# Patient Record
Sex: MALE | Race: WHITE | NOT HISPANIC OR LATINO | Employment: OTHER | URBAN - METROPOLITAN AREA
[De-identification: names, ages, dates, MRNs, and addresses within clinical notes are randomized per-mention and may not be internally consistent; named-entity substitution may affect disease eponyms.]

---

## 2021-06-16 PROBLEM — A41.9 SEPSIS (H): Status: ACTIVE | Noted: 2018-06-09

## 2021-06-16 PROBLEM — L03.90 CELLULITIS: Status: ACTIVE | Noted: 2018-07-02

## 2021-06-16 PROBLEM — K21.9 GERD (GASTROESOPHAGEAL REFLUX DISEASE): Status: ACTIVE | Noted: 2018-05-16

## 2021-06-25 ENCOUNTER — HOSPITAL ENCOUNTER (EMERGENCY)
Dept: EMERGENCY MEDICINE | Facility: CLINIC | Age: 76
Discharge: HOME | End: 2021-06-25
Attending: EMERGENCY MEDICINE
Payer: MEDICARE

## 2021-06-25 DIAGNOSIS — S39.012A BACK STRAIN, INITIAL ENCOUNTER: ICD-10-CM

## 2021-06-25 DIAGNOSIS — S09.90XA CLOSED HEAD INJURY, INITIAL ENCOUNTER: ICD-10-CM

## 2021-06-25 DIAGNOSIS — T07.XXXA ABRASION, MULTIPLE SITES: ICD-10-CM

## 2021-06-25 RX ORDER — OXYCODONE HYDROCHLORIDE 5 MG/1
5-10 TABLET ORAL EVERY 6 HOURS PRN
Qty: 20 TABLET | Refills: 0 | Status: SHIPPED | OUTPATIENT
Start: 2021-06-25 | End: 2022-08-24

## 2021-06-25 RX ORDER — CYCLOBENZAPRINE HCL 10 MG
10 TABLET ORAL 3 TIMES DAILY PRN
Qty: 15 TABLET | Refills: 0 | Status: SHIPPED | OUTPATIENT
Start: 2021-06-25 | End: 2023-04-04

## 2021-06-26 NOTE — ED PROVIDER NOTES
EMERGENCY DEPARTMENT ENCOUNTER      NAME: Nirav Baker  AGE: 76 y.o. male  YOB: 1945  MRN: [id number]  EVALUATION DATE & TIME: 6/25/2021 11:06 AM    PCP: Mae Carranza PA-C    ED PROVIDER: Genoveva Eric M.D.      Chief Complaint   Patient presents with     Fall     Back Pain         FINAL IMPRESSION:  1. Back strain, initial encounter    2. Closed head injury, initial encounter    3. Abrasion, multiple sites          ED COURSE & MEDICAL DECISION MAKING:    Pertinent Labs & Imaging studies reviewed. (See chart for details)  11:15 AM I saw the patient wearing an eye shield, surgical mask, N95, and gloves.    2:52 PM I checked in on patient who is feeling improved but continues to endorse pain with movement. Discussed work up results and plan for discharge with outpatient follow up. Patient agreeable with plan.     76 y.o. male presents to the Emergency Department for evaluation of fall and back pain.    ED Course as of Jun 25 1457   Fri Jun 25, 2021   1204 Given how the patient fell on outstretched hands and landing on his knees just nicking his forehead, low risk for significant head injury and he is not on any blood thinners except aspirin.  He also has no LOC with this and no neurologic symptoms so at this point no further imaging is needed we will just have him monitor for symptoms at home and return here if they occur.  He is comfortable with that.  He does not even have a headache but notes he does get frequent migraines.  He is more so having diffuse muscle spasms of his back.  This seems more likely to be muscular but he is obese and so it certainly is possible that even with how he fell if he bent significantly he could have some compression fracture or wedge fracture so we will do imaging of his back specifically in the areas where he is having discomfort.  He has no radicular symptoms and no neck pain so no imaging there as needed.  We discussed pain control and we will start with  morphine and then Percocet.  As far as the abrasion laceration over his right eyebrow he is comfortable just using gauze to treat that as its not gaping and should heal fine.  His tetanus is up-to-date as of earlier this year.  His other scrapes are not associated with any bony pain or joint swelling so no further imaging there as needed.  He is having some shortness of breath but it is primarily just with taking a deep breath because of the back pain but we will get an EKG on him.    [NB]      ED Course User Index  [NB] Genoveva Eric MD         At the conclusion of the encounter I discussed the results of all of the tests and the disposition. The questions were answered. The patient or family acknowledged understanding and was agreeable with the care plan.       0 minutes of critical care time not including time for procedures which is documented separately under procedures heading.    MEDICATIONS GIVEN IN THE EMERGENCY:  Medications   bacitracin ointment packet 1 packet (has no administration in time range)   sodium chloride flush 10 mL (NS) (has no administration in time range)   cyclobenzaprine tablet 10 mg (FLEXERIL) (has no administration in time range)   morphine injection 6 mg (6 mg Intravenous Given 6/25/21 1150)   oxyCODONE-acetaminophen 5-325 mg 2 tablet (PERCOCET/ENDOCET) (2 tablets Oral Given 6/25/21 1142)       NEW PRESCRIPTIONS STARTED AT TODAY'S ER VISIT  Current Discharge Medication List      START taking these medications    Details   cyclobenzaprine (FLEXERIL) 10 MG tablet Take 1 tablet (10 mg total) by mouth 3 (three) times a day as needed for muscle spasms.  Qty: 15 tablet, Refills: 0    Associated Diagnoses: Back strain, initial encounter      oxyCODONE (ROXICODONE) 5 MG immediate release tablet Take 1-2 tablets (5-10 mg total) by mouth every 6 (six) hours as needed for pain.  Qty: 20 tablet, Refills: 0    Associated Diagnoses: Back strain, initial encounter         CONTINUE these  medications which have NOT CHANGED    Details   acetaminophen (TYLENOL) 325 MG tablet Take 2 tablets by mouth every 4 (four) hours as needed.      aspirin 325 MG EC tablet Take 1 tablet by mouth at bedtime.       aspirin-acetaminophen-caffeine (EXCEDRIN MIGRAINE) 250-250-65 mg per tablet Take 1 tablet by mouth every 6 (six) hours as needed for pain.      calcium carbonate (TUMS ULTRA) 400 mg calcium (1,000 mg) Chew Chew 2 tablets at bedtime.       chlorthalidone (HYGROTEN) 25 MG tablet Take 12.5 mg by mouth at bedtime.       cholecalciferol, vitamin D3, 5,000 unit capsule Take 5,000 Units by mouth at bedtime.       diphenhydrAMINE (BENADRYL) 25 mg tablet Take 25 mg by mouth at bedtime as needed for itching.      ezetimibe (ZETIA) 10 mg tablet Take 1 tablet by mouth at bedtime.       glipiZIDE (GLUCOTROL XL) 10 MG 24 hr tablet Take 1 tablet (10 mg total) by mouth daily.  Qty: 30 tablet, Refills: 0    Associated Diagnoses: Diabetes mellitus without complication (H)      indomethacin (INDOCIN) 50 MG capsule Take 1 capsule by mouth 3 (three) times a day as needed.       irbesartan (AVAPRO) 300 MG tablet Take 300 mg by mouth at bedtime.      Lactobacillus rhamnosus GG (CULTURELLE) 10-15 Billion cell capsule Take 2 capsules by mouth daily.      LANTUS U-100 INSULIN 100 unit/mL injection Inject 60 Units under the skin 2 (two) times a day.  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       metFORMIN (GLUCOPHAGE) 1000 MG tablet Take 1 tablet by mouth at bedtime.       multivitamin (ONE A DAY) per tablet Take 1 tablet by mouth at bedtime.       omega-3/dha/epa/fish oil (FISH OIL-OMEGA-3 FATTY ACIDS) 300-1,000 mg capsule Take 6 g by mouth at bedtime.      omeprazole (PRILOSEC) 20 MG capsule Take 1 capsule by mouth at bedtime.       polyethylene glycol (MIRALAX) 17 gram packet Take 17 g by mouth at bedtime.      "  pseudoephedrine (SUDAFED) 30 MG tablet Take 1 tablet by mouth every 12 (twelve) hours as needed.      riboflavin, vitamin B2, 100 mg Tab Take 1 tablet by mouth at bedtime.       TRADJENTA 5 mg Tab Take 1 tablet by mouth at bedtime.                 =================================================================    HPI    Patient information was obtained from: Patient     Use of : N/A       Nirav Baker is a 76 y.o. male with a pertinent history of metabolic disease, morbid obesity, adenocarcinoma of prostate, diabetes mellitus, mixed hyperlipidemia, migraines, hypertension, acute non-ST segment elevation, celiac disease, GERD and obstructive sleep apnea who presents to this ED via walk-in for evaluation of back pain secondary to a fall.     Patient reports at 09:15 this morning while walking alongside a road under construction he misstepped, lost his balance, and fell forward into the middle of the street. Patient did hit his head but denies loss of consciousness. Able to ambulate after fall. Of note endorses \"severe\" back pain with associated muscle spasms throughout the entire back except for the lower region. Associated pain with deep inhalation secondary to back pain. Additionally obtains a laceration above the right eyelid and abrasions to bilateral knees and right elbow.     Not on anticoagulants. Last Tdap was 05/12/2021. History of chronic low back pain. Denies current low back pain. History of C5 and C6 fusion. Complaint with all daily medications including aspirin. History of diabetes and insulin dependent. Sugars this morning were within normal limits. History of migraine headaches. Medicates with tylenol as needed for pain. Fully vaccinated with Pfizer for covid-19. Denies tobacco use. Patient denies headache, fever, chills, cough, chest pain, shortness of breath, nausea, vomiting, and any other complaints at this time.     REVIEW OF SYSTEMS   Review of Systems   Constitutional: " Negative for chills and fever.   HENT: Negative.    Respiratory: Negative for cough and shortness of breath.    Cardiovascular: Negative for chest pain.   Gastrointestinal: Negative for nausea and vomiting.   Musculoskeletal: Positive for back pain.   Skin: Positive for wound (laceration to right eyelid and abrasions to bilateral knees and right elbow).   Neurological: Negative for headaches.   All other systems reviewed and are negative.     Otherwise, outside of that noted in the HPI, all other systems negative.    PAST MEDICAL HISTORY:  Past Medical History:   Diagnosis Date     Acute non-ST segment elevation myocardial infarction (H) 9/11/2012    Overview:  Admitted 9/8/12 at [facility] in the setting of sepsis and acute renal failure following a prostate biopsy on 9/7/12, and found to also have acute MI. Cardiology consultation Dr. Spence from [facility]. Echocardiogram done 9/10/12 shows EF of 45% with mild to moderate inferior wall hypokinesis and mild global LV hypokinesis, mild LVH, mildly dilated right ventricle with mildly reduced RV function and moderately dilated left and right atria. Transferred to [facility] for coronary angiography once his acute renal failure resolved, and angiography did not find any lesion amenable to stenting. His troponin elevation was felt by Cardiology (Dr. Jacobo) to most likely be due to stress of sepsis.     Adenocarcinoma of prostate (H) 12/13/2012    Overview:  Independence score 7/10 confined to the prostate gland, bilateral and involving <5% of the gland, incidentally found when robotic prostatectomy done 12/2012 for PIN and BPH causing urinary retention.     Celiac disease 5/20/2011     Diabetes mellitus without complication (H) 12/3/2002    Overview:  Dx 3/1997.     GERD (gastroesophageal reflux disease) 5/16/2018     Hypertension      Metabolic disease      Migraines 8/14/2011     Mixed hyperlipidemia 12/10/2006     Morbid obesity (H)       "Obstructive sleep apnea syndrome 5/21/2007    Overview:  On CPAP. POLYSOMNOGRAM  PSG type: split night Date of study:12/06/2003; weight: 318# Facility: [facility] Sleep [facility] Baseline data: Total Sleep Time:  195\" Sleep Efficiency: 96.4% AHI: 58.5 RDI: 63.1 Jun O2%: 66 MD interp: ELDON, CPAP 15cm water        PAST SURGICAL HISTORY:  No past surgical history on file.        CURRENT MEDICATIONS:    No current facility-administered medications on file prior to encounter.      Current Outpatient Medications on File Prior to Encounter   Medication Sig     acetaminophen (TYLENOL) 325 MG tablet Take 2 tablets by mouth every 4 (four) hours as needed.     aspirin 325 MG EC tablet Take 1 tablet by mouth at bedtime.      aspirin-acetaminophen-caffeine (EXCEDRIN MIGRAINE) 250-250-65 mg per tablet Take 1 tablet by mouth every 6 (six) hours as needed for pain.     calcium carbonate (TUMS ULTRA) 400 mg calcium (1,000 mg) Chew Chew 2 tablets at bedtime.      chlorthalidone (HYGROTEN) 25 MG tablet Take 12.5 mg by mouth at bedtime.      cholecalciferol, vitamin D3, 5,000 unit capsule Take 5,000 Units by mouth at bedtime.      diphenhydrAMINE (BENADRYL) 25 mg tablet Take 25 mg by mouth at bedtime as needed for itching.     ezetimibe (ZETIA) 10 mg tablet Take 1 tablet by mouth at bedtime.      glipiZIDE (GLUCOTROL XL) 10 MG 24 hr tablet Take 1 tablet (10 mg total) by mouth daily. (Patient taking differently: Take 20 mg by mouth at bedtime. )     indomethacin (INDOCIN) 50 MG capsule Take 1 capsule by mouth 3 (three) times a day as needed.      irbesartan (AVAPRO) 300 MG tablet Take 300 mg by mouth at bedtime.     Lactobacillus rhamnosus GG (CULTURELLE) 10-15 Billion cell capsule Take 2 capsules by mouth daily.     LANTUS U-100 INSULIN 100 unit/mL injection Inject 60 Units under the skin 2 (two) times a day.     magnesium 250 mg Tab Take 500 mg by mouth at bedtime.      metFORMIN (GLUCOPHAGE) 1000 MG tablet Take 1 tablet by mouth at " "bedtime.      multivitamin (ONE A DAY) per tablet Take 1 tablet by mouth at bedtime.      omega-3/dha/epa/fish oil (FISH OIL-OMEGA-3 FATTY ACIDS) 300-1,000 mg capsule Take 6 g by mouth at bedtime.     omeprazole (PRILOSEC) 20 MG capsule Take 1 capsule by mouth at bedtime.      polyethylene glycol (MIRALAX) 17 gram packet Take 17 g by mouth at bedtime.      pseudoephedrine (SUDAFED) 30 MG tablet Take 1 tablet by mouth every 12 (twelve) hours as needed.     riboflavin, vitamin B2, 100 mg Tab Take 1 tablet by mouth at bedtime.      TRADJENTA 5 mg Tab Take 1 tablet by mouth at bedtime.        ALLERGIES:  Allergies   Allergen Reactions     Amoxicillin Hives     Per Dr. Barrientos, patient has tolerated Keflex.     Atorvastatin Diarrhea and Myalgia     Fenofibrate Myalgia     Hydrocodone-Acetaminophen Other (See Comments)     Patient reports \"going white as a sheet\" and cold sweats     Lisinopril Cough     Neosporin ([facility]) [Neomycin-Bacitracnzn-Polymyxnb]      Pravastatin Sodium Myalgia     Trulicity [Dulaglutide]      Gluten Other (See Comments)     Reports feeling worse with gluten; tested below threshold for Celiac but daughter is positive     Insulin Detemir Rash       FAMILY HISTORY:  No family history on file.    SOCIAL HISTORY:   Social History     Socioeconomic History     Marital status:      Spouse name: Not on file     Number of children: Not on file     Years of education: Not on file     Highest education level: Not on file   Occupational History     Not on file   Social Needs     Financial resource strain: Not on file     Food insecurity     Worry: Not on file     Inability: Not on file     Transportation needs     Medical: Not on file     Non-medical: Not on file   Tobacco Use     Smoking status: Never Smoker     Smokeless tobacco: Never Used   Substance and Sexual Activity     Alcohol use: Yes     Alcohol/week: 1.0 standard drinks     Types: 1 Standard drinks or equivalent per week     Drug use: " No     Sexual activity: Not on file   Lifestyle     Physical activity     Days per week: Not on file     Minutes per session: Not on file     Stress: Not on file   Relationships     Social connections     Talks on phone: Not on file     Gets together: Not on file     Attends Holiness service: Not on file     Active member of club or organization: Not on file     Attends meetings of clubs or organizations: Not on file     Relationship status: Not on file     Intimate partner violence     Fear of current or ex partner: Not on file     Emotionally abused: Not on file     Physically abused: Not on file     Forced sexual activity: Not on file   Other Topics Concern     Not on file   Social History Narrative     Not on file       VITALS:  Patient Vitals for the past 24 hrs:   BP Temp Temp src Pulse Resp SpO2 Weight   06/25/21 1357 138/65 -- -- 75 16 97 % --   06/25/21 1102 161/77 98.9  F (37.2  C) Oral 80 18 97 % (!) 339 lb (153.8 kg)       PHYSICAL EXAM    Constitutional:  Patient sitting in bed. Appears comfortable at rest but endorses pain with movement.   Eyes:  PERRLA bilaterally, no hyphema, no diplopia,   HENT:  NCAT, canals clear, no lacerations, no hemotympanum, no epistaxis, no septal hematoma, oropharynx clear, no blood in posterior pharynx, teeth intact, trachea midline   Spine:  No C-Collar,Tenderness over T8 and L1. Muscles are diffusely sore.    Respiratory:  Clear to auscultation, equal breath sounds bilaterally but somewhat distant sounds. no subcutaneous air, atraumatic chest wall, stable chest wall to ap and lateral palpation.   Cardiovascular:   RRR S1 S2, no murmurs or friction rubs, No JVD, pulses are equal and symmetrically in all extremities  Abdomen:  Soft, Non-distended, non-tender, atraumatic,  Pelvis:  Stable, nontender to ap and lateral compression and to rock.    /Rectal:  No signs of trauma, no gross hematuria, no blood at the urethral meatus, no perineal ecchymosis.  Musculoskeletal:   Tenderness over T8 and L1. Muscles are diffusely sore.  Integument:  Superficial laceration 1.5 cm over the right eyebrow. Laceration is starting to scab over. Bleeding controlled. Abrasion to bilateral knees. No overlying contusion. Contact road-rash to the right elbow that is not completely through the skins surface.   Neurologic:  Awake, Alert, and Oriented x3, GCS 15, diffusely normal sensation including perirectally, spontaneously able to move all extremities         LAB:  All pertinent labs reviewed and interpreted.  No results found for this visit on 06/25/21.    RADIOLOGY:  Reviewed all pertinent imaging. Please see official radiology report.  Xr Thoracic Spine 3 Vws    Result Date: 6/25/2021  EXAM: XR THORACIC SPINE 3 VWS LOCATION: [facility] DATE/TIME: 6/25/2021 12:59 PM INDICATION: fall forwards landed on hands/knees and now diffuse back spasm with focal pain T8 area and L1 area. COMPARISON: Chest x-ray 02/20/2018. TECHNIQUE: CR Thoracic Spine.     Vertebral body heights are unremarkable. Alignment is unremarkable in the lateral projection. There is mild broad-based dextroconvex thoracic curvature. There are confluent anterior endplate osteophytes, compatible with diffuse idiopathic skeletal hyperostosis. Intervertebral disc spaces appear preserved. Pedicles appear symmetric. Visualized lung fields are well aerated.      Xr Lumbar Spine 2 Or 3 Vws    Result Date: 6/25/2021  EXAM: XR LUMBAR SPINE 2 OR 3 VWS LOCATION: [facility] DATE/TIME: 6/25/2021 12:54 PM INDICATION: fall forwards landed on hands/knees and now diffuse back spasm with focal pain t8 area and l1 area. COMPARISON: Radiograph 11/16/2020. Abdominal CT 06/19/2018. TECHNIQUE: CR Lumbar Spine.     Nomenclature presumes 5 lumbar type vertebral bodies. Total body heights are unremarkable and unchanged. There is mild L3-L4 and L2-L3 retrolisthesis, similar to prior. Mild L1-L2, L3-L4 and L4-L5  intervertebral disc height loss suggests underlying spondylosis. Pedicles appear symmetric. The imaged portion of the sacrum appears grossly intact. However, it is partially obscured by stool and bowel gas.    Xr Chest 1 View    Result Date: 6/25/2021  EXAM: XR CHEST 1 VIEW LOCATION: [facility] DATE/TIME: 6/25/2021 12:53 PM INDICATION: fall forwards landed on hands/knees and now diffuse back spasm with focal pain t8 area and l1 area.  causing sob COMPARISON: 06/09/2018     No focal consolidation or pleural effusion. Stable mildly enlarged cardiac silhouette. The pulmonary vasculature is normal.        I, Neal Domínguez , am serving as a scribe to document services personally performed by Dr. Jeaneth MD, based on my observation and the provider's statements to me. I, Genoveva Eric MD attest that Neal Domínguez  is acting in a scribe capacity, has observed my performance of the services and has documented them in accordance with my direction.    Genoveva Eric M.D.  Emergency Medicine  [facility] EMERGENCY ROOM  [address] [address]  Dept: [phone]  Loc: [phone]     Genoveva Eric MD  06/25/21 1316

## 2021-07-06 VITALS — WEIGHT: 315 LBS | BODY MASS INDEX: 45.98 KG/M2

## 2021-07-07 NOTE — ED TRIAGE NOTES
Patient tripped on curb outside @0915 this morning.  Patient has lac to R forehead, not on thinners, no LOC, has diffuse back pain.  Perla Nelson RN.......6/25/2021 11:06 AM

## 2022-02-19 ENCOUNTER — HOSPITAL ENCOUNTER (EMERGENCY)
Facility: CLINIC | Age: 77
Discharge: HOME | End: 2022-02-20
Attending: EMERGENCY MEDICINE | Admitting: EMERGENCY MEDICINE
Payer: COMMERCIAL

## 2022-02-19 DIAGNOSIS — R79.89 ELEVATED SERUM CREATININE: ICD-10-CM

## 2022-02-19 DIAGNOSIS — N23 URETERAL COLIC: ICD-10-CM

## 2022-02-19 DIAGNOSIS — N20.1 URETEROLITHIASIS: ICD-10-CM

## 2022-02-19 PROBLEM — M25.551 BILATERAL HIP PAIN: Status: ACTIVE | Noted: 2020-11-17

## 2022-02-19 PROBLEM — R40.0 UNCONTROLLED DAYTIME SOMNOLENCE: Status: ACTIVE | Noted: 2017-12-08

## 2022-02-19 PROBLEM — G89.29 CHRONIC BILATERAL LOW BACK PAIN WITHOUT SCIATICA: Status: ACTIVE | Noted: 2020-11-17

## 2022-02-19 PROBLEM — G89.29 CHRONIC RIGHT-SIDED THORACIC BACK PAIN: Status: ACTIVE | Noted: 2021-10-07

## 2022-02-19 PROBLEM — T46.6X5D ADVERSE EFFECT OF ANTIHYPERLIPIDEMIC AND ANTIARTERIOSCLEROTIC DRUGS, SUBSEQUENT ENCOUNTER: Status: ACTIVE | Noted: 2021-12-02

## 2022-02-19 PROBLEM — M54.6 CHRONIC RIGHT-SIDED THORACIC BACK PAIN: Status: ACTIVE | Noted: 2021-10-07

## 2022-02-19 PROBLEM — M25.552 BILATERAL HIP PAIN: Status: ACTIVE | Noted: 2020-11-17

## 2022-02-19 PROBLEM — M54.50 CHRONIC BILATERAL LOW BACK PAIN WITHOUT SCIATICA: Status: ACTIVE | Noted: 2020-11-17

## 2022-02-19 LAB
ALBUMIN SERPL-MCNC: 3.6 G/DL (ref 3.5–5)
ALP SERPL-CCNC: 93 U/L (ref 45–120)
ALT SERPL W P-5'-P-CCNC: 40 U/L (ref 0–45)
ANION GAP SERPL CALCULATED.3IONS-SCNC: 13 MMOL/L (ref 5–18)
AST SERPL W P-5'-P-CCNC: 37 U/L (ref 0–40)
BASOPHILS # BLD AUTO: 0.1 10E3/UL (ref 0–0.2)
BASOPHILS NFR BLD AUTO: 1 %
BILIRUB SERPL-MCNC: 0.8 MG/DL (ref 0–1)
BUN SERPL-MCNC: 26 MG/DL (ref 8–28)
C REACTIVE PROTEIN LHE: 0.4 MG/DL (ref 0–0.8)
CALCIUM SERPL-MCNC: 9 MG/DL (ref 8.5–10.5)
CHLORIDE BLD-SCNC: 105 MMOL/L (ref 98–107)
CO2 SERPL-SCNC: 20 MMOL/L (ref 22–31)
CREAT SERPL-MCNC: 1.6 MG/DL (ref 0.7–1.3)
EOSINOPHIL # BLD AUTO: 0.1 10E3/UL (ref 0–0.7)
EOSINOPHIL NFR BLD AUTO: 1 %
ERYTHROCYTE [DISTWIDTH] IN BLOOD BY AUTOMATED COUNT: 13.8 % (ref 10–15)
GFR SERPL CREATININE-BSD FRML MDRD: 44 ML/MIN/1.73M2
GLUCOSE BLD-MCNC: 202 MG/DL (ref 70–125)
HCT VFR BLD AUTO: 45.5 % (ref 40–53)
HGB BLD-MCNC: 15.8 G/DL (ref 13.3–17.7)
IMM GRANULOCYTES # BLD: 0 10E3/UL
IMM GRANULOCYTES NFR BLD: 0 %
INR PPP: 1.03 (ref 0.85–1.15)
LIPASE SERPL-CCNC: 19 U/L (ref 0–52)
LYMPHOCYTES # BLD AUTO: 1.2 10E3/UL (ref 0.8–5.3)
LYMPHOCYTES NFR BLD AUTO: 11 %
MCH RBC QN AUTO: 30.1 PG (ref 26.5–33)
MCHC RBC AUTO-ENTMCNC: 34.7 G/DL (ref 31.5–36.5)
MCV RBC AUTO: 87 FL (ref 78–100)
MONOCYTES # BLD AUTO: 0.5 10E3/UL (ref 0–1.3)
MONOCYTES NFR BLD AUTO: 5 %
NEUTROPHILS # BLD AUTO: 8.5 10E3/UL (ref 1.6–8.3)
NEUTROPHILS NFR BLD AUTO: 82 %
NRBC # BLD AUTO: 0 10E3/UL
NRBC BLD AUTO-RTO: 0 /100
PLATELET # BLD AUTO: 181 10E3/UL (ref 150–450)
POTASSIUM BLD-SCNC: 4.3 MMOL/L (ref 3.5–5)
PROT SERPL-MCNC: 7.4 G/DL (ref 6–8)
RBC # BLD AUTO: 5.25 10E6/UL (ref 4.4–5.9)
SODIUM SERPL-SCNC: 138 MMOL/L (ref 136–145)
TROPONIN I SERPL-MCNC: <0.01 NG/ML (ref 0–0.29)
WBC # BLD AUTO: 10.4 10E3/UL (ref 4–11)

## 2022-02-19 PROCEDURE — 96374 THER/PROPH/DIAG INJ IV PUSH: CPT

## 2022-02-19 PROCEDURE — 85610 PROTHROMBIN TIME: CPT | Performed by: EMERGENCY MEDICINE

## 2022-02-19 PROCEDURE — 83690 ASSAY OF LIPASE: CPT | Performed by: EMERGENCY MEDICINE

## 2022-02-19 PROCEDURE — 99285 EMERGENCY DEPT VISIT HI MDM: CPT | Mod: 25

## 2022-02-19 PROCEDURE — 86140 C-REACTIVE PROTEIN: CPT | Performed by: EMERGENCY MEDICINE

## 2022-02-19 PROCEDURE — 84484 ASSAY OF TROPONIN QUANT: CPT | Performed by: EMERGENCY MEDICINE

## 2022-02-19 PROCEDURE — 96375 TX/PRO/DX INJ NEW DRUG ADDON: CPT

## 2022-02-19 PROCEDURE — 93005 ELECTROCARDIOGRAM TRACING: CPT | Performed by: EMERGENCY MEDICINE

## 2022-02-19 PROCEDURE — 85025 COMPLETE CBC W/AUTO DIFF WBC: CPT | Performed by: EMERGENCY MEDICINE

## 2022-02-19 PROCEDURE — 80053 COMPREHEN METABOLIC PANEL: CPT | Performed by: EMERGENCY MEDICINE

## 2022-02-19 PROCEDURE — 36415 COLL VENOUS BLD VENIPUNCTURE: CPT | Performed by: EMERGENCY MEDICINE

## 2022-02-20 ENCOUNTER — APPOINTMENT (OUTPATIENT)
Dept: CT IMAGING | Facility: CLINIC | Age: 77
End: 2022-02-20
Attending: EMERGENCY MEDICINE
Payer: COMMERCIAL

## 2022-02-20 VITALS
OXYGEN SATURATION: 97 % | BODY MASS INDEX: 42.66 KG/M2 | HEIGHT: 72 IN | WEIGHT: 315 LBS | RESPIRATION RATE: 23 BRPM | HEART RATE: 94 BPM | SYSTOLIC BLOOD PRESSURE: 134 MMHG | DIASTOLIC BLOOD PRESSURE: 68 MMHG | TEMPERATURE: 97.3 F

## 2022-02-20 LAB
ALBUMIN UR-MCNC: 10 MG/DL
APPEARANCE UR: ABNORMAL
BACTERIA #/AREA URNS HPF: ABNORMAL /HPF
BILIRUB UR QL STRIP: NEGATIVE
COLOR UR AUTO: YELLOW
GLUCOSE UR STRIP-MCNC: 50 MG/DL
HGB UR QL STRIP: ABNORMAL
KETONES UR STRIP-MCNC: 10 MG/DL
LEUKOCYTE ESTERASE UR QL STRIP: NEGATIVE
MUCOUS THREADS #/AREA URNS LPF: PRESENT /LPF
NITRATE UR QL: NEGATIVE
PH UR STRIP: 5 [PH] (ref 5–7)
RBC URINE: >182 /HPF
SP GR UR STRIP: 1.02 (ref 1–1.03)
UROBILINOGEN UR STRIP-MCNC: <2 MG/DL
WBC URINE: 5 /HPF

## 2022-02-20 PROCEDURE — 250N000013 HC RX MED GY IP 250 OP 250 PS 637: Performed by: EMERGENCY MEDICINE

## 2022-02-20 PROCEDURE — 250N000011 HC RX IP 250 OP 636: Performed by: EMERGENCY MEDICINE

## 2022-02-20 PROCEDURE — 74176 CT ABD & PELVIS W/O CONTRAST: CPT

## 2022-02-20 PROCEDURE — 81001 URINALYSIS AUTO W/SCOPE: CPT | Performed by: EMERGENCY MEDICINE

## 2022-02-20 RX ORDER — ACETAMINOPHEN 325 MG/1
975 TABLET ORAL ONCE
Status: COMPLETED | OUTPATIENT
Start: 2022-02-20 | End: 2022-02-20

## 2022-02-20 RX ORDER — DIMENHYDRINATE 50 MG/ML
25 INJECTION, SOLUTION INTRAMUSCULAR; INTRAVENOUS ONCE
Status: COMPLETED | OUTPATIENT
Start: 2022-02-20 | End: 2022-02-20

## 2022-02-20 RX ORDER — KETOROLAC TROMETHAMINE 15 MG/ML
15 INJECTION, SOLUTION INTRAMUSCULAR; INTRAVENOUS ONCE
Status: COMPLETED | OUTPATIENT
Start: 2022-02-20 | End: 2022-02-20

## 2022-02-20 RX ORDER — DIMENHYDRINATE 50 MG
50 TABLET ORAL EVERY 6 HOURS PRN
Qty: 28 TABLET | Refills: 0 | Status: SHIPPED | OUTPATIENT
Start: 2022-02-20 | End: 2022-02-27

## 2022-02-20 RX ORDER — OXYCODONE HYDROCHLORIDE 5 MG/1
5-10 TABLET ORAL EVERY 6 HOURS PRN
Qty: 10 TABLET | Refills: 0 | Status: SHIPPED | OUTPATIENT
Start: 2022-02-20 | End: 2022-02-23

## 2022-02-20 RX ORDER — DIMENHYDRINATE 50 MG
50 TABLET ORAL AT BEDTIME
Qty: 7 TABLET | Refills: 0 | Status: SHIPPED | OUTPATIENT
Start: 2022-02-20 | End: 2022-02-27

## 2022-02-20 RX ORDER — IBUPROFEN 200 MG
400 TABLET ORAL EVERY 6 HOURS
Qty: 56 TABLET | Refills: 0 | Status: SHIPPED | OUTPATIENT
Start: 2022-02-20 | End: 2022-02-27

## 2022-02-20 RX ORDER — FENTANYL CITRATE 50 UG/ML
50 INJECTION, SOLUTION INTRAMUSCULAR; INTRAVENOUS ONCE
Status: COMPLETED | OUTPATIENT
Start: 2022-02-20 | End: 2022-02-20

## 2022-02-20 RX ADMIN — KETOROLAC TROMETHAMINE 15 MG: 15 INJECTION, SOLUTION INTRAMUSCULAR; INTRAVENOUS at 02:11

## 2022-02-20 RX ADMIN — FENTANYL CITRATE 50 MCG: 50 INJECTION INTRAMUSCULAR; INTRAVENOUS at 02:09

## 2022-02-20 RX ADMIN — ACETAMINOPHEN 975 MG: 325 TABLET, FILM COATED ORAL at 02:14

## 2022-02-20 RX ADMIN — DIMENHYDRINATE 25 MG: 50 INJECTION, SOLUTION INTRAMUSCULAR; INTRAVENOUS at 02:13

## 2022-02-20 NOTE — ED PROVIDER NOTES
EMERGENCY DEPARTMENT [facility]OU[facility]ter      NAME: Nirav Baker  AGE: 77 year old male  YOB: 1945  MRN: [id number]  EVALUATION DATE & TIME: 2/19/2022 10:57 PM    PCP: No primary care provider on file.    ED PROVIDER: Lula Cramer MD      Chief Complaint   Patient presents with     Abdominal Pain     Flank Pain         FINAL IMPRESSION:  1. Ureteral colic    2. Ureterolithiasis    3. Elevated serum creatinine          ED COURSE & MEDICAL DECISION MAKING:      In summary, the patient is a 77-year-old male that presents to the emergency department with low abdominal and left flank pain that started a few hours prior to his presentation to the emergency department.  His pain is secondary to ureteral colic.  We will discharge to home with symptomatic treatment and close out patient follow-up with the kidney stone [place].    11:18 PM I met with the patient for the initial interview and physical examination. Vital signs stable. Discussed plan for treatment and workup in the ED, which includes labs, EKG, and imaging. Offered pain medication which the patient declined.  0145-discussed CT findings with patient.  The patient would like pain medication at this time.  He rates his pain at 7 out of 10 in intensity.  Fentanyl 50 mcg and Toradol 15 mg IV were administered for pain.  Tylenol 975 mg p.o. was also administered.  Dramamine 25 mg IV was administered for nausea.  Reevaluation reveals pain is improved in the ED.    At the conclusion of the encounter I discussed the results of all of the tests and the disposition. The questions were answered. The patient or family acknowledged understanding and was agreeable with the care plan.         MEDICATIONS GIVEN IN THE EMERGENCY:  Medications   ketorolac (TORADOL) injection 15 mg (15 mg Intravenous Given 2/20/22 0211)   dimenhyDRINATE (DRAMAMINE) injection 25 mg (25 mg Intravenous Given 2/20/22 0213)   acetaminophen (TYLENOL) tablet 975 mg (975 mg Oral Given  2/20/22 0214)   fentaNYL (PF) (SUBLIMAZE) injection 50 mcg (50 mcg Intravenous Given 2/20/22 0209)       NEW PRESCRIPTIONS STARTED AT TODAY'S ER VISIT  New Prescriptions    DIMENHYDRINATE (DRAMAMINE) 50 MG TABLET    Take 1 tablet (50 mg) by mouth At Bedtime for 7 days    DIMENHYDRINATE (DRAMAMINE) 50 MG TABLET    Take 1 tablet (50 mg) by mouth every 6 hours as needed for other (kidney stone pain management)    IBUPROFEN (ADVIL/MOTRIN) 200 MG TABLET    Take 2 tablets (400 mg) by mouth every 6 hours for 7 days    OXYCODONE (ROXICODONE) 5 MG TABLET    Take 1-2 tablets (5-10 mg) by mouth every 6 hours as needed for moderate to severe pain          =================================================================    HPI        Nirav Baker is a 77 year old male with a pertinent history of morbid obesity, sepsis, NSTEMI, CAD, insulin dependent diabetes mellitus type 2, hypertension, GERD, migraines, hyperlipidemia, hernia who presents to this ED by walk in for evaluation of abdominal pain and flank pain.     Patient ate dinner at 6:00 PM and around 6:30 PM (~5 hours ago), patient had fairly quick onset of lower abdominal pain. Patient explains that he feels this pain occasionally but around 30-40 minutes after onset, he will have a bowel movement and it will resolve. However, tonight, pain was not resolving and was at a 6-7/10 in severity. Around 8:30 PM (~3 hours ago), patient also started feeling constant pain in his left flank. Endorses associated nausea and urinary frequency.     Patient currently rates abdominal pain as a 4-5/10 and left flank pain as 5-6/10. Endorses that pushing on his abdomen worsens the flank pain. Reports that he has never been to a physician for this lower abdominal pain because it always resolved after a bowel movement.     Patient endorses surgical history of cholecystectomy, appendectomy, inguinal hernia repair, and C5-C6 fusion. Endorse taking a daily baby aspirin among other daily  medications. Patient lives with his wife and her son.     Patient denies vomiting, diaphoresis, dysuria, hematuria, bloody stool, fever, chills, or any other complaints at this time.      REVIEW OF SYSTEMS     Constitutional:  Denies fever, diaphoresis, or chills  HENT:  Denies sore throat   Respiratory:  Denies cough or shortness of breath   Cardiovascular:  Denies chest pain or palpitations  GI:  Positive for abdominal pain (lower) and nausea. Denies vomiting or bloody stool  : Positive for flank pain (left) and urinary frequency. Denies dysuria, hematuria.   Musculoskeletal:  Denies any new extremity pain   Skin:  Denies rash   Neurologic:  Denies headache, focal weakness or sensory changes    All other systems reviewed and are negative      PAST MEDICAL HISTORY:  History reviewed. No pertinent past medical history.    PAST SURGICAL HISTORY:  History reviewed. No pertinent surgical history.        CURRENT MEDICATIONS:    dimenhyDRINATE (DRAMAMINE) 50 MG tablet  dimenhyDRINATE (DRAMAMINE) 50 MG tablet  ibuprofen (ADVIL/MOTRIN) 200 MG tablet  oxyCODONE (ROXICODONE) 5 MG tablet  acetaminophen (TYLENOL) 325 MG tablet  aspirin 325 MG EC tablet  aspirin-acetaminophen-caffeine (EXCEDRIN MIGRAINE) 250-250-65 mg per tablet  calcium carbonate (TUMS ULTRA) 400 mg calcium (1,000 mg) Chew  chlorthalidone (HYGROTEN) 25 MG tablet  cholecalciferol, vitamin D3, 5,000 unit capsule  cyclobenzaprine (FLEXERIL) 10 MG tablet  diphenhydrAMINE (BENADRYL) 25 mg tablet  ezetimibe (ZETIA) 10 mg tablet  glipiZIDE (GLUCOTROL XL) 10 MG 24 hr tablet  indomethacin (INDOCIN) 50 MG capsule  irbesartan (AVAPRO) 300 MG tablet  Lactobacillus rhamnosus GG (CULTURELLE) 10-15 Billion cell capsule  LANTUS U-100 INSULIN 100 unit/mL injection  magnesium 250 mg Tab  metFORMIN (GLUCOPHAGE) 1000 MG tablet  multivitamin (ONE A DAY) per tablet  omega-3/dha/epa/fish oil (FISH OIL-OMEGA-3 FATTY ACIDS) 300-1,000 mg capsule  omeprazole (PRILOSEC) 20 MG  "capsule  oxyCODONE (ROXICODONE) 5 MG immediate release tablet  polyethylene glycol (MIRALAX) 17 gram packet  pseudoephedrine (SUDAFED) 30 MG tablet  riboflavin, vitamin B2, 100 mg Tab  TRADJENTA 5 mg Tab        ALLERGIES:  Allergies   Allergen Reactions     Amoxicillin Hives     Per Dr. Barrientos, patient has tolerated Keflex.     Atorvastatin Diarrhea     Fenofibrate Muscle Pain (Myalgia)     Gemfibrozil      Hydrocodone-Acetaminophen Other (See Comments)     Patient reports \"going white as a sheet\" and cold sweats     Lisinopril Cough     Neosporin (Nlc-Rro-Dvooh) [Triple Antibiotic] Unknown     Penicillins Hives     1992 had pcn and had hives. Pt. States allergic to all cillin's       Pravastatin Sodium [Pravastatin] Muscle Pain (Myalgia)     Trulicity [Dulaglutide] Unknown     Gluten [Gluten Meal] Other (See Comments)     Reports feeling worse with gluten; tested below threshold for Celiac but daughter is positive     Insulin Detemir Rash       FAMILY HISTORY:  History reviewed. No pertinent family history.    SOCIAL HISTORY:   Social History     Socioeconomic History     Marital status:      Spouse name: None     Number of children: None     Years of education: None     Highest education level: None   Occupational History     None   Tobacco Use     Smoking status: Never Smoker     Smokeless tobacco: Never Used   Substance and Sexual Activity     Alcohol use: Yes     Alcohol/week: 1.0 standard drink     Drug use: No     Sexual activity: None   Other Topics Concern     None   Social History Narrative     None     Social Determinants of Health     Financial Resource Strain: Not on file   Food Insecurity: Not on file   Transportation Needs: Not on file   Physical Activity: Not on file   Stress: Not on file   Social Connections: Not on file   Intimate Partner Violence: Not on file   Housing Stability: Not on file       VITALS:  Patient Vitals for the past 24 hrs:   BP Temp Temp src Pulse Resp SpO2 Height Weight "   02/20/22 0045 -- -- -- 94 23 97 % -- --   02/20/22 0030 -- -- -- 95 23 97 % -- --   02/20/22 0015 134/68 -- -- -- -- -- -- --   02/20/22 0000 132/56 -- -- 91 21 96 % -- --   02/19/22 2345 133/64 -- -- 91 22 96 % -- --   02/19/22 2330 132/62 -- -- 93 18 96 % -- --   02/19/22 2306 (!) 173/70 97.3  F (36.3  C) Oral 96 16 98 % 1.829 m (6') (!) 153.3 kg (338 lb)       PHYSICAL EXAM    Constitutional:  obese,  HENT:  Normocephalic, Atraumatic, Bilateral external ears normal, Oropharynx moist, Nose normal.   Neck:  Normal range of motion, No meningismus, No stridor.   Eyes:  EOMI, Conjunctiva normal, No discharge.   Respiratory:  Normal breath sounds, No respiratory distress, No wheezing, No chest tenderness.   Cardiovascular:  Normal heart rate, Normal rhythm, No murmurs  GI:  Soft,  Tenderness LLQ, No guarding, No CVA tenderness.   Musculoskeletal:  Neurovascularly intact distally, No edema, No tenderness, No cyanosis, Good range of motion in all major joints. No tenderness to palpation or major deformities noted.   Integument:  Warm, Dry, No erythema, No rash.   Lymphatic:  No lymphadenopathy noted.   Neurologic:  Alert & oriented x 3, Normal motor function,No focal deficits noted.   Psychiatric:  Affect normal, Judgment normal, Mood normal.      LAB:  All pertinent labs reviewed and interpreted.  Results for orders placed or performed during the hospital encounter of 02/19/22   CT Abdomen Pelvis w/o Contrast    Impression    IMPRESSION:   1.  4 mm distal left ureteral stone causing mild left hydroureteronephrosis. There are multiple additional intrarenal stones in the left kidney measuring up to 6 mm.    2.  Colonic diverticulosis.    3.  Hepatic steatosis.     Result Value Ref Range    INR 1.03 0.85 - 1.15   Comprehensive metabolic panel   Result Value Ref Range    Sodium 138 136 - 145 mmol/L    Potassium 4.3 3.5 - 5.0 mmol/L    Chloride 105 98 - 107 mmol/L    Carbon Dioxide (CO2) 20 (L) 22 - 31 mmol/L    Anion Gap  13 5 - 18 mmol/L    Urea Nitrogen 26 8 - 28 mg/dL    Creatinine 1.60 (H) 0.70 - 1.30 mg/dL    Calcium 9.0 8.5 - 10.5 mg/dL    Glucose 202 (H) 70 - 125 mg/dL    Alkaline Phosphatase 93 45 - 120 U/L    AST 37 0 - 40 U/L    ALT 40 0 - 45 U/L    Protein Total 7.4 6.0 - 8.0 g/dL    Albumin 3.6 3.5 - 5.0 g/dL    Bilirubin Total 0.8 0.0 - 1.0 mg/dL    GFR Estimate 44 (L) >60 mL/min/1.73m2   Result Value Ref Range    Lipase 19 0 - 52 U/L   Result Value Ref Range    Troponin I <0.01 0.00 - 0.29 ng/mL   UA with Microscopic reflex to Culture    Specimen: Urine, Clean Catch   Result Value Ref Range    Color Urine Yellow Colorless, Straw, Light Yellow, Yellow    Appearance Urine Turbid (A) Clear    Glucose Urine 50  (A) Negative mg/dL    Bilirubin Urine Negative Negative    Ketones Urine 10  (A) Negative mg/dL    Specific Gravity Urine 1.016 1.001 - 1.030    Blood Urine >1.0 mg/dL (A) Negative    pH Urine 5.0 5.0 - 7.0    Protein Albumin Urine 10  (A) Negative mg/dL    Urobilinogen Urine <2.0 <2.0 mg/dL    Nitrite Urine Negative Negative    Leukocyte Esterase Urine Negative Negative    Bacteria Urine Few (A) None Seen /HPF    Mucus Urine Present (A) None Seen /LPF    RBC Urine >182 (H) <=2 /HPF    WBC Urine 5 <=5 /HPF   CRP inflammation   Result Value Ref Range    CRP 0.4 0.0-<0.8 mg/dL   CBC with platelets and differential   Result Value Ref Range    WBC Count 10.4 4.0 - 11.0 10e3/uL    RBC Count 5.25 4.40 - 5.90 10e6/uL    Hemoglobin 15.8 13.3 - 17.7 g/dL    Hematocrit 45.5 40.0 - 53.0 %    MCV 87 78 - 100 fL    MCH 30.1 26.5 - 33.0 pg    MCHC 34.7 31.5 - 36.5 g/dL    RDW 13.8 10.0 - 15.0 %    Platelet Count 181 150 - 450 10e3/uL    % Neutrophils 82 %    % Lymphocytes 11 %    % Monocytes 5 %    % Eosinophils 1 %    % Basophils 1 %    % Immature Granulocytes 0 %    NRBCs per 100 WBC 0 <1 /100    Absolute Neutrophils 8.5 (H) 1.6 - 8.3 10e3/uL    Absolute Lymphocytes 1.2 0.8 - 5.3 10e3/uL    Absolute Monocytes 0.5 0.0 - 1.3  10e3/uL    Absolute Eosinophils 0.1 0.0 - 0.7 10e3/uL    Absolute Basophils 0.1 0.0 - 0.2 10e3/uL    Absolute Immature Granulocytes 0.0 <=0.4 10e3/uL    Absolute NRBCs 0.0 10e3/uL       RADIOLOGY:  I have independently reviewed and interpreted the above imaging, pending the final radiology read.  CT Abdomen Pelvis w/o Contrast   Final Result   IMPRESSION:    1.  4 mm distal left ureteral stone causing mild left hydroureteronephrosis. There are multiple additional intrarenal stones in the left kidney measuring up to 6 mm.      2.  Colonic diverticulosis.      3.  Hepatic steatosis.             EK-rate is 95, sinus, first-degree AV block, left axis deviation, no change from 2018.    I have independently reviewed and interpreted this EKG          I, Reba Greenberg, am serving as a scribe to document services personally performed by Dr. Cramer based on my observation and the provider's statements to me. I, Lula Cramer MD attest that Reba Greenberg is acting in a scribe capacity, has observed my performance of the services and has documented them in accordance with my direction.    Lula Cramer MD  Emergency Medicine  [facility] EMERGENCY ROOM  [address] [id number] [phone]  Dept: [phone]     Lula Cramer MD  22 [id number]

## 2022-02-20 NOTE — DISCHARGE INSTRUCTIONS
Your primary care doctor or the kidney stone [place] should recheck your creatinine in the next 1 to 2 weeks to make sure it is improving.

## 2022-02-20 NOTE — ED TRIAGE NOTES
Lower abdominal pain x 1830 today, attempted to have a bowel movement which did not help the pain, Now has left flank pain +nausea

## 2022-02-21 ENCOUNTER — TELEPHONE (OUTPATIENT)
Dept: UROLOGY | Facility: CLINIC | Age: 77
End: 2022-02-21
Payer: COMMERCIAL

## 2022-02-21 ENCOUNTER — VIRTUAL VISIT (OUTPATIENT)
Dept: UROLOGY | Facility: CLINIC | Age: 77
End: 2022-02-21
Payer: COMMERCIAL

## 2022-02-21 DIAGNOSIS — N20.1 CALCULUS OF URETER: Primary | ICD-10-CM

## 2022-02-21 DIAGNOSIS — N23 URETERAL COLIC: ICD-10-CM

## 2022-02-21 DIAGNOSIS — N20.1 URETEROLITHIASIS: ICD-10-CM

## 2022-02-21 PROCEDURE — 99203 OFFICE O/P NEW LOW 30 MIN: CPT | Mod: 95 | Performed by: UROLOGY

## 2022-02-21 ASSESSMENT — PAIN SCALES - GENERAL: PAINLEVEL: SEVERE PAIN (7)

## 2022-02-21 NOTE — PATIENT INSTRUCTIONS
Patient Stated Goal: Pass my stone  Symptom Control While Passing A Stone    The goal of [facility] is to let a smaller kidney stone (less than 4 to 5 mm) pass without intervention if possible. Giving your body a chance to clear the stone may take a few hours up to a few weeks.  Keeping you well-informed, safe and fairly comfortable is important.    Drink to thirst  Do not attempt to  flush out  your stone by drinking too much fluid. This does not work and may increase nausea. Drink enough to satisfy your body s thirst. Eating your normal diet is fine.   Medications (that may be suggested or prescribed)    Ibuprofen (Advil or Motrin) Available over the counter  o Take two (200mg) tablets every six hours until the stone passes.  o Prevents spasm of the ureter.    o Decreases pain.      Dramamine* (drowsy version, non-generic formulation) Available over the counter  o Take 50mg at bedtime  o Decreases spasms of the ureter  o Decreases nausea  o Decreases acute pain  o Decreases recurrence of pain for 24 hours  o Will help you sleep  *This medication will cause increase drowsiness, do not drive or operate machinery for 6 hours.      Narcotics (Percocet, Vicodin, Dilaudid) Take as prescribed for severe pain unrelieved by ibuprofen and Dramamine  o Narcotics have significant side effects and only  cover-up  pain. They have no effect on the cause of pain.  o Common side effects  - Confusion, disorientation and sedation - DO NOT DRIVE OR OPERATE MACHINERY WITHIN 24 HOURS  - Nausea - take Dramamine or Zofran or Haldol to help control  - Constipation  - Sleep disturbances      Ondansetron (Zofran) Take as prescribed  o Reserve for severe nausea  o May cause constipation, start over the counter Miralax if needed      Second Line Anti-Nausea Medication: Adding a different anti-nausea medication maybe helpful for persistent nausea.  The combined effect of different types of anti-nausea medications maybe more  effective than either medication by itself, even in higher doses.  o Compazine: Take as prescribed      Information about kidney stones    Crystals can form if chemicals are too concentrated in your urine. If the crystal grows over time, a stone may form. A stone usually isn t painful while it is still in the kidney.    As the stone begins to leave the kidney, you may experience episodes of flank pain as the kidney stone approaches the entrance to the ureter. Some people feel a vague ache in the side.    Kidney stones may fall into the ureter. Some stones are tiny and pass through without causing symptoms. The ureter is a small tube (approximately 1/8 of an inch wide). A kidney stone can get stuck and block the ureter. If this happens, urine backs up and flows back to the kidney. Back pressure on the kidney can cause:  o Severe flank pain radiating to the groin.  o Severe nausea and vomiting.  o The pain can occur in the lower back, side, groin or all three.      When the stone reaches the lower ureter, this can irritate the bladder and sensations of feeling the urge to urinate frequently and urgently may occur.      Once the stone passes out of your ureter and into your bladder, the symptoms of urgency and frequency will often disappear. Sometimes pain will come back for a short period and will not be as severe as before. The passage of the stone from your bladder and out of your body is usually not a problem. The urethra is at least twice as wide as the normal ureter, so the stone doesn t usually block it.    Strain all urine  If you pass the stone, save it. Place it in the container we have provided and bring it to the [facility] within a week of passing it. Your stone will then be sent for analysis which takes about a month.     Signs and symptoms you might experience    Nausea    Decreased appetite    Urinary frequency    Bloody urine     Chills    Fatigue    When to call [facility] or  go to the Emergency Room    Fever with a temperature greater than 100.1    Severe pain    Persistent nausea/vomiting    If the pain worsens or nausea/vomiting is uncontrolled with medications, STOP eating & drinking. You need to have an empty stomach for 8 hours prior to surgery. Call the [facility] immediately at [phone].           Follow-up    Low dose CT scan with doctor visit 1-2 weeks after initial clinic visit per doctor s instructions    Please cancel the CT scan visit if you pass a stone. Reschedule for a one month follow-up with doctor to discuss stone composition and future prevention.    Preventing future stones    Approximately a month after your stone is sent out for analysis, a prevention visit will occur with your provider, to discuss an individualized plan for prevention of new stones and to discuss managing stones that you may still have. Along with the analysis of the kidney stone, blood and urine tests may be indicated to develop this plan. Knowing the type of kidney stones you make, and why, allows the providers at the [facility] to recommend specific ways to prevent them.    Follow-up visits are an important part of monitoring and preventing future re-occurrences.    The [facility] is available for questions or concerns 24 hours a day at [phone]

## 2022-02-21 NOTE — PROGRESS NOTES
Assessment/Plan:    Assessment & Plan   Nirav was seen today for follow up.    Diagnoses and all orders for this visit:    Calculus of ureter  -     Patient Stated Goal: Pass my stone  -     CT Abdomen Pelvis w/o Contrast; Future        Stone Management Plan  Stone Management 2/21/2022   Urinary Tract Infection No suspicion of infection   Renal Colic Well controlled symptoms   Renal Failure No suspicion of renal failure   Current CT date 2/20/2022   Right sided stones? No   R Stone Event No current event   Left sided stones? Yes   L Number of ureteral stones 1   L GSD of ureteral stones 4   L Location of ureteral stone Distal   L Number of kidney stones  5   L GSD of kidney stones 4 - 10   L Hydronephrosis Mild   L Stone Event New event   Diagnosis date 2/20/2022   Initial location of primary symptomatic stone Distal   Initial GSD of primary symptomatic stone 4   L MET Status Initiation   L Current Plan MET   MET 1 week F/U             PLAN    Will proceed with medical expulsive therapy. Risks and benefits were detailed of medical expulsive therapy including probability of stone passage, recurrent renal colic, and requirement of emergency medical and/or surgical care and further imaging. Patient verbalized understanding. Patient agrees with plan as discussed. He will return in 1 weeks with low dose CT scan.    Over the counter symptom control medications of ibuprofen, Dramamine and Tylenol were recommended.    Video call duration: 12 minutes  17 minutes spent on the date of the encounter doing chart review, history and exam, documentation and further activities per the note    TOM LUGO MD  [facility]    Subjective:     HPI  Mr. Nirav Baker is a 77 year old  male who is being evaluated via a billable video visit by [facility] [place] new stone episode    He is a first time unidentified composition stone former who has not required  stone clearance procedures. He has not previously participated in stone risk evaluation. He has no identified modifiable stone risk factors. He has identified non-modifiable stone risks including:  multiple stones at presentation.    Presented to ED with sudden onset, severe, left flank pain. Good control since. No fever or chills. No voiding symptoms.    CT scan is personally reviewed and demonstrates a 4 mm left distal stone and ~5 left renal stones up to 8 mm.    He would like to try to pass this stone. If this does not occur promptly, will transition to ureteroscopic clearance.    ROS   Review of systems is negative except for HPI    No past medical history on file.    No past surgical history on file.    Current Outpatient Medications   Medication Sig Dispense Refill     acetaminophen (TYLENOL) 325 MG tablet [ACETAMINOPHEN (TYLENOL) 325 MG TABLET] Take 2 tablets by mouth every 4 (four) hours as needed.       aspirin 325 MG EC tablet [ASPIRIN 325 MG EC TABLET] Take 1 tablet by mouth at bedtime.        aspirin-acetaminophen-caffeine (EXCEDRIN MIGRAINE) 250-250-65 mg per tablet [ASPIRIN-ACETAMINOPHEN-CAFFEINE (EXCEDRIN MIGRAINE) 250-250-65 MG PER TABLET] Take 1 tablet by mouth every 6 (six) hours as needed for pain.       calcium carbonate (TUMS ULTRA) 400 mg calcium (1,000 mg) Chew [CALCIUM CARBONATE (TUMS ULTRA) 400 MG CALCIUM (1,000 MG) CHEW] Chew 2 tablets at bedtime.        cholecalciferol, vitamin D3, 5,000 unit capsule [CHOLECALCIFEROL, VITAMIN D3, 5,000 UNIT CAPSULE] Take 5,000 Units by mouth at bedtime.        cyclobenzaprine (FLEXERIL) 10 MG tablet [CYCLOBENZAPRINE (FLEXERIL) 10 MG TABLET] Take 1 tablet (10 mg total) by mouth 3 (three) times a day as needed for muscle spasms. 15 tablet 0     dimenhyDRINATE (DRAMAMINE) 50 MG tablet Take 1 tablet (50 mg) by mouth At Bedtime for 7 days 7 tablet 0     dimenhyDRINATE (DRAMAMINE) 50 MG tablet Take 1 tablet (50 mg) by mouth every 6 hours as needed for other  (kidney stone pain management) 28 tablet 0     diphenhydrAMINE (BENADRYL) 25 mg tablet [DIPHENHYDRAMINE (BENADRYL) 25 MG TABLET] Take 25 mg by mouth at bedtime as needed for itching.       ezetimibe (ZETIA) 10 mg tablet [EZETIMIBE (ZETIA) 10 MG TABLET] Take 1 tablet by mouth at bedtime.        ibuprofen (ADVIL/MOTRIN) 200 MG tablet Take 2 tablets (400 mg) by mouth every 6 hours for 7 days 56 tablet 0     indomethacin (INDOCIN) 50 MG capsule [INDOMETHACIN (INDOCIN) 50 MG CAPSULE] Take 1 capsule by mouth 3 (three) times a day as needed.        irbesartan (AVAPRO) 300 MG tablet [IRBESARTAN (AVAPRO) 300 MG TABLET] Take 300 mg by mouth at bedtime.       Lactobacillus rhamnosus GG (CULTURELLE) 10-15 Billion cell capsule [LACTOBACILLUS RHAMNOSUS GG (CULTURELLE) 10-15 BILLION CELL CAPSULE] Take 2 capsules by mouth daily.       LANTUS U-100 INSULIN 100 unit/mL injection [LANTUS U-100 INSULIN 100 UNIT/ML INJECTION] Inject 60 Units under the skin 2 (two) times a day. 10 mL PRN     magnesium 250 mg Tab [MAGNESIUM 250 MG TAB] Take 500 mg by mouth at bedtime.        metFORMIN (GLUCOPHAGE) 1000 MG tablet [METFORMIN (GLUCOPHAGE) 1000 MG TABLET] Take 1 tablet by mouth at bedtime.        multivitamin (ONE A DAY) per tablet [MULTIVITAMIN (ONE A DAY) PER TABLET] Take 1 tablet by mouth at bedtime.        omega-3/dha/epa/fish oil (FISH OIL-OMEGA-3 FATTY ACIDS) 300-1,000 mg capsule [OMEGA-3/DHA/EPA/FISH OIL (FISH OIL-OMEGA-3 FATTY ACIDS) 300-1,000 MG CAPSULE] Take 6 g by mouth at bedtime.       omeprazole (PRILOSEC) 20 MG capsule [OMEPRAZOLE (PRILOSEC) 20 MG CAPSULE] Take 1 capsule by mouth at bedtime.        polyethylene glycol (MIRALAX) 17 gram packet [POLYETHYLENE GLYCOL (MIRALAX) 17 GRAM PACKET] Take 17 g by mouth at bedtime.        pseudoephedrine (SUDAFED) 30 MG tablet [PSEUDOEPHEDRINE (SUDAFED) 30 MG TABLET] Take 1 tablet by mouth every 12 (twelve) hours as needed.       riboflavin, vitamin B2, 100 mg Tab [RIBOFLAVIN, VITAMIN B2,  "100 MG TAB] Take 1 tablet by mouth at bedtime.        TRADJENTA 5 mg Tab [TRADJENTA 5 MG TAB] Take 1 tablet by mouth at bedtime.        chlorthalidone (HYGROTEN) 25 MG tablet [CHLORTHALIDONE (HYGROTEN) 25 MG TABLET] Take 12.5 mg by mouth at bedtime.        glipiZIDE (GLUCOTROL XL) 10 MG 24 hr tablet [GLIPIZIDE (GLUCOTROL XL) 10 MG 24 HR TABLET] Take 1 tablet (10 mg total) by mouth daily. 30 tablet 0     oxyCODONE (ROXICODONE) 5 MG immediate release tablet [OXYCODONE (ROXICODONE) 5 MG IMMEDIATE RELEASE TABLET] Take 1-2 tablets (5-10 mg total) by mouth every 6 (six) hours as needed for pain. (Patient not taking: Reported on 2/21/2022) 20 tablet 0     oxyCODONE (ROXICODONE) 5 MG tablet Take 1-2 tablets (5-10 mg) by mouth every 6 hours as needed for moderate to severe pain (Patient not taking: Reported on 2/21/2022) 10 tablet 0       Allergies   Allergen Reactions     Amoxicillin Hives     Per Dr. Barrientos, patient has tolerated Keflex.     Atorvastatin Diarrhea     Fenofibrate Muscle Pain (Myalgia)     Gemfibrozil      Hydrocodone-Acetaminophen Other (See Comments)     Patient reports \"going white as a sheet\" and cold sweats     Lisinopril Cough     Neosporin (Jfi-Tid-Clgdj) [Triple Antibiotic] Unknown     Penicillins Hives 1992 had pcn and had hives. Pt. States allergic to all cillin's       Pravastatin Sodium [Pravastatin] Muscle Pain (Myalgia)     Trulicity [Dulaglutide] Unknown     Gluten [Gluten Meal] Other (See Comments)     Reports feeling worse with gluten; tested below threshold for Celiac but daughter is positive     Insulin Detemir Rash       Social History     Socioeconomic History     Marital status:      Spouse name: Not on file     Number of children: Not on file     Years of education: Not on file     Highest education level: Not on file   Occupational History     Not on file   Tobacco Use     Smoking status: Never Smoker     Smokeless tobacco: Never Used   Substance and Sexual Activity     " Alcohol use: Yes     Alcohol/week: 1.0 standard drink     Drug use: No     Sexual activity: Not on file   Other Topics Concern     Not on file   Social History Narrative     Not on file     Social Determinants of Health     Financial Resource Strain: Not on file   Food Insecurity: Not on file   Transportation Needs: Not on file   Physical Activity: Not on file   Stress: Not on file   Social Connections: Not on file   Intimate Partner Violence: Not on file   Housing Stability: Not on file       No family history on file.    Objective:     No vitals or physical exam obtained due to virtual visit    LABS  Most Recent 3 CBC's:Recent Labs   Lab Test 02/19/22  2312 07/05/18  0618 07/04/18  0452 07/03/18  0548   WBC 10.4 7.3  --  5.9   HGB 15.8 14.2  --  13.4*   MCV 87 87  --  88    170 199 165     Most Recent 3 BMP's:Recent Labs   Lab Test 02/19/22  2312 07/05/18  0658 07/05/18  0618 07/03/18  0632 07/03/18  0548     --  139  --  138   POTASSIUM 4.3  --  3.9  --  3.8   CHLORIDE 105  --  105  --  103   CO2 20*  --  27  --  28   BUN 26  --  15  --  18   CR 1.60*  --  1.08  --  1.01   ANIONGAP 13  --  7  --  7   AURORA 9.0  --  9.5  --  9.3   * 105 115   < > 109    < > = values in this interval not displayed.     Most Recent Urinalysis:Recent Labs   Lab Test 02/20/22  0020 06/09/18  0046   COLOR Yellow Yellow   APPEARANCE Turbid* Clear   URINEGLC 50 * 150 mg/dL*   URINEBILI Negative Negative   URINEKETONE 10 * 25 mg/dL*   SG 1.016 1.010   UBLD >1.0 mg/dL* Negative   URINEPH 5.0 5.0   PROTEIN 10 * 30 mg/dL*   UROBILINOGEN  --  <2.0 E.U./dL   NITRITE Negative Negative   LEUKEST Negative Negative   RBCU >182* 0-2   WBCU 5 0-5     Acute Labs   Urine Culture    Culture   Date Value Ref Range Status   07/03/2018 No MRSA isolated  Final   06/09/2018 STREPTOCOCCUS PYOGENES (A)  Final     Comment:     Streptococcus pyogenes  Reported and sent to [facility] as part of the  Emerging Infections Surveillance Program.

## 2022-02-21 NOTE — PROGRESS NOTES
Patient states that they are in [place] at the time of their visit.  Patient is aware telehealth visit is billable and agrees to proceed.  Grace Mayorga RN

## 2022-02-22 PROCEDURE — 82365 CALCULUS SPECTROSCOPY: CPT

## 2022-02-23 ENCOUNTER — TELEPHONE (OUTPATIENT)
Dept: UROLOGY | Facility: CLINIC | Age: 77
End: 2022-02-23
Payer: COMMERCIAL

## 2022-02-23 ENCOUNTER — LAB (OUTPATIENT)
Dept: LAB | Facility: CLINIC | Age: 77
End: 2022-02-23
Payer: COMMERCIAL

## 2022-02-23 DIAGNOSIS — N20.1 CALCULUS OF URETER: Primary | ICD-10-CM

## 2022-02-23 DIAGNOSIS — N20.1 CALCULUS OF URETER: ICD-10-CM

## 2022-03-01 ENCOUNTER — HOSPITAL ENCOUNTER (OUTPATIENT)
Dept: CT IMAGING | Facility: CLINIC | Age: 77
Discharge: HOME | End: 2022-03-01
Attending: UROLOGY | Admitting: UROLOGY
Payer: COMMERCIAL

## 2022-03-01 ENCOUNTER — TELEPHONE (OUTPATIENT)
Dept: UROLOGY | Facility: CLINIC | Age: 77
End: 2022-03-01
Payer: COMMERCIAL

## 2022-03-01 DIAGNOSIS — N20.1 CALCULUS OF URETER: ICD-10-CM

## 2022-03-01 PROCEDURE — 74176 CT ABD & PELVIS W/O CONTRAST: CPT

## 2022-03-02 LAB
APPEARANCE STONE: NORMAL
COMPN STONE: NORMAL
SPECIMEN WT: 45 MG

## 2022-03-03 ENCOUNTER — VIRTUAL VISIT (OUTPATIENT)
Dept: UROLOGY | Facility: CLINIC | Age: 77
End: 2022-03-03
Payer: COMMERCIAL

## 2022-03-03 ENCOUNTER — LAB (OUTPATIENT)
Dept: LAB | Facility: CLINIC | Age: 77
End: 2022-03-03
Payer: COMMERCIAL

## 2022-03-03 DIAGNOSIS — N20.1 CALCULUS OF URETER: Primary | ICD-10-CM

## 2022-03-03 DIAGNOSIS — N20.0 CALCULUS OF KIDNEY: ICD-10-CM

## 2022-03-03 DIAGNOSIS — N20.1 CALCULUS OF URETER: ICD-10-CM

## 2022-03-03 DIAGNOSIS — N20.0 URIC ACID NEPHROLITHIASIS: ICD-10-CM

## 2022-03-03 PROCEDURE — 99214 OFFICE O/P EST MOD 30 MIN: CPT | Mod: 95 | Performed by: UROLOGY

## 2022-03-03 PROCEDURE — 82365 CALCULUS SPECTROSCOPY: CPT

## 2022-03-03 ASSESSMENT — PAIN SCALES - GENERAL: PAINLEVEL: NO PAIN (1)

## 2022-03-03 NOTE — PROGRESS NOTES
Assessment/Plan:    Assessment & Plan   Nirav was seen today for follow up.    Diagnoses and all orders for this visit:    Calculus of ureter  -     Patient Stated Goal: Pass my stone  -     CT Abdomen Pelvis w/o Contrast; Future    Calculus of kidney  -     Patient Stated Goal: Pass my stone  -     CT Abdomen Pelvis w/o Contrast; Future    Uric acid nephrolithiasis  -     Patient Stated Goal: Pass my stone  -     Basic metabolic panel  (Ca, Cl, CO2, Creat, Gluc, K, Na, BUN); Future        Stone Management Plan  Stone Management 2/21/2022 3/3/2022   Urinary Tract Infection No suspicion of infection No suspicion of infection   Renal Colic Well controlled symptoms Well controlled symptoms   Renal Failure No suspicion of renal failure No suspicion of renal failure   Current CT date 2/20/2022 3/1/2022   Right sided stones? No No   R Stone Event No current event No current event   Left sided stones? Yes Yes   L Number of ureteral stones 1 1   L GSD of ureteral stones 4 3   L Location of ureteral stone Distal Distal   L Number of kidney stones  5 5   L GSD of kidney stones 4 - 10 4 - 10   L Hydronephrosis Mild Mild   L Stone Event New event Established event   Diagnosis date 2/20/2022 -   Initial location of primary symptomatic stone Distal -   Initial GSD of primary symptomatic stone 4 -   L MET Status Initiation Progression   L Current Plan MET MET   MET 1 week F/U 2 week F/U             PLAN    Video call duration: 10 minutes  15 minutes spent on the date of the encounter doing chart review, history and exam, documentation and further activities per the note    TOM LUGO MD  [facility]    HPI  Mr. Nirav Baker is a 77 year old  male who is being evaluated via a billable video visit by [facility] [place] for medical expulsive therapy follow up.     On last encounter, his 3 mm stone was in left distal ureter with Mild hydronephrosis. He  has had no unanticipated events.    Symptoms have been well controlled with medication and he is able to carry on normal activities. Significant current symptoms include:  left flank pain. Pertinent negative current symptoms include:  fever, chills, nausea, vomiting, urinary frequency and dysuria. He had a period of about 1 week where he was completely pain free before pain recurred a few days ago. He did pass a stone 2/22 which was analysed and found to be uric acid.    New CT scan was personally reviewed and demonstrates a new 3 mm left distal stone and some repositioning of the multiple renal stones with stable Mild hydronephrosis.     He will continue to attempt to pass stone and will return in 2 weeks with further imaging.     Assuming he can pass this small stone, it should be possible to alkalinize his urine to dissolve the renal stones and set up for long-term prevention.    ROS   Review of systems is negative except for HPI.    No past medical history on file.    No past surgical history on file.    Current Outpatient Medications   Medication Sig Dispense Refill     oxyCODONE (ROXICODONE) 5 MG immediate release tablet [OXYCODONE (ROXICODONE) 5 MG IMMEDIATE RELEASE TABLET] Take 1-2 tablets (5-10 mg total) by mouth every 6 (six) hours as needed for pain. 20 tablet 0     acetaminophen (TYLENOL) 325 MG tablet [ACETAMINOPHEN (TYLENOL) 325 MG TABLET] Take 2 tablets by mouth every 4 (four) hours as needed.       aspirin 325 MG EC tablet [ASPIRIN 325 MG EC TABLET] Take 1 tablet by mouth at bedtime.        aspirin-acetaminophen-caffeine (EXCEDRIN MIGRAINE) 250-250-65 mg per tablet [ASPIRIN-ACETAMINOPHEN-CAFFEINE (EXCEDRIN MIGRAINE) 250-250-65 MG PER TABLET] Take 1 tablet by mouth every 6 (six) hours as needed for pain.       calcium carbonate (TUMS ULTRA) 400 mg calcium (1,000 mg) Chew [CALCIUM CARBONATE (TUMS ULTRA) 400 MG CALCIUM (1,000 MG) CHEW] Chew 2 tablets at bedtime.        chlorthalidone (HYGROTEN) 25 MG  tablet [CHLORTHALIDONE (HYGROTEN) 25 MG TABLET] Take 12.5 mg by mouth at bedtime.        cholecalciferol, vitamin D3, 5,000 unit capsule [CHOLECALCIFEROL, VITAMIN D3, 5,000 UNIT CAPSULE] Take 5,000 Units by mouth at bedtime.        cyclobenzaprine (FLEXERIL) 10 MG tablet [CYCLOBENZAPRINE (FLEXERIL) 10 MG TABLET] Take 1 tablet (10 mg total) by mouth 3 (three) times a day as needed for muscle spasms. 15 tablet 0     diphenhydrAMINE (BENADRYL) 25 mg tablet [DIPHENHYDRAMINE (BENADRYL) 25 MG TABLET] Take 25 mg by mouth at bedtime as needed for itching.       ezetimibe (ZETIA) 10 mg tablet [EZETIMIBE (ZETIA) 10 MG TABLET] Take 1 tablet by mouth at bedtime.        glipiZIDE (GLUCOTROL XL) 10 MG 24 hr tablet [GLIPIZIDE (GLUCOTROL XL) 10 MG 24 HR TABLET] Take 1 tablet (10 mg total) by mouth daily. 30 tablet 0     indomethacin (INDOCIN) 50 MG capsule [INDOMETHACIN (INDOCIN) 50 MG CAPSULE] Take 1 capsule by mouth 3 (three) times a day as needed.        irbesartan (AVAPRO) 300 MG tablet [IRBESARTAN (AVAPRO) 300 MG TABLET] Take 300 mg by mouth at bedtime.       Lactobacillus rhamnosus GG (CULTURELLE) 10-15 Billion cell capsule [LACTOBACILLUS RHAMNOSUS GG (CULTURELLE) 10-15 BILLION CELL CAPSULE] Take 2 capsules by mouth daily.       LANTUS U-100 INSULIN 100 unit/mL injection [LANTUS U-100 INSULIN 100 UNIT/ML INJECTION] Inject 60 Units under the skin 2 (two) times a day. 10 mL PRN     magnesium 250 mg Tab [MAGNESIUM 250 MG TAB] Take 500 mg by mouth at bedtime.        metFORMIN (GLUCOPHAGE) 1000 MG tablet [METFORMIN (GLUCOPHAGE) 1000 MG TABLET] Take 1 tablet by mouth at bedtime.        multivitamin (ONE A DAY) per tablet [MULTIVITAMIN (ONE A DAY) PER TABLET] Take 1 tablet by mouth at bedtime.        omega-3/dha/epa/fish oil (FISH OIL-OMEGA-3 FATTY ACIDS) 300-1,000 mg capsule [OMEGA-3/DHA/EPA/FISH OIL (FISH OIL-OMEGA-3 FATTY ACIDS) 300-1,000 MG CAPSULE] Take 6 g by mouth at bedtime.       omeprazole (PRILOSEC) 20 MG capsule  "[OMEPRAZOLE (PRILOSEC) 20 MG CAPSULE] Take 1 capsule by mouth at bedtime.        polyethylene glycol (MIRALAX) 17 gram packet [POLYETHYLENE GLYCOL (MIRALAX) 17 GRAM PACKET] Take 17 g by mouth at bedtime.        pseudoephedrine (SUDAFED) 30 MG tablet [PSEUDOEPHEDRINE (SUDAFED) 30 MG TABLET] Take 1 tablet by mouth every 12 (twelve) hours as needed.       riboflavin, vitamin B2, 100 mg Tab [RIBOFLAVIN, VITAMIN B2, 100 MG TAB] Take 1 tablet by mouth at bedtime.        TRADJENTA 5 mg Tab [TRADJENTA 5 MG TAB] Take 1 tablet by mouth at bedtime.          Allergies   Allergen Reactions     Amoxicillin Hives     Per Dr. Barrientos, patient has tolerated Keflex.     Atorvastatin Diarrhea     Fenofibrate Muscle Pain (Myalgia)     Gemfibrozil      Hydrocodone-Acetaminophen Other (See Comments)     Patient reports \"going white as a sheet\" and cold sweats     Lisinopril Cough     Neosporin (Wun-Yrs-Oaphn) [Triple Antibiotic] Unknown     Penicillins Hives 1992 had pcn and had hives. Pt. States allergic to all cillin's       Pravastatin Sodium [Pravastatin] Muscle Pain (Myalgia)     Trulicity [Dulaglutide] Unknown     Gluten [Gluten Meal] Other (See Comments)     Reports feeling worse with gluten; tested below threshold for Celiac but daughter is positive     Insulin Detemir Rash       Social History     Socioeconomic History     Marital status:      Spouse name: Not on file     Number of children: Not on file     Years of education: Not on file     Highest education level: Not on file   Occupational History     Not on file   Tobacco Use     Smoking status: Never Smoker     Smokeless tobacco: Never Used   Substance and Sexual Activity     Alcohol use: Yes     Alcohol/week: 1.0 standard drink     Drug use: No     Sexual activity: Not on file   Other Topics Concern     Not on file   Social History Narrative     Not on file     Social Determinants of Health     Financial Resource Strain: Not on file   Food Insecurity: Not on file "   Transportation Needs: Not on file   Physical Activity: Not on file   Stress: Not on file   Social Connections: Not on file   Intimate Partner Violence: Not on file   Housing Stability: Not on file       No family history on file.    Objective:     Appears AAO x 3  No vitals obtained due to virtual visit    Labs   Most Recent 3 CBC's:Recent Labs   Lab Test 02/19/22  2312 07/05/18  0618 07/04/18  0452 07/03/18  0548   WBC 10.4 7.3  --  5.9   HGB 15.8 14.2  --  13.4*   MCV 87 87  --  88    170 199 165     Most Recent 3 BMP's:Recent Labs   Lab Test 02/19/22  2312 07/05/18  0658 07/05/18  0618 07/03/18  0632 07/03/18  0548     --  139  --  138   POTASSIUM 4.3  --  3.9  --  3.8   CHLORIDE 105  --  105  --  103   CO2 20*  --  27  --  28   BUN 26  --  15  --  18   CR 1.60*  --  1.08  --  1.01   ANIONGAP 13  --  7  --  7   AURORA 9.0  --  9.5  --  9.3   * 105 115   < > 109    < > = values in this interval not displayed.     Most Recent Urinalysis:Recent Labs   Lab Test 02/20/22  0020 06/09/18  0046   COLOR Yellow Yellow   APPEARANCE Turbid* Clear   URINEGLC 50 * 150 mg/dL*   URINEBILI Negative Negative   URINEKETONE 10 * 25 mg/dL*   SG 1.016 1.010   UBLD >1.0 mg/dL* Negative   URINEPH 5.0 5.0   PROTEIN 10 * 30 mg/dL*   UROBILINOGEN  --  <2.0 E.U./dL   NITRITE Negative Negative   LEUKEST Negative Negative   RBCU >182* 0-2   WBCU 5 0-5     Acute Labs   Urine Culture    Culture   Date Value Ref Range Status   07/03/2018 No MRSA isolated  Final   06/09/2018 STREPTOCOCCUS PYOGENES (A)  Final     Comment:     Streptococcus pyogenes  Reported and sent to [facility] as part of the  Emerging Infections Surveillance Program.

## 2022-03-03 NOTE — PATIENT INSTRUCTIONS
Patient Stated Goal: Pass my stone  Symptom Control While Passing A Stone    The goal of [facility] is to let a smaller kidney stone (less than 4 to 5 mm) pass without intervention if possible. Giving your body a chance to clear the stone may take a few hours up to a few weeks.  Keeping you well-informed, safe and fairly comfortable is important.    Drink to thirst  Do not attempt to  flush out  your stone by drinking too much fluid. This does not work and may increase nausea. Drink enough to satisfy your body s thirst. Eating your normal diet is fine.   Medications (that may be suggested or prescribed)    Ibuprofen (Advil or Motrin) Available over the counter  o Take two (200mg) tablets every six hours until the stone passes.  o Prevents spasm of the ureter.    o Decreases pain.      Dramamine* (drowsy version, non-generic formulation) Available over the counter  o Take 50mg at bedtime  o Decreases spasms of the ureter  o Decreases nausea  o Decreases acute pain  o Decreases recurrence of pain for 24 hours  o Will help you sleep  *This medication will cause increase drowsiness, do not drive or operate machinery for 6 hours.      Narcotics (Percocet, Vicodin, Dilaudid) Take as prescribed for severe pain unrelieved by ibuprofen and Dramamine  o Narcotics have significant side effects and only  cover-up  pain. They have no effect on the cause of pain.  o Common side effects  - Confusion, disorientation and sedation - DO NOT DRIVE OR OPERATE MACHINERY WITHIN 24 HOURS  - Nausea - take Dramamine or Zofran or Haldol to help control  - Constipation  - Sleep disturbances      Ondansetron (Zofran) Take as prescribed  o Reserve for severe nausea  o May cause constipation, start over the counter Miralax if needed      Second Line Anti-Nausea Medication: Adding a different anti-nausea medication maybe helpful for persistent nausea.  The combined effect of different types of anti-nausea medications maybe more  effective than either medication by itself, even in higher doses.  o Compazine: Take as prescribed      Information about kidney stones    Crystals can form if chemicals are too concentrated in your urine. If the crystal grows over time, a stone may form. A stone usually isn t painful while it is still in the kidney.    As the stone begins to leave the kidney, you may experience episodes of flank pain as the kidney stone approaches the entrance to the ureter. Some people feel a vague ache in the side.    Kidney stones may fall into the ureter. Some stones are tiny and pass through without causing symptoms. The ureter is a small tube (approximately 1/8 of an inch wide). A kidney stone can get stuck and block the ureter. If this happens, urine backs up and flows back to the kidney. Back pressure on the kidney can cause:  o Severe flank pain radiating to the groin.  o Severe nausea and vomiting.  o The pain can occur in the lower back, side, groin or all three.      When the stone reaches the lower ureter, this can irritate the bladder and sensations of feeling the urge to urinate frequently and urgently may occur.      Once the stone passes out of your ureter and into your bladder, the symptoms of urgency and frequency will often disappear. Sometimes pain will come back for a short period and will not be as severe as before. The passage of the stone from your bladder and out of your body is usually not a problem. The urethra is at least twice as wide as the normal ureter, so the stone doesn t usually block it.    Strain all urine  If you pass the stone, save it. Place it in the container we have provided and bring it to the [facility] within a week of passing it. Your stone will then be sent for analysis which takes about a month.     Signs and symptoms you might experience    Nausea    Decreased appetite    Urinary frequency    Bloody urine     Chills    Fatigue    When to call [facility] or  go to the Emergency Room    Fever with a temperature greater than 100.1    Severe pain    Persistent nausea/vomiting    If the pain worsens or nausea/vomiting is uncontrolled with medications, STOP eating & drinking. You need to have an empty stomach for 8 hours prior to surgery. Call the [facility] immediately at [phone].           Follow-up    Low dose CT scan with doctor visit 1-2 weeks after initial clinic visit per doctor s instructions    Please cancel the CT scan visit if you pass a stone. Reschedule for a one month follow-up with doctor to discuss stone composition and future prevention.    Preventing future stones    Approximately a month after your stone is sent out for analysis, a prevention visit will occur with your provider, to discuss an individualized plan for prevention of new stones and to discuss managing stones that you may still have. Along with the analysis of the kidney stone, blood and urine tests may be indicated to develop this plan. Knowing the type of kidney stones you make, and why, allows the providers at the [facility] to recommend specific ways to prevent them.    Follow-up visits are an important part of monitoring and preventing future re-occurrences.    The [facility] is available for questions or concerns 24 hours a day at [phone]

## 2022-03-03 NOTE — PROGRESS NOTES
Patient is roomed via telephone for a virtual visit.  Patient confirmed he is in the [facility] at the time of this appointment.  Patient understands that this virtual visit is billable and agree to proceed with appointment.

## 2022-03-08 LAB
APPEARANCE STONE: NORMAL
COMPN STONE: NORMAL
SPECIMEN WT: 42 MG

## 2022-03-09 LAB
ATRIAL RATE - MUSE: 95 BPM
DIASTOLIC BLOOD PRESSURE - MUSE: 70 MMHG
INTERPRETATION ECG - MUSE: NORMAL
P AXIS - MUSE: 62 DEGREES
PR INTERVAL - MUSE: 212 MS
QRS DURATION - MUSE: 104 MS
QT - MUSE: 358 MS
QTC - MUSE: 449 MS
R AXIS - MUSE: -47 DEGREES
SYSTOLIC BLOOD PRESSURE - MUSE: 173 MMHG
T AXIS - MUSE: 57 DEGREES
VENTRICULAR RATE- MUSE: 95 BPM

## 2022-03-14 ENCOUNTER — TELEPHONE (OUTPATIENT)
Dept: UROLOGY | Facility: CLINIC | Age: 77
End: 2022-03-14
Payer: COMMERCIAL

## 2022-03-17 ENCOUNTER — LAB (OUTPATIENT)
Dept: LAB | Facility: CLINIC | Age: 77
End: 2022-03-17
Payer: COMMERCIAL

## 2022-03-17 DIAGNOSIS — N20.0 URIC ACID NEPHROLITHIASIS: ICD-10-CM

## 2022-03-17 LAB
ANION GAP SERPL CALCULATED.3IONS-SCNC: 11 MMOL/L (ref 5–18)
BUN SERPL-MCNC: 32 MG/DL (ref 8–28)
CALCIUM SERPL-MCNC: 9.5 MG/DL (ref 8.5–10.5)
CHLORIDE BLD-SCNC: 101 MMOL/L (ref 98–107)
CO2 SERPL-SCNC: 24 MMOL/L (ref 22–31)
CREAT SERPL-MCNC: 1.95 MG/DL (ref 0.7–1.3)
GFR SERPL CREATININE-BSD FRML MDRD: 35 ML/MIN/1.73M2
GLUCOSE BLD-MCNC: 353 MG/DL (ref 70–125)
POTASSIUM BLD-SCNC: 4.9 MMOL/L (ref 3.5–5)
SODIUM SERPL-SCNC: 136 MMOL/L (ref 136–145)

## 2022-03-17 PROCEDURE — 36415 COLL VENOUS BLD VENIPUNCTURE: CPT

## 2022-03-17 PROCEDURE — 80048 BASIC METABOLIC PNL TOTAL CA: CPT

## 2022-03-28 ENCOUNTER — VIRTUAL VISIT (OUTPATIENT)
Dept: UROLOGY | Facility: CLINIC | Age: 77
End: 2022-03-28
Payer: COMMERCIAL

## 2022-03-28 DIAGNOSIS — N20.0 URIC ACID NEPHROLITHIASIS: ICD-10-CM

## 2022-03-28 DIAGNOSIS — N17.9 ACUTE RENAL FAILURE, UNSPECIFIED ACUTE RENAL FAILURE TYPE (H): ICD-10-CM

## 2022-03-28 DIAGNOSIS — N20.0 CALCULUS OF KIDNEY: Primary | ICD-10-CM

## 2022-03-28 PROCEDURE — 99214 OFFICE O/P EST MOD 30 MIN: CPT | Mod: 95 | Performed by: UROLOGY

## 2022-03-28 ASSESSMENT — PAIN SCALES - GENERAL: PAINLEVEL: NO PAIN (0)

## 2022-03-28 NOTE — PROGRESS NOTES
Assessment/Plan:    Assessment & Plan   Nirav was seen today for follow up.    Diagnoses and all orders for this visit:    Calculus of kidney    Uric acid nephrolithiasis    Acute renal failure, unspecified acute renal failure type (H)        Stone Management Plan  Stone Management 2/21/2022 3/3/2022 3/28/2022   Urinary Tract Infection No suspicion of infection No suspicion of infection No suspicion of infection   Renal Colic Well controlled symptoms Well controlled symptoms Asymptomatic at this time   Renal Failure No suspicion of renal failure No suspicion of renal failure Acute renal failure   Current CT date 2/20/2022 3/1/2022 -   Right sided stones? No No -   R Stone Event No current event No current event No current event   Left sided stones? Yes Yes -   L Number of ureteral stones 1 1 -   L GSD of ureteral stones 4 3 -   L Location of ureteral stone Distal Distal -   L Number of kidney stones  5 5 -   L GSD of kidney stones 4 - 10 4 - 10 -   L Hydronephrosis Mild Mild -   L Stone Event New event Established event No current event   Diagnosis date 2/20/2022 - -   Initial location of primary symptomatic stone Distal - -   Initial GSD of primary symptomatic stone 4 - -   L MET Status Initiation Progression -   L Current Plan MET MET -   MET 1 week F/U 2 week F/U -             PLAN    Video call duration: 10 minutes  15 minutes spent on the date of the encounter doing chart review, history and exam, documentation and further activities per the note    TOM LUGO MD  [facility]    HPI  Mr. Nirav Baker is a 77 year old  male who is being evaluated via a billable video visit by [facility] for follow up of his stone disease.    He is feeling fine after passing a couple of uric acid stones. Denies current pain.    His creatinine continues to rise (now 1.95) from normal baseline despite passing the stones.    Will recheck  another creatinine and if not normalized, will recheck CT to see if he has another obstructing stone.    ROS   Review of systems is negative except for HPI.    No past medical history on file.    No past surgical history on file.    Current Outpatient Medications   Medication Sig Dispense Refill     acetaminophen (TYLENOL) 325 MG tablet [ACETAMINOPHEN (TYLENOL) 325 MG TABLET] Take 2 tablets by mouth every 4 (four) hours as needed.       aspirin 325 MG EC tablet [ASPIRIN 325 MG EC TABLET] Take 1 tablet by mouth at bedtime.        aspirin-acetaminophen-caffeine (EXCEDRIN MIGRAINE) 250-250-65 mg per tablet [ASPIRIN-ACETAMINOPHEN-CAFFEINE (EXCEDRIN MIGRAINE) 250-250-65 MG PER TABLET] Take 1 tablet by mouth every 6 (six) hours as needed for pain.       calcium carbonate (TUMS ULTRA) 400 mg calcium (1,000 mg) Chew [CALCIUM CARBONATE (TUMS ULTRA) 400 MG CALCIUM (1,000 MG) CHEW] Chew 2 tablets at bedtime.        cholecalciferol, vitamin D3, 5,000 unit capsule [CHOLECALCIFEROL, VITAMIN D3, 5,000 UNIT CAPSULE] Take 5,000 Units by mouth at bedtime.        cyclobenzaprine (FLEXERIL) 10 MG tablet [CYCLOBENZAPRINE (FLEXERIL) 10 MG TABLET] Take 1 tablet (10 mg total) by mouth 3 (three) times a day as needed for muscle spasms. 15 tablet 0     diphenhydrAMINE (BENADRYL) 25 mg tablet [DIPHENHYDRAMINE (BENADRYL) 25 MG TABLET] Take 25 mg by mouth at bedtime as needed for itching.       ezetimibe (ZETIA) 10 mg tablet [EZETIMIBE (ZETIA) 10 MG TABLET] Take 1 tablet by mouth at bedtime.        indomethacin (INDOCIN) 50 MG capsule [INDOMETHACIN (INDOCIN) 50 MG CAPSULE] Take 1 capsule by mouth 3 (three) times a day as needed.        irbesartan (AVAPRO) 300 MG tablet [IRBESARTAN (AVAPRO) 300 MG TABLET] Take 300 mg by mouth at bedtime.       Lactobacillus rhamnosus GG (CULTURELLE) 10-15 Billion cell capsule [LACTOBACILLUS RHAMNOSUS GG (CULTURELLE) 10-15 BILLION CELL CAPSULE] Take 2 capsules by mouth daily.       LANTUS U-100 INSULIN 100  unit/mL injection [LANTUS U-100 INSULIN 100 UNIT/ML INJECTION] Inject 60 Units under the skin 2 (two) times a day. 10 mL PRN     magnesium 250 mg Tab [MAGNESIUM 250 MG TAB] Take 500 mg by mouth at bedtime.        metFORMIN (GLUCOPHAGE) 1000 MG tablet [METFORMIN (GLUCOPHAGE) 1000 MG TABLET] Take 1 tablet by mouth at bedtime.        multivitamin (ONE A DAY) per tablet [MULTIVITAMIN (ONE A DAY) PER TABLET] Take 1 tablet by mouth at bedtime.        omega-3/dha/epa/fish oil (FISH OIL-OMEGA-3 FATTY ACIDS) 300-1,000 mg capsule [OMEGA-3/DHA/EPA/FISH OIL (FISH OIL-OMEGA-3 FATTY ACIDS) 300-1,000 MG CAPSULE] Take 6 g by mouth at bedtime.       omeprazole (PRILOSEC) 20 MG capsule [OMEPRAZOLE (PRILOSEC) 20 MG CAPSULE] Take 1 capsule by mouth at bedtime.        polyethylene glycol (MIRALAX) 17 gram packet [POLYETHYLENE GLYCOL (MIRALAX) 17 GRAM PACKET] Take 17 g by mouth at bedtime.        pseudoephedrine (SUDAFED) 30 MG tablet [PSEUDOEPHEDRINE (SUDAFED) 30 MG TABLET] Take 1 tablet by mouth every 12 (twelve) hours as needed.       riboflavin, vitamin B2, 100 mg Tab [RIBOFLAVIN, VITAMIN B2, 100 MG TAB] Take 1 tablet by mouth at bedtime.        TRADJENTA 5 mg Tab [TRADJENTA 5 MG TAB] Take 1 tablet by mouth at bedtime.        chlorthalidone (HYGROTEN) 25 MG tablet [CHLORTHALIDONE (HYGROTEN) 25 MG TABLET] Take 12.5 mg by mouth at bedtime.        glipiZIDE (GLUCOTROL XL) 10 MG 24 hr tablet [GLIPIZIDE (GLUCOTROL XL) 10 MG 24 HR TABLET] Take 1 tablet (10 mg total) by mouth daily. 30 tablet 0     oxyCODONE (ROXICODONE) 5 MG immediate release tablet [OXYCODONE (ROXICODONE) 5 MG IMMEDIATE RELEASE TABLET] Take 1-2 tablets (5-10 mg total) by mouth every 6 (six) hours as needed for pain. (Patient not taking: Reported on 3/28/2022) 20 tablet 0       Allergies   Allergen Reactions     Amoxicillin Hives     Per Dr. Barrientos, patient has tolerated Keflex.     Atorvastatin Diarrhea     Fenofibrate Muscle Pain (Myalgia)     Gemfibrozil       "Hydrocodone-Acetaminophen Other (See Comments)     Patient reports \"going white as a sheet\" and cold sweats     Lisinopril Cough     Neosporin (Ipf-Srf-Kvdae) [Triple Antibiotic] Unknown     Penicillins Hives     1992 had pcn and had hives. Pt. States allergic to all cillin's       Pravastatin Sodium [Pravastatin] Muscle Pain (Myalgia)     Trulicity [Dulaglutide] Unknown     Gluten [Gluten Meal] Other (See Comments)     Reports feeling worse with gluten; tested below threshold for Celiac but daughter is positive     Insulin Detemir Rash       Social History     Socioeconomic History     Marital status:      Spouse name: Not on file     Number of children: Not on file     Years of education: Not on file     Highest education level: Not on file   Occupational History     Not on file   Tobacco Use     Smoking status: Never Smoker     Smokeless tobacco: Never Used   Substance and Sexual Activity     Alcohol use: Yes     Alcohol/week: 1.0 standard drink     Drug use: No     Sexual activity: Not on file   Other Topics Concern     Not on file   Social History Narrative     Not on file     Social Determinants of Health     Financial Resource Strain: Not on file   Food Insecurity: Not on file   Transportation Needs: Not on file   Physical Activity: Not on file   Stress: Not on file   Social Connections: Not on file   Intimate Partner Violence: Not on file   Housing Stability: Not on file       No family history on file.    Objective:     Appears AAO x 3  No vitals obtained due to virtual visit    Labs   Most Recent 3 CBC's:Recent Labs   Lab Test 02/19/22  2312 07/05/18  0618 07/04/18  0452 07/03/18  0548   WBC 10.4 7.3  --  5.9   HGB 15.8 14.2  --  13.4*   MCV 87 87  --  88    170 199 165     Most Recent 3 BMP's:Recent Labs   Lab Test 03/17/22  1304 02/19/22  2312 07/05/18  0658 07/05/18  0618    138  --  139   POTASSIUM 4.9 4.3  --  3.9   CHLORIDE 101 105  --  105   CO2 24 20*  --  27   BUN 32* 26  --  " 15   CR 1.95* 1.60*  --  1.08   ANIONGAP 11 13  --  7   AURORA 9.5 9.0  --  9.5   * 202* 105 115     Most Recent Urinalysis:Recent Labs   Lab Test 02/20/22  0020 06/09/18  0046   COLOR Yellow Yellow   APPEARANCE Turbid* Clear   URINEGLC 50 * 150 mg/dL*   URINEBILI Negative Negative   URINEKETONE 10 * 25 mg/dL*   SG 1.016 1.010   UBLD >1.0 mg/dL* Negative   URINEPH 5.0 5.0   PROTEIN 10 * 30 mg/dL*   UROBILINOGEN  --  <2.0 E.U./dL   NITRITE Negative Negative   LEUKEST Negative Negative   RBCU >182* 0-2   WBCU 5 0-5     Acute Labs   Urine Culture    Culture   Date Value Ref Range Status   07/03/2018 No MRSA isolated  Final   06/09/2018 STREPTOCOCCUS PYOGENES (A)  Final     Comment:     Streptococcus pyogenes  Reported and sent to [facility] as part of the  Emerging Infections Surveillance Program.

## 2022-03-29 ENCOUNTER — TELEPHONE (OUTPATIENT)
Dept: UROLOGY | Facility: CLINIC | Age: 77
End: 2022-03-29
Payer: COMMERCIAL

## 2022-03-29 DIAGNOSIS — N20.0 CALCULUS OF KIDNEY: Primary | ICD-10-CM

## 2022-03-30 ENCOUNTER — LAB (OUTPATIENT)
Dept: LAB | Facility: CLINIC | Age: 77
End: 2022-03-30
Payer: COMMERCIAL

## 2022-03-30 DIAGNOSIS — N20.0 CALCULUS OF KIDNEY: ICD-10-CM

## 2022-03-30 LAB
ALBUMIN SERPL-MCNC: 3.3 G/DL (ref 3.5–5)
ANION GAP SERPL CALCULATED.3IONS-SCNC: 13 MMOL/L (ref 5–18)
BUN SERPL-MCNC: 24 MG/DL (ref 8–28)
CALCIUM SERPL-MCNC: 9.3 MG/DL (ref 8.5–10.5)
CHLORIDE BLD-SCNC: 104 MMOL/L (ref 98–107)
CO2 SERPL-SCNC: 27 MMOL/L (ref 22–31)
CREAT SERPL-MCNC: 1.49 MG/DL (ref 0.7–1.3)
GFR SERPL CREATININE-BSD FRML MDRD: 48 ML/MIN/1.73M2
GLUCOSE BLD-MCNC: 188 MG/DL (ref 70–125)
PHOSPHATE SERPL-MCNC: 3.5 MG/DL (ref 2.5–4.5)
POTASSIUM BLD-SCNC: 3.9 MMOL/L (ref 3.5–5)
SODIUM SERPL-SCNC: 144 MMOL/L (ref 136–145)

## 2022-03-30 PROCEDURE — 80069 RENAL FUNCTION PANEL: CPT

## 2022-03-30 PROCEDURE — 36415 COLL VENOUS BLD VENIPUNCTURE: CPT

## 2022-08-24 ENCOUNTER — HOSPITAL ENCOUNTER (EMERGENCY)
Facility: CLINIC | Age: 77
Discharge: HOME | End: 2022-08-24
Attending: EMERGENCY MEDICINE | Admitting: EMERGENCY MEDICINE
Payer: COMMERCIAL

## 2022-08-24 ENCOUNTER — APPOINTMENT (OUTPATIENT)
Dept: CT IMAGING | Facility: CLINIC | Age: 77
End: 2022-08-24
Payer: COMMERCIAL

## 2022-08-24 VITALS
BODY MASS INDEX: 45.16 KG/M2 | OXYGEN SATURATION: 98 % | RESPIRATION RATE: 16 BRPM | SYSTOLIC BLOOD PRESSURE: 152 MMHG | HEART RATE: 70 BPM | DIASTOLIC BLOOD PRESSURE: 88 MMHG | TEMPERATURE: 98.7 F | WEIGHT: 315 LBS

## 2022-08-24 DIAGNOSIS — N20.1 URETEROLITHIASIS: ICD-10-CM

## 2022-08-24 LAB
ALBUMIN SERPL-MCNC: 3.4 G/DL (ref 3.5–5)
ALBUMIN UR-MCNC: NEGATIVE MG/DL
ALP SERPL-CCNC: 76 U/L (ref 45–120)
ALT SERPL W P-5'-P-CCNC: 28 U/L (ref 0–45)
ANION GAP SERPL CALCULATED.3IONS-SCNC: 7 MMOL/L (ref 5–18)
APPEARANCE UR: CLEAR
AST SERPL W P-5'-P-CCNC: 33 U/L (ref 0–40)
BASOPHILS # BLD AUTO: 0.1 10E3/UL (ref 0–0.2)
BASOPHILS NFR BLD AUTO: 1 %
BILIRUB SERPL-MCNC: 1.1 MG/DL (ref 0–1)
BILIRUB UR QL STRIP: NEGATIVE
BUN SERPL-MCNC: 37 MG/DL (ref 8–28)
C REACTIVE PROTEIN LHE: 2.8 MG/DL (ref 0–?)
CALCIUM SERPL-MCNC: 10 MG/DL (ref 8.5–10.5)
CHLORIDE BLD-SCNC: 106 MMOL/L (ref 98–107)
CO2 SERPL-SCNC: 27 MMOL/L (ref 22–31)
COLOR UR AUTO: YELLOW
CREAT SERPL-MCNC: 2.28 MG/DL (ref 0.7–1.3)
EOSINOPHIL # BLD AUTO: 0.3 10E3/UL (ref 0–0.7)
EOSINOPHIL NFR BLD AUTO: 3 %
ERYTHROCYTE [DISTWIDTH] IN BLOOD BY AUTOMATED COUNT: 13.3 % (ref 10–15)
GFR SERPL CREATININE-BSD FRML MDRD: 29 ML/MIN/1.73M2
GLUCOSE BLD-MCNC: 142 MG/DL (ref 70–125)
GLUCOSE UR STRIP-MCNC: NEGATIVE MG/DL
HCT VFR BLD AUTO: 44 % (ref 40–53)
HGB BLD-MCNC: 15.4 G/DL (ref 13.3–17.7)
HGB UR QL STRIP: NEGATIVE
IMM GRANULOCYTES # BLD: 0.1 10E3/UL
IMM GRANULOCYTES NFR BLD: 1 %
KETONES UR STRIP-MCNC: NEGATIVE MG/DL
LEUKOCYTE ESTERASE UR QL STRIP: NEGATIVE
LYMPHOCYTES # BLD AUTO: 1.9 10E3/UL (ref 0.8–5.3)
LYMPHOCYTES NFR BLD AUTO: 18 %
MCH RBC QN AUTO: 30.3 PG (ref 26.5–33)
MCHC RBC AUTO-ENTMCNC: 35 G/DL (ref 31.5–36.5)
MCV RBC AUTO: 87 FL (ref 78–100)
MONOCYTES # BLD AUTO: 0.8 10E3/UL (ref 0–1.3)
MONOCYTES NFR BLD AUTO: 8 %
MUCOUS THREADS #/AREA URNS LPF: PRESENT /LPF
NEUTROPHILS # BLD AUTO: 7.4 10E3/UL (ref 1.6–8.3)
NEUTROPHILS NFR BLD AUTO: 69 %
NITRATE UR QL: NEGATIVE
NRBC # BLD AUTO: 0 10E3/UL
NRBC BLD AUTO-RTO: 0 /100
PH UR STRIP: 5 [PH] (ref 5–7)
PLATELET # BLD AUTO: 208 10E3/UL (ref 150–450)
POTASSIUM BLD-SCNC: 4.8 MMOL/L (ref 3.5–5)
PROT SERPL-MCNC: 7.5 G/DL (ref 6–8)
RBC # BLD AUTO: 5.08 10E6/UL (ref 4.4–5.9)
RBC URINE: 1 /HPF
SARS-COV-2 RNA RESP QL NAA+PROBE: NEGATIVE
SODIUM SERPL-SCNC: 140 MMOL/L (ref 136–145)
SP GR UR STRIP: 1.02 (ref 1–1.03)
SQUAMOUS EPITHELIAL: 1 /HPF
UROBILINOGEN UR STRIP-MCNC: <2 MG/DL
WBC # BLD AUTO: 10.5 10E3/UL (ref 4–11)
WBC URINE: 2 /HPF

## 2022-08-24 PROCEDURE — 81001 URINALYSIS AUTO W/SCOPE: CPT | Performed by: EMERGENCY MEDICINE

## 2022-08-24 PROCEDURE — U0005 INFEC AGEN DETEC AMPLI PROBE: HCPCS | Performed by: EMERGENCY MEDICINE

## 2022-08-24 PROCEDURE — 74176 CT ABD & PELVIS W/O CONTRAST: CPT

## 2022-08-24 PROCEDURE — 96374 THER/PROPH/DIAG INJ IV PUSH: CPT

## 2022-08-24 PROCEDURE — 250N000011 HC RX IP 250 OP 636: Performed by: EMERGENCY MEDICINE

## 2022-08-24 PROCEDURE — 250N000013 HC RX MED GY IP 250 OP 250 PS 637: Performed by: EMERGENCY MEDICINE

## 2022-08-24 PROCEDURE — 99285 EMERGENCY DEPT VISIT HI MDM: CPT | Mod: 25

## 2022-08-24 PROCEDURE — 85014 HEMATOCRIT: CPT | Performed by: EMERGENCY MEDICINE

## 2022-08-24 PROCEDURE — 36415 COLL VENOUS BLD VENIPUNCTURE: CPT | Performed by: EMERGENCY MEDICINE

## 2022-08-24 PROCEDURE — 80053 COMPREHEN METABOLIC PANEL: CPT | Performed by: EMERGENCY MEDICINE

## 2022-08-24 PROCEDURE — C9803 HOPD COVID-19 SPEC COLLECT: HCPCS

## 2022-08-24 PROCEDURE — 86140 C-REACTIVE PROTEIN: CPT | Performed by: EMERGENCY MEDICINE

## 2022-08-24 RX ORDER — DIAZEPAM 5 MG
5 TABLET ORAL ONCE
Status: COMPLETED | OUTPATIENT
Start: 2022-08-24 | End: 2022-08-24

## 2022-08-24 RX ORDER — DIMENHYDRINATE 50 MG
50 TABLET ORAL EVERY 6 HOURS PRN
Qty: 28 TABLET | Refills: 0 | Status: SHIPPED | OUTPATIENT
Start: 2022-08-24 | End: 2022-08-31

## 2022-08-24 RX ORDER — ONDANSETRON 4 MG/1
4 TABLET, ORALLY DISINTEGRATING ORAL EVERY 8 HOURS PRN
Qty: 12 TABLET | Refills: 0 | Status: SHIPPED | OUTPATIENT
Start: 2022-08-24 | End: 2022-08-27

## 2022-08-24 RX ORDER — OXYCODONE AND ACETAMINOPHEN 5; 325 MG/1; MG/1
2 TABLET ORAL ONCE
Status: COMPLETED | OUTPATIENT
Start: 2022-08-24 | End: 2022-08-24

## 2022-08-24 RX ORDER — KETOROLAC TROMETHAMINE 15 MG/ML
15 INJECTION, SOLUTION INTRAMUSCULAR; INTRAVENOUS ONCE
Status: COMPLETED | OUTPATIENT
Start: 2022-08-24 | End: 2022-08-24

## 2022-08-24 RX ORDER — OXYCODONE HYDROCHLORIDE 5 MG/1
5 TABLET ORAL EVERY 4 HOURS PRN
Qty: 12 TABLET | Refills: 0 | Status: SHIPPED | OUTPATIENT
Start: 2022-08-24 | End: 2022-08-28

## 2022-08-24 RX ADMIN — OXYCODONE AND ACETAMINOPHEN 2 TABLET: 5; 325 TABLET ORAL at 18:34

## 2022-08-24 RX ADMIN — KETOROLAC TROMETHAMINE 15 MG: 15 INJECTION, SOLUTION INTRAMUSCULAR; INTRAVENOUS at 18:34

## 2022-08-24 RX ADMIN — DIAZEPAM 5 MG: 5 TABLET ORAL at 15:51

## 2022-08-24 NOTE — ED TRIAGE NOTES
Since Saturday with left flank pain.  States he has had kidney stones since January and states it feels similar.     Triage Assessment     Row Name 08/24/22 1412       Triage Assessment (Adult)    Airway WDL WDL       Respiratory WDL    Respiratory WDL WDL       Skin Circulation/Temperature WDL    Skin Circulation/Temperature WDL WDL       Cardiac WDL    Cardiac WDL WDL       Peripheral/Neurovascular WDL    Peripheral Neurovascular WDL WDL       Cognitive/Neuro/Behavioral WDL    Cognitive/Neuro/Behavioral WDL WDL

## 2022-08-24 NOTE — ED PROVIDER NOTES
EMERGENCY DEPARTMENT ENCOUNTER      NAME: Nirav Baker  AGE: 77 year old male  YOB: 1945  MRN: [id number]  EVALUATION DATE & TIME: 2022  5:17 PM    PCP: Mae Ashton    ED PROVIDER: Deniz Cobian M.D.      Chief Complaint   Patient presents with     Flank Pain       FINAL IMPRESSION:  1. Ureterolithiasis        ED COURSE & MEDICAL DECISION MAKIN year old male presents to the Emergency Department for evaluation of flank pain.  History of kidney stones.  Found to have a 7 mm distal ureteral stone on the left side.  No evidence of infection on urine analysis or other lab testing.  Patient is afebrile.  Pain was controlled here.  Patient previously seen by [facility] [place].  We discussed an analgesic plan for home and he will follow-up there given the large stone likely to require an intervention.  Did obtain prescreening COVID-19 test.  Patient was discharged with analgesics and follow-up.  Return precautions reviewed.    5:39 PM I met with the patient, obtained history, performed an initial exam, and discussed options and plan for diagnostics and treatment here in the ED.    PPE: Provider wore gloves, N95 mask, eye protection.     At the conclusion of the encounter I discussed the results of all of the tests and the disposition. The questions were answered. The patient or family acknowledged understanding and was agreeable with the care plan.       MEDICATIONS GIVEN IN THE EMERGENCY:  Medications   diazepam (VALIUM) tablet 5 mg (5 mg Oral Given 22 1551)   ketorolac (TORADOL) injection 15 mg (15 mg Intravenous Given 22)   oxyCODONE-acetaminophen (PERCOCET) 5-325 MG per tablet 2 tablet (2 tablets Oral Given 22)       NEW PRESCRIPTIONS STARTED AT TODAY'S ER VISIT  Discharge Medication List as of 2022  6:41 PM      START taking these medications    Details   dimenhyDRINATE (DRAMAMINE) 50 MG tablet Take 1 tablet (50 mg) by mouth every 6 hours  as needed for other (kidney stone pain management), Disp-28 tablet, R-0, Local Print      ondansetron (ZOFRAN ODT) 4 MG ODT tab Take 1 tablet (4 mg) by mouth every 8 hours as needed for nausea, Disp-12 tablet, R-0, Local Print                =================================================================    HPI    Patient information was obtained from: The patient     Use of : N/A       Nirav Baker is a 77 year old male with a pertinent history of CAD, GERD, HTN, diabetes mellitus type II, abdominal hernia, urinary retention, and sepsis who presents to this ED for evaluation of flank pain.    Patient believes he is dealing with kidney stone pain. He has chronic back pain, and can tell this pain is different. He woke up early Saturday morning in excruciating pain and vomited. Since then, he has been taking Tylenol and Ibuprofen intermittently for the pain to some relief of symptoms. Denies dysuria and fever. Patient was driven here by his wife today. Notes he has a history of kidney stones and passed three in Feb/Jan of this year. No other concerns or symptoms reported at this time.        REVIEW OF SYSTEMS   All systems reviewed and negative except as noted in HPI.    PAST MEDICAL HISTORY:  History reviewed. No pertinent past medical history.    PAST SURGICAL HISTORY:  History reviewed. No pertinent surgical history.      CURRENT MEDICATIONS:    No current facility-administered medications for this encounter.     Current Outpatient Medications   Medication     dimenhyDRINATE (DRAMAMINE) 50 MG tablet     ondansetron (ZOFRAN ODT) 4 MG ODT tab     oxyCODONE (ROXICODONE) 5 MG tablet     acetaminophen (TYLENOL) 325 MG tablet     aspirin 325 MG EC tablet     aspirin-acetaminophen-caffeine (EXCEDRIN MIGRAINE) 250-250-65 mg per tablet     calcium carbonate (TUMS ULTRA) 400 mg calcium (1,000 mg) Chew     chlorthalidone (HYGROTEN) 25 MG tablet     cholecalciferol, vitamin D3, 5,000 unit capsule      "cyclobenzaprine (FLEXERIL) 10 MG tablet     diphenhydrAMINE (BENADRYL) 25 mg tablet     ezetimibe (ZETIA) 10 mg tablet     glipiZIDE (GLUCOTROL XL) 10 MG 24 hr tablet     indomethacin (INDOCIN) 50 MG capsule     irbesartan (AVAPRO) 300 MG tablet     Lactobacillus rhamnosus GG (CULTURELLE) 10-15 Billion cell capsule     LANTUS U-100 INSULIN 100 unit/mL injection     magnesium 250 mg Tab     metFORMIN (GLUCOPHAGE) 1000 MG tablet     multivitamin (ONE A DAY) per tablet     omega-3/dha/epa/fish oil (FISH OIL-OMEGA-3 FATTY ACIDS) 300-1,000 mg capsule     omeprazole (PRILOSEC) 20 MG capsule     polyethylene glycol (MIRALAX) 17 gram packet     pseudoephedrine (SUDAFED) 30 MG tablet     riboflavin, vitamin B2, 100 mg Tab     TRADJENTA 5 mg Tab         ALLERGIES:  Allergies   Allergen Reactions     Amoxicillin Hives     Per Dr. Barrientos, patient has tolerated Keflex.     Atorvastatin Diarrhea     Fenofibrate Muscle Pain (Myalgia)     Gemfibrozil      Hydrocodone-Acetaminophen Other (See Comments)     Patient reports \"going white as a sheet\" and cold sweats     Lisinopril Cough     Neosporin (Rgh-Lot-Wvtxj) [Triple Antibiotic] Unknown     Penicillins Hives     1992 had pcn and had hives. Pt. States allergic to all cillin's       Pravastatin Sodium [Pravastatin] Muscle Pain (Myalgia)     Trulicity [Dulaglutide] Unknown     Gluten [Gluten Meal] Other (See Comments)     Reports feeling worse with gluten; tested below threshold for Celiac but daughter is positive     Insulin Detemir Rash       FAMILY HISTORY:  History reviewed. No pertinent family history.    SOCIAL HISTORY:   Social History     Socioeconomic History     Marital status:    Tobacco Use     Smoking status: Never Smoker     Smokeless tobacco: Never Used   Substance and Sexual Activity     Alcohol use: Yes     Alcohol/week: 1.0 standard drink     Drug use: No       VITALS:  BP (!) 152/88   Pulse 70   Temp 98.7  F (37.1  C) (Oral)   Resp 16   Wt (!) 151 kg " (333 lb)   SpO2 98%   BMI 45.16 kg/m      PHYSICAL EXAM    Constitutional: Well developed, Well nourished, NAD.  HENT: Normocephalic, Atraumatic. Neck Supple.  Eyes: EOMI, Conjunctiva normal.  Respiratory: Breathing comfortably on room air. Speaks full sentences easily. Lungs clear to ascultation.  Cardiovascular: Normal heart rate, Regular rhythm. No peripheral edema.  Abdomen: Soft, L sided CVA tenderness.  Musculoskeletal: Good range of motion in all major joints. No major deformities noted.  Integument: Warm, Dry.  Neurologic: Alert & awake, Normal motor function, Normal sensory function, No focal deficits noted.   Psychiatric: Cooperative. Affect appropriate.     LAB:  All pertinent labs reviewed and interpreted.  Labs Ordered and Resulted from Time of ED Arrival to Time of ED Departure   CRP INFLAMMATION - Abnormal       Result Value    CRP 2.8 (*)    COMPREHENSIVE METABOLIC PANEL - Abnormal    Sodium 140      Potassium 4.8      Chloride 106      Carbon Dioxide (CO2) 27      Anion Gap 7      Urea Nitrogen 37 (*)     Creatinine 2.28 (*)     Calcium 10.0      Glucose 142 (*)     Alkaline Phosphatase 76      AST 33      ALT 28      Protein Total 7.5      Albumin 3.4 (*)     Bilirubin Total 1.1 (*)     GFR Estimate 29 (*)    ROUTINE UA WITH MICROSCOPIC REFLEX TO CULTURE - Abnormal    Color Urine Yellow      Appearance Urine Clear      Glucose Urine Negative      Bilirubin Urine Negative      Ketones Urine Negative      Specific Gravity Urine 1.019      Blood Urine Negative      pH Urine 5.0      Protein Albumin Urine Negative      Urobilinogen Urine <2.0      Nitrite Urine Negative      Leukocyte Esterase Urine Negative      Mucus Urine Present (*)     RBC Urine 1      WBC Urine 2      Squamous Epithelials Urine 1     CBC WITH PLATELETS AND DIFFERENTIAL    WBC Count 10.5      RBC Count 5.08      Hemoglobin 15.4      Hematocrit 44.0      MCV 87      MCH 30.3      MCHC 35.0      RDW 13.3      Platelet Count 208       % Neutrophils 69      % Lymphocytes 18      % Monocytes 8      % Eosinophils 3      % Basophils 1      % Immature Granulocytes 1      NRBCs per 100 WBC 0      Absolute Neutrophils 7.4      Absolute Lymphocytes 1.9      Absolute Monocytes 0.8      Absolute Eosinophils 0.3      Absolute Basophils 0.1      Absolute Immature Granulocytes 0.1      Absolute NRBCs 0.0         RADIOLOGY:  Reviewed all pertinent imaging. Please see official radiology report.  CT Abdomen Pelvis w/o Contrast   Final Result   IMPRESSION:    1.  Obstructing 7 x 4 mm distal left ureteral stone with mild left hydronephrosis/hydroureter.   2.  Numerous additional small stones are present within left kidney.                 I, Eliz Jose, am serving as a scribe to document services personally performed by Dr. Deniz Cobian, based on my observation and the provider's statements to me. I, Deniz Cobian MD attest that Elzi Jose is acting in a scribe capacity, has observed my performance of the services and has documented them in accordance with my direction.    Deniz Cobian M.D.  Emergency Medicine  [facility] EMERGENCY ROOM  [address] [id number] [phone]  Dept: [phone]     Deniz Cobian MD  08/24/22 2125

## 2022-08-24 NOTE — DISCHARGE INSTRUCTIONS
You have a left sided kidney stone which is quite large measuring 7 mm.  This is causing your pain.  The rest of your labs and urine testing looks stable.  You should follow-up as soon as possible in the kidney stone [facility] to get reestablished and discuss management of the stone which will likely require procedure.  Please review the attached instructions.  We would like you to continue scheduled Tylenol and ibuprofen for pain.  He can also use Dramamine especially at nighttime and this can be helpful for kidney stone pain.  He can do Zofran as needed for nausea and finally oxycodone as needed for breakthrough severe pain.  We placed a referral so a  should be reaching out to you tomorrow, if you do not hear from anyone you can contact the clinic yourself using the information above.

## 2022-08-29 ENCOUNTER — VIRTUAL VISIT (OUTPATIENT)
Dept: UROLOGY | Facility: CLINIC | Age: 77
End: 2022-08-29
Payer: COMMERCIAL

## 2022-08-29 DIAGNOSIS — N20.0 CALCULUS OF KIDNEY: ICD-10-CM

## 2022-08-29 DIAGNOSIS — N20.1 URETEROLITHIASIS: ICD-10-CM

## 2022-08-29 DIAGNOSIS — N20.1 CALCULUS OF URETER: Primary | ICD-10-CM

## 2022-08-29 PROCEDURE — 99213 OFFICE O/P EST LOW 20 MIN: CPT | Mod: 95 | Performed by: UROLOGY

## 2022-08-29 RX ORDER — ACETAMINOPHEN 500 MG
1000 TABLET ORAL
Status: CANCELLED | OUTPATIENT
Start: 2022-08-29

## 2022-08-29 RX ORDER — LEVOFLOXACIN 5 MG/ML
500 INJECTION, SOLUTION INTRAVENOUS EVERY 24 HOURS
Status: CANCELLED | OUTPATIENT
Start: 2022-08-29

## 2022-08-29 RX ORDER — GABAPENTIN 100 MG/1
300 CAPSULE ORAL
Status: CANCELLED | OUTPATIENT
Start: 2022-08-29

## 2022-08-29 RX ORDER — KETOROLAC TROMETHAMINE 30 MG/ML
15 INJECTION, SOLUTION INTRAMUSCULAR; INTRAVENOUS
Status: CANCELLED | OUTPATIENT
Start: 2022-08-29

## 2022-08-29 ASSESSMENT — PAIN SCALES - GENERAL: PAINLEVEL: MILD PAIN (2)

## 2022-08-29 NOTE — PATIENT INSTRUCTIONS
Patient Stated Goal: Know what to expect after surgery  Ureteroscopy    Ureteroscopy is a procedure which is done for clearance of stones from the ureter, kidney or both. There are no incisions involved. The procedure involves your surgeon placing a small scope into your urethra. This is the opening where urine leaves your body.  The surgeon watches as they carefully guide the scope to the stone(s).  Modern flexible ureteroscopes can be used to reach virtually any location within the urinary tract.     The size, shape and location of the stone determines how best to treat the stone(s).  Whenever possible, stones are removed in one piece.  Larger stones need to be broken using a laser before removing in smaller pieces.  The goal is to remove all stones and stone fragments from that side of the body in a single treatment.  Complete stone clearance is an important step to prevent future kidney stone episodes.    Surgery:    Same day outpatient procedure    30-60 minutes    Procedure done in hospital surgical suite    General anesthesia (you will be asleep during the procedure)     Antibiotic prior to surgery to prevent infection    Physician will visit with you and respond to any questions or concerns and consent will be signed prior to going to the operating room    Risks:    Infection - Preoperative antibiotics should prevent new infections but it is possible that unanticipated bacteria may be introduced at time of surgery or that the stones were actually chronically infected before surgery      Injury - The ureter may be injured during this procedure.  This is most likely to happen if the ureter was very inflamed before surgery or if a stone is very tightly impacted.  The surgeon will not aggressively treat a stone if this creates a risk of injury.        Inaccessible Stones -A single procedure is effective in 95% of cases, but if your ureter is very narrow or your kidney stone is very impacted, a stent will be  placed and the procedure stopped.  In 1-2 weeks after the ureter has relaxed, the patient will be brought back to surgery and the procedure can be safely performed.      Incomplete stone clearance -Occasionally stone or stone fragments may not be completely cleared.  These may pass on their own, which may cause discomfort.  Our goal is to remove all possible stones and fragments.    Stent:      An internal soft tube will be placed between the kidney and the bladder while in surgery (after the stone is cleared). The stent will keep the kidney draining.    What should I expect?     It is common for a stent to cause some irritation and discomfort.   You may have:      The need to urinate suddenly     The need to urinate often     Pain during urination     A dull backache, which may get worse during urination     Blood stained urine (like fruit punch) and occasional small clots    It s important to remember the stent is necessary and only temporary. To feel more comfortable:      Drink more than you normally would but you do not have to constantly  flush your kidneys     Limiting your activity may decrease irritation or bleeding    Ibuprofen - 2 tablets every 6-8 hours     Use pain medications as directed.    When is the stent removed?    Most stents are removed within 5 days to 2 weeks after a procedure.     How is the stent removed?     Your stent will be removed in the [facility] with a small telescope and a grasping tool.  It usually takes less than 1 minute to remove the stent.    What should I expect after the stent is removed?     You should feel normal by the next day    Some patients find:    An increase in back pain about an hour after the stent is removed as the kidney fills up with urine before it starts to empty.  It can be as uncomfortable as your initial stone episode.  Taking pain medications before stent removal may be helpful, but you would need someone else to drive you to and from your  appointment.    Bladder symptoms usually disappear by the next morning.    Small amounts of blood in the urine may be seen occasionally for up to a week.    Diet:      After surgery, there are no dietary restrictions - Drink to thirst, there is no need to increase intake of fluids, as this may increase nausea symptoms. Try to eat smaller, more frequent snacks, instead of large meals.    Activity:    Many people return to work within 1-2 days. Fatigue is normal for a couple of weeks following surgery. With increased activity you may experience more discomfort and you may notice more blood in your urine.      Post-Operative Symptom Control    While you recover from your procedure, you can take steps to ease your recovery.    Medications that prevent further episodes of severe pain and help stones pass: Take these as prescribed on a regular basis even if you are NOT in pain      Ibuprofen (Advil or Motrin) - Is available over the counter Take 2 (200mg) tablets every 6 hours until the stone passes.  o prevents spasm of the ureter.    o Decreases pain      Dramamine - (drowsy version, non-generic formulation) Is available over the counter and decreases spasm of the ureter.  Take 50mg at bedtime every night until the stone passes. In addition, take every 6 hours as needed.  Dramamine:  o Decreases nausea  o Decreases acute pain  o Decreases recurrence of pain for next 24 hours  o Will help you sleep        *This medication will cause increased drowsiness, do not drive or operate machinery for 6 hours      Flomax- Studies show that Flomax decreases irritation from stents.   o Take every day with food until stone passes even if you do not have pain  o Flomax does not relieve pain.        *This medication may cause nasal congestion or light-headedness      Detrol ( Tolterodine) - After surgery Detrol may decrease stent irritation and pelvic pain  o Take as prescribed     *This medication may cause dry mouth, constipation or  blurry vision. Stop medication if unable to urinate.    Medication that are taken as needed to manage break through symptoms: Take these ONLY as required and hopefully not at all      Narcotics (Percocet, Vicodin, Dilaudid)- take as prescribed for severe pain unrelieved by ibuprofen and dramamine  o Take as prescribed for severe pain  o Narcotics have significant side effects and only  cover-up  pain. They have no effect on cause of pain.  o Common side effects:  - Confusion, disorientation and sedation - DO NOT DRIVE OR OPERATE MACHINERY WITHIN 24 HOURS  - Nausea - take Dramamine or Zofran  or Haldol to help control  - Constipation  - Sleep disturbances      Ondansetron (Zofran)-  o Take as prescribed  o Reserve for severe nausea  o May cause constipation, start over the counter Miralax if needed to treat this    Haldol-  o Take as prescribed  o Reserve for severe nausea    Warning Signs/Symptoms - Please call the [facility] 24 hours a day at [phone] IMMEDIATELY if you experience any of these:    Fever greater than 100.1     Chills    Pain NOT CONTROLLED by pain medications    Heavy bleeding or large clots in urine (small clots can be normal)    Persistent nausea and/or vomiting    Post-Operative Follow up:    The stone(s) will be sent from surgery to a lab for composition analysis.  These results are usually available before a one month post-operative visit.  If you had laser treatment to break up your stone, you will usually be scheduled for a low dose CT scan prior to your one month appointment.  This scan allows your surgeon to confirm that all stone fragments were cleared at time of surgery and that there have been no complications.  These results along with possible labs and urine studies will help us develop an individualized plan to prevent new stones from forming and keep existing stones from enlarging.  This visit is usually scheduled about 1 month after your original surgery.    The  [facility] can respond to your questions or concerns 24 hours a day at [phone].

## 2022-08-29 NOTE — PROGRESS NOTES
Assessment/Plan:    Assessment & Plan   Nirav was seen today for follow up.    Diagnoses and all orders for this visit:    Calculus of ureter  -     Patient Stated Goal: Know what to expect after surgery  -     Case Request: CYSTOURETEROSCOPY, WITH LASER LITHOTRIPSY, CALCULUS REMOVAL AND URETERAL STENT INSERTION; Standing  -     Case Request: CYSTOURETEROSCOPY, WITH LASER LITHOTRIPSY, CALCULUS REMOVAL AND URETERAL STENT INSERTION    Calculus of kidney  -     Patient Stated Goal: Know what to expect after surgery  -     Case Request: CYSTOURETEROSCOPY, WITH LASER LITHOTRIPSY, CALCULUS REMOVAL AND URETERAL STENT INSERTION; Standing  -     Case Request: CYSTOURETEROSCOPY, WITH LASER LITHOTRIPSY, CALCULUS REMOVAL AND URETERAL STENT INSERTION    Other orders  -     Forced Air Warming Device; Standing  -     Notify Provider - Anticoagulants and Antiplatelets; Standing  -     Glucose monitor nursing POCT; Standing  -     NPO per Anesthesia Guidelines for Procedure/Surgery Except for: Meds; Standing  -     Apply Pneumatic Compression Device (PCD); Standing  -     Pneumatic Compression Device (PCD) (Equipment); Standing  -     gabapentin (NEURONTIN) capsule 300 mg  -     acetaminophen (TYLENOL) tablet 1,000 mg  -     XR Surgery RENETTA Fluoro Less Than 5 Min; Standing  -     ketorolac (TORADOL) injection 15 mg  -     levofloxacin (LEVAQUIN) infusion 500 mg  -     Provider ordered ALTERNATE pre op antibiotic.  -     XR Surgery RENETTA Fluoro Less Than 5 Min        Stone Management Plan  Stone Management 3/3/2022 3/28/2022 8/29/2022   Urinary Tract Infection No suspicion of infection No suspicion of infection No suspicion of infection   Renal Colic Well controlled symptoms Asymptomatic at this time Inadequate outpatient management of symptoms   Renal Failure No suspicion of renal failure Acute renal failure No suspicion of renal failure   Current CT date 3/1/2022 - 8/24/2022   Right sided stones? No - No   R Stone Event No current  event No current event No current event   Left sided stones? Yes - Yes   L Number of ureteral stones 1 - 1   L GSD of ureteral stones 3 - 7   L Location of ureteral stone Distal - Distal   L Number of kidney stones  5 - 3   L GSD of kidney stones 4 - 10 - 4 - 10   L Hydronephrosis Mild - Mild   L Stone Event Established event No current event New event   Diagnosis date - - 8/24/2022   Initial location of primary symptomatic stone - - Distal   Initial GSD of primary symptomatic stone - - 7   L MET Status Progression - -   L Current Plan MET - Clear   MET 2 week F/U - -   Clear rationale - - Symptomatic             PLAN    Video call duration: 15 minutes  25 minutes spent on the date of the encounter doing chart review, history and exam, documentation and further activities per the note    TOM LUGO MD  [facility]    HPI  Mr. Nirav Baker is a 77 year old  male who is being evaluated via a billable video visit by [facility] for unanticipated visit with acute exacerbation of chronic stone disease.      He is a recurrent uric acid stone former who has not required stone clearance procedures.     He has had difficult to control left flank pain for 10 days despite appropriate non-narcotic measures. No fever or chills. He passed several left sided stones earlier this year. Was fine until now.    CT scan is personally reviewed and demonstrates a 7 mm left distal stone and three stones in the left kidney. Looks like he has dropped one of the prior renal stones..    Creatinine 2.3 with limited baseline data available.    Will proceed with ureterscopic stone clearance this week. Risks and benefits were detailed of ureteroscopic stone clearance including potential issues of urinary or systemic infection, ureteral injury, inaccessible stone, incomplete stone clearance, multiple surgeries, and stent related symptoms of urgency, frequency  and hematuria Patient verbalized understanding. Patient agrees with plan as discussed. Preoperative evaluation with primary care has been requested.    Over the counter symptom control medications of ibuprofen, Dramamine and Tylenol were recommended.    ROS   Review of systems is negative except for HPI.    No past medical history on file.    No past surgical history on file.    Current Outpatient Medications   Medication Sig Dispense Refill     acetaminophen (TYLENOL) 325 MG tablet [ACETAMINOPHEN (TYLENOL) 325 MG TABLET] Take 2 tablets by mouth every 4 (four) hours as needed.       aspirin 325 MG EC tablet [ASPIRIN 325 MG EC TABLET] Take 1 tablet by mouth at bedtime.        aspirin-acetaminophen-caffeine (EXCEDRIN MIGRAINE) 250-250-65 mg per tablet [ASPIRIN-ACETAMINOPHEN-CAFFEINE (EXCEDRIN MIGRAINE) 250-250-65 MG PER TABLET] Take 1 tablet by mouth every 6 (six) hours as needed for pain.       calcium carbonate (TUMS ULTRA) 400 mg calcium (1,000 mg) Chew [CALCIUM CARBONATE (TUMS ULTRA) 400 MG CALCIUM (1,000 MG) CHEW] Chew 2 tablets at bedtime.        chlorthalidone (HYGROTEN) 25 MG tablet [CHLORTHALIDONE (HYGROTEN) 25 MG TABLET] Take 12.5 mg by mouth at bedtime.        cholecalciferol, vitamin D3, 5,000 unit capsule [CHOLECALCIFEROL, VITAMIN D3, 5,000 UNIT CAPSULE] Take 5,000 Units by mouth at bedtime.        cyclobenzaprine (FLEXERIL) 10 MG tablet [CYCLOBENZAPRINE (FLEXERIL) 10 MG TABLET] Take 1 tablet (10 mg total) by mouth 3 (three) times a day as needed for muscle spasms. 15 tablet 0     dimenhyDRINATE (DRAMAMINE) 50 MG tablet Take 1 tablet (50 mg) by mouth every 6 hours as needed for other (kidney stone pain management) 28 tablet 0     diphenhydrAMINE (BENADRYL) 25 mg tablet [DIPHENHYDRAMINE (BENADRYL) 25 MG TABLET] Take 25 mg by mouth at bedtime as needed for itching.       ezetimibe (ZETIA) 10 mg tablet [EZETIMIBE (ZETIA) 10 MG TABLET] Take 1 tablet by mouth at bedtime.        glipiZIDE (GLUCOTROL XL) 10 MG  24 hr tablet [GLIPIZIDE (GLUCOTROL XL) 10 MG 24 HR TABLET] Take 1 tablet (10 mg total) by mouth daily. 30 tablet 0     indomethacin (INDOCIN) 50 MG capsule [INDOMETHACIN (INDOCIN) 50 MG CAPSULE] Take 1 capsule by mouth 3 (three) times a day as needed.        irbesartan (AVAPRO) 300 MG tablet [IRBESARTAN (AVAPRO) 300 MG TABLET] Take 300 mg by mouth at bedtime.       Lactobacillus rhamnosus GG (CULTURELLE) 10-15 Billion cell capsule [LACTOBACILLUS RHAMNOSUS GG (CULTURELLE) 10-15 BILLION CELL CAPSULE] Take 2 capsules by mouth daily.       LANTUS U-100 INSULIN 100 unit/mL injection [LANTUS U-100 INSULIN 100 UNIT/ML INJECTION] Inject 60 Units under the skin 2 (two) times a day. 10 mL PRN     magnesium 250 mg Tab [MAGNESIUM 250 MG TAB] Take 500 mg by mouth at bedtime.        metFORMIN (GLUCOPHAGE) 1000 MG tablet [METFORMIN (GLUCOPHAGE) 1000 MG TABLET] Take 1 tablet by mouth at bedtime.        multivitamin (ONE A DAY) per tablet [MULTIVITAMIN (ONE A DAY) PER TABLET] Take 1 tablet by mouth at bedtime.        omega-3/dha/epa/fish oil (FISH OIL-OMEGA-3 FATTY ACIDS) 300-1,000 mg capsule [OMEGA-3/DHA/EPA/FISH OIL (FISH OIL-OMEGA-3 FATTY ACIDS) 300-1,000 MG CAPSULE] Take 6 g by mouth at bedtime.       omeprazole (PRILOSEC) 20 MG capsule [OMEPRAZOLE (PRILOSEC) 20 MG CAPSULE] Take 1 capsule by mouth at bedtime.        polyethylene glycol (MIRALAX) 17 gram packet [POLYETHYLENE GLYCOL (MIRALAX) 17 GRAM PACKET] Take 17 g by mouth at bedtime.        pseudoephedrine (SUDAFED) 30 MG tablet [PSEUDOEPHEDRINE (SUDAFED) 30 MG TABLET] Take 1 tablet by mouth every 12 (twelve) hours as needed.       riboflavin, vitamin B2, 100 mg Tab [RIBOFLAVIN, VITAMIN B2, 100 MG TAB] Take 1 tablet by mouth at bedtime.        TRADJENTA 5 mg Tab [TRADJENTA 5 MG TAB] Take 1 tablet by mouth at bedtime.          Allergies   Allergen Reactions     Amoxicillin Hives     Per Dr. Barrientos, patient has tolerated Keflex.     Atorvastatin Diarrhea     Fenofibrate Muscle  "Pain (Myalgia)     Gemfibrozil      Hydrocodone-Acetaminophen Other (See Comments)     Patient reports \"going white as a sheet\" and cold sweats     Lisinopril Cough     Neosporin (Ytz-Fhy-Pymgi) [Triple Antibiotic] Unknown     Penicillins Hives     1992 had pcn and had hives. Pt. States allergic to all cillin's       Pravastatin Sodium [Pravastatin] Muscle Pain (Myalgia)     Trulicity [Dulaglutide] Unknown     Gluten [Gluten Meal] Other (See Comments)     Reports feeling worse with gluten; tested below threshold for Celiac but daughter is positive     Insulin Detemir Rash       Social History     Socioeconomic History     Marital status:      Spouse name: Not on file     Number of children: Not on file     Years of education: Not on file     Highest education level: Not on file   Occupational History     Not on file   Tobacco Use     Smoking status: Never Smoker     Smokeless tobacco: Never Used   Substance and Sexual Activity     Alcohol use: Yes     Alcohol/week: 1.0 standard drink     Drug use: No     Sexual activity: Not on file   Other Topics Concern     Not on file   Social History Narrative     Not on file     Social Determinants of Health     Financial Resource Strain: Not on file   Food Insecurity: Not on file   Transportation Needs: Not on file   Physical Activity: Not on file   Stress: Not on file   Social Connections: Not on file   Intimate Partner Violence: Not on file   Housing Stability: Not on file       No family history on file.    Objective:     Appears AAO x 3  No vitals obtained due to virtual visit    Labs   Most Recent 3 CBC's:Recent Labs   Lab Test 08/24/22  1548 02/19/22  2312 07/05/18  0618   WBC 10.5 10.4 7.3   HGB 15.4 15.8 14.2   MCV 87 87 87    181 170     Most Recent 3 BMP's:Recent Labs   Lab Test 08/24/22  1548 03/30/22  0922 03/17/22  1304    144 136   POTASSIUM 4.8 3.9 4.9   CHLORIDE 106 104 101   CO2 27 27 24   BUN 37* 24 32*   CR 2.28* 1.49* 1.95*   ANIONGAP 7 " 13 11   AURORA 10.0 9.3 9.5   * 188* 353*     Most Recent Urinalysis:Recent Labs   Lab Test 08/24/22  1753 02/20/22  0020 06/09/18  0046   COLOR Yellow   < > Yellow   APPEARANCE Clear   < > Clear   URINEGLC Negative   < > 150 mg/dL*   URINEBILI Negative   < > Negative   URINEKETONE Negative   < > 25 mg/dL*   SG 1.019   < > 1.010   UBLD Negative   < > Negative   URINEPH 5.0   < > 5.0   PROTEIN Negative   < > 30 mg/dL*   UROBILINOGEN  --   --  <2.0 E.U./dL   NITRITE Negative   < > Negative   LEUKEST Negative   < > Negative   RBCU 1   < > 0-2   WBCU 2   < > 0-5    < > = values in this interval not displayed.     Acute Labs   Urine Culture    Culture   Date Value Ref Range Status   07/03/2018 No MRSA isolated  Final   06/09/2018 STREPTOCOCCUS PYOGENES (A)  Final     Comment:     Streptococcus pyogenes  Reported and sent to [facility] as part of the  Emerging Infections Surveillance Program.

## 2022-09-01 RX ORDER — ASPIRIN 81 MG/1
81 TABLET ORAL EVERY EVENING
Status: ON HOLD | COMMUNITY
End: 2024-04-24

## 2022-09-02 ENCOUNTER — HOSPITAL ENCOUNTER (EMERGENCY)
Facility: CLINIC | Age: 77
Discharge: HOME | End: 2022-09-03
Attending: EMERGENCY MEDICINE | Admitting: EMERGENCY MEDICINE
Payer: COMMERCIAL

## 2022-09-02 DIAGNOSIS — R33.9 URINARY RETENTION: ICD-10-CM

## 2022-09-02 PROCEDURE — 51798 US URINE CAPACITY MEASURE: CPT

## 2022-09-02 PROCEDURE — 99285 EMERGENCY DEPT VISIT HI MDM: CPT | Mod: 25

## 2022-09-02 PROCEDURE — 51702 INSERT TEMP BLADDER CATH: CPT

## 2022-09-02 RX ORDER — LIDOCAINE HYDROCHLORIDE 20 MG/ML
10 JELLY TOPICAL ONCE
Status: COMPLETED | OUTPATIENT
Start: 2022-09-02 | End: 2022-09-03

## 2022-09-02 ASSESSMENT — ENCOUNTER SYMPTOMS
FLANK PAIN: 1
DIFFICULTY URINATING: 1

## 2022-09-03 VITALS
OXYGEN SATURATION: 94 % | DIASTOLIC BLOOD PRESSURE: 89 MMHG | RESPIRATION RATE: 20 BRPM | HEART RATE: 87 BPM | SYSTOLIC BLOOD PRESSURE: 182 MMHG | TEMPERATURE: 97.4 F

## 2022-09-03 LAB
ALBUMIN UR-MCNC: NEGATIVE MG/DL
APPEARANCE UR: CLEAR
ATRIAL RATE - MUSE: 90 BPM
BACTERIA #/AREA URNS HPF: ABNORMAL /HPF
BILIRUB UR QL STRIP: NEGATIVE
COLOR UR AUTO: ABNORMAL
DIASTOLIC BLOOD PRESSURE - MUSE: 81 MMHG
GLUCOSE UR STRIP-MCNC: 500 MG/DL
HGB UR QL STRIP: ABNORMAL
HYALINE CASTS: 1 /LPF
INTERPRETATION ECG - MUSE: NORMAL
KETONES UR STRIP-MCNC: NEGATIVE MG/DL
LEUKOCYTE ESTERASE UR QL STRIP: NEGATIVE
MUCOUS THREADS #/AREA URNS LPF: PRESENT /LPF
NITRATE UR QL: NEGATIVE
P AXIS - MUSE: 50 DEGREES
PH UR STRIP: 5.5 [PH] (ref 5–7)
PR INTERVAL - MUSE: 200 MS
QRS DURATION - MUSE: 112 MS
QT - MUSE: 388 MS
QTC - MUSE: 474 MS
R AXIS - MUSE: -47 DEGREES
RBC URINE: 78 /HPF
SP GR UR STRIP: 1.01 (ref 1–1.03)
SQUAMOUS EPITHELIAL: <1 /HPF
SYSTOLIC BLOOD PRESSURE - MUSE: 199 MMHG
T AXIS - MUSE: 81 DEGREES
UROBILINOGEN UR STRIP-MCNC: <2 MG/DL
VENTRICULAR RATE- MUSE: 90 BPM
WBC URINE: 5 /HPF

## 2022-09-03 PROCEDURE — 250N000011 HC RX IP 250 OP 636: Performed by: EMERGENCY MEDICINE

## 2022-09-03 PROCEDURE — 250N000013 HC RX MED GY IP 250 OP 250 PS 637: Performed by: EMERGENCY MEDICINE

## 2022-09-03 PROCEDURE — 96372 THER/PROPH/DIAG INJ SC/IM: CPT | Mod: XS | Performed by: EMERGENCY MEDICINE

## 2022-09-03 PROCEDURE — 96376 TX/PRO/DX INJ SAME DRUG ADON: CPT

## 2022-09-03 PROCEDURE — 93005 ELECTROCARDIOGRAM TRACING: CPT | Performed by: EMERGENCY MEDICINE

## 2022-09-03 PROCEDURE — 96374 THER/PROPH/DIAG INJ IV PUSH: CPT

## 2022-09-03 PROCEDURE — 51703 INSERT BLADDER CATH COMPLEX: CPT | Performed by: UROLOGY

## 2022-09-03 PROCEDURE — 81001 URINALYSIS AUTO W/SCOPE: CPT | Performed by: UROLOGY

## 2022-09-03 PROCEDURE — 99204 OFFICE O/P NEW MOD 45 MIN: CPT | Mod: 25 | Performed by: UROLOGY

## 2022-09-03 PROCEDURE — 250N000009 HC RX 250: Performed by: EMERGENCY MEDICINE

## 2022-09-03 RX ORDER — CIPROFLOXACIN 500 MG/1
500 TABLET, FILM COATED ORAL 2 TIMES DAILY
Qty: 14 TABLET | Refills: 0 | Status: SHIPPED | OUTPATIENT
Start: 2022-09-03 | End: 2022-09-10

## 2022-09-03 RX ORDER — HYDROMORPHONE HYDROCHLORIDE 1 MG/ML
0.5 INJECTION, SOLUTION INTRAMUSCULAR; INTRAVENOUS; SUBCUTANEOUS
Status: DISCONTINUED | OUTPATIENT
Start: 2022-09-03 | End: 2022-09-03 | Stop reason: HOSPADM

## 2022-09-03 RX ORDER — CIPROFLOXACIN 500 MG/1
500 TABLET, FILM COATED ORAL ONCE
Status: COMPLETED | OUTPATIENT
Start: 2022-09-03 | End: 2022-09-03

## 2022-09-03 RX ORDER — LIDOCAINE HYDROCHLORIDE 20 MG/ML
10 JELLY TOPICAL ONCE
Status: COMPLETED | OUTPATIENT
Start: 2022-09-03 | End: 2022-09-03

## 2022-09-03 RX ADMIN — LIDOCAINE HYDROCHLORIDE 5 ML: 20 JELLY TOPICAL at 00:39

## 2022-09-03 RX ADMIN — HYDROMORPHONE HYDROCHLORIDE 0.5 MG: 1 INJECTION, SOLUTION INTRAMUSCULAR; INTRAVENOUS; SUBCUTANEOUS at 01:39

## 2022-09-03 RX ADMIN — CIPROFLOXACIN 500 MG: 500 TABLET, FILM COATED ORAL at 01:38

## 2022-09-03 RX ADMIN — HYDROMORPHONE HYDROCHLORIDE 1 MG: 1 INJECTION, SOLUTION INTRAMUSCULAR; INTRAVENOUS; SUBCUTANEOUS at 00:36

## 2022-09-03 RX ADMIN — HYDROMORPHONE HYDROCHLORIDE 0.5 MG: 1 INJECTION, SOLUTION INTRAMUSCULAR; INTRAVENOUS; SUBCUTANEOUS at 02:10

## 2022-09-03 RX ADMIN — LIDOCAINE HYDROCHLORIDE 10 ML: 20 JELLY TOPICAL at 00:26

## 2022-09-03 ASSESSMENT — ACTIVITIES OF DAILY LIVING (ADL)
ADLS_ACUITY_SCORE: 35
ADLS_ACUITY_SCORE: 35

## 2022-09-03 NOTE — CONSULTS
Name: Nirav Baker    MRN: [id number]   YOB: 1945                 Chief Complaint:   Urinary retention          History of Present Illness:   Mr. Nirav Baker is a 77 year old male seen in consultation from the emergency department at [facility] for acute urinary retention.  He has a obese diabetic with a history of radical prostatectomy.  He he states he has had a slow dribbling stream ever since the prostatectomy but has not sought any treatment for this.    He is known to Dr. Madden for kidney stones and is planning to undergo procedure with Dr. Madden in the coming week.  He has passed some stones on his own in the recent past but this 1 is planned for ureteroscopy.    He denies any hematuria or dysuria or fever.  He simply has a 12-hour onset of increasingly difficult urination.  The nurses have already tried a 12 Polish and 14 Polish and 18 Polish catheter without success.  There is not been any gross blood from the tip of the urethra.             Past Medical History:     Past Medical History:   Diagnosis Date     Claustrophobia      Diabetes (H)      Hiatal hernia      Hypertension      Motion sickness      Orthopnea      Personal history of fall     Twice     Sleep apnea      Walking troubles             Past Surgical History:     Past Surgical History:   Procedure Laterality Date     APPENDECTOMY       BACK SURGERY      C5     CHOLECYSTECTOMY       COLONOSCOPY       DENTAL SURGERY       HEMORRHOIDECTOMY       HERNIA REPAIR       PROSTATECTOMY              Social History:     Social History     Tobacco Use     Smoking status: Never Smoker     Smokeless tobacco: Never Used   Substance Use Topics     Alcohol use: Yes     Alcohol/week: 1.0 standard drink            Family History:   No family history on file.           Allergies:     Allergies   Allergen Reactions     Amoxicillin Hives     Per Dr. Barrientos, patient has tolerated Keflex.     Atorvastatin Diarrhea     Fenofibrate  "Muscle Pain (Myalgia)     Gemfibrozil      Hydrocodone-Acetaminophen Other (See Comments)     Patient reports \"going white as a sheet\" and cold sweats   OK with Tylenol #3 No problems     Lisinopril Cough     Neosporin (Vgw-Ski-Xmfhl) [Triple Antibiotic] Unknown     Penicillins Hives     1992 had pcn and had hives. Pt. States allergic to all cillin's       Pravastatin Sodium [Pravastatin] Muscle Pain (Myalgia)     Trulicity [Dulaglutide] Unknown     Gluten [Gluten Meal] Other (See Comments)     Reports feeling worse with gluten; tested below threshold for Celiac but daughter is positive     Insulin Detemir Rash            Medications:     Current Facility-Administered Medications   Medication     HYDROmorphone (PF) (DILAUDID) injection 0.5 mg     Current Outpatient Medications   Medication Sig     acetaminophen (TYLENOL) 325 MG tablet [ACETAMINOPHEN (TYLENOL) 325 MG TABLET] Take 2 tablets by mouth every 4 (four) hours as needed.     aspirin 81 MG EC tablet Take 81 mg by mouth daily     aspirin-acetaminophen-caffeine (EXCEDRIN MIGRAINE) 250-250-65 mg per tablet [ASPIRIN-ACETAMINOPHEN-CAFFEINE (EXCEDRIN MIGRAINE) 250-250-65 MG PER TABLET] Take 1 tablet by mouth every 6 (six) hours as needed for pain.     calcium carbonate (TUMS ULTRA) 400 mg calcium (1,000 mg) Chew [CALCIUM CARBONATE (TUMS ULTRA) 400 MG CALCIUM (1,000 MG) CHEW] Chew 2 tablets at bedtime.      chlorthalidone (HYGROTEN) 25 MG tablet [CHLORTHALIDONE (HYGROTEN) 25 MG TABLET] Take 12.5 mg by mouth at bedtime.      cholecalciferol, vitamin D3, 5,000 unit capsule [CHOLECALCIFEROL, VITAMIN D3, 5,000 UNIT CAPSULE] Take 5,000 Units by mouth at bedtime.      cyclobenzaprine (FLEXERIL) 10 MG tablet [CYCLOBENZAPRINE (FLEXERIL) 10 MG TABLET] Take 1 tablet (10 mg total) by mouth 3 (three) times a day as needed for muscle spasms.     diphenhydrAMINE (BENADRYL) 25 mg tablet [DIPHENHYDRAMINE (BENADRYL) 25 MG TABLET] Take 25 mg by mouth at bedtime as needed for itching. "     ezetimibe (ZETIA) 10 mg tablet [EZETIMIBE (ZETIA) 10 MG TABLET] Take 1 tablet by mouth at bedtime.      glipiZIDE (GLUCOTROL XL) 10 MG 24 hr tablet [GLIPIZIDE (GLUCOTROL XL) 10 MG 24 HR TABLET] Take 1 tablet (10 mg total) by mouth daily.     indomethacin (INDOCIN) 50 MG capsule [INDOMETHACIN (INDOCIN) 50 MG CAPSULE] Take 1 capsule by mouth 3 (three) times a day as needed.      irbesartan (AVAPRO) 300 MG tablet [IRBESARTAN (AVAPRO) 300 MG TABLET] Take 300 mg by mouth at bedtime.     Lactobacillus rhamnosus GG (CULTURELLE) 10-15 Billion cell capsule [LACTOBACILLUS RHAMNOSUS GG (CULTURELLE) 10-15 BILLION CELL CAPSULE] Take 2 capsules by mouth daily.     LANTUS U-100 INSULIN 100 unit/mL injection [LANTUS U-100 INSULIN 100 UNIT/ML INJECTION] Inject 60 Units under the skin 2 (two) times a day.     magnesium 250 mg Tab [MAGNESIUM 250 MG TAB] Take 500 mg by mouth at bedtime.      metFORMIN (GLUCOPHAGE) 1000 MG tablet [METFORMIN (GLUCOPHAGE) 1000 MG TABLET] Take 1 tablet by mouth at bedtime.      multivitamin (ONE A DAY) per tablet [MULTIVITAMIN (ONE A DAY) PER TABLET] Take 1 tablet by mouth at bedtime.      omega-3/dha/epa/fish oil (FISH OIL-OMEGA-3 FATTY ACIDS) 300-1,000 mg capsule [OMEGA-3/DHA/EPA/FISH OIL (FISH OIL-OMEGA-3 FATTY ACIDS) 300-1,000 MG CAPSULE] Take 6 g by mouth at bedtime.     omeprazole (PRILOSEC) 20 MG capsule [OMEPRAZOLE (PRILOSEC) 20 MG CAPSULE] Take 1 capsule by mouth at bedtime.      polyethylene glycol (MIRALAX) 17 gram packet [POLYETHYLENE GLYCOL (MIRALAX) 17 GRAM PACKET] Take 17 g by mouth at bedtime.      pseudoephedrine (SUDAFED) 30 MG tablet [PSEUDOEPHEDRINE (SUDAFED) 30 MG TABLET] Take 1 tablet by mouth every 12 (twelve) hours as needed.     riboflavin, vitamin B2, 100 mg Tab [RIBOFLAVIN, VITAMIN B2, 100 MG TAB] Take 1 tablet by mouth at bedtime.      TRADJENTA 5 mg Tab [TRADJENTA 5 MG TAB] Take 1 tablet by mouth at bedtime.              Review of Systems:    ROS: 14 point ROS neg other  than the symptoms noted above in the HPI and PMH.          Physical Exam:   B/P: 199/81, T: 97.4, P: 88, R: 18  Estimated body mass index is 45.03 kg/m  as calculated from the following:    Height as of 9/1/22: 1.829 m (6').    Weight as of 9/1/22: 150.6 kg (332 lb).  General: age-appropriate appearing male in NAD.    Abdomen: moderate obesity , soft, non-distended, non-tender. No organomegaly. Surgical scars include: Robotic prostatectomy ports and umbilical extraction site.  : testicles normal without atrophy or masses, penis normal without urethral discharge and uncircumcised; no meatal stenosis    Procedure note:  The penis is prepped and draped in the usual sterile fashion.  A glide wire was passed up into the bladder and placement was confirmed by getting return of urine when I passed a 5 Comoran catheter over the guidewire.  The guidewire was replaced into the bladder and then the urethra was dilated up using serial dilators to 14 Comoran.  It felt like he had a 10 Comoran bladder neck contracture as I started to get resistance with the 10 Comoran 12 Comoran and then 14 Comoran dilators.  The 16 Comoran dilator would not pass.  All the resistance was at the bladder neck.  I was using Amplatz dilators over a Dotter catheter.  I then exchanged the Glidewire for a guide wire and passed a 14 Comoran catheter over the guidewire.  I was able to have the catheter and then inflated the balloon with 10 cc sterile water.  Overall this took approximately 30 minutes.       Labs:    All laboratory data reviewed with patient  Significant for negative urinalysis      Imaging:    All imaging reviewed with patient.  Significant for CT scan demonstrating the above-noted ureteral stone and there is a moderately distended bladder.  There is no prostate present.  This was independently interpreted by me and explained to the patient.           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  I performed emergency dilation of vesicourethral anastomotic stenosis (otherwise known as bladder neck contracture).  The catheter can stay in place until the time of his ureteroscopy with Dr. Madden.  I sent off a urine culture from the urine after replaced the catheter and this can guide preoperative antibiotics.    Orders Placed This Encounter   Procedures     UA with Microscopic reflex to Culture     Bladder scan     ACUTE Indwelling urinary catheter (Campos)     ED Place Call To:     Peripheral IV catheter       Gunner Pang MD  September 3, 2022     Total time spent in this visit including reviewing his chart, patient history, documentation was 90 minutes.  This was exclusive of the procedure.

## 2022-09-03 NOTE — ED PROVIDER NOTES
EMERGENCY DEPARTMENT ENCOUNTER      NAME: Nirav Baker  AGE: 77 year old male  YOB: 1945  MRN: [id number]  EVALUATION DATE & TIME: 9/2/2022 11:28 PM    PCP: Mae Ashton    ED PROVIDER: Dane Hernandez M.D.      Chief Complaint   Patient presents with     Dysuria         FINAL IMPRESSION:  1. Urinary retention          ED COURSE & MEDICAL DECISION MAKING:    Pertinent Labs & Imaging studies reviewed. (See chart for details)  77 year old male presents to the Emergency Department for evaluation of difficulty urinating.  Has a history of urethral stricture.  Had over thousand mils in his bladder.  We were not able to pass a catheter.  Dr. Pang from urology did come in to place a catheter.  He was able to successfully place a catheter after dilatation.  Patient will keep catheter in place.  Will discharge home with Cipro for possible infection.  Follow-up with Dr. Madden next week.  Return for any worsening symptoms peer    11:25 PM I met with the patient to gather history and to perform my initial exam. I discussed the plan for care while in the Emergency Department. PPE: eye protection, n95 mask and nitrile gloves.  12:03 AM RN informs that she tried to lazo cath the patient but was unsuccessful.  12:54 AM RN informs lazo catheter placement was unsuccessful again. Will proceed to [place] urologist.   1:12 AM Care discussed with Dr. Figueroa, urologist.   1:53 AM Dr. Figueroa, urologist is at bedside to examine the patient.   2:44 AM Dr. Figueroa was able to successfully place in lazo catheter. He is agreeable with discharging the patient with close follow up.   2:58 AM Results were communicated with the patient. Discussed discharge plans along with return precautions. Patient was agreeable with plan.        At the conclusion of the encounter I discussed the results of all of the tests and the disposition. The questions were answered. The patient or family acknowledged understanding and was  agreeable with the care plan.            MEDICATIONS GIVEN IN THE EMERGENCY:  Medications   HYDROmorphone (PF) (DILAUDID) injection 0.5 mg (0.5 mg Intravenous Given 9/3/22 0210)   lidocaine (XYLOCAINE) 2 % external gel 10 mL (10 mLs Urethral Given 9/3/22 0026)   lidocaine (XYLOCAINE) 2 % external gel 10 mL (5 mLs Urethral Given 9/3/22 0039)   HYDROmorphone (DILAUDID) injection 1 mg (1 mg Intramuscular Given 9/3/22 0036)   ciprofloxacin (CIPRO) tablet 500 mg (500 mg Oral Given 9/3/22 0138)       NEW PRESCRIPTIONS STARTED AT TODAY'S ER VISIT  New Prescriptions    CIPROFLOXACIN (CIPRO) 500 MG TABLET    Take 1 tablet (500 mg) by mouth 2 times daily for 7 days          =================================================================    HPI    Patient information was obtained from: patient     Use of : N/A         Nirav Baker is a 77 year old male with a pertinent history of recurrent uric acid stone former, prior prostate cancer removal, celiac disease, CAD, hypertension, hiatal hernia, T2DM, who presents to this ED via walk in for evaluation of flank pain and urinary urgency/frequency.     Patient here with urinary urgency and frequency since this evening but has not been able to urinate as much when he tries to. He does have a history of kidney stones. His last normal urination was at 2PM. He has had flank pain for the past few weeks and did see Dr. Madden on Monday for future surgery scheduled for next week. No fevers. He has had prior symptoms before after he had gotten prostate surgery on 12/12/12 where they had to insert a pediatric catheter since the other types did not work. No other medical complaints at this time.     Chart Review:  8/24/22: [facility] ER: Patient presented with flank pain. CT abdomen pelvis w/o contrast showed: 1. Obstructing 7 x 4 mm distal left ureteral stone with mild left hydronephrosis/hydroureter. 2. Numerous additional small stones are present within left  kidney. There was no evidence of infection on urine analysis or other lab testing. Pain was controlled with oxycodone. Covid was negative. Patient discharged in stable condition with analgesics.         REVIEW OF SYSTEMS   Review of Systems   Genitourinary: Positive for decreased urine volume, difficulty urinating, flank pain and urgency.   All other systems reviewed and are negative.       PAST MEDICAL HISTORY:  Past Medical History:   Diagnosis Date     Claustrophobia      Diabetes (H)      Hiatal hernia      Hypertension      Motion sickness      Orthopnea      Personal history of fall     Twice     Sleep apnea      Walking troubles        PAST SURGICAL HISTORY:  Past Surgical History:   Procedure Laterality Date     APPENDECTOMY       BACK SURGERY      C5     CHOLECYSTECTOMY       COLONOSCOPY       DENTAL SURGERY       HEMORRHOIDECTOMY       HERNIA REPAIR       PROSTATECTOMY             CURRENT MEDICATIONS:    Current Facility-Administered Medications   Medication     HYDROmorphone (PF) (DILAUDID) injection 0.5 mg     Current Outpatient Medications   Medication     ciprofloxacin (CIPRO) 500 MG tablet     acetaminophen (TYLENOL) 325 MG tablet     aspirin 81 MG EC tablet     aspirin-acetaminophen-caffeine (EXCEDRIN MIGRAINE) 250-250-65 mg per tablet     calcium carbonate (TUMS ULTRA) 400 mg calcium (1,000 mg) Chew     chlorthalidone (HYGROTEN) 25 MG tablet     cholecalciferol, vitamin D3, 5,000 unit capsule     cyclobenzaprine (FLEXERIL) 10 MG tablet     diphenhydrAMINE (BENADRYL) 25 mg tablet     ezetimibe (ZETIA) 10 mg tablet     glipiZIDE (GLUCOTROL XL) 10 MG 24 hr tablet     indomethacin (INDOCIN) 50 MG capsule     irbesartan (AVAPRO) 300 MG tablet     Lactobacillus rhamnosus GG (CULTURELLE) 10-15 Billion cell capsule     LANTUS U-100 INSULIN 100 unit/mL injection     magnesium 250 mg Tab     metFORMIN (GLUCOPHAGE) 1000 MG tablet     multivitamin (ONE A DAY) per tablet     omega-3/dha/epa/fish oil (FISH  "OIL-OMEGA-3 FATTY ACIDS) 300-1,000 mg capsule     omeprazole (PRILOSEC) 20 MG capsule     polyethylene glycol (MIRALAX) 17 gram packet     pseudoephedrine (SUDAFED) 30 MG tablet     riboflavin, vitamin B2, 100 mg Tab     TRADJENTA 5 mg Tab         ALLERGIES:  Allergies   Allergen Reactions     Amoxicillin Hives     Per Dr. Barrientos, patient has tolerated Keflex.     Atorvastatin Diarrhea     Fenofibrate Muscle Pain (Myalgia)     Gemfibrozil      Hydrocodone-Acetaminophen Other (See Comments)     Patient reports \"going white as a sheet\" and cold sweats   OK with Tylenol #3 No problems     Lisinopril Cough     Neosporin (Qbq-[id number]-Qggsy) [Triple Antibiotic] Unknown     Penicillins Hives     1992 had pcn and had hives. Pt. States allergic to all cillin's       Pravastatin Sodium [Pravastatin] Muscle Pain (Myalgia)     Trulicity [Dulaglutide] Unknown     Gluten [Gluten Meal] Other (See Comments)     Reports feeling worse with gluten; tested below threshold for Celiac but daughter is positive     Insulin Detemir Rash       FAMILY HISTORY:  No family history on file.    SOCIAL HISTORY:   Social History     Socioeconomic History     Marital status:    Tobacco Use     Smoking status: Never Smoker     Smokeless tobacco: Never Used   Substance and Sexual Activity     Alcohol use: Yes     Alcohol/week: 1.0 standard drink     Drug use: No       VITALS:  BP (!) 199/81   Pulse 88   Temp 97.4  F (36.3  C) (Temporal)   Resp 18   SpO2 98%     PHYSICAL EXAM    Physical Exam  Constitutional:       General: He is not in acute distress.     Appearance: He is not diaphoretic.   HENT:      Head: Atraumatic.   Eyes:      General: No scleral icterus.     Pupils: Pupils are equal, round, and reactive to light.   Cardiovascular:      Heart sounds: Normal heart sounds.   Pulmonary:      Effort: No respiratory distress.      Breath sounds: Normal breath sounds.   Abdominal:      General: Bowel sounds are normal. There is distension.      " Palpations: Abdomen is soft.      Tenderness: There is no abdominal tenderness.   Genitourinary:     Penis: Normal.    Musculoskeletal:         General: No tenderness.   Skin:     General: Skin is warm.      Findings: No rash.           LAB:  All pertinent labs reviewed and interpreted.  Labs Ordered and Resulted from Time of ED Arrival to Time of ED Departure - No data to display    RADIOLOGY:  Reviewed all pertinent imaging. Please see official radiology report.  No orders to display       PROCEDURES:   None      I, Zina Hays, am serving as a scribe to document services personally performed by Dr. Dane Hernandez, based on my observation and the provider's statements to me. I, Dane Hernandez MD attest that Zina Hays is acting in a scribe capacity, has observed my performance of the services and has documented them in accordance with my direction.    Dane Hernandez M.D.  Emergency Medicine  [facility] EMERGENCY ROOM  [address] [address]  [phone]  Dept: [phone]     Dane Hernandez MD  09/03/22 0333

## 2022-09-03 NOTE — ED TRIAGE NOTES
Pt presents to the ED with c/o being unable to urinate. Pt was dx with 7mm kidney stone two weeks ago. Has surgery scheduled for next week. This morning, pt was able to urinate like normal around 3am. Pt only has gotten dribbles out since then. 10/10 pain in bladder.

## 2022-09-03 NOTE — H&P (VIEW-ONLY)
Name: Nirav Baker    MRN: [id number]   YOB: 1945                 Chief Complaint:   Urinary retention          History of Present Illness:   Mr. Nirav Baker is a 77 year old male seen in consultation from the emergency department at [facility] for acute urinary retention.  He has a obese diabetic with a history of radical prostatectomy.  He he states he has had a slow dribbling stream ever since the prostatectomy but has not sought any treatment for this.    He is known to Dr. Madden for kidney stones and is planning to undergo procedure with Dr. Madden in the coming week.  He has passed some stones on his own in the recent past but this 1 is planned for ureteroscopy.    He denies any hematuria or dysuria or fever.  He simply has a 12-hour onset of increasingly difficult urination.  The nurses have already tried a 12 Gabonese and 14 Gabonese and 18 Gabonese catheter without success.  There is not been any gross blood from the tip of the urethra.             Past Medical History:     Past Medical History:   Diagnosis Date     Claustrophobia      Diabetes (H)      Hiatal hernia      Hypertension      Motion sickness      Orthopnea      Personal history of fall     Twice     Sleep apnea      Walking troubles             Past Surgical History:     Past Surgical History:   Procedure Laterality Date     APPENDECTOMY       BACK SURGERY      C5     CHOLECYSTECTOMY       COLONOSCOPY       DENTAL SURGERY       HEMORRHOIDECTOMY       HERNIA REPAIR       PROSTATECTOMY              Social History:     Social History     Tobacco Use     Smoking status: Never Smoker     Smokeless tobacco: Never Used   Substance Use Topics     Alcohol use: Yes     Alcohol/week: 1.0 standard drink            Family History:   No family history on file.           Allergies:     Allergies   Allergen Reactions     Amoxicillin Hives     Per Dr. Barrientos, patient has tolerated Keflex.     Atorvastatin Diarrhea     Fenofibrate  "Muscle Pain (Myalgia)     Gemfibrozil      Hydrocodone-Acetaminophen Other (See Comments)     Patient reports \"going white as a sheet\" and cold sweats   OK with Tylenol #3 No problems     Lisinopril Cough     Neosporin (Sfi-[id number]-[id number]) [Triple Antibiotic] Unknown     Penicillins Hives     1992 had pcn and had hives. Pt. States allergic to all cillin's       Pravastatin Sodium [Pravastatin] Muscle Pain (Myalgia)     Trulicity [Dulaglutide] Unknown     Gluten [Gluten Meal] Other (See Comments)     Reports feeling worse with gluten; tested below threshold for Celiac but daughter is positive     Insulin Detemir Rash            Medications:     Current Facility-Administered Medications   Medication     HYDROmorphone (PF) (DILAUDID) injection 0.5 mg     Current Outpatient Medications   Medication Sig     acetaminophen (TYLENOL) 325 MG tablet [ACETAMINOPHEN (TYLENOL) 325 MG TABLET] Take 2 tablets by mouth every 4 (four) hours as needed.     aspirin 81 MG EC tablet Take 81 mg by mouth daily     aspirin-acetaminophen-caffeine (EXCEDRIN MIGRAINE) 250-250-65 mg per tablet [ASPIRIN-ACETAMINOPHEN-CAFFEINE (EXCEDRIN MIGRAINE) 250-250-65 MG PER TABLET] Take 1 tablet by mouth every 6 (six) hours as needed for pain.     calcium carbonate (TUMS ULTRA) 400 mg calcium (1,000 mg) Chew [CALCIUM CARBONATE (TUMS ULTRA) 400 MG CALCIUM (1,000 MG) CHEW] Chew 2 tablets at bedtime.      chlorthalidone (HYGROTEN) 25 MG tablet [CHLORTHALIDONE (HYGROTEN) 25 MG TABLET] Take 12.5 mg by mouth at bedtime.      cholecalciferol, vitamin D3, 5,000 unit capsule [CHOLECALCIFEROL, VITAMIN D3, 5,000 UNIT CAPSULE] Take 5,000 Units by mouth at bedtime.      cyclobenzaprine (FLEXERIL) 10 MG tablet [CYCLOBENZAPRINE (FLEXERIL) 10 MG TABLET] Take 1 tablet (10 mg total) by mouth 3 (three) times a day as needed for muscle spasms.     diphenhydrAMINE (BENADRYL) 25 mg tablet [DIPHENHYDRAMINE (BENADRYL) 25 MG TABLET] Take 25 mg by mouth at bedtime as needed for itching. "     ezetimibe (ZETIA) 10 mg tablet [EZETIMIBE (ZETIA) 10 MG TABLET] Take 1 tablet by mouth at bedtime.      glipiZIDE (GLUCOTROL XL) 10 MG 24 hr tablet [GLIPIZIDE (GLUCOTROL XL) 10 MG 24 HR TABLET] Take 1 tablet (10 mg total) by mouth daily.     indomethacin (INDOCIN) 50 MG capsule [INDOMETHACIN (INDOCIN) 50 MG CAPSULE] Take 1 capsule by mouth 3 (three) times a day as needed.      irbesartan (AVAPRO) 300 MG tablet [IRBESARTAN (AVAPRO) 300 MG TABLET] Take 300 mg by mouth at bedtime.     Lactobacillus rhamnosus GG (CULTURELLE) 10-15 Billion cell capsule [LACTOBACILLUS RHAMNOSUS GG (CULTURELLE) 10-15 BILLION CELL CAPSULE] Take 2 capsules by mouth daily.     LANTUS U-100 INSULIN 100 unit/mL injection [LANTUS U-100 INSULIN 100 UNIT/ML INJECTION] Inject 60 Units under the skin 2 (two) times a day.     magnesium 250 mg Tab [MAGNESIUM 250 MG TAB] Take 500 mg by mouth at bedtime.      metFORMIN (GLUCOPHAGE) 1000 MG tablet [METFORMIN (GLUCOPHAGE) 1000 MG TABLET] Take 1 tablet by mouth at bedtime.      multivitamin (ONE A DAY) per tablet [MULTIVITAMIN (ONE A DAY) PER TABLET] Take 1 tablet by mouth at bedtime.      omega-3/dha/epa/fish oil (FISH OIL-OMEGA-3 FATTY ACIDS) 300-1,000 mg capsule [OMEGA-3/DHA/EPA/FISH OIL (FISH OIL-OMEGA-3 FATTY ACIDS) 300-1,000 MG CAPSULE] Take 6 g by mouth at bedtime.     omeprazole (PRILOSEC) 20 MG capsule [OMEPRAZOLE (PRILOSEC) 20 MG CAPSULE] Take 1 capsule by mouth at bedtime.      polyethylene glycol (MIRALAX) 17 gram packet [POLYETHYLENE GLYCOL (MIRALAX) 17 GRAM PACKET] Take 17 g by mouth at bedtime.      pseudoephedrine (SUDAFED) 30 MG tablet [PSEUDOEPHEDRINE (SUDAFED) 30 MG TABLET] Take 1 tablet by mouth every 12 (twelve) hours as needed.     riboflavin, vitamin B2, 100 mg Tab [RIBOFLAVIN, VITAMIN B2, 100 MG TAB] Take 1 tablet by mouth at bedtime.      TRADJENTA 5 mg Tab [TRADJENTA 5 MG TAB] Take 1 tablet by mouth at bedtime.              Review of Systems:    ROS: 14 point ROS neg other  than the symptoms noted above in the HPI and PMH.          Physical Exam:   B/P: 199/81, T: 97.4, P: 88, R: 18  Estimated body mass index is 45.03 kg/m  as calculated from the following:    Height as of 9/1/22: 1.829 m (6').    Weight as of 9/1/22: 150.6 kg (332 lb).  General: age-appropriate appearing male in NAD.    Abdomen: moderate obesity , soft, non-distended, non-tender. No organomegaly. Surgical scars include: Robotic prostatectomy ports and umbilical extraction site.  : testicles normal without atrophy or masses, penis normal without urethral discharge and uncircumcised; no meatal stenosis    Procedure note:  The penis is prepped and draped in the usual sterile fashion.  A glide wire was passed up into the bladder and placement was confirmed by getting return of urine when I passed a 5 Moldovan catheter over the guidewire.  The guidewire was replaced into the bladder and then the urethra was dilated up using serial dilators to 14 Moldovan.  It felt like he had a 10 Moldovan bladder neck contracture as I started to get resistance with the 10 Moldovan 12 Moldovan and then 14 Moldovan dilators.  The 16 Moldovan dilator would not pass.  All the resistance was at the bladder neck.  I was using Amplatz dilators over a Dotter catheter.  I then exchanged the Glidewire for a guide wire and passed a 14 Moldovan catheter over the guidewire.  I was able to have the catheter and then inflated the balloon with 10 cc sterile water.  Overall this took approximately 30 minutes.       Labs:    All laboratory data reviewed with patient  Significant for negative urinalysis      Imaging:    All imaging reviewed with patient.  Significant for CT scan demonstrating the above-noted ureteral stone and there is a moderately distended bladder.  There is no prostate present.  This was independently interpreted by me and explained to the patient.           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  I performed emergency dilation of vesicourethral anastomotic stenosis (otherwise known as bladder neck contracture).  The catheter can stay in place until the time of his ureteroscopy with Dr. Madden.  I sent off a urine culture from the urine after replaced the catheter and this can guide preoperative antibiotics.    Orders Placed This Encounter   Procedures     UA with Microscopic reflex to Culture     Bladder scan     ACUTE Indwelling urinary catheter (Campos)     ED Place Call To:     Peripheral IV catheter       Gunner Pang MD  September 3, 2022     Total time spent in this visit including reviewing his chart, patient history, documentation was 90 minutes.  This was exclusive of the procedure.

## 2022-09-08 ENCOUNTER — TELEPHONE (OUTPATIENT)
Dept: UROLOGY | Facility: CLINIC | Age: 77
End: 2022-09-08

## 2022-09-08 ENCOUNTER — ANESTHESIA EVENT (OUTPATIENT)
Dept: SURGERY | Facility: AMBULATORY SURGERY CENTER | Age: 77
End: 2022-09-08
Payer: COMMERCIAL

## 2022-09-08 NOTE — TELEPHONE ENCOUNTER
[facility] Call Center    Phone Message    May a detailed message be left on voicemail: yes     Reason for Call:  Patient was in ER over the weekend and Dr. Pang placed a lazo cath. Patient wanted to make sure that Dr. Pang spoke with Dr. Madden about removing the lazo bag during procedure tomorrow. Please reach out. Thank you!         Action Taken: Message routed to:  [facility] KEN    Travel Screening: Not Applicable

## 2022-09-09 ENCOUNTER — HOSPITAL ENCOUNTER (OUTPATIENT)
Facility: AMBULATORY SURGERY CENTER | Age: 77
Discharge: HOME | End: 2022-09-09
Attending: UROLOGY
Payer: COMMERCIAL

## 2022-09-09 ENCOUNTER — TELEPHONE (OUTPATIENT)
Dept: UROLOGY | Facility: CLINIC | Age: 77
End: 2022-09-09

## 2022-09-09 ENCOUNTER — ANESTHESIA (OUTPATIENT)
Dept: SURGERY | Facility: AMBULATORY SURGERY CENTER | Age: 77
End: 2022-09-09
Payer: COMMERCIAL

## 2022-09-09 VITALS
SYSTOLIC BLOOD PRESSURE: 114 MMHG | WEIGHT: 315 LBS | RESPIRATION RATE: 16 BRPM | DIASTOLIC BLOOD PRESSURE: 58 MMHG | HEIGHT: 72 IN | TEMPERATURE: 97.9 F | BODY MASS INDEX: 42.66 KG/M2 | OXYGEN SATURATION: 95 % | HEART RATE: 66 BPM

## 2022-09-09 DIAGNOSIS — N20.1 CALCULUS OF URETER: ICD-10-CM

## 2022-09-09 DIAGNOSIS — N20.0 CALCULUS OF KIDNEY: ICD-10-CM

## 2022-09-09 LAB
BLOOD COLLECTION: 146
GLUCOSE SERPL-MCNC: 163 MG/DL (ref 70–99)

## 2022-09-09 PROCEDURE — 52317 REMOVE BLADDER STONE: CPT | Performed by: UROLOGY

## 2022-09-09 PROCEDURE — 82365 CALCULUS SPECTROSCOPY: CPT | Performed by: UROLOGY

## 2022-09-09 PROCEDURE — 99000 SPECIMEN HANDLING OFFICE-LAB: CPT | Performed by: UROLOGY

## 2022-09-09 RX ORDER — GABAPENTIN 300 MG/1
300 CAPSULE ORAL
Status: COMPLETED | OUTPATIENT
Start: 2022-09-09 | End: 2022-09-09

## 2022-09-09 RX ORDER — ACETAMINOPHEN 500 MG
1000 TABLET ORAL
Status: COMPLETED | OUTPATIENT
Start: 2022-09-09 | End: 2022-09-09

## 2022-09-09 RX ORDER — LIDOCAINE HYDROCHLORIDE 10 MG/ML
INJECTION, SOLUTION INFILTRATION; PERINEURAL PRN
Status: DISCONTINUED | OUTPATIENT
Start: 2022-09-09 | End: 2022-09-09

## 2022-09-09 RX ORDER — SODIUM CHLORIDE, SODIUM LACTATE, POTASSIUM CHLORIDE, CALCIUM CHLORIDE 600; 310; 30; 20 MG/100ML; MG/100ML; MG/100ML; MG/100ML
INJECTION, SOLUTION INTRAVENOUS CONTINUOUS
Status: DISCONTINUED | OUTPATIENT
Start: 2022-09-09 | End: 2022-09-10 | Stop reason: HOSPADM

## 2022-09-09 RX ORDER — FENTANYL CITRATE 0.05 MG/ML
25 INJECTION, SOLUTION INTRAMUSCULAR; INTRAVENOUS
Status: DISCONTINUED | OUTPATIENT
Start: 2022-09-09 | End: 2022-09-10 | Stop reason: HOSPADM

## 2022-09-09 RX ORDER — ONDANSETRON 2 MG/ML
4 INJECTION INTRAMUSCULAR; INTRAVENOUS EVERY 30 MIN PRN
Status: DISCONTINUED | OUTPATIENT
Start: 2022-09-09 | End: 2022-09-10 | Stop reason: HOSPADM

## 2022-09-09 RX ORDER — HYDROMORPHONE HCL IN WATER/PF 6 MG/30 ML
0.2 PATIENT CONTROLLED ANALGESIA SYRINGE INTRAVENOUS EVERY 5 MIN PRN
Status: DISCONTINUED | OUTPATIENT
Start: 2022-09-09 | End: 2022-09-10 | Stop reason: HOSPADM

## 2022-09-09 RX ORDER — PROPOFOL 10 MG/ML
INJECTION, EMULSION INTRAVENOUS PRN
Status: DISCONTINUED | OUTPATIENT
Start: 2022-09-09 | End: 2022-09-09

## 2022-09-09 RX ORDER — PROPOFOL 10 MG/ML
INJECTION, EMULSION INTRAVENOUS CONTINUOUS PRN
Status: DISCONTINUED | OUTPATIENT
Start: 2022-09-09 | End: 2022-09-09

## 2022-09-09 RX ORDER — OXYCODONE HYDROCHLORIDE 5 MG/1
5 TABLET ORAL EVERY 4 HOURS PRN
Status: DISCONTINUED | OUTPATIENT
Start: 2022-09-09 | End: 2022-09-10 | Stop reason: HOSPADM

## 2022-09-09 RX ORDER — MEPERIDINE HYDROCHLORIDE 25 MG/ML
12.5 INJECTION INTRAMUSCULAR; INTRAVENOUS; SUBCUTANEOUS
Status: DISCONTINUED | OUTPATIENT
Start: 2022-09-09 | End: 2022-09-10 | Stop reason: HOSPADM

## 2022-09-09 RX ORDER — KETOROLAC TROMETHAMINE 15 MG/ML
15 INJECTION, SOLUTION INTRAMUSCULAR; INTRAVENOUS
Status: DISCONTINUED | OUTPATIENT
Start: 2022-09-09 | End: 2022-09-10 | Stop reason: HOSPADM

## 2022-09-09 RX ORDER — ACETAMINOPHEN 325 MG/1
975 TABLET ORAL ONCE
Status: DISCONTINUED | OUTPATIENT
Start: 2022-09-09 | End: 2022-09-10 | Stop reason: HOSPADM

## 2022-09-09 RX ORDER — ONDANSETRON 2 MG/ML
INJECTION INTRAMUSCULAR; INTRAVENOUS PRN
Status: DISCONTINUED | OUTPATIENT
Start: 2022-09-09 | End: 2022-09-09

## 2022-09-09 RX ORDER — LIDOCAINE 40 MG/G
CREAM TOPICAL
Status: DISCONTINUED | OUTPATIENT
Start: 2022-09-09 | End: 2022-09-10 | Stop reason: HOSPADM

## 2022-09-09 RX ORDER — FENTANYL CITRATE 0.05 MG/ML
25 INJECTION, SOLUTION INTRAMUSCULAR; INTRAVENOUS EVERY 5 MIN PRN
Status: DISCONTINUED | OUTPATIENT
Start: 2022-09-09 | End: 2022-09-10 | Stop reason: HOSPADM

## 2022-09-09 RX ORDER — GLYCOPYRROLATE 0.2 MG/ML
INJECTION, SOLUTION INTRAMUSCULAR; INTRAVENOUS PRN
Status: DISCONTINUED | OUTPATIENT
Start: 2022-09-09 | End: 2022-09-09

## 2022-09-09 RX ORDER — ONDANSETRON 4 MG/1
4 TABLET, ORALLY DISINTEGRATING ORAL EVERY 30 MIN PRN
Status: DISCONTINUED | OUTPATIENT
Start: 2022-09-09 | End: 2022-09-10 | Stop reason: HOSPADM

## 2022-09-09 RX ORDER — FENTANYL CITRATE 50 UG/ML
INJECTION, SOLUTION INTRAMUSCULAR; INTRAVENOUS PRN
Status: DISCONTINUED | OUTPATIENT
Start: 2022-09-09 | End: 2022-09-09

## 2022-09-09 RX ORDER — LEVOFLOXACIN 5 MG/ML
500 INJECTION, SOLUTION INTRAVENOUS EVERY 24 HOURS
Status: DISCONTINUED | OUTPATIENT
Start: 2022-09-09 | End: 2022-09-10 | Stop reason: HOSPADM

## 2022-09-09 RX ADMIN — PROPOFOL 50 MG: 10 INJECTION, EMULSION INTRAVENOUS at 13:28

## 2022-09-09 RX ADMIN — Medication 100 MCG: at 13:25

## 2022-09-09 RX ADMIN — Medication 100 MCG: at 13:30

## 2022-09-09 RX ADMIN — ONDANSETRON 4 MG: 2 INJECTION INTRAMUSCULAR; INTRAVENOUS at 13:21

## 2022-09-09 RX ADMIN — LEVOFLOXACIN 500 MG: 5 INJECTION, SOLUTION INTRAVENOUS at 13:10

## 2022-09-09 RX ADMIN — Medication 100 MCG: at 13:28

## 2022-09-09 RX ADMIN — GLYCOPYRROLATE 0.2 MG: 0.2 INJECTION, SOLUTION INTRAMUSCULAR; INTRAVENOUS at 13:17

## 2022-09-09 RX ADMIN — Medication 100 MCG: at 13:36

## 2022-09-09 RX ADMIN — PROPOFOL 200 MCG/KG/MIN: 10 INJECTION, EMULSION INTRAVENOUS at 13:17

## 2022-09-09 RX ADMIN — SODIUM CHLORIDE, SODIUM LACTATE, POTASSIUM CHLORIDE, CALCIUM CHLORIDE: 600; 310; 30; 20 INJECTION, SOLUTION INTRAVENOUS at 12:38

## 2022-09-09 RX ADMIN — KETOROLAC TROMETHAMINE 15 MG: 15 INJECTION, SOLUTION INTRAMUSCULAR; INTRAVENOUS at 12:38

## 2022-09-09 RX ADMIN — Medication 100 MCG: at 13:29

## 2022-09-09 RX ADMIN — FENTANYL CITRATE 100 MCG: 50 INJECTION, SOLUTION INTRAMUSCULAR; INTRAVENOUS at 13:17

## 2022-09-09 RX ADMIN — GABAPENTIN 300 MG: 300 CAPSULE ORAL at 12:38

## 2022-09-09 RX ADMIN — Medication 100 MCG: at 13:39

## 2022-09-09 RX ADMIN — Medication 100 MCG: at 13:26

## 2022-09-09 RX ADMIN — Medication 100 MCG: at 13:41

## 2022-09-09 RX ADMIN — LIDOCAINE HYDROCHLORIDE 3 ML: 10 INJECTION, SOLUTION INFILTRATION; PERINEURAL at 13:17

## 2022-09-09 RX ADMIN — Medication 1000 MG: at 12:36

## 2022-09-09 RX ADMIN — PROPOFOL 200 MG: 10 INJECTION, EMULSION INTRAVENOUS at 13:17

## 2022-09-09 RX ADMIN — PROPOFOL 50 MG: 10 INJECTION, EMULSION INTRAVENOUS at 13:31

## 2022-09-09 RX ADMIN — Medication 100 MCG: at 13:27

## 2022-09-09 NOTE — OP NOTE
[facility]  [facility] [place] Operative Note    Nirav Baker   September 9, 2022 9/9/2022   Mae Ashton     Procedure Performed  Procedure(s):  CYSTOURETEROSCOPY, WITH BLADDER NECK DILATION WITH BLADDER NECK CONTRACTURE,  BLADDER CALCULUS REMOVAL  1. Cystoscopy  2. Dilation bladder neck contracture  3. Cystolitholapaxy (<2.5 cm)      Pre-operative Diagnosis  Calculus of ureter [N20.1]  Calculus of kidney [N20.0]    Post-operative Diagnosis  1. Stone bladder    2. Bladder neck contracture      Surgeon(s) and Role:     * Harvey Madden MD - Primary    Anesthesia Type  Not documented     Procedural Summary    Estimation of stone clearance: complete  Subjective stone composition: calcium  Renal papillae assessment: not observed  Unanticipated event/findings: none  Post-operative plan: return to clinic for trial of void in 6 days    Narrative    After dilation of tight bladder neck contracture, ureteral stone was found in the bladder. Stone was retrieved. Will focus on bladder neck contracture and potentially return to renal stones down the road.    Procedural Details    Patient is brought to the surgical suite where anesthesia is induced. he is prepped and draped in standard fashion in lithotomy position.    Cystoscopy: Rigid cystoscopy is performed. Anterior and posterior urethra are normal. Prostate is surgically absent with bladder neck contracture.     The bladder neck is dilated to 18 F at 20 KWAKU with balloon.    Cystoscopy can now be performed with rigid scope but the bladder neck region is severely fixed and immobile.    Bladder mucosa demonstrates inflammation consistent with existing lazo catheter and a stone consistent with that seen on pre-op CT in the left ureter is identified in the bladder. Stone is extracted with basket.    A 16F Councill catheter is inserted and irrigates well.    The patient was then taken to the recovery room in good condition.     Past Medical  History:   Diagnosis Date     Claustrophobia      Diabetes (H)      Hiatal hernia      Hypertension      Motion sickness      Orthopnea      Personal history of fall     Twice     Sleep apnea      Walking troubles         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)     Cellulitis of right leg     Cardiovascular disease     Dyslipidemia     Benign essential HTN     Actinic keratosis     Dermatographic urticaria     Adverse effect of antihyperlipidemic and antiarteriosclerotic drugs, subsequent encounter     Benign neoplasm of colon     Bilateral hip pain     Chronic bilateral low back pain without sciatica     Chronic right-sided thoracic back pain     Diarrhea     Elevated prostate specific antigen (PSA)     Hernia of abdominal wall     Hypovitaminosis D     Incisional hernia     Retention of urine     Sepsis due to Escherichia coli (H)     Stool fat increased     Uncontrolled daytime somnolence     Calculus of ureter     Calculus of kidney        Estimated Blood Loss  .* No values recorded between 9/9/2022  1:27 PM and 9/9/2022  2:00 PM *     ID Type Source Tests Collected by Time Destination   A :  Calculus/Stone Urinary Bladder STONE ANALYSIS Harvey Madden MD 9/9/2022  1:48 PM

## 2022-09-09 NOTE — ANESTHESIA PROCEDURE NOTES
Airway       Patient location during procedure: OR  Staff -        CRNA: Siva Mchugh APRN CRNA       Performed By: CRNA  Consent for Airway        Urgency: elective  Indications and Patient Condition       Indications for airway management: jarrett-procedural       Induction type:intravenous       Mask difficulty assessment: 0 - not attempted    Final Airway Details       Final airway type: supraglottic airway    Supraglottic Airway Details        Type: LMA       Brand: Ambu AuraGain       LMA size: 5    Post intubation assessment        Placement verified by: capnometry, equal breath sounds and chest rise        Number of attempts at approach: 1       Number of other approaches attempted: 0       Ease of procedure: easy       Dentition: Intact and Unchanged

## 2022-09-09 NOTE — TELEPHONE ENCOUNTER
[facility] Call Center    Phone Message    May a detailed message be left on voicemail: yes     Reason for Call:Patient is calling to schedule cath removal in 6 days. Please reach out. Thank you!    Action Taken: Message routed to:  [facility] KEN    Travel Screening: Not Applicable

## 2022-09-09 NOTE — PROGRESS NOTES
Pt presented a NEGATIVE Covid test performed 9.2.22 upon admission to [facility].

## 2022-09-09 NOTE — DISCHARGE INSTRUCTIONS
YOU RECEIVED TYLENOL 1000 MG TODAY AT 1245.  NO FURTHER TYLENOL PRODUCTS UNTIL 6:45 THIS EVENING.  DO NOT EXCEED MORE THAN 4000 MG TYLENOL PER DAY.  PLEASE NOTE THAT MANY OVER THE COUNTER, ALLERGY, COUGH, AND NARCOTIC MEDICATIONS ALSO CONTAIN TYLENOL.    YOU ALSO RECEIVED TORADOL AT 1245 TODAY.  TORADOL IS IN THE NSAID FAMILY OF MEDICATIONS.  NO FURTHER NSAID PRODUCTS UNTIL 6:45 THIS EVENING.  THIS INCLUDES IBUPROFEN, ADVIL, ALEVE, NAPROXEN AND ASPIRIN.      If you have any questions or concerns regarding your procedure, please contact Dr. Madden, his office number is [phone].

## 2022-09-09 NOTE — ANESTHESIA CARE TRANSFER NOTE
Patient: Nirav Baker    Procedure: Procedure(s):  CYSTOURETEROSCOPY, WITH BLADDER NECK DILATION WITH BLADDER NECK CONTRACTURE,  BLADDER CALCULUS REMOVAL       Diagnosis: Calculus of ureter [N20.1]  Calculus of kidney [N20.0]  Diagnosis Additional Information: No value filed.    Anesthesia Type:   General     Note:    Oropharynx: oropharynx clear of all foreign objects  Level of Consciousness: drowsy  Oxygen Supplementation: face mask  Level of Supplemental Oxygen (L/min / FiO2): 8  Independent Airway: airway patency satisfactory and stable  Dentition: dentition unchanged  Vital Signs Stable: post-procedure vital signs reviewed and stable  Report to RN Given: handoff report given  Patient transferred to: PACU  Comments: Patient transferred to PACU by CRNA. Report given to PACU RN, all questions answered. Patient hemodynamically stable. CRNA ensured PACU RN was comfortable taking over care.  Handoff Report: Identifed the Patient, Identified the Reponsible Provider, Reviewed the pertinent medical history, Discussed the surgical course, Reviewed Intra-OP anesthesia mangement and issues during anesthesia, Set expectations for post-procedure period and Allowed opportunity for questions and acknowledgement of understanding      Vitals:  Vitals Value Taken Time   /64 09/09/22 1401   Temp 36.2  C (97.2  F) 09/09/22 1401   Pulse 78 09/09/22 1401   Resp 16 09/09/22 1401   SpO2 99 % 09/09/22 1401       Electronically Signed By: TIFFANY Garza CRNA  September 9, 2022  2:03 PM

## 2022-09-09 NOTE — TELEPHONE ENCOUNTER
Message left for patient letting him know we will call him back on Monday to set up his trial of void.  Grace Mayorga RN

## 2022-09-09 NOTE — ANESTHESIA PREPROCEDURE EVALUATION
"Anesthesia Pre-Procedure Evaluation    Patient: Nirav Baker   MRN: [id number] : 1945        Procedure : Procedure(s):  CYSTOURETEROSCOPY, WITH LASER LITHOTRIPSY, CALCULUS REMOVAL AND URETERAL STENT INSERTION          Past Medical History:   Diagnosis Date     Claustrophobia      Diabetes (H)      Hiatal hernia      Hypertension      Motion sickness      Orthopnea      Personal history of fall     Twice     Sleep apnea      Walking troubles       Past Surgical History:   Procedure Laterality Date     APPENDECTOMY       BACK SURGERY      C5     CHOLECYSTECTOMY       COLONOSCOPY       DENTAL SURGERY       HEMORRHOIDECTOMY       HERNIA REPAIR       PROSTATECTOMY        Allergies   Allergen Reactions     Amoxicillin Hives     Per Dr. Barrientos, patient has tolerated Keflex.     Atorvastatin Diarrhea     Fenofibrate Muscle Pain (Myalgia)     Gemfibrozil      Hydrocodone-Acetaminophen Other (See Comments)     Patient reports \"going white as a sheet\" and cold sweats   OK with Tylenol #3 No problems     Lisinopril Cough     Neosporin (Vbv-Zff-Kranl) [Triple Antibiotic] Unknown     Penicillins Hives      had pcn and had hives. Pt. States allergic to all cillin's       Pravastatin Sodium [Pravastatin] Muscle Pain (Myalgia)     Trulicity [Dulaglutide] Unknown     Gluten [Gluten Meal] Other (See Comments)     Reports feeling worse with gluten; tested below threshold for Celiac but daughter is positive     Insulin Detemir Rash      Social History     Tobacco Use     Smoking status: Never Smoker     Smokeless tobacco: Never Used   Substance Use Topics     Alcohol use: Yes     Alcohol/week: 1.0 standard drink      Wt Readings from Last 1 Encounters:   22 (!) 150.6 kg (332 lb)        Anesthesia Evaluation   Pt has had prior anesthetic.     No history of anesthetic complications       ROS/MED HX  ENT/Pulmonary:  - neg pulmonary ROS   (+) sleep apnea, uses CPAP,     Neurologic:  - neg neurologic ROS   (+) " migraines,     Cardiovascular:  - neg cardiovascular ROS   (+) Dyslipidemia hypertension--CAD ---    METS/Exercise Tolerance: >4 METS    Hematologic:  - neg hematologic  ROS     Musculoskeletal: Comment: S/p spinal surgery - neg musculoskeletal ROS     GI/Hepatic:  - neg GI/hepatic ROS   (+) GERD,     Renal/Genitourinary:  - neg Renal ROS   (+) renal disease, type: CRI, Nephrolithiasis ,     Endo:  - neg endo ROS   (+) type II DM, Using insulin,     Psychiatric/Substance Use:  - neg psychiatric ROS     Infectious Disease:  - neg infectious disease ROS     Malignancy:  - neg malignancy ROS (+) Malignancy, History of Prostate.Prostate CA Remission status post Surgery.        Other:            Physical Exam    Airway        Mallampati: III   TM distance: > 3 FB   Neck ROM: full   Mouth opening: > 3 cm    Respiratory Devices and Support         Dental         B=Bridge, C=Chipped, L=Loose, M=Missing    Cardiovascular   cardiovascular exam normal       Rhythm and rate: regular and normal     Pulmonary   pulmonary exam normal        breath sounds clear to auscultation           OUTSIDE LABS:  CBC:   Lab Results   Component Value Date    WBC 10.5 08/24/2022    WBC 10.4 02/19/2022    HGB 15.4 08/24/2022    HGB 15.8 02/19/2022    HCT 44.0 08/24/2022    HCT 45.5 02/19/2022     08/24/2022     02/19/2022     BMP:   Lab Results   Component Value Date     08/24/2022     03/30/2022    POTASSIUM 4.8 08/24/2022    POTASSIUM 3.9 03/30/2022    CHLORIDE 106 08/24/2022    CHLORIDE 104 03/30/2022    CO2 27 08/24/2022    CO2 27 03/30/2022    BUN 37 (H) 08/24/2022    BUN 24 03/30/2022    CR 2.28 (H) 08/24/2022    CR 1.49 (H) 03/30/2022     (H) 08/24/2022     (H) 03/30/2022     COAGS:   Lab Results   Component Value Date    INR 1.03 02/19/2022     POC: No results found for: BGM, HCG, HCGS  HEPATIC:   Lab Results   Component Value Date    ALBUMIN 3.4 (L) 08/24/2022    PROTTOTAL 7.5 08/24/2022    ALT 28  08/24/2022    AST 33 08/24/2022    ALKPHOS 76 08/24/2022    BILITOTAL 1.1 (H) 08/24/2022     OTHER:   Lab Results   Component Value Date    LACT 1.4 06/11/2018    A1C 6.5 (H) 09/08/2012    AURORA 10.0 08/24/2022    PHOS 3.5 03/30/2022    MAG 2.0 07/03/2018    LIPASE 19 02/19/2022    CRP 2.8 (H) 08/24/2022    SED 19 (H) 07/02/2018       Anesthesia Plan    ASA Status:  3   NPO Status:  NPO Appropriate    Anesthesia Type: General.     - Airway: LMA   Induction: Propofol.   Maintenance: TIVA.        Consents    Anesthesia Plan(s) and associated risks, benefits, and realistic alternatives discussed. Questions answered and patient/representative(s) expressed understanding.    - Discussed:     - Discussed with:  Patient         Postoperative Care    Pain management: Multi-modal analgesia.   PONV prophylaxis: Ondansetron (or other 5HT-3), Dexamethasone or Solumedrol     Comments:    Other Comments: Reviewed anesthetic options and risks, including risk of dental trauma. Patient agrees to proceed.               Madi Mejia MD

## 2022-09-09 NOTE — ANESTHESIA POSTPROCEDURE EVALUATION
Patient: Nirav Baker    Procedure: Procedure(s):  CYSTOURETEROSCOPY, WITH BLADDER NECK DILATION WITH BLADDER NECK CONTRACTURE,  BLADDER CALCULUS REMOVAL       Anesthesia Type:  General    Note:  Disposition: Outpatient   Postop Pain Control: Uneventful            Sign Out: Well controlled pain   PONV: No   Neuro/Psych: Uneventful            Sign Out: Acceptable/Baseline neuro status   Airway/Respiratory: Uneventful            Sign Out: Acceptable/Baseline resp. status   CV/Hemodynamics: Uneventful            Sign Out: Acceptable CV status; No obvious hypovolemia; No obvious fluid overload   Other NRE: NONE   DID A NON-ROUTINE EVENT OCCUR? No           Last vitals:  Vitals Value Taken Time   /57 09/09/22 1415   Temp 97.2  F (36.2  C) 09/09/22 1401   Pulse 74 09/09/22 1422   Resp 16 09/09/22 1401   SpO2 97 % 09/09/22 1423   Vitals shown include unvalidated device data.    Electronically Signed By: Maricruz Murillo MD  September 9, 2022  3:01 PM

## 2022-09-12 LAB
APPEARANCE STONE: NORMAL
COMPN STONE: NORMAL
SPECIMEN WT: 133 MG

## 2022-09-15 ENCOUNTER — OFFICE VISIT (OUTPATIENT)
Dept: UROLOGY | Facility: CLINIC | Age: 77
End: 2022-09-15
Payer: COMMERCIAL

## 2022-09-15 VITALS — DIASTOLIC BLOOD PRESSURE: 76 MMHG | SYSTOLIC BLOOD PRESSURE: 179 MMHG | HEART RATE: 73 BPM | TEMPERATURE: 97.4 F

## 2022-09-15 DIAGNOSIS — R33.9 RETENTION OF URINE: ICD-10-CM

## 2022-09-15 DIAGNOSIS — N20.1 CALCULUS OF URETER: Primary | ICD-10-CM

## 2022-09-15 DIAGNOSIS — N32.0 BLADDER NECK CONTRACTURE: ICD-10-CM

## 2022-09-15 PROCEDURE — 99213 OFFICE O/P EST LOW 20 MIN: CPT | Performed by: UROLOGY

## 2022-09-15 ASSESSMENT — PAIN SCALES - GENERAL: PAINLEVEL: MILD PAIN (2)

## 2022-09-15 NOTE — PROGRESS NOTES
Assessment/Plan:    Assessment & Plan   Nirav was seen today for follow up.    Diagnoses and all orders for this visit:    Calculus of ureter    Retention of urine    Bladder neck contracture        Stone Management Plan  Stone Management 3/28/2022 8/29/2022 9/15/2022   Urinary Tract Infection No suspicion of infection No suspicion of infection No suspicion of infection   Renal Colic Asymptomatic at this time Inadequate outpatient management of symptoms Well controlled symptoms   Renal Failure Acute renal failure No suspicion of renal failure No suspicion of renal failure   Current CT date - 8/24/2022 -   Right sided stones? - No -   R Stone Event No current event No current event -   Left sided stones? - Yes -   L Number of ureteral stones - 1 -   L GSD of ureteral stones - 7 -   L Location of ureteral stone - Distal -   L Number of kidney stones  - 3 -   L GSD of kidney stones - 4 - 10 -   L Hydronephrosis - Mild -   L Stone Event No current event New event -   Diagnosis date - 8/24/2022 -   Initial location of primary symptomatic stone - Distal -   Initial GSD of primary symptomatic stone - 7 -   L MET Status - - -   L Current Plan - Clear -   MET - - -   Clear rationale - Symptomatic -             TOM LUGO MD  [facility]    HPI  Mr. Nirav Baker is a 77 year old  male who returns to [facility] [place] for follow up of his voiding.    He had a dilation of chronic bladder neck contracture and extraction of passed ureteral stone. Tolerated post op lazo catheter well.    Did well on trial of void today, initially voiding out 250 of 350 instilled followed by some more.    Will follow with repeat PVR in one month. He may require reconstruction of BNC and Dr Pang from [facility] has already met him in call coverage.    ROS   Review of systems is negative except for HPI.    Past Medical History:   Diagnosis Date     Claustrophobia       Diabetes (H)      Hiatal hernia      Hypertension      Motion sickness      Orthopnea      Personal history of fall     Twice     Sleep apnea      Walking troubles        Past Surgical History:   Procedure Laterality Date     APPENDECTOMY       BACK SURGERY      C5     CHOLECYSTECTOMY       COLONOSCOPY       COMBINED CYSTOSCOPY, INSERT STENT URETER(S) Left 9/9/2022    Procedure: CYSTOURETEROSCOPY, WITH BLADDER NECK DILATION WITH BLADDER NECK CONTRACTURE,  BLADDER CALCULUS REMOVAL;  Surgeon: Harvey Madden MD;  Location: [facility]     DENTAL SURGERY       HEMORRHOIDECTOMY       HERNIA REPAIR       PROSTATECTOMY         Current Outpatient Medications   Medication Sig Dispense Refill     acetaminophen (TYLENOL) 325 MG tablet [ACETAMINOPHEN (TYLENOL) 325 MG TABLET] Take 2 tablets by mouth every 4 (four) hours as needed.       aspirin 81 MG EC tablet Take 81 mg by mouth daily       aspirin-acetaminophen-caffeine (EXCEDRIN MIGRAINE) 250-250-65 mg per tablet [ASPIRIN-ACETAMINOPHEN-CAFFEINE (EXCEDRIN MIGRAINE) 250-250-65 MG PER TABLET] Take 1 tablet by mouth every 6 (six) hours as needed for pain.       calcium carbonate (TUMS ULTRA) 400 mg calcium (1,000 mg) Chew [CALCIUM CARBONATE (TUMS ULTRA) 400 MG CALCIUM (1,000 MG) CHEW] Chew 2 tablets at bedtime.        cholecalciferol, vitamin D3, 5,000 unit capsule [CHOLECALCIFEROL, VITAMIN D3, 5,000 UNIT CAPSULE] Take 5,000 Units by mouth at bedtime.        cyclobenzaprine (FLEXERIL) 10 MG tablet [CYCLOBENZAPRINE (FLEXERIL) 10 MG TABLET] Take 1 tablet (10 mg total) by mouth 3 (three) times a day as needed for muscle spasms. 15 tablet 0     diphenhydrAMINE (BENADRYL) 25 mg tablet [DIPHENHYDRAMINE (BENADRYL) 25 MG TABLET] Take 25 mg by mouth at bedtime as needed for itching.       ezetimibe (ZETIA) 10 mg tablet [EZETIMIBE (ZETIA) 10 MG TABLET] Take 1 tablet by mouth at bedtime.        indomethacin (INDOCIN) 50 MG capsule [INDOMETHACIN (INDOCIN) 50 MG CAPSULE] Take 1  capsule by mouth 3 (three) times a day as needed.        irbesartan (AVAPRO) 300 MG tablet [IRBESARTAN (AVAPRO) 300 MG TABLET] Take 300 mg by mouth at bedtime.       Lactobacillus rhamnosus GG (CULTURELLE) 10-15 Billion cell capsule [LACTOBACILLUS RHAMNOSUS GG (CULTURELLE) 10-15 BILLION CELL CAPSULE] Take 2 capsules by mouth daily.       LANTUS U-100 INSULIN 100 unit/mL injection [LANTUS U-100 INSULIN 100 UNIT/ML INJECTION] Inject 60 Units under the skin 2 (two) times a day. 10 mL PRN     magnesium 250 mg Tab [MAGNESIUM 250 MG TAB] Take 500 mg by mouth at bedtime.        metFORMIN (GLUCOPHAGE) 1000 MG tablet [METFORMIN (GLUCOPHAGE) 1000 MG TABLET] Take 1 tablet by mouth at bedtime.        multivitamin (ONE A DAY) per tablet [MULTIVITAMIN (ONE A DAY) PER TABLET] Take 1 tablet by mouth at bedtime.        omega-3/dha/epa/fish oil (FISH OIL-OMEGA-3 FATTY ACIDS) 300-1,000 mg capsule [OMEGA-3/DHA/EPA/FISH OIL (FISH OIL-OMEGA-3 FATTY ACIDS) 300-1,000 MG CAPSULE] Take 6 g by mouth at bedtime.       omeprazole (PRILOSEC) 20 MG capsule [OMEPRAZOLE (PRILOSEC) 20 MG CAPSULE] Take 1 capsule by mouth at bedtime.        polyethylene glycol (MIRALAX) 17 gram packet [POLYETHYLENE GLYCOL (MIRALAX) 17 GRAM PACKET] Take 17 g by mouth at bedtime.        pseudoephedrine (SUDAFED) 30 MG tablet [PSEUDOEPHEDRINE (SUDAFED) 30 MG TABLET] Take 1 tablet by mouth every 12 (twelve) hours as needed.       riboflavin, vitamin B2, 100 mg Tab [RIBOFLAVIN, VITAMIN B2, 100 MG TAB] Take 1 tablet by mouth at bedtime.        TRADJENTA 5 mg Tab [TRADJENTA 5 MG TAB] Take 1 tablet by mouth at bedtime.        chlorthalidone (HYGROTEN) 25 MG tablet [CHLORTHALIDONE (HYGROTEN) 25 MG TABLET] Take 12.5 mg by mouth at bedtime.        glipiZIDE (GLUCOTROL XL) 10 MG 24 hr tablet [GLIPIZIDE (GLUCOTROL XL) 10 MG 24 HR TABLET] Take 1 tablet (10 mg total) by mouth daily. 30 tablet 0       Allergies   Allergen Reactions     Amoxicillin Hives     Per Dr. Barrientos, patient has  "tolerated Keflex.     Atorvastatin Diarrhea     Fenofibrate Muscle Pain (Myalgia)     Gemfibrozil      Hydrocodone-Acetaminophen Other (See Comments)     Patient reports \"going white as a sheet\" and cold sweats   OK with Tylenol #3 No problems     Lisinopril Cough     Neosporin (Ayk-Muc-Gooyu) [Triple Antibiotic] Unknown     Penicillins Hives     1992 had pcn and had hives. Pt. States allergic to all cillin's       Pravastatin Sodium [Pravastatin] Muscle Pain (Myalgia)     Trulicity [Dulaglutide] Unknown     Gluten [Gluten Meal] Other (See Comments)     Reports feeling worse with gluten; tested below threshold for Celiac but daughter is positive     Insulin Detemir Rash       Social History     Socioeconomic History     Marital status:      Spouse name: Not on file     Number of children: Not on file     Years of education: Not on file     Highest education level: Not on file   Occupational History     Not on file   Tobacco Use     Smoking status: Never Smoker     Smokeless tobacco: Never Used   Substance and Sexual Activity     Alcohol use: Yes     Alcohol/week: 1.0 standard drink     Drug use: No     Sexual activity: Not on file   Other Topics Concern     Not on file   Social History Narrative     Not on file     Social Determinants of Health     Financial Resource Strain: Not on file   Food Insecurity: Not on file   Transportation Needs: Not on file   Physical Activity: Not on file   Stress: Not on file   Social Connections: Not on file   Intimate Partner Violence: Not on file   Housing Stability: Not on file       No family history on file.    Objective:       Labs   Most Recent 3 CBC's:Recent Labs   Lab Test 08/24/22  1548 02/19/22  2312 07/05/18  0618   WBC 10.5 10.4 7.3   HGB 15.4 15.8 14.2   MCV 87 87 87    181 170     Most Recent 3 BMP's:Recent Labs   Lab Test 09/09/22  1405 08/24/22  1548 03/30/22  0922 03/17/22  1304   NA  --  140 144 136   POTASSIUM  --  4.8 3.9 4.9   CHLORIDE  --  106 104 " 101   CO2  --  27 27 24   BUN  --  37* 24 32*   CR  --  2.28* 1.49* 1.95*   ANIONGAP  --  7 13 11   AURORA  --  10.0 9.3 9.5   * 142* 188* 353*     Most Recent Urinalysis:Recent Labs   Lab Test 09/03/22  0245 02/20/22  0020 06/09/18  0046   COLOR Light Yellow   < > Yellow   APPEARANCE Clear   < > Clear   URINEGLC 500 *   < > 150 mg/dL*   URINEBILI Negative   < > Negative   URINEKETONE Negative   < > 25 mg/dL*   SG 1.013   < > 1.010   UBLD 1.0 mg/dL*   < > Negative   URINEPH 5.5   < > 5.0   PROTEIN Negative   < > 30 mg/dL*   UROBILINOGEN  --   --  <2.0 E.U./dL   NITRITE Negative   < > Negative   LEUKEST Negative   < > Negative   RBCU 78*   < > 0-2   WBCU 5   < > 0-5    < > = values in this interval not displayed.

## 2022-09-15 NOTE — PROGRESS NOTES
Patient here to have indwelling catheter removed.  350 ml of sterile water was instilled into bladder via catheter.  Catheter was then removed and patient was able to urinate out 350 ml of clear liquid.  No pain and tolerated procedure well.  Grace Mayorga RN

## 2022-10-17 ENCOUNTER — OFFICE VISIT (OUTPATIENT)
Dept: UROLOGY | Facility: CLINIC | Age: 77
End: 2022-10-17
Payer: COMMERCIAL

## 2022-10-17 DIAGNOSIS — N32.0 BLADDER NECK OBSTRUCTION: ICD-10-CM

## 2022-10-17 DIAGNOSIS — N32.0 BLADDER NECK CONTRACTURE: Primary | ICD-10-CM

## 2022-10-17 PROCEDURE — 99213 OFFICE O/P EST LOW 20 MIN: CPT | Performed by: UROLOGY

## 2022-10-17 NOTE — PROGRESS NOTES
Patient educated regarding bladder scan procedure which writer performed.  Patient voiced understanding of information.  121ml of urine remaining in bladder.

## 2022-10-17 NOTE — PROGRESS NOTES
Assessment/Plan:    Assessment & Plan   Diagnoses and all orders for this visit:    Bladder neck obstruction        Stone Management Plan  Stone Management 8/29/2022 9/15/2022 10/17/2022   Urinary Tract Infection No suspicion of infection No suspicion of infection No suspicion of infection   Renal Colic Inadequate outpatient management of symptoms Well controlled symptoms Asymptomatic at this time   Renal Failure No suspicion of renal failure No suspicion of renal failure No suspicion of renal failure   Current CT date 8/24/2022 - -   Right sided stones? No - -   R Stone Event No current event - No current event   Left sided stones? Yes - -   L Number of ureteral stones 1 - -   L GSD of ureteral stones 7 - -   L Location of ureteral stone Distal - -   L Number of kidney stones  3 - -   L GSD of kidney stones 4 - 10 - -   L Hydronephrosis Mild - -   L Stone Event New event - No current event   Diagnosis date 8/24/2022 - -   Initial location of primary symptomatic stone Distal - -   Initial GSD of primary symptomatic stone 7 - -   L MET Status - - -   L Current Plan Clear - -   MET - - -   Clear rationale Symptomatic - -           PLAN      Phone call duration: 8 minutes  13 minutes spent on the date of the encounter doing chart review, history and exam, documentation and further activities per the note    TOM LUGO MD  [facility]    Subjective:     HPI  . Nirav Baker is a 77 year old  male who is being evaluated via a billable telephone visit by [facility] [place] for follow up of his stone disease and bladder neck contracture.    He has done well in the interim. Had some transient post-procedural poor urinary control but feels that he is back to normal now. Has nocturia 1-3 and is not bothered.    Post void residual was just 110 ml    We had a good discussion. Major issue is his bladder neck contracture. He had contact with   [facility], who placed his stent at presentation. Will arrange follow up of bladder neck contrature with Dr Pang. I suspect that this is not the end of his [facility] issues.         ROS   Review of systems is negative except for HPI.    Past Medical History:   Diagnosis Date     Claustrophobia      Diabetes (H)      Hiatal hernia      Hypertension      Motion sickness      Orthopnea      Personal history of fall     Twice     Sleep apnea      Walking troubles        Past Surgical History:   Procedure Laterality Date     APPENDECTOMY       BACK SURGERY      C5     CHOLECYSTECTOMY       COLONOSCOPY       COMBINED CYSTOSCOPY, INSERT STENT URETER(S) Left 9/9/2022    Procedure: CYSTOURETEROSCOPY, WITH BLADDER NECK DILATION WITH BLADDER NECK CONTRACTURE,  BLADDER CALCULUS REMOVAL;  Surgeon: Harvey Madden MD;  Location: [facility]     DENTAL SURGERY       HEMORRHOIDECTOMY       HERNIA REPAIR       PROSTATECTOMY         Current Outpatient Medications   Medication Sig Dispense Refill     acetaminophen (TYLENOL) 325 MG tablet [ACETAMINOPHEN (TYLENOL) 325 MG TABLET] Take 2 tablets by mouth every 4 (four) hours as needed.       aspirin 81 MG EC tablet Take 81 mg by mouth daily       aspirin-acetaminophen-caffeine (EXCEDRIN MIGRAINE) 250-250-65 mg per tablet [ASPIRIN-ACETAMINOPHEN-CAFFEINE (EXCEDRIN MIGRAINE) 250-250-65 MG PER TABLET] Take 1 tablet by mouth every 6 (six) hours as needed for pain.       calcium carbonate (TUMS ULTRA) 400 mg calcium (1,000 mg) Chew [CALCIUM CARBONATE (TUMS ULTRA) 400 MG CALCIUM (1,000 MG) CHEW] Chew 2 tablets at bedtime.        chlorthalidone (HYGROTEN) 25 MG tablet [CHLORTHALIDONE (HYGROTEN) 25 MG TABLET] Take 12.5 mg by mouth at bedtime.        cholecalciferol, vitamin D3, 5,000 unit capsule [CHOLECALCIFEROL, VITAMIN D3, 5,000 UNIT CAPSULE] Take 5,000 Units by mouth at bedtime.        cyclobenzaprine (FLEXERIL) 10 MG tablet [CYCLOBENZAPRINE (FLEXERIL) 10 MG TABLET] Take 1 tablet (10 mg  total) by mouth 3 (three) times a day as needed for muscle spasms. 15 tablet 0     diphenhydrAMINE (BENADRYL) 25 mg tablet [DIPHENHYDRAMINE (BENADRYL) 25 MG TABLET] Take 25 mg by mouth at bedtime as needed for itching.       ezetimibe (ZETIA) 10 mg tablet [EZETIMIBE (ZETIA) 10 MG TABLET] Take 1 tablet by mouth at bedtime.        glipiZIDE (GLUCOTROL XL) 10 MG 24 hr tablet [GLIPIZIDE (GLUCOTROL XL) 10 MG 24 HR TABLET] Take 1 tablet (10 mg total) by mouth daily. 30 tablet 0     indomethacin (INDOCIN) 50 MG capsule [INDOMETHACIN (INDOCIN) 50 MG CAPSULE] Take 1 capsule by mouth 3 (three) times a day as needed.        irbesartan (AVAPRO) 300 MG tablet [IRBESARTAN (AVAPRO) 300 MG TABLET] Take 300 mg by mouth at bedtime.       Lactobacillus rhamnosus GG (CULTURELLE) 10-15 Billion cell capsule [LACTOBACILLUS RHAMNOSUS GG (CULTURELLE) 10-15 BILLION CELL CAPSULE] Take 2 capsules by mouth daily.       LANTUS U-100 INSULIN 100 unit/mL injection [LANTUS U-100 INSULIN 100 UNIT/ML INJECTION] Inject 60 Units under the skin 2 (two) times a day. 10 mL PRN     magnesium 250 mg Tab [MAGNESIUM 250 MG TAB] Take 500 mg by mouth at bedtime.        metFORMIN (GLUCOPHAGE) 1000 MG tablet [METFORMIN (GLUCOPHAGE) 1000 MG TABLET] Take 1 tablet by mouth at bedtime.        multivitamin (ONE A DAY) per tablet [MULTIVITAMIN (ONE A DAY) PER TABLET] Take 1 tablet by mouth at bedtime.        omega-3/dha/epa/fish oil (FISH OIL-OMEGA-3 FATTY ACIDS) 300-1,000 mg capsule [OMEGA-3/DHA/EPA/FISH OIL (FISH OIL-OMEGA-3 FATTY ACIDS) 300-1,000 MG CAPSULE] Take 6 g by mouth at bedtime.       omeprazole (PRILOSEC) 20 MG capsule [OMEPRAZOLE (PRILOSEC) 20 MG CAPSULE] Take 1 capsule by mouth at bedtime.        polyethylene glycol (MIRALAX) 17 gram packet [POLYETHYLENE GLYCOL (MIRALAX) 17 GRAM PACKET] Take 17 g by mouth at bedtime.        pseudoephedrine (SUDAFED) 30 MG tablet [PSEUDOEPHEDRINE (SUDAFED) 30 MG TABLET] Take 1 tablet by mouth every 12 (twelve) hours as  "needed.       riboflavin, vitamin B2, 100 mg Tab [RIBOFLAVIN, VITAMIN B2, 100 MG TAB] Take 1 tablet by mouth at bedtime.        TRADJENTA 5 mg Tab [TRADJENTA 5 MG TAB] Take 1 tablet by mouth at bedtime.          Allergies   Allergen Reactions     Amoxicillin Hives     Per Dr. Barrientos, patient has tolerated Keflex.     Atorvastatin Diarrhea     Fenofibrate Muscle Pain (Myalgia)     Gemfibrozil      Hydrocodone-Acetaminophen Other (See Comments)     Patient reports \"going white as a sheet\" and cold sweats   OK with Tylenol #3 No problems     Lisinopril Cough     Neosporin (Pts-Npq-Pqyid) [Triple Antibiotic] Unknown     Penicillins Hives     1992 had pcn and had hives. Pt. States allergic to all cillin's       Pravastatin Sodium [Pravastatin] Muscle Pain (Myalgia)     Trulicity [Dulaglutide] Unknown     Gluten [Gluten Meal] Other (See Comments)     Reports feeling worse with gluten; tested below threshold for Celiac but daughter is positive     Insulin Detemir Rash       Social History     Socioeconomic History     Marital status:      Spouse name: Not on file     Number of children: Not on file     Years of education: Not on file     Highest education level: Not on file   Occupational History     Not on file   Tobacco Use     Smoking status: Never     Smokeless tobacco: Never   Substance and Sexual Activity     Alcohol use: Yes     Alcohol/week: 1.0 standard drink     Drug use: No     Sexual activity: Not on file   Other Topics Concern     Not on file   Social History Narrative     Not on file     Social Determinants of Health     Financial Resource Strain: Not on file   Food Insecurity: Not on file   Transportation Needs: Not on file   Physical Activity: Not on file   Stress: Not on file   Social Connections: Not on file   Intimate Partner Violence: Not on file   Housing Stability: Not on file       No family history on file.    Objective:     No vitals or physical exam obtained due to virtual visit  Labs "     Most Recent 3 CBC's:Recent Labs   Lab Test 08/24/22  1548 02/19/22  2312 07/05/18  0618   WBC 10.5 10.4 7.3   HGB 15.4 15.8 14.2   MCV 87 87 87    181 170     Most Recent 3 BMP's:Recent Labs   Lab Test 09/09/22  1405 08/24/22  1548 03/30/22  0922 03/17/22  1304   NA  --  140 144 136   POTASSIUM  --  4.8 3.9 4.9   CHLORIDE  --  106 104 101   CO2  --  27 27 24   BUN  --  37* 24 32*   CR  --  2.28* 1.49* 1.95*   ANIONGAP  --  7 13 11   AURORA  --  10.0 9.3 9.5   * 142* 188* 353*     Most Recent Urinalysis:Recent Labs   Lab Test 09/03/22  0245 02/20/22  0020 06/09/18  0046   COLOR Light Yellow   < > Yellow   APPEARANCE Clear   < > Clear   URINEGLC 500*   < > 150 mg/dL*   URINEBILI Negative   < > Negative   URINEKETONE Negative   < > 25 mg/dL*   SG 1.013   < > 1.010   UBLD 1.0 mg/dL*   < > Negative   URINEPH 5.5   < > 5.0   PROTEIN Negative   < > 30 mg/dL*   UROBILINOGEN  --   --  <2.0 E.U./dL   NITRITE Negative   < > Negative   LEUKEST Negative   < > Negative   RBCU 78*   < > 0-2   WBCU 5   < > 0-5    < > = values in this interval not displayed.     Acute Labs   Urine Culture    Culture   Date Value Ref Range Status   07/03/2018 No MRSA isolated  Final   06/09/2018 STREPTOCOCCUS PYOGENES (A)  Final     Comment:     Streptococcus pyogenes  Reported and sent to [facility] as part of the  Emerging Infections Surveillance Program.

## 2023-01-03 ENCOUNTER — APPOINTMENT (OUTPATIENT)
Dept: RADIOLOGY | Facility: CLINIC | Age: 78
End: 2023-01-03
Attending: STUDENT IN AN ORGANIZED HEALTH CARE EDUCATION/TRAINING PROGRAM
Payer: COMMERCIAL

## 2023-01-03 ENCOUNTER — APPOINTMENT (OUTPATIENT)
Dept: CT IMAGING | Facility: CLINIC | Age: 78
End: 2023-01-03
Attending: STUDENT IN AN ORGANIZED HEALTH CARE EDUCATION/TRAINING PROGRAM
Payer: COMMERCIAL

## 2023-01-03 ENCOUNTER — HOSPITAL ENCOUNTER (EMERGENCY)
Facility: CLINIC | Age: 78
Discharge: HOME | End: 2023-01-03
Attending: STUDENT IN AN ORGANIZED HEALTH CARE EDUCATION/TRAINING PROGRAM | Admitting: STUDENT IN AN ORGANIZED HEALTH CARE EDUCATION/TRAINING PROGRAM
Payer: COMMERCIAL

## 2023-01-03 VITALS
SYSTOLIC BLOOD PRESSURE: 181 MMHG | RESPIRATION RATE: 18 BRPM | OXYGEN SATURATION: 98 % | BODY MASS INDEX: 45.43 KG/M2 | HEART RATE: 82 BPM | TEMPERATURE: 98.5 F | DIASTOLIC BLOOD PRESSURE: 79 MMHG | WEIGHT: 315 LBS

## 2023-01-03 DIAGNOSIS — S20.212A CONTUSION OF LEFT CHEST WALL, INITIAL ENCOUNTER: ICD-10-CM

## 2023-01-03 PROCEDURE — 99285 EMERGENCY DEPT VISIT HI MDM: CPT | Mod: 25

## 2023-01-03 PROCEDURE — 71250 CT THORAX DX C-: CPT

## 2023-01-03 PROCEDURE — 93005 ELECTROCARDIOGRAM TRACING: CPT | Performed by: EMERGENCY MEDICINE

## 2023-01-03 PROCEDURE — 93005 ELECTROCARDIOGRAM TRACING: CPT | Performed by: STUDENT IN AN ORGANIZED HEALTH CARE EDUCATION/TRAINING PROGRAM

## 2023-01-03 PROCEDURE — 73030 X-RAY EXAM OF SHOULDER: CPT | Mod: LT

## 2023-01-03 PROCEDURE — 73080 X-RAY EXAM OF ELBOW: CPT | Mod: LT

## 2023-01-04 LAB
ATRIAL RATE - MUSE: 85 BPM
DIASTOLIC BLOOD PRESSURE - MUSE: NORMAL MMHG
INTERPRETATION ECG - MUSE: NORMAL
P AXIS - MUSE: 19 DEGREES
PR INTERVAL - MUSE: 184 MS
QRS DURATION - MUSE: 108 MS
QT - MUSE: 372 MS
QTC - MUSE: 442 MS
R AXIS - MUSE: -61 DEGREES
SYSTOLIC BLOOD PRESSURE - MUSE: NORMAL MMHG
T AXIS - MUSE: 62 DEGREES
VENTRICULAR RATE- MUSE: 85 BPM

## 2023-01-04 NOTE — ED PROVIDER NOTES
Emergency Department Encounter         FINAL IMPRESSION:  FALL, CHEST CONTUSION         ED COURSE AND MEDICAL DECISION MAKING       ED Course as of 01/03/23 2203 Tue Jan 03, 2023 2201 Patient is morbidly obese 77-year-old male here for mechanical fall.  States he tripped on the stairs.  Did not hit his head, no neck pain no back pain.  Planing of left-sided chest pain as well as left elbow pain and left shoulder pain.  Denies any syncope or presyncope preceding the fall and states he simply missed a step and fell down.  No lower extremity injuries.  No abdominal pain or pelvic pain.  Arrival he looks well.  Vitals are stable.  Heart and lungs are normal.  Patient with pain palpation of the left anterior chest wall as well as anterior chest wall.  Pain palpation of the left elbow with abrasions as well as pain with range of motion of the left shoulder with no obvious deformities.  Normal pulses in that extremity.  No midline neck thoracic or lumbosacral back pain.  No pelvic pain or abdominal pain.  No lower extremity injuries appreciated.  Plan for x-ray of the shoulder     -X-ray of the shoulder normal.  CT chest normal.  X-ray normal.  Patient discharged home with supportive care measures.  Denies any headache, dizziness, lightheadedness, vertigo neck pain back pain, lower extremity pain or lower abdominal pain.           Medical Decision Making    History:    Supplemental history from: Documented in HPI, if applicable    External Record(s) reviewed: Documented in [facility], if applicable.    Work Up:    Chart documentation includes differential considered and any EKGs or imaging independently interpreted by provider.    In additional to work up documented, I considered the following work up: See chart documentation, if applicable.    External consultation:    Discussion of management with another provider: See chart documentation, if applicable    Complicating factors:    Care impacted by chronic illness:  N/A    Care affected by social determinants of health: N/A    Disposition considerations: Discharge. No recommendations on prescription strength medication(s). Admission consideration documented above, if applicable.                At the conclusion of the encounter I discussed the results of all the tests and the disposition. The questions were answered. The patient or family acknowledged understanding and was agreeable with the care plan.                  MEDICATIONS GIVEN IN THE EMERGENCY DEPARTMENT:  Medications - No data to display    NEW PRESCRIPTIONS STARTED AT TODAY'S ED VISIT:  New Prescriptions    No medications on file       HPI     Morbidly obese 77-year-old not on blood thinners here after mechanical fall down a few stairs.  Did not hit his head however is complaining of chest pain, left arm pain.  No syncope or presyncope preceding the fall.        REVIEW OF SYSTEMS:  Review of Systems   Constitutional: Negative for fever, malaise  HEENT: Negative runny nose, sore throat, ear pain, neck pain  Respiratory: Negative for shortness of breath, cough, congestion  Cardiovascular: Negative for chest pain, leg edema  Gastrointestinal: Negative for abdominal distention, abdominal pain, constipation, vomiting, nausea, diarrhea  Genitourinary: Negative for dysuria and hematuria.   Integument: Negative for rash, skin breakdown  Neurological: Negative for paresthesias, weakness, headache.  Musculoskeletal: Negative for joint pain, joint swelling      All other systems reviewed and are negative.          MEDICAL HISTORY     Past Medical History:   Diagnosis Date     Claustrophobia      Diabetes (H)      Hiatal hernia      Hypertension      Motion sickness      Orthopnea      Personal history of fall      Sleep apnea      Walking troubles        Past Surgical History:   Procedure Laterality Date     APPENDECTOMY       BACK SURGERY      C5     CHOLECYSTECTOMY       COLONOSCOPY       COMBINED CYSTOSCOPY, INSERT STENT  URETER(S) Left 9/9/2022    Procedure: CYSTOURETEROSCOPY, WITH BLADDER NECK DILATION WITH BLADDER NECK CONTRACTURE,  BLADDER CALCULUS REMOVAL;  Surgeon: Harvey Madden MD;  Location: [facility] OR     DENTAL SURGERY       HEMORRHOIDECTOMY       HERNIA REPAIR       PROSTATECTOMY         Social History     Tobacco Use     Smoking status: Never     Smokeless tobacco: Never   Substance Use Topics     Alcohol use: Yes     Alcohol/week: 1.0 standard drink     Drug use: No       acetaminophen (TYLENOL) 325 MG tablet  aspirin 81 MG EC tablet  aspirin-acetaminophen-caffeine (EXCEDRIN MIGRAINE) 250-250-65 mg per tablet  calcium carbonate (TUMS ULTRA) 400 mg calcium (1,000 mg) Chew  chlorthalidone (HYGROTEN) 25 MG tablet  cholecalciferol, vitamin D3, 5,000 unit capsule  cyclobenzaprine (FLEXERIL) 10 MG tablet  diphenhydrAMINE (BENADRYL) 25 mg tablet  ezetimibe (ZETIA) 10 mg tablet  glipiZIDE (GLUCOTROL XL) 10 MG 24 hr tablet  indomethacin (INDOCIN) 50 MG capsule  irbesartan (AVAPRO) 300 MG tablet  Lactobacillus rhamnosus GG (CULTURELLE) 10-15 Billion cell capsule  LANTUS U-100 INSULIN 100 unit/mL injection  magnesium 250 mg Tab  metFORMIN (GLUCOPHAGE) 1000 MG tablet  multivitamin (ONE A DAY) per tablet  omega-3/dha/epa/fish oil (FISH OIL-OMEGA-3 FATTY ACIDS) 300-1,000 mg capsule  omeprazole (PRILOSEC) 20 MG capsule  polyethylene glycol (MIRALAX) 17 gram packet  pseudoephedrine (SUDAFED) 30 MG tablet  riboflavin, vitamin B2, 100 mg Tab  TRADJENTA 5 mg Tab            PHYSICAL EXAM     BP (!) 158/67   Pulse 89   Temp 98.5  F (36.9  C) (Oral)   Resp 18   Wt (!) 152 kg (335 lb)   SpO2 98%   BMI 45.43 kg/m        PHYSICAL EXAM:     General: Patient appears well, nontoxic, comfortable  HEENT: Moist mucous membranes,  No head trauma.  No midline neck pain.  Cardiovascular: Normal rate, normal rhythm, no extremity edema.  No appreciable murmur.  Respiratory: No signs of respiratory distress, lungs are clear to auscultation  bilaterally with no wheezes rhonchi or rales.  Abdominal: Soft, nontender, nondistended, no palpable masses, no guarding, no rebound  Musculoskeletal: Full range of motion of joints, no deformities appreciated.  Neurological: Alert and oriented, grossly neurologically intact.  Psychological: Normal affect and mood.  Integument: No rashes appreciated          RESULTS       Labs Ordered and Resulted from Time of ED Arrival to Time of ED Departure - No data to display    XR Shoulder Left 2 Views   Final Result   IMPRESSION: There are mild to moderate degenerative changes seen at the glenohumeral joint and to a lesser extent the AC joint. No acute bony findings such as fracture or dislocation identified.      Elbow  XR, G/E 3 views, left   Final Result   IMPRESSION: Normal joint spaces and alignment. No fracture or joint effusion.      Chest CT w/o contrast   Final Result   IMPRESSION:    Wedge-shaped area of high attenuation inferior to the left axilla, probably a small superficial hematoma. No associated fracture.                           PROCEDURES:  Procedures:  Procedures         Gunner Parker DO  Emergency Medicine  [facility] EMERGENCY ROOM     Gunner Parker DO  01/04/23 7291

## 2023-01-04 NOTE — ED TRIAGE NOTES
pt here with mechanical fall on stairs. Fell to left side. Has center chest pain, left should pain, abrasion left elbow. Denies use of blood thinners. Denies changes in loc. Denies head, neck, nor back pain. Here via [facility].

## 2023-02-26 ENCOUNTER — HOSPITAL ENCOUNTER (EMERGENCY)
Facility: CLINIC | Age: 78
Discharge: HOME | End: 2023-02-27
Attending: EMERGENCY MEDICINE | Admitting: EMERGENCY MEDICINE
Payer: COMMERCIAL

## 2023-02-26 DIAGNOSIS — R33.8 ACUTE URINARY RETENTION: ICD-10-CM

## 2023-02-26 DIAGNOSIS — N32.0 BLADDER NECK CONTRACTURE: ICD-10-CM

## 2023-02-26 LAB
ALBUMIN UR-MCNC: NEGATIVE MG/DL
APPEARANCE UR: CLEAR
BILIRUB UR QL STRIP: NEGATIVE
COLOR UR AUTO: ABNORMAL
GLUCOSE UR STRIP-MCNC: 200 MG/DL
HGB UR QL STRIP: ABNORMAL
KETONES UR STRIP-MCNC: NEGATIVE MG/DL
LEUKOCYTE ESTERASE UR QL STRIP: NEGATIVE
MUCOUS THREADS #/AREA URNS LPF: PRESENT /LPF
NITRATE UR QL: NEGATIVE
PH UR STRIP: 6 [PH] (ref 5–7)
RBC URINE: 71 /HPF
SP GR UR STRIP: 1.02 (ref 1–1.03)
SQUAMOUS EPITHELIAL: <1 /HPF
UROBILINOGEN UR STRIP-MCNC: <2 MG/DL
WBC URINE: 3 /HPF

## 2023-02-26 PROCEDURE — 250N000009 HC RX 250: Performed by: EMERGENCY MEDICINE

## 2023-02-26 PROCEDURE — 81001 URINALYSIS AUTO W/SCOPE: CPT | Performed by: EMERGENCY MEDICINE

## 2023-02-26 PROCEDURE — 51798 US URINE CAPACITY MEASURE: CPT

## 2023-02-26 PROCEDURE — 51702 INSERT TEMP BLADDER CATH: CPT

## 2023-02-26 PROCEDURE — 99284 EMERGENCY DEPT VISIT MOD MDM: CPT

## 2023-02-26 RX ORDER — LIDOCAINE HYDROCHLORIDE 20 MG/ML
10 JELLY TOPICAL ONCE
Status: COMPLETED | OUTPATIENT
Start: 2023-02-26 | End: 2023-02-26

## 2023-02-26 RX ADMIN — LIDOCAINE HYDROCHLORIDE 10 ML: 20 JELLY TOPICAL at 23:44

## 2023-02-26 ASSESSMENT — ACTIVITIES OF DAILY LIVING (ADL)
ADLS_ACUITY_SCORE: 35
ADLS_ACUITY_SCORE: 33

## 2023-02-27 ENCOUNTER — TELEPHONE (OUTPATIENT)
Dept: UROLOGY | Facility: CLINIC | Age: 78
End: 2023-02-27
Payer: COMMERCIAL

## 2023-02-27 VITALS
OXYGEN SATURATION: 97 % | WEIGHT: 315 LBS | HEIGHT: 72 IN | SYSTOLIC BLOOD PRESSURE: 187 MMHG | RESPIRATION RATE: 19 BRPM | BODY MASS INDEX: 42.66 KG/M2 | DIASTOLIC BLOOD PRESSURE: 85 MMHG | HEART RATE: 80 BPM | TEMPERATURE: 98.8 F

## 2023-02-27 DIAGNOSIS — N32.0 BLADDER NECK OBSTRUCTION: Primary | ICD-10-CM

## 2023-02-27 DIAGNOSIS — N32.0 BLADDER NECK CONTRACTURE: ICD-10-CM

## 2023-02-27 ASSESSMENT — ACTIVITIES OF DAILY LIVING (ADL): ADLS_ACUITY_SCORE: 35

## 2023-02-27 NOTE — TELEPHONE ENCOUNTER
[facility] Call Center    Phone Message    May a detailed message be left on voicemail: yes     Reason for Call: Appointment Intake    Referring Provider Name:   Diagnosis and/or Symptoms: pt saw  in the ED and was instructed to schedule a follow up with  for retention asap- please call Nirav, thank you    Action Taken: Message routed to:  [facility] ([facility] uro    Travel Screening: Not Applicable

## 2023-02-27 NOTE — PROGRESS NOTES
ED Consult    Recurrent bladder neck contracture. Radical prostatectomy 2012 - initial complications with anastomosis. Difficulty emptying for last couple of days.    Flexible cystoscopy: Anterior posterior urethra are normal. Pinhole bladder neck. Guidewire passed to bladder. Dilated to 22F with nephrostomy tract dilators.    18F Councill cath inserted.    About 1,500 ml clear urine drained.    Follow up with Dr Pang for formal BNC management.

## 2023-02-27 NOTE — ED TRIAGE NOTES
Patient presents to the ED with urinary retention since 7AM. States this has happened before in the past. Feels the pressure to void but is unable to urinate.     Triage Assessment     Row Name 02/26/23 2059       Triage Assessment (Adult)    Airway WDL WDL       Respiratory WDL    Respiratory WDL WDL       Skin Circulation/Temperature WDL    Skin Circulation/Temperature WDL WDL       Cardiac WDL    Cardiac WDL WDL       Peripheral/Neurovascular WDL    Peripheral Neurovascular WDL WDL       Cognitive/Neuro/Behavioral WDL    Cognitive/Neuro/Behavioral WDL WDL       Joanna Coma Scale    Best Eye Response 4-->(E4) spontaneous    Best Motor Response 6-->(M6) obeys commands    Best Verbal Response 5-->(V5) oriented    Joanna Coma Scale Score 15

## 2023-02-27 NOTE — DISCHARGE INSTRUCTIONS
You were seen in the emergency department for urinary retention.  Dr. Madden help this pass the catheter into your bladder and dilate up your urethra.  We need you to follow-up as soon as possible in the urology clinic with Dr. Pang to discuss a long-term management strategy for the bladder neck contracture causing these issues.  The catheter will stay in place in the meantime.  We can reevaluate if there are any concerns about catheter malfunction, abdominal pain, fever, or other immediate concern.

## 2023-02-27 NOTE — ED PROVIDER NOTES
EMERGENCY DEPARTMENT ENCOUNTER      NAME: Nirav Baker  AGE: 78 year old male  YOB: 1945  MRN: [id number]  EVALUATION DATE & TIME: 2023  9:01 PM    PCP: [facility]    ED PROVIDER: Deniz Cobian M.D.      Chief Complaint   Patient presents with     Urinary Retention         FINAL IMPRESSION:  1. Bladder neck contracture    2. Acute urinary retention          ED COURSE & MEDICAL DECISION MAKIN:09 PM I met with the patient, obtained history, performed an initial exam, and discussed options and plan for diagnostics and treatment here in the ED.   10:31 PM I spoke with Dr. Madden, Urology. He will come to see the patient.    78 year old male presents to the Emergency Department for evaluation of urinary retention.  History of bladder neck contracture and kidney stones treated most recently with a Campos catheter and ureteral dilation late last year.  He presents with inability to urinate.  Bladder scan reveals greater than 800 cc of urine in the bladder.  He does appear somewhat uncomfortable.  Initial attempts to pass even small sized catheters here by nursing with myself present were unsuccessful.  Contacted Dr. Madden with urology who is familiar with the patient.  He came to the emergency department and performed a cystoscopy with dilation of the contracture and was able to pass a 18 [place]gu[place]an catheter which remains in place.  He is also a patient of Dr. Pang where he was supposed to be following up for his chronic management of this bladder neck contracture.  Patient will be discharged with the Campos in place.  At this time point I do not think there is a strong indication for labs or other imaging given stability of his exam, relieved symptoms after passage of the catheter here.  Patient was discharged in stable condition.    At the conclusion of the encounter I discussed the results of all of the tests and the disposition. The questions were answered. The  "patient or family acknowledged understanding and was agreeable with the care plan.       Medical Decision Making    History:    Supplemental history from: Documented in chart, if applicable    External Record(s) reviewed: Documented in chart, if applicable.    Work Up:    Chart documentation includes differential considered and any EKGs or imaging independently interpreted by provider, where specified.    In additional to work up documented, I considered the following work up: Documented in chart, if applicable.    External consultation:    Discussion of management with another provider: Documented in chart, if applicable    Complicating factors:    Care impacted by chronic illness: Type 2 diabetes, prostatectomy, cardiovascular disease    Care affected by social determinants of health: N/A    Disposition considerations: Discharge. No recommendations on prescription strength medication(s). See documentation for any additional details.            MEDICATIONS GIVEN IN THE EMERGENCY:  Medications   lidocaine (XYLOCAINE) 2 % external gel 10 mL (10 mLs Urethral $Given 2/26/23 7031)       NEW PRESCRIPTIONS STARTED AT TODAY'S ER VISIT  New Prescriptions    No medications on file          =================================================================    HPI    Patient information was obtained from: Patient    Use of : N/A          Nirav Baker is a 78 year old male with a pertinent history of urine retention, recurrent uric acid stone former, prior prostate cancer removal, CAD, hypertension, hiatal hernia, T2DM who presents to this ED via walk-in with his wife for evaluation of urinary retention.    Patient reports difficulty urinating. He had some output this morning, but has not been able to void since. He states this has happened in the past, they tried 5 catheters but couldn't get them in, so they stretched and \"poked holes in\" his bladder. Per wife, his last catheter was removed in November when it " was found he had kidney stones, with one in the bladder. Patient reports a weird sensation at the bladder and pelvic area and states he feels pressure to void but is unable to. No other current complaints.      REVIEW OF SYSTEMS   All systems reviewed and negative except as noted in HPI.    PAST MEDICAL HISTORY:  Past Medical History:   Diagnosis Date     Claustrophobia      Diabetes (H)      Hiatal hernia      Hypertension      Motion sickness      Orthopnea      Personal history of fall     Twice     Sleep apnea      Walking troubles        PAST SURGICAL HISTORY:  Past Surgical History:   Procedure Laterality Date     APPENDECTOMY       BACK SURGERY      C5     CHOLECYSTECTOMY       COLONOSCOPY       COMBINED CYSTOSCOPY, INSERT STENT URETER(S) Left 9/9/2022    Procedure: CYSTOURETEROSCOPY, WITH BLADDER NECK DILATION WITH BLADDER NECK CONTRACTURE,  BLADDER CALCULUS REMOVAL;  Surgeon: Harvey Madden MD;  Location: [facility] OR     DENTAL SURGERY       HEMORRHOIDECTOMY       HERNIA REPAIR       PROSTATECTOMY             CURRENT MEDICATIONS:    No current facility-administered medications for this encounter.     Current Outpatient Medications   Medication     acetaminophen (TYLENOL) 325 MG tablet     aspirin 81 MG EC tablet     aspirin-acetaminophen-caffeine (EXCEDRIN MIGRAINE) 250-250-65 mg per tablet     calcium carbonate (TUMS ULTRA) 400 mg calcium (1,000 mg) Chew     chlorthalidone (HYGROTEN) 25 MG tablet     cholecalciferol, vitamin D3, 5,000 unit capsule     cyclobenzaprine (FLEXERIL) 10 MG tablet     diphenhydrAMINE (BENADRYL) 25 mg tablet     ezetimibe (ZETIA) 10 mg tablet     glipiZIDE (GLUCOTROL XL) 10 MG 24 hr tablet     indomethacin (INDOCIN) 50 MG capsule     irbesartan (AVAPRO) 300 MG tablet     Lactobacillus rhamnosus GG (CULTURELLE) 10-15 Billion cell capsule     LANTUS U-100 INSULIN 100 unit/mL injection     magnesium 250 mg Tab     metFORMIN (GLUCOPHAGE) 1000 MG tablet     multivitamin (ONE A  "DAY) per tablet     omega-3/dha/epa/fish oil (FISH OIL-OMEGA-3 FATTY ACIDS) 300-1,000 mg capsule     omeprazole (PRILOSEC) 20 MG capsule     polyethylene glycol (MIRALAX) 17 gram packet     pseudoephedrine (SUDAFED) 30 MG tablet     riboflavin, vitamin B2, 100 mg Tab     TRADJENTA 5 mg Tab         ALLERGIES:  Allergies   Allergen Reactions     Amoxicillin Hives     Per Dr. Barrientos, patient has tolerated Keflex.     Atorvastatin Diarrhea     Fenofibrate Muscle Pain (Myalgia)     Gemfibrozil      Hydrocodone-Acetaminophen Other (See Comments)     Patient reports \"going white as a sheet\" and cold sweats   OK with Tylenol #3 No problems     Lisinopril Cough     Neosporin (Lme-Rig-Fijgs) [Triple Antibiotic] Unknown     Penicillins Hives     1992 had pcn and had hives. Pt. States allergic to all cillin's       Pravastatin Sodium [Pravastatin] Muscle Pain (Myalgia)     Trulicity [Dulaglutide] Unknown     Gluten [Gluten Meal] Other (See Comments)     Reports feeling worse with gluten; tested below threshold for Celiac but daughter is positive     Insulin Detemir Rash       FAMILY HISTORY:  History reviewed. No pertinent family history.    SOCIAL HISTORY:   Social History     Socioeconomic History     Marital status:      Spouse name: None     Number of children: None     Years of education: None     Highest education level: None   Tobacco Use     Smoking status: Never     Smokeless tobacco: Never   Substance and Sexual Activity     Alcohol use: Yes     Alcohol/week: 1.0 standard drink     Drug use: No       VITALS:  BP (!) 157/84 (BP Location: Left arm, Patient Position: Semi-De La Garza's, Cuff Size: Adult Regular)   Pulse 89   Temp 98.8  F (37.1  C) (Oral)   Resp 19   Ht 1.829 m (6')   Wt (!) 154.1 kg (339 lb 12.8 oz)   SpO2 98%   BMI 46.09 kg/m      PHYSICAL EXAM    Constitutional: Obese chronically ill-appearing elderly male patient, laying in bed, no acute distress  HENT: Normocephalic, Atraumatic. Neck " Supple.  Eyes: EOMI, Conjunctiva normal.  Respiratory: Breathing comfortably on room air. Speaks full sentences easily. Lungs clear to ascultation.  Cardiovascular: Normal heart rate, Regular rhythm. No peripheral edema.  Abdomen: Soft, mild suprapubic fullness and tenderness.  : Normal penis and testes.  Musculoskeletal: Good range of motion in all major joints. No major deformities noted.  Integument: Warm, Dry.  Neurologic: Alert & awake, Normal motor function, Normal sensory function, No focal deficits noted.   Psychiatric: Cooperative. Affect appropriate.     LAB:  All pertinent labs reviewed and interpreted.  Labs Ordered and Resulted from Time of ED Arrival to Time of ED Departure   ROUTINE UA WITH MICROSCOPIC REFLEX TO CULTURE - Abnormal       Result Value    Color Urine Light Yellow      Appearance Urine Clear      Glucose Urine 200 (*)     Bilirubin Urine Negative      Ketones Urine Negative      Specific Gravity Urine 1.016      Blood Urine 0.5 mg/dL (*)     pH Urine 6.0      Protein Albumin Urine Negative      Urobilinogen Urine <2.0      Nitrite Urine Negative      Leukocyte Esterase Urine Negative      Mucus Urine Present (*)     RBC Urine 71 (*)     WBC Urine 3      Squamous Epithelials Urine <1           I, Mariia Millan, am serving as a scribe to document services personally performed by Dr. Deniz Cobian, based on my observation and the provider's statements to me. I, Deniz Cobian MD attest that Mariia Millan is acting in a scribe capacity, has observed my performance of the services and has documented them in accordance with my direction.    Deniz Cobian M.D.  Emergency Medicine  [facility] EMERGENCY ROOM  [address] [id number] [phone]  Dept: [phone]     Deniz Cobian MD  02/27/23 0044

## 2023-03-01 NOTE — TELEPHONE ENCOUNTER
MEDICAL RECORDS REQUEST   [facility]  Urology Clinic  [address] [place] [address]  PHONE: [phone]  Fax: [phone]        FUTURE VISIT INFORMATION                                                   Nirav Baker, : 1945 scheduled for future visit at [facility]    APPOINTMENT INFORMATION:    Date: 2023    Provider:  Gunner Pang MD    Reason for Visit/Diagnosis: Retention-pt was in the ER     RECORDS REQUESTED FOR VISIT                                                     NOTES  STATUS/DETAILS   DISCHARGE REPORT from the ER  yes, 2023 -- [facility] ED   MEDICATION LIST  yes   LABS     URINALYSIS (UA)  yes     PRE-VISIT CHECKLIST      Record collection complete Yes   Appointment appropriately scheduled           (right time/right provider) YES    Joint diagnostic appointment coordinated correctly          (ensure right order & amount of time) YES   MyChart activation If no, please explain pending   Questionnaire complete If no, please explain pending

## 2023-03-01 NOTE — TELEPHONE ENCOUNTER
Patient called back stating that the time won't work for him. Anywhere from 1-3 works best he states they have to get Grandchildren. Also states he was told by the nurse he spoke with to call the call center number and press option 3 to speak to a nurse and he states there's no option 3. There's only option 1 or 2 so he was a little frustrated about that. Please contact Nirav in regards to this message. Thank you

## 2023-03-02 ENCOUNTER — PRE VISIT (OUTPATIENT)
Dept: UROLOGY | Facility: CLINIC | Age: 78
End: 2023-03-02
Payer: COMMERCIAL

## 2023-03-02 NOTE — TELEPHONE ENCOUNTER
Reason for visit: Bladder neck contracture      Relevant information: referred by Dr. Madden    Records/imaging/labs/orders: all records available    Pt called: No need for a call    At Rooming: jules Bates CMA  3/2/2023  8:13 AM

## 2023-03-06 ENCOUNTER — OFFICE VISIT (OUTPATIENT)
Dept: UROLOGY | Facility: CLINIC | Age: 78
End: 2023-03-06
Payer: COMMERCIAL

## 2023-03-06 ENCOUNTER — PRE VISIT (OUTPATIENT)
Dept: UROLOGY | Facility: CLINIC | Age: 78
End: 2023-03-06

## 2023-03-06 VITALS
WEIGHT: 315 LBS | HEART RATE: 91 BPM | DIASTOLIC BLOOD PRESSURE: 76 MMHG | BODY MASS INDEX: 42.66 KG/M2 | SYSTOLIC BLOOD PRESSURE: 143 MMHG | HEIGHT: 72 IN

## 2023-03-06 DIAGNOSIS — N32.0 BLADDER NECK OBSTRUCTION: Primary | ICD-10-CM

## 2023-03-06 PROCEDURE — 99214 OFFICE O/P EST MOD 30 MIN: CPT | Performed by: UROLOGY

## 2023-03-06 RX ORDER — CIPROFLOXACIN 2 MG/ML
400 INJECTION, SOLUTION INTRAVENOUS EVERY 12 HOURS
Status: CANCELLED | OUTPATIENT
Start: 2023-03-06

## 2023-03-06 ASSESSMENT — PAIN SCALES - GENERAL: PAINLEVEL: MODERATE PAIN (5)

## 2023-03-06 NOTE — PROGRESS NOTES
HPI:  Nirav Baker is a 78 year old diabetic male with long h/o kidney stones being seen for VUAS after RP. Has had dribble ever since surgery. I dilated VUAS in ER in Nov; [facility] did again in late Feb 2023. Catheter in place now.     2012: Robotic RP; no RT  2013: ventral hernia repair at prostate excision site  Nov 2022: dilation VUAS -- no DAREN after that  Feb 26 2023: dilation VUAS    Exam:  BP (!) 143/76   Pulse 91   Ht 1.829 m (6')   Wt (!) 153.8 kg (339 lb)   BMI 45.98 kg/m    GENERAL: Healthy, alert and no distress  EYES: Eyes grossly normal to inspection.  No discharge or erythema, or obvious scleral/conjunctival abnormalities.  RESP: No audible wheeze, cough, or visible cyanosis.  No visible retractions or increased work of breathing.    SKIN: Visible skin clear. No significant rash, abnormal pigmentation or lesions.  NEURO: Cranial nerves grossly intact.  Mentation and speech appropriate for age.  PSYCH: Mentation appears normal, affect normal/bright, judgement and insight intact, normal speech and appearance well-groomed.    Review of Imaging:  The following imaging exams were independently viewed and interpreted by me and discussed with patient:  none    Review of Labs:  The following labs were reviewed by me and discussed with the patient:  Recent Results (from the past 720 hour(s))   UA with Microscopic reflex to Culture    Collection Time: 02/26/23 11:45 PM    Specimen: Urine, Campos Catheter   Result Value Ref Range    Color Urine Light Yellow Colorless, Straw, Light Yellow, Yellow    Appearance Urine Clear Clear    Glucose Urine 200 (A) Negative mg/dL    Bilirubin Urine Negative Negative    Ketones Urine Negative Negative mg/dL    Specific Gravity Urine 1.016 1.001 - 1.030    Blood Urine 0.5 mg/dL (A) Negative    pH Urine 6.0 5.0 - 7.0    Protein Albumin Urine Negative Negative mg/dL    Urobilinogen Urine <2.0 <2.0 mg/dL    Nitrite Urine Negative Negative    Leukocyte Esterase Urine  Negative Negative    Mucus Urine Present (A) None Seen /LPF    RBC Urine 71 (H) <=2 /HPF    WBC Urine 3 <=5 /HPF    Squamous Epithelials Urine <1 <=1 /HPF         Assessment & Plan   1. Prostate cancer under good control  2. 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 Discussed options of permanent SPT, self dilation; TUIBNC and Mitomycin C injection or Optilume, urethroplasty from perineum or robotic. Discussed that with his age, DM and obesity I would not favor urethroplasty.   4. He opts for TUIBNC + mitomycin C. UNderstands risks ot be bleeding, fistula, recurrence and incontinence.   5. Will scope him 1 month post-op to see if we need to start self dilation (he is amenable).   6. BMI 46 but did well at [facility] recently     Gunner Pang MD  [facility] UROLOGY [facility]INIC [place]      ==========================      Additional Coding Information:    Problems:  4 -- two or more stable chronic illnesses    Data Reviewed    None    Level of risk:  4 -- minor surgery with patient or procedure risks

## 2023-03-06 NOTE — LETTER
3/6/2023       RE: Nirav Baker  [address] [place] [address]     Dear Colleague,    Thank you for referring your patient, Nirav Baker, to the [facility]OLOGY [facility] [place] at [facility]. Please see a copy of my visit note below.    HPI:  Nirav Baker is a 78 year old diabetic male with long h/o kidney stones being seen for VUAS after RP. Has had dribble ever since surgery. I dilated VUAS in ER in Nov; [facility] did again in late Feb 2023. Catheter in place now.     2012: Robotic RP; no RT  2013: ventral hernia repair at prostate excision site  Nov 2022: dilation VUAS -- no DAREN after that  Feb 26 2023: dilation VUAS    Exam:  BP (!) 143/76   Pulse 91   Ht 1.829 m (6')   Wt (!) 153.8 kg (339 lb)   BMI 45.98 kg/m    GENERAL: Healthy, alert and no distress  EYES: Eyes grossly normal to inspection.  No discharge or erythema, or obvious scleral/conjunctival abnormalities.  RESP: No audible wheeze, cough, or visible cyanosis.  No visible retractions or increased work of breathing.    SKIN: Visible skin clear. No significant rash, abnormal pigmentation or lesions.  NEURO: Cranial nerves grossly intact.  Mentation and speech appropriate for age.  PSYCH: Mentation appears normal, affect normal/bright, judgement and insight intact, normal speech and appearance well-groomed.    Review of Imaging:  The following imaging exams were independently viewed and interpreted by me and discussed with patient:  none    Review of Labs:  The following labs were reviewed by me and discussed with the patient:  Recent Results (from the past 720 hour(s))   UA with Microscopic reflex to Culture    Collection Time: 02/26/23 11:45 PM    Specimen: Urine, Campos Catheter   Result Value Ref Range    Color Urine Light Yellow Colorless, Straw, Light Yellow, Yellow    Appearance Urine Clear Clear    Glucose Urine 200 (A) Negative mg/dL    Bilirubin Urine Negative  Negative    Ketones Urine Negative Negative mg/dL    Specific Gravity Urine 1.016 1.001 - 1.030    Blood Urine 0.5 mg/dL (A) Negative    pH Urine 6.0 5.0 - 7.0    Protein Albumin Urine Negative Negative mg/dL    Urobilinogen Urine <2.0 <2.0 mg/dL    Nitrite Urine Negative Negative    Leukocyte Esterase Urine Negative Negative    Mucus Urine Present (A) None Seen /LPF    RBC Urine 71 (H) <=2 /HPF    WBC Urine 3 <=5 /HPF    Squamous Epithelials Urine <1 <=1 /HPF     Assessment & Plan   1. Prostate cancer under good control  2. 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 Discussed options of permanent SPT, self dilation; TUIBNC and Mitomycin C injection or Optilume, urethroplasty from perineum or robotic. Discussed that with his age, DM and obesity I would not favor urethroplasty.   4. He opts for TUIBNC + mitomycin C. UNderstands risks ot be bleeding, fistula, recurrence and incontinence.   5. Will scope him 1 month post-op to see if we need to start self dilation (he is amenable).   6. BMI 46 but did well at [facility] recently     Gunner Pang MD  [facility] UROLOGY CLINIC [place]    ==========================    Additional Coding Information:    Problems:  4 -- two or more stable chronic illnesses    Data Reviewed    None    Level of risk:  4 -- minor surgery with patient or procedure risks

## 2023-03-06 NOTE — NURSING NOTE
"Chief Complaint   Patient presents with     Consult     Bladder neck contracture       Blood pressure (!) 143/76, pulse 91, height 1.829 m (6'), weight (!) 153.8 kg (339 lb). Body mass index is 45.98 kg/m .    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)     Cellulitis of right leg     Cardiovascular disease     Dyslipidemia     Benign essential HTN     Actinic keratosis     Dermatographic urticaria     Adverse effect of antihyperlipidemic and antiarteriosclerotic drugs, subsequent encounter     Benign neoplasm of colon     Bilateral hip pain     Chronic bilateral low back pain without sciatica     Chronic right-sided thoracic back pain     Diarrhea     Elevated prostate specific antigen (PSA)     Hernia of abdominal wall     Hypovitaminosis D     Incisional hernia     Retention of urine     Sepsis due to Escherichia coli (H)     Stool fat increased     Uncontrolled daytime somnolence     Calculus of ureter     Calculus of kidney       Allergies   Allergen Reactions     Amoxicillin Hives     Per Dr. Barrientos, patient has tolerated Keflex.     Atorvastatin Diarrhea     Fenofibrate Muscle Pain (Myalgia)     Gemfibrozil      Hydrocodone-Acetaminophen Other (See Comments)     Patient reports \"going white as a sheet\" and cold sweats   OK with Tylenol #3 No problems     Lisinopril Cough     Neosporin (Dld-Cbb-Fdccm) [Triple Antibiotic] Unknown     Penicillins Hives     1992 had pcn and had " hives. Pt. States allergic to all cillin's       Pravastatin Sodium [Pravastatin] Muscle Pain (Myalgia)     Trulicity [Dulaglutide] Unknown     Gluten [Gluten Meal] Other (See Comments)     Reports feeling worse with gluten; tested below threshold for Celiac but daughter is positive     Insulin Detemir Rash       Current Outpatient Medications   Medication Sig Dispense Refill     acetaminophen (TYLENOL) 325 MG tablet [ACETAMINOPHEN (TYLENOL) 325 MG TABLET] Take 2 tablets by mouth every 4 (four) hours as needed.       aspirin 81 MG EC tablet Take 81 mg by mouth daily       aspirin-acetaminophen-caffeine (EXCEDRIN MIGRAINE) 250-250-65 mg per tablet [ASPIRIN-ACETAMINOPHEN-CAFFEINE (EXCEDRIN MIGRAINE) 250-250-65 MG PER TABLET] Take 1 tablet by mouth every 6 (six) hours as needed for pain.       calcium carbonate (TUMS ULTRA) 400 mg calcium (1,000 mg) Chew [CALCIUM CARBONATE (TUMS ULTRA) 400 MG CALCIUM (1,000 MG) CHEW] Chew 2 tablets at bedtime.        chlorthalidone (HYGROTEN) 25 MG tablet [CHLORTHALIDONE (HYGROTEN) 25 MG TABLET] Take 12.5 mg by mouth at bedtime.        cholecalciferol, vitamin D3, 5,000 unit capsule [CHOLECALCIFEROL, VITAMIN D3, 5,000 UNIT CAPSULE] Take 5,000 Units by mouth at bedtime.        cyclobenzaprine (FLEXERIL) 10 MG tablet [CYCLOBENZAPRINE (FLEXERIL) 10 MG TABLET] Take 1 tablet (10 mg total) by mouth 3 (three) times a day as needed for muscle spasms. 15 tablet 0     diphenhydrAMINE (BENADRYL) 25 mg tablet [DIPHENHYDRAMINE (BENADRYL) 25 MG TABLET] Take 25 mg by mouth at bedtime as needed for itching.       ezetimibe (ZETIA) 10 mg tablet [EZETIMIBE (ZETIA) 10 MG TABLET] Take 1 tablet by mouth at bedtime.        glipiZIDE (GLUCOTROL XL) 10 MG 24 hr tablet [GLIPIZIDE (GLUCOTROL XL) 10 MG 24 HR TABLET] Take 1 tablet (10 mg total) by mouth daily. 30 tablet 0     indomethacin (INDOCIN) 50 MG capsule [INDOMETHACIN (INDOCIN) 50 MG CAPSULE] Take 1 capsule by mouth 3 (three) times a day as needed.         irbesartan (AVAPRO) 300 MG tablet [IRBESARTAN (AVAPRO) 300 MG TABLET] Take 300 mg by mouth at bedtime.       Lactobacillus rhamnosus GG (CULTURELLE) 10-15 Billion cell capsule [LACTOBACILLUS RHAMNOSUS GG (CULTURELLE) 10-15 BILLION CELL CAPSULE] Take 2 capsules by mouth daily.       LANTUS U-100 INSULIN 100 unit/mL injection [LANTUS U-100 INSULIN 100 UNIT/ML INJECTION] Inject 60 Units under the skin 2 (two) times a day. 10 mL PRN     magnesium 250 mg Tab [MAGNESIUM 250 MG TAB] Take 500 mg by mouth at bedtime.        metFORMIN (GLUCOPHAGE) 1000 MG tablet [METFORMIN (GLUCOPHAGE) 1000 MG TABLET] Take 1 tablet by mouth at bedtime.        multivitamin (ONE A DAY) per tablet [MULTIVITAMIN (ONE A DAY) PER TABLET] Take 1 tablet by mouth at bedtime.        omega-3/dha/epa/fish oil (FISH OIL-OMEGA-3 FATTY ACIDS) 300-1,000 mg capsule [OMEGA-3/DHA/EPA/FISH OIL (FISH OIL-OMEGA-3 FATTY ACIDS) 300-1,000 MG CAPSULE] Take 6 g by mouth at bedtime.       omeprazole (PRILOSEC) 20 MG capsule [OMEPRAZOLE (PRILOSEC) 20 MG CAPSULE] Take 1 capsule by mouth at bedtime.        polyethylene glycol (MIRALAX) 17 gram packet [POLYETHYLENE GLYCOL (MIRALAX) 17 GRAM PACKET] Take 17 g by mouth at bedtime.        pseudoephedrine (SUDAFED) 30 MG tablet [PSEUDOEPHEDRINE (SUDAFED) 30 MG TABLET] Take 1 tablet by mouth every 12 (twelve) hours as needed.       riboflavin, vitamin B2, 100 mg Tab [RIBOFLAVIN, VITAMIN B2, 100 MG TAB] Take 1 tablet by mouth at bedtime.        TRADJENTA 5 mg Tab [TRADJENTA 5 MG TAB] Take 1 tablet by mouth at bedtime.          Social History     Tobacco Use     Smoking status: Never     Smokeless tobacco: Never   Substance Use Topics     Alcohol use: Yes     Alcohol/week: 1.0 standard drink     Drug use: No       Virybradley Vides  3/6/2023  11:58 AM

## 2023-03-09 ENCOUNTER — TELEPHONE (OUTPATIENT)
Dept: UROLOGY | Facility: CLINIC | Age: 78
End: 2023-03-09
Payer: COMMERCIAL

## 2023-03-09 NOTE — TELEPHONE ENCOUNTER
[facility] Call Center    Phone Message    May a detailed message be left on voicemail: yes     Reason for Call: Pt stated he was given antibiotic scrub at appt 3/6 but wasn't told where on body, when or how much to use. Pt has catheter and thought maybe it was for that but the directions on the tube say not to use in that area of body. Please call pt to discuss. Thank you    Action Taken: Message routed to:  [facility] U[facility]    Travel Screening: Not Applicable

## 2023-03-09 NOTE — TELEPHONE ENCOUNTER
Call returned. Pt expressed concerns about using the soap on his genitalia. Surgical prep soap discussed. Pt expressed understanding that it was ok presurgical.    Carmita Bates CMA  03/09/23  2:15 PM

## 2023-03-20 ENCOUNTER — TELEPHONE (OUTPATIENT)
Dept: UROLOGY | Facility: CLINIC | Age: 78
End: 2023-03-20

## 2023-03-20 DIAGNOSIS — R33.9 RETENTION OF URINE: Primary | ICD-10-CM

## 2023-03-20 PROBLEM — N32.0 BLADDER NECK OBSTRUCTION: Status: ACTIVE | Noted: 2023-03-20

## 2023-03-20 NOTE — TELEPHONE ENCOUNTER
[facility] Call Center    Phone Message    May a detailed message be left on voicemail: yes     Reason for Call: Patient calling to schedule surgery. Please reach out. Thank you     Action Taken: Message routed to:  [facility] URO    Travel Screening: Not Applicable

## 2023-03-20 NOTE — TELEPHONE ENCOUNTER
Patient is scheduled for surgery with Dr. Encarnacion/Bird      Spoke with: Patient and wife via phone      Date of Surgery: Friday April 14, 2023    Location: [facility] OR      Informed patient they will need an adult : Yes     Pre-op: Yes     PAC EVAL: Tuesday April 04, 2023    Additional imaging/appointments:     Post-op: Monday May 15, 2023     Additional comments:      Surgery packet: Sent via mail 3/21/23     Patient is aware that surgery time is tentative to change and to expect a call 3-1 business days from Pre Admission Nursing for instructions and arrival time

## 2023-03-21 NOTE — TELEPHONE ENCOUNTER
Pt called and notified he will need to provide a urine sample for culture 4/3/23.    Carmita Bates CMA  03/21/23  10:46 AM

## 2023-03-21 NOTE — TELEPHONE ENCOUNTER
FUTURE VISIT INFORMATION      SURGERY INFORMATION:    Date: 2023    Location: [facility] OR    Surgeon:  Bacilio Encarnacion MD    Anesthesia Type: General    Procedure: CYSTOSCOPY, WITH MULTIPLE INCISIONS OF BLADDER NECK    RECORDS REQUESTED FROM:       Primary Care Provider: Mae Ashton PA-C  - [facility]     Pertinent Medical History: ELDON; CAD; Cardiovascular disease;     Most recent EKG+ Tracin/3/23 - Epic     Most recent ECHO: 18 - Epic     Most recent Cardiac Stress Test: 18 - [facility]     Most recent PFT's: 19- Pending sale to [facility] requested     Most recent Sleep Study: 16 - scanned in [facility]     Records Requested     2023 1:40 PM  [address]  Tel [phone]  Fax [phone]   Outcome Sent a fax for PFT tracings

## 2023-03-31 ENCOUNTER — OFFICE VISIT (OUTPATIENT)
Dept: UROLOGY | Facility: CLINIC | Age: 78
End: 2023-03-31
Payer: COMMERCIAL

## 2023-03-31 DIAGNOSIS — R33.9 RETENTION OF URINE: ICD-10-CM

## 2023-03-31 DIAGNOSIS — N32.0 BLADDER NECK CONTRACTURE: Primary | ICD-10-CM

## 2023-03-31 PROCEDURE — 51702 INSERT TEMP BLADDER CATH: CPT

## 2023-03-31 PROCEDURE — 87088 URINE BACTERIA CULTURE: CPT | Performed by: STUDENT IN AN ORGANIZED HEALTH CARE EDUCATION/TRAINING PROGRAM

## 2023-03-31 RX ORDER — SULFAMETHOXAZOLE/TRIMETHOPRIM 800-160 MG
1 TABLET ORAL ONCE
Status: COMPLETED | OUTPATIENT
Start: 2023-03-31 | End: 2023-03-31

## 2023-03-31 RX ADMIN — SULFAMETHOXAZOLE AND TRIMETHOPRIM 1 TABLET: 800; 160 TABLET ORAL at 14:50

## 2023-03-31 NOTE — PROGRESS NOTES
"Chief Complaint   Patient presents with     Allied Health Visit       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)     Cellulitis of right leg     Cardiovascular disease     Dyslipidemia     Benign essential HTN     Actinic keratosis     Dermatographic urticaria     Adverse effect of antihyperlipidemic and antiarteriosclerotic drugs, subsequent encounter     Benign neoplasm of colon     Bilateral hip pain     Chronic bilateral low back pain without sciatica     Chronic right-sided thoracic back pain     Diarrhea     Elevated prostate specific antigen (PSA)     Hernia of abdominal wall     Hypovitaminosis D     Incisional hernia     Retention of urine     Sepsis due to Escherichia coli (H)     Stool fat increased     Uncontrolled daytime somnolence     Calculus of ureter     Calculus of kidney     Bladder neck obstruction       Allergies   Allergen Reactions     Amoxicillin Hives     Per Dr. Barrientos, patient has tolerated Keflex.     Atorvastatin Diarrhea     Fenofibrate Muscle Pain (Myalgia)     Gemfibrozil      Hydrocodone-Acetaminophen Other (See Comments)     Patient reports \"going white as a sheet\" and cold sweats   OK with Tylenol #3 No problems     Lisinopril Cough     Neosporin (Siv-Nbi-Kzcvb) [Triple Antibiotic] Unknown     Penicillins Hives     1992 had pcn and had hives. Pt. States allergic to all cillin's       Pravastatin Sodium [Pravastatin] Muscle Pain (Myalgia)     " Trulicity [Dulaglutide] Unknown     Gluten [Gluten Meal] Other (See Comments)     Reports feeling worse with gluten; tested below threshold for Celiac but daughter is positive     Insulin Detemir Rash       Current Outpatient Medications   Medication Sig Dispense Refill     acetaminophen (TYLENOL) 325 MG tablet [ACETAMINOPHEN (TYLENOL) 325 MG TABLET] Take 2 tablets by mouth every 4 (four) hours as needed.       aspirin 81 MG EC tablet Take 81 mg by mouth daily       aspirin-acetaminophen-caffeine (EXCEDRIN MIGRAINE) 250-250-65 mg per tablet [ASPIRIN-ACETAMINOPHEN-CAFFEINE (EXCEDRIN MIGRAINE) 250-250-65 MG PER TABLET] Take 1 tablet by mouth every 6 (six) hours as needed for pain.       calcium carbonate (TUMS ULTRA) 400 mg calcium (1,000 mg) Chew [CALCIUM CARBONATE (TUMS ULTRA) 400 MG CALCIUM (1,000 MG) CHEW] Chew 2 tablets at bedtime.        chlorthalidone (HYGROTEN) 25 MG tablet [CHLORTHALIDONE (HYGROTEN) 25 MG TABLET] Take 12.5 mg by mouth at bedtime.        cholecalciferol, vitamin D3, 5,000 unit capsule [CHOLECALCIFEROL, VITAMIN D3, 5,000 UNIT CAPSULE] Take 5,000 Units by mouth at bedtime.        cyclobenzaprine (FLEXERIL) 10 MG tablet [CYCLOBENZAPRINE (FLEXERIL) 10 MG TABLET] Take 1 tablet (10 mg total) by mouth 3 (three) times a day as needed for muscle spasms. 15 tablet 0     diphenhydrAMINE (BENADRYL) 25 mg tablet [DIPHENHYDRAMINE (BENADRYL) 25 MG TABLET] Take 25 mg by mouth at bedtime as needed for itching.       ezetimibe (ZETIA) 10 mg tablet [EZETIMIBE (ZETIA) 10 MG TABLET] Take 1 tablet by mouth at bedtime.        glipiZIDE (GLUCOTROL XL) 10 MG 24 hr tablet [GLIPIZIDE (GLUCOTROL XL) 10 MG 24 HR TABLET] Take 1 tablet (10 mg total) by mouth daily. 30 tablet 0     indomethacin (INDOCIN) 50 MG capsule [INDOMETHACIN (INDOCIN) 50 MG CAPSULE] Take 1 capsule by mouth 3 (three) times a day as needed.        irbesartan (AVAPRO) 300 MG tablet [IRBESARTAN (AVAPRO) 300 MG TABLET] Take 300 mg by mouth at bedtime.        Lactobacillus rhamnosus GG (CULTURELLE) 10-15 Billion cell capsule [LACTOBACILLUS RHAMNOSUS GG (CULTURELLE) 10-15 BILLION CELL CAPSULE] Take 2 capsules by mouth daily.       LANTUS U-100 INSULIN 100 unit/mL injection [LANTUS U-100 INSULIN 100 UNIT/ML INJECTION] Inject 60 Units under the skin 2 (two) times a day. 10 mL PRN     magnesium 250 mg Tab [MAGNESIUM 250 MG TAB] Take 500 mg by mouth at bedtime.        metFORMIN (GLUCOPHAGE) 1000 MG tablet [METFORMIN (GLUCOPHAGE) 1000 MG TABLET] Take 1 tablet by mouth at bedtime.        multivitamin (ONE A DAY) per tablet [MULTIVITAMIN (ONE A DAY) PER TABLET] Take 1 tablet by mouth at bedtime.        omega-3/dha/epa/fish oil (FISH OIL-OMEGA-3 FATTY ACIDS) 300-1,000 mg capsule [OMEGA-3/DHA/EPA/FISH OIL (FISH OIL-OMEGA-3 FATTY ACIDS) 300-1,000 MG CAPSULE] Take 6 g by mouth at bedtime.       omeprazole (PRILOSEC) 20 MG capsule [OMEPRAZOLE (PRILOSEC) 20 MG CAPSULE] Take 1 capsule by mouth at bedtime.        polyethylene glycol (MIRALAX) 17 gram packet [POLYETHYLENE GLYCOL (MIRALAX) 17 GRAM PACKET] Take 17 g by mouth at bedtime.        pseudoephedrine (SUDAFED) 30 MG tablet [PSEUDOEPHEDRINE (SUDAFED) 30 MG TABLET] Take 1 tablet by mouth every 12 (twelve) hours as needed.       riboflavin, vitamin B2, 100 mg Tab [RIBOFLAVIN, VITAMIN B2, 100 MG TAB] Take 1 tablet by mouth at bedtime.        TRADJENTA 5 mg Tab [TRADJENTA 5 MG TAB] Take 1 tablet by mouth at bedtime.          Social History     Tobacco Use     Smoking status: Never     Smokeless tobacco: Never   Substance Use Topics     Alcohol use: Yes     Alcohol/week: 1.0 standard drink     Drug use: No       Niravevita Baker comes into clinic today at the request of Dr. Pang for catheter change.     Patient diagnosis: bladder neck contracture     This service provided today was under the direct supervision of Dr. Alcaraz, who was available if needed.    Niravevita Lanemann presents to clinic for scheduled [Yes] catheter  exchange.  Order has been verified: Yes. Catheter change discussed with and approved by NAVEEN Kenney since it had been 5 weeks since catheter was placed and 2 more weeks until surgery.     Removal:  18 Fr straight tipped latex lazo catheter removed from urethral meatus without difficulty.    Insertion:  18 Fr straight tipped latex lazo catheter inserted into urethral meatus in the usual sterile fashion without difficulty.  Balloon filled with 10 mL sterile H2O.  Received >50 ml yellow urine output. Urine was also collected via new catheter for urine culture needed prior to surgery.  Catheter secured in place with leg strap: Yes.     One Bactrim 800 mg given per protocol: Yes.   The following medication was given:     MEDICATION:  Bactrim  ROUTE: PO  SITE: Medication was given orally   DOSE: 800 mg  LOT #: [id number]  : [employer]  EXPIRATION DATE: 05/2024  NDC#: 4164-2894-33   Was there drug waste? No    Prior to medication administration, verified patient identity using patient's name and date of birth.  Due to medication administration, patient instructed to remain in clinic for 15 minutes  afterwards, and to report any adverse reaction to me immediately.    Patient did tolerate procedure well.    Patient instructed as to where to call or go for pain, fever, leakage, or decreased urine flow.       Rossy Armenta  3/31/2023  2:28 PM

## 2023-04-04 ENCOUNTER — PRE VISIT (OUTPATIENT)
Dept: SURGERY | Facility: CLINIC | Age: 78
End: 2023-04-04

## 2023-04-04 ENCOUNTER — ANESTHESIA EVENT (OUTPATIENT)
Dept: SURGERY | Facility: AMBULATORY SURGERY CENTER | Age: 78
End: 2023-04-04
Payer: COMMERCIAL

## 2023-04-04 ENCOUNTER — VIRTUAL VISIT (OUTPATIENT)
Dept: SURGERY | Facility: CLINIC | Age: 78
End: 2023-04-04
Payer: COMMERCIAL

## 2023-04-04 DIAGNOSIS — N32.0 BLADDER NECK OBSTRUCTION: ICD-10-CM

## 2023-04-04 DIAGNOSIS — Z01.818 PREOP EXAMINATION: Primary | ICD-10-CM

## 2023-04-04 LAB
BACTERIA UR CULT: ABNORMAL
BACTERIA UR CULT: ABNORMAL

## 2023-04-04 PROCEDURE — 99204 OFFICE O/P NEW MOD 45 MIN: CPT | Mod: VID | Performed by: PHYSICIAN ASSISTANT

## 2023-04-04 RX ORDER — INSULIN LISPRO 100 [IU]/ML
20-30 INJECTION, SOLUTION INTRAVENOUS; SUBCUTANEOUS 2 TIMES DAILY WITH MEALS
Status: ON HOLD | COMMUNITY
Start: 2022-08-20 | End: 2024-04-26

## 2023-04-04 ASSESSMENT — ENCOUNTER SYMPTOMS: SEIZURES: 0

## 2023-04-04 ASSESSMENT — LIFESTYLE VARIABLES: TOBACCO_USE: 0

## 2023-04-04 NOTE — PROGRESS NOTES
Rishi is a 78 year old who is being evaluated via a billable video visit.      How would you like to obtain your AVS? Mail a copy      Subjective   Rishi is a 78 year old, presenting for the following health issues:  Pre-Op Exam (/)    HPI           Review of Systems       Physical Exam             MANI Torre LPN

## 2023-04-04 NOTE — PATIENT INSTRUCTIONS
Preparing for Your Surgery      Name:  Nirav Baker   MRN:  [id number]   :  1945   Today's Date:  2023       Arriving for surgery:  Surgery date:  23  Arrival time:  11:30 am     Surgeries and procedures: Adult patients can have 2 visitors all through the surgery process.     Visiting hours: 8 a.m. to 8:30 p.m.     Hospital: Adult patients and children under age 18 can have 4 visitor at a time     No visitors under the age of 5 are allowed for hospital patients.  Double occupancy rooms: Patients can have only two visitors at a time.     Patients with disabilities: Can have a support person with them (family member, service provider     Or someone well informed about their needs) plus the allowed number of visitors     Patients confirmed or suspected to have symptoms of COVID 19 or flu:     No visitors allowed for adult patients.   Children (under age 18) can have 1 named visitor.     People who are sick or showing symptoms of COVID 19 or flu:    Are not allowed to visit patients--we can only make exceptions in special situations.       Please follow these guidelines for your visit:   Arrive wearing a mask over your mouth and nose; we will give you a medical mask to wear    If you arrive wearing a cloth mask.   Keep it on during your entire visit, even when in patient's room.   If you don't wear a mask we'll ask you to leave.     Clean your hands with alcohol hand . Do this when you arrive at and leave the building and patient room,    And again after you touch your mask or anything in the room.     You can t visit if you have a fever, cough, shortness of breath, muscle aches, headaches, sore throat    Or diarrhea      Stay 6 feet away from others during your visit and between visits     Go directly to and from the room you are visiting.     Stay in the patient s room during your visit. Limit going to other places in the hospital as much as possible     Leave bags and jackets at home or  in the car.     For everyone s health, please don t come and go during your visit. That includes for smoking   during your visit.     Please come to:     [facility] - [address] [address]     Parking is available in front of the Clinics and Surgery Center building from 5:30AM to 8:00PM.  -  Proceed to the 5th floor to check into the Ambulatory Surgery Center.              >> There will be patient concierges on the 1st and 5th floor, for assistance or an escort, if you would like.              >> Please call [phone] with any questions.    What can I eat or drink?  -  You may eat and drink normally up to 8 hours prior to arrival time.   -  You may have clear liquids until 2 hours prior to arrival time.    Examples of clear liquids:  Water  Clear broth  Juices (apple, white grape, white cranberry  and cider) without pulp  Noncarbonated, powder based beverages  (lemonade and Ammon-Aid)  Sodas (Sprite, 7-Up, ginger ale and seltzer)  Coffee or tea (without milk or cream)  Gatorade    -  No Alcohol for at least 24 hours before surgery.     Which medicines can I take?    Hold Aspirin (excedrin) for 7 days before surgery.   Hold Multivitamins for 7 days before surgery.  Hold Supplements (fish oil) for 7 days before surgery.  Hold Ibuprofen (Advil, Motrin) for 1 day(s) before surgery--unless otherwise directed by surgeon.  Hold Naproxen (Aleve) for 4 days before surgery.    Take 43 units of lantus insulin the morning of surgery.    -  DO NOT take these medications the day of surgery:   Lispro insulin + sudafed.    -  PLEASE TAKE these medications the day of surgery:  Tylenol if needed; take other morning medications.    How do I prepare myself?  - Please take 2 showers (one the night prior to surgery and one the morning of surgery) using Scrubcare or Hibiclens soap.    Use this soap only from the neck to your toes.     Leave the soap on your skin  for one minute--then rinse thoroughly.      You may use your own shampoo and conditioner. No other hair products.   - Please remove all jewelry and body piercings.  - No lotions, deodorants or fragrance.  - No makeup or fingernail polish.   - Bring your ID and insurance card.    -If you have a Deep Brain Stimulator, Spinal Cord Stimulator, or any Neuro Stimulator device---you must bring the remote control to the hospital.      ALL PATIENTS GOING HOME THE SAME DAY OF SURGERY ARE REQUIRED TO HAVE A RESPONSIBLE ADULT TO DRIVE AND BE IN ATTENDANCE WITH THEM FOR 24 HOURS FOLLOWING SURGERY.    Covid testing policy as of 12/06/2022  Your surgeon will notify and schedule you for a COVID test if one is needed before surgery--please direct any questions or COVID symptoms to your surgeon      Questions or Concerns:    - For any questions regarding the day of surgery or your hospital stay, please contact the Pre Admission Nursing Office at [phone].       - If you have health changes between today and your surgery, please call your surgeon.       - For questions after surgery, please call your surgeons office.

## 2023-04-04 NOTE — H&P (VIEW-ONLY)
Pre-Operative H & P     CC:  Preoperative exam to assess for increased cardiopulmonary risk while undergoing surgery and anesthesia.    Date of Encounter: 4/4/2023  Primary Care Physician:  Thom Rodriges     Reason for visit:   Encounter Diagnoses   Name Primary?     Preop examination Yes     Bladder neck obstruction        HPI  Nirav Baker is a 78 year old male who presents for pre-operative H & P in preparation for  Procedure Information     Case: [id number] Date/Time: 04/14/23 1310    Procedure: CYSTOSCOPY, WITH MULTIPLE INCISIONS OF BLADDER NECK (Urethra)    Anesthesia type: General    Diagnosis: Bladder neck obstruction [N32.0]    Pre-op diagnosis: Bladder neck obstruction [N32.0]    Location: [place] [address] / [facility]    Providers: Bacilio Encarnacion MD          Mr. Baker has PMH significant for hypertension, dyslipidemia, s/p NSTEMI 2012, ELDON, insulin dependent diabetes, morbid obesity, GERD, h/o kidney stones, and prostate cancer s/p surgery and radiation with subsequent vesicourethral anastomotic stricture. He presented to ED 2/26/23 for urinary retention, was seen by urology and underwent cystoscopy with dilation and insertion of 18 Kiswahili catheter.  He then saw Dr. Pang as an outpatient and the above procedure is now planned.    History is obtained from the patient and chart review    Hx of abnormal bleeding or anti-platelet use: ASA 81mg      Past Medical History  Past Medical History:   Diagnosis Date     Claustrophobia      Diabetes (H)      Hiatal hernia      Hypertension      Motion sickness      Orthopnea      Personal history of fall     Twice     Sleep apnea      Walking troubles        Past Surgical History  Past Surgical History:   Procedure Laterality Date     APPENDECTOMY       BACK SURGERY      C5     CHOLECYSTECTOMY       COLONOSCOPY       COMBINED CYSTOSCOPY, INSERT STENT URETER(S) Left 9/9/2022    Procedure:  CYSTOURETEROSCOPY, WITH BLADDER NECK DILATION WITH BLADDER NECK CONTRACTURE,  BLADDER CALCULUS REMOVAL;  Surgeon: Harvey Madden MD;  Location: [facility]     DENTAL SURGERY       HEMORRHOIDECTOMY       HERNIA REPAIR       PROSTATECTOMY         Prior to Admission Medications  Current Outpatient Medications   Medication Sig Dispense Refill     acetaminophen (TYLENOL) 325 MG tablet [ACETAMINOPHEN (TYLENOL) 325 MG TABLET] Take 2 tablets by mouth every 4 (four) hours as needed.       aspirin 81 MG EC tablet Take 81 mg by mouth every evening       aspirin-acetaminophen-caffeine (EXCEDRIN MIGRAINE) 250-250-65 mg per tablet Take 2 tablets by mouth At Bedtime       calcium carbonate (TUMS ULTRA) 400 mg calcium (1,000 mg) Chew Take 2 tablets by mouth as needed       cholecalciferol, vitamin D3, 5,000 unit capsule [CHOLECALCIFEROL, VITAMIN D3, 5,000 UNIT CAPSULE] Take 5,000 Units by mouth at bedtime.        diphenhydrAMINE (BENADRYL) 25 mg tablet [DIPHENHYDRAMINE (BENADRYL) 25 MG TABLET] Take 25 mg by mouth at bedtime as needed for itching.       ezetimibe (ZETIA) 10 mg tablet [EZETIMIBE (ZETIA) 10 MG TABLET] Take 1 tablet by mouth at bedtime.        glipiZIDE (GLUCOTROL XL) 10 MG 24 hr tablet [GLIPIZIDE (GLUCOTROL XL) 10 MG 24 HR TABLET] Take 1 tablet (10 mg total) by mouth daily. (Patient taking differently: Take 20 mg by mouth every evening) 30 tablet 0     insulin lispro (HUMALOG KWIKPEN) 100 UNIT/ML (1 unit dial) KWIKPEN 20-30 Units 3 times daily (before meals) 20 units breakfast, 20 units lunch and 30 units dinner       irbesartan (AVAPRO) 300 MG tablet [IRBESARTAN (AVAPRO) 300 MG TABLET] Take 300 mg by mouth at bedtime.       LANTUS U-100 INSULIN 100 unit/mL injection [LANTUS U-100 INSULIN 100 UNIT/ML INJECTION] Inject 60 Units under the skin 2 (two) times a day. (Patient taking differently: Inject 54-76 Units Subcutaneous 2 times daily 54 units AM   76 units PM) 10 mL PRN     magnesium 250 mg Tab [MAGNESIUM  "250 MG TAB] Take 500 mg by mouth at bedtime.        melatonin 3 MG CAPS Take 6 mg by mouth At Bedtime       metFORMIN (GLUCOPHAGE) 1000 MG tablet Take 1 tablet by mouth daily (with dinner)       multivitamin (ONE A DAY) per tablet [MULTIVITAMIN (ONE A DAY) PER TABLET] Take 1 tablet by mouth at bedtime.        omega-3/dha/epa/fish oil (FISH OIL-OMEGA-3 FATTY ACIDS) 300-1,000 mg capsule [OMEGA-3/DHA/EPA/FISH OIL (FISH OIL-OMEGA-3 FATTY ACIDS) 300-1,000 MG CAPSULE] Take 6 g by mouth at bedtime.       omeprazole (PRILOSEC) 20 MG capsule Take 1 capsule by mouth 2 times daily       polyethylene glycol (MIRALAX) 17 gram packet [POLYETHYLENE GLYCOL (MIRALAX) 17 GRAM PACKET] Take 17 g by mouth at bedtime.        pseudoephedrine (SUDAFED) 30 MG tablet [PSEUDOEPHEDRINE (SUDAFED) 30 MG TABLET] Take 1 tablet by mouth every 12 (twelve) hours as needed.       riboflavin, vitamin B2, 100 mg Tab [RIBOFLAVIN, VITAMIN B2, 100 MG TAB] Take 1 tablet by mouth at bedtime.        TRADJENTA 5 mg Tab [TRADJENTA 5 MG TAB] Take 1 tablet by mouth at bedtime.        chlorthalidone (HYGROTEN) 25 MG tablet [CHLORTHALIDONE (HYGROTEN) 25 MG TABLET] Take 12.5 mg by mouth at bedtime.          Allergies  Allergies   Allergen Reactions     Amoxicillin Hives     Per Dr. Barrientos, patient has tolerated Keflex.     Atorvastatin Diarrhea     Benzalkonium Chloride Other (See Comments)     Fenofibrate Muscle Pain (Myalgia)     Gemfibrozil      Hydrocodone-Acetaminophen Other (See Comments)     Patient reports \"going white as a sheet\" and cold sweats   OK with Tylenol #3 No problems     Lisinopril Cough     Neosporin (Smu-Dyc-Vknju) [Triple Antibiotic] Unknown     Penicillins Hives     1992 had pcn and had hives. Pt. States allergic to all cillin's       Pravastatin Sodium [Pravastatin] Muscle Pain (Myalgia)     Trulicity [Dulaglutide] Unknown     Gluten [Gluten Meal] Other (See Comments)     Reports feeling worse with gluten; tested below threshold for Celiac " but daughter is positive     Insulin Detemir Rash       Social History  Social History     Socioeconomic History     Marital status:      Spouse name: Not on file     Number of children: Not on file     Years of education: Not on file     Highest education level: Not on file   Occupational History     Not on file   Tobacco Use     Smoking status: Never     Passive exposure: Past     Smokeless tobacco: Never   Vaping Use     Vaping status: Not on file   Substance and Sexual Activity     Alcohol use: Yes     Alcohol/week: 1.0 standard drink of alcohol     Types: 1 Standard drinks or equivalent per week     Comment: very occasional     Drug use: No     Sexual activity: Not on file   Other Topics Concern     Not on file   Social History Narrative     Not on file     Social Determinants of Health     Financial Resource Strain: Not on file   Food Insecurity: Not on file   Transportation Needs: Not on file   Physical Activity: Not on file   Stress: Not on file   Social Connections: Not on file   Intimate Partner Violence: Not on file   Housing Stability: Not on file       Family History  Family History   Problem Relation Age of Onset     Anesthesia Reaction No family hx of      Bleeding Disorder No family hx of      Venous thrombosis No family hx of        Review of Systems  The complete review of systems is negative other than noted in the HPI or here.     Anesthesia Evaluation   Pt has had prior anesthetic.     History of anesthetic complications   ELDON.    ROS/MED HX  ENT/Pulmonary:     (+) sleep apnea (does not use full face mask, uses nose pillows), uses CPAP,  (-) tobacco use and recent URI   Neurologic: Comment: claustrophobic - neg neurologic ROS  (-) no seizures and no CVA   Cardiovascular:     (+) Dyslipidemia hypertension--CAD -past MI (NSTEMI, 2012 in the setting of sepsis) --Previous cardiac testing   Echo: Date: 2018 Results:    Left Ventricle: Normal left ventricular size.The calculated left    ventricular ejection fraction is 49%. This represents a mildly decreased   ejection fraction. The left ventricular wall thickness is normal. E/e' 8   to 15, which is equivocal for estimating LV filling pressures.     Suspicion of inferior basilar hypokinesis. Study is technically limited   in the specificity of this finding is reduced.     Right Ventricle: Right ventricle not well visualized. Right ventricle is   difficult to visualize. There is some suspicion of mild reduction in right   ventricular systolic function. Views of the right ventricle are not   standard views however.     Aortic Valve: Aortic valve not well visualized. Cannot see the leaflets   of the aortic valve because of technical limitations. There is global   thickening of the aortic valve. No aortic stenosis. No aortic   regurgitation is present.     Tricuspid Valve: Normal tricuspid valve structure. There is no evidence   of tricuspid stenosis. Mild to moderate tricuspid valve regurgitation. The   estimated systolic pulmonary artery pressure is 30+ right atrial pressure   mmhg.     Stress Test: Date: 5/2018 Results:   ECG Stress Conclusions        1.  Regadenoson stress was performed.          2.  Resting hypertension.        3.  Negative stress ECG for ST segment depression.        Myocardial Perfusion Conclusions        1.  Myocardial perfusion imaging reveals no scan evidence of          any significant territory of ischemia or infarction.          2.  Left ventricular size and wall motion are normal.          3.  Left ventricular ejection fraction is normal, 54%.        4.  In comparison with the prior report, there has been no          significant change.     ECG Reviewed: Date: Results:    Cath: Date: Results:      METS/Exercise Tolerance: >4 METS    Hematologic:  - neg hematologic  ROS  (-) history of blood clots and history of blood transfusion   Musculoskeletal:  - neg musculoskeletal ROS     GI/Hepatic:     (+) GERD, Asymptomatic on  medication,     Renal/Genitourinary:     (+) Nephrolithiasis ,     Endo:     (+) type II DM, Last HgA1c: 8.0, date: 2/1/23, Using insulin, - not using insulin pump. Normal glucose range: 100-150, Obesity,  (-) thyroid disease   Psychiatric/Substance Use:     (+) psychiatric history anxiety (h/o panic attacks, intermittent)     Infectious Disease:  - neg infectious disease ROS     Malignancy: Comment: H/o MOHS surgery, face  (+) Malignancy, History of Prostate and Skin.Prostate CA status post Surgery and Radiation.        Other:  - neg other ROS          Virtual visit -  No vitals were obtained    Physical Exam  Constitutional: Awake, alert, cooperative, no apparent distress, and appears stated age.  HENT: Normocephalic  Respiratory: non labored breathing   Neurologic: Awake, alert, oriented to name, place and time.   Neuropsychiatric: Calm, cooperative. Normal affect.      Prior Labs/Diagnostic Studies   All labs and imaging personally reviewed     Outside labs 2/1/23  Sodium 136 - 145 mmol/L 142  [facility]   Potassium 3.5 - 5.1 mmol/L 4.0  [facility]   Chloride 98 - 109 mmol/L 105  [facility]   CO2 20 - 29 mmol/L 26  [facility]   Anion Gap 7 - 16 mmol/L 11  [facility]   Calcium 8.4 - 10.4 mg/dL 9.2  [facility]   BUN 7 - 26 mg/dL 28 High   [facility]   Creatinine 0.73 - 1.18 mg/dL 1.38 High   [facility]   Glucose 70 - 100 mg/dL [facility]     Hemoglobin A1C <=5.6 % 8.0 High       9/7/22  WBC 3.5 - 10.5 x10(9)/L 8.4    RBC 4.32 - 5.72 x10(12)/L 4.69    Hemoglobin 13.5 - 17.5 g/dL 14.2    HCT 38.8 - 50.0 % 40.9    MCV 80.0 - 100.0 fL 87.2    MCH 27.6 - 33.3 pg 30.3    MCHC 31.5 - 35.2 g/dL 34.7    RDW 11.9 - 15.5 % 13.0    Platelets 150 - 450 x10(9)/L 184      EKG/ stress test - if available please see in ROS above          The patient's records and results  personally reviewed by this provider.     Outside records reviewed from: Care Everywhere      Assessment      Nirav Baker is a 78 year old male seen as a PAC referral for risk assessment and optimization for anesthesia.    Plan/Recommendations  Pt will be optimized for the proposed procedure.  See below for details on the assessment, risk, and preoperative recommendations    NEUROLOGY  - No history of TIA, CVA or seizure    -Post Op delirium risk factors:  Age and High co-morbid index    ENT  - No current airway concerns.  Will need to be reassessed day of surgery.  Mallampati: Unable to assess  TM: Unable to assess    CARDIAC  - NSTEMI 2012 in the setting of sepsis  - NM stress test negative for ischemia 2018  - echo 2018 with EF 49% and mild to moderate tricuspid regurgitation (in setting of sepsis due to cellulitis)  - pt denies chest pain, chest pressure, SOB, MURRAY    - METS (Metabolic Equivalents), patient climbs two flights of stairs in his home multiple times per day. He was more active before lazo placed 2/26/23.    - underwent procedure 9/2022 under [facility] without issues    Patient performs 4 or more METS exercise without symptoms            Total Score: 0      RCRI-Moderate risk: Class 3  6.6% complication rate            Total Score: 2    RCRI: CAD    RCRI: Diabetes        PULMONARY  - Obstructive Sleep Apnea  ELDON with home CPAP.  Patient will be instructed to bring their home CPAP device to the hospital with them.       - Denies asthma or inhaler use  - Tobacco History      History   Smoking Status     Never   Smokeless Tobacco     Never       GI  - GERD  Controlled on medications: Proton Pump Inhibitor  PONV Low Risk  Total Score: 1           1 AN PONV: Patient is not a current smoker        /RENAL  - Baseline Creatinine 1.38  - prostate cancer s/p radical prostatectomy and radiation  - vesicourethral anastomotic stricture s/p multiple dilations.  Recent ED visit for retention. Pt has indwelling  catheter with recent exchange 3/31/23    ENDOCRINE    - BMI: Estimated body mass index is 45.98 kg/m  as calculated from the following:    Height as of 3/6/23: 1.829 m (6').    Weight as of 3/6/23: 153.8 kg (339 lb).  Class 3 Obesity (BMI > 40)  - Diabetes  Diabetes Type 2, insulin dependent. Hold morning oral hypoglycemic medications and short acting insulin DOS. Take 80% of last scheduled dose of long-acting insulin prior to procedure.  Recommend close monitoring of the patient's blood glucose levels throughout the perioperative period.    HEME  VTE Low Risk 0.5%            Total Score: 4    VTE: Greater than 59 yrs old    VTE: BMI greater than 39    VTE: Male      - Platelet disfunction second to Aspirin (Tracee, many others)        Different anesthesia methods/types have been discussed with the patient, but they are aware that the final plan will be decided by the assigned anesthesia provider on the date of service.    Patient was discussed with Dr Joyce    The patient is optimized for their procedure. AVS with information on surgery time/arrival time, meds and NPO status given by nursing staff. No further diagnostic testing indicated.    Please refer to the physical examination documented by the anesthesiologist in the anesthesia record on the day of surgery.    Video-Visit Details    Type of service:  Video Visit    Provider received verbal consent for a Video Visit from the patient? Yes   Video Call Length: 18 minutes    Originating Location (pt. Location): Home    Distant Location (provider location):  Off-site  Mode of Communication:  Video Conference via Wavestream  On the day of service:     Prep time: 15 minutes  Visit time: 18 minutes  Documentation time: 14 minutes  ------------------------------------------  Total time: 47 minutes      Bella Marino PA-C  Preoperative Assessment Center  [facility]  [facility]  Phone: [phone]  Fax: [phone]

## 2023-04-04 NOTE — H&P
Pre-Operative H & P     CC:  Preoperative exam to assess for increased cardiopulmonary risk while undergoing surgery and anesthesia.    Date of Encounter: 4/4/2023  Primary Care Physician:  Thom Rodriges     Reason for visit:   Encounter Diagnoses   Name Primary?     Preop examination Yes     Bladder neck obstruction        HPI  Nirav Baker is a 78 year old male who presents for pre-operative H & P in preparation for  Procedure Information     Case: [id number] Date/Time: 04/14/23 1310    Procedure: CYSTOSCOPY, WITH MULTIPLE INCISIONS OF BLADDER NECK (Urethra)    Anesthesia type: General    Diagnosis: Bladder neck obstruction [N32.0]    Pre-op diagnosis: Bladder neck obstruction [N32.0]    Location: [place] [address] / [facility]    Providers: Bacilio Encarnacion MD          Mr. Baker has PMH significant for hypertension, dyslipidemia, s/p NSTEMI 2012, ELDON, insulin dependent diabetes, morbid obesity, GERD, h/o kidney stones, and prostate cancer s/p surgery and radiation with subsequent vesicourethral anastomotic stricture. He presented to ED 2/26/23 for urinary retention, was seen by urology and underwent cystoscopy with dilation and insertion of 18 Vietnamese catheter.  He then saw Dr. Pang as an outpatient and the above procedure is now planned.    History is obtained from the patient and chart review    Hx of abnormal bleeding or anti-platelet use: ASA 81mg      Past Medical History  Past Medical History:   Diagnosis Date     Claustrophobia      Diabetes (H)      Hiatal hernia      Hypertension      Motion sickness      Orthopnea      Personal history of fall     Twice     Sleep apnea      Walking troubles        Past Surgical History  Past Surgical History:   Procedure Laterality Date     APPENDECTOMY       BACK SURGERY      C5     CHOLECYSTECTOMY       COLONOSCOPY       COMBINED CYSTOSCOPY, INSERT STENT URETER(S) Left 9/9/2022    Procedure:  CYSTOURETEROSCOPY, WITH BLADDER NECK DILATION WITH BLADDER NECK CONTRACTURE,  BLADDER CALCULUS REMOVAL;  Surgeon: Harvey Madden MD;  Location: [facility]     DENTAL SURGERY       HEMORRHOIDECTOMY       HERNIA REPAIR       PROSTATECTOMY         Prior to Admission Medications  Current Outpatient Medications   Medication Sig Dispense Refill     acetaminophen (TYLENOL) 325 MG tablet [ACETAMINOPHEN (TYLENOL) 325 MG TABLET] Take 2 tablets by mouth every 4 (four) hours as needed.       aspirin 81 MG EC tablet Take 81 mg by mouth every evening       aspirin-acetaminophen-caffeine (EXCEDRIN MIGRAINE) 250-250-65 mg per tablet Take 2 tablets by mouth At Bedtime       calcium carbonate (TUMS ULTRA) 400 mg calcium (1,000 mg) Chew Take 2 tablets by mouth as needed       cholecalciferol, vitamin D3, 5,000 unit capsule [CHOLECALCIFEROL, VITAMIN D3, 5,000 UNIT CAPSULE] Take 5,000 Units by mouth at bedtime.        diphenhydrAMINE (BENADRYL) 25 mg tablet [DIPHENHYDRAMINE (BENADRYL) 25 MG TABLET] Take 25 mg by mouth at bedtime as needed for itching.       ezetimibe (ZETIA) 10 mg tablet [EZETIMIBE (ZETIA) 10 MG TABLET] Take 1 tablet by mouth at bedtime.        glipiZIDE (GLUCOTROL XL) 10 MG 24 hr tablet [GLIPIZIDE (GLUCOTROL XL) 10 MG 24 HR TABLET] Take 1 tablet (10 mg total) by mouth daily. (Patient taking differently: Take 20 mg by mouth every evening) 30 tablet 0     insulin lispro (HUMALOG KWIKPEN) 100 UNIT/ML (1 unit dial) KWIKPEN 20-30 Units 3 times daily (before meals) 20 units breakfast, 20 units lunch and 30 units dinner       irbesartan (AVAPRO) 300 MG tablet [IRBESARTAN (AVAPRO) 300 MG TABLET] Take 300 mg by mouth at bedtime.       LANTUS U-100 INSULIN 100 unit/mL injection [LANTUS U-100 INSULIN 100 UNIT/ML INJECTION] Inject 60 Units under the skin 2 (two) times a day. (Patient taking differently: Inject 54-76 Units Subcutaneous 2 times daily 54 units AM   76 units PM) 10 mL PRN     magnesium 250 mg Tab [MAGNESIUM  "250 MG TAB] Take 500 mg by mouth at bedtime.        melatonin 3 MG CAPS Take 6 mg by mouth At Bedtime       metFORMIN (GLUCOPHAGE) 1000 MG tablet Take 1 tablet by mouth daily (with dinner)       multivitamin (ONE A DAY) per tablet [MULTIVITAMIN (ONE A DAY) PER TABLET] Take 1 tablet by mouth at bedtime.        omega-3/dha/epa/fish oil (FISH OIL-OMEGA-3 FATTY ACIDS) 300-1,000 mg capsule [OMEGA-3/DHA/EPA/FISH OIL (FISH OIL-OMEGA-3 FATTY ACIDS) 300-1,000 MG CAPSULE] Take 6 g by mouth at bedtime.       omeprazole (PRILOSEC) 20 MG capsule Take 1 capsule by mouth 2 times daily       polyethylene glycol (MIRALAX) 17 gram packet [POLYETHYLENE GLYCOL (MIRALAX) 17 GRAM PACKET] Take 17 g by mouth at bedtime.        pseudoephedrine (SUDAFED) 30 MG tablet [PSEUDOEPHEDRINE (SUDAFED) 30 MG TABLET] Take 1 tablet by mouth every 12 (twelve) hours as needed.       riboflavin, vitamin B2, 100 mg Tab [RIBOFLAVIN, VITAMIN B2, 100 MG TAB] Take 1 tablet by mouth at bedtime.        TRADJENTA 5 mg Tab [TRADJENTA 5 MG TAB] Take 1 tablet by mouth at bedtime.        chlorthalidone (HYGROTEN) 25 MG tablet [CHLORTHALIDONE (HYGROTEN) 25 MG TABLET] Take 12.5 mg by mouth at bedtime.          Allergies  Allergies   Allergen Reactions     Amoxicillin Hives     Per Dr. Barrientos, patient has tolerated Keflex.     Atorvastatin Diarrhea     Benzalkonium Chloride Other (See Comments)     Fenofibrate Muscle Pain (Myalgia)     Gemfibrozil      Hydrocodone-Acetaminophen Other (See Comments)     Patient reports \"going white as a sheet\" and cold sweats   OK with Tylenol #3 No problems     Lisinopril Cough     Neosporin (Boo-Qyo-Oiezx) [Triple Antibiotic] Unknown     Penicillins Hives     1992 had pcn and had hives. Pt. States allergic to all cillin's       Pravastatin Sodium [Pravastatin] Muscle Pain (Myalgia)     Trulicity [Dulaglutide] Unknown     Gluten [Gluten Meal] Other (See Comments)     Reports feeling worse with gluten; tested below threshold for Celiac " but daughter is positive     Insulin Detemir Rash       Social History  Social History     Socioeconomic History     Marital status:      Spouse name: Not on file     Number of children: Not on file     Years of education: Not on file     Highest education level: Not on file   Occupational History     Not on file   Tobacco Use     Smoking status: Never     Passive exposure: Past     Smokeless tobacco: Never   Vaping Use     Vaping status: Not on file   Substance and Sexual Activity     Alcohol use: Yes     Alcohol/week: 1.0 standard drink of alcohol     Types: 1 Standard drinks or equivalent per week     Comment: very occasional     Drug use: No     Sexual activity: Not on file   Other Topics Concern     Not on file   Social History Narrative     Not on file     Social Determinants of Health     Financial Resource Strain: Not on file   Food Insecurity: Not on file   Transportation Needs: Not on file   Physical Activity: Not on file   Stress: Not on file   Social Connections: Not on file   Intimate Partner Violence: Not on file   Housing Stability: Not on file       Family History  Family History   Problem Relation Age of Onset     Anesthesia Reaction No family hx of      Bleeding Disorder No family hx of      Venous thrombosis No family hx of        Review of Systems  The complete review of systems is negative other than noted in the HPI or here.     Anesthesia Evaluation   Pt has had prior anesthetic.     History of anesthetic complications   ELDON.    ROS/MED HX  ENT/Pulmonary:     (+) sleep apnea (does not use full face mask, uses nose pillows), uses CPAP,  (-) tobacco use and recent URI   Neurologic: Comment: claustrophobic - neg neurologic ROS  (-) no seizures and no CVA   Cardiovascular:     (+) Dyslipidemia hypertension--CAD -past MI (NSTEMI, 2012 in the setting of sepsis) --Previous cardiac testing   Echo: Date: 2018 Results:    Left Ventricle: Normal left ventricular size.The calculated left    ventricular ejection fraction is 49%. This represents a mildly decreased   ejection fraction. The left ventricular wall thickness is normal. E/e' 8   to 15, which is equivocal for estimating LV filling pressures.     Suspicion of inferior basilar hypokinesis. Study is technically limited   in the specificity of this finding is reduced.     Right Ventricle: Right ventricle not well visualized. Right ventricle is   difficult to visualize. There is some suspicion of mild reduction in right   ventricular systolic function. Views of the right ventricle are not   standard views however.     Aortic Valve: Aortic valve not well visualized. Cannot see the leaflets   of the aortic valve because of technical limitations. There is global   thickening of the aortic valve. No aortic stenosis. No aortic   regurgitation is present.     Tricuspid Valve: Normal tricuspid valve structure. There is no evidence   of tricuspid stenosis. Mild to moderate tricuspid valve regurgitation. The   estimated systolic pulmonary artery pressure is 30+ right atrial pressure   mmhg.     Stress Test: Date: 5/2018 Results:   ECG Stress Conclusions        1.  Regadenoson stress was performed.          2.  Resting hypertension.        3.  Negative stress ECG for ST segment depression.        Myocardial Perfusion Conclusions        1.  Myocardial perfusion imaging reveals no scan evidence of          any significant territory of ischemia or infarction.          2.  Left ventricular size and wall motion are normal.          3.  Left ventricular ejection fraction is normal, 54%.        4.  In comparison with the prior report, there has been no          significant change.     ECG Reviewed: Date: Results:    Cath: Date: Results:      METS/Exercise Tolerance: >4 METS    Hematologic:  - neg hematologic  ROS  (-) history of blood clots and history of blood transfusion   Musculoskeletal:  - neg musculoskeletal ROS     GI/Hepatic:     (+) GERD, Asymptomatic on  medication,     Renal/Genitourinary:     (+) Nephrolithiasis ,     Endo:     (+) type II DM, Last HgA1c: 8.0, date: 2/1/23, Using insulin, - not using insulin pump. Normal glucose range: 100-150, Obesity,  (-) thyroid disease   Psychiatric/Substance Use:     (+) psychiatric history anxiety (h/o panic attacks, intermittent)     Infectious Disease:  - neg infectious disease ROS     Malignancy: Comment: H/o MOHS surgery, face  (+) Malignancy, History of Prostate and Skin.Prostate CA status post Surgery and Radiation.        Other:  - neg other ROS          Virtual visit -  No vitals were obtained    Physical Exam  Constitutional: Awake, alert, cooperative, no apparent distress, and appears stated age.  HENT: Normocephalic  Respiratory: non labored breathing   Neurologic: Awake, alert, oriented to name, place and time.   Neuropsychiatric: Calm, cooperative. Normal affect.      Prior Labs/Diagnostic Studies   All labs and imaging personally reviewed     Outside labs 2/1/23  Sodium 136 - 145 mmol/L 142  [facility]   Potassium 3.5 - 5.1 mmol/L 4.0  [facility]   Chloride 98 - 109 mmol/L 105  [facility]   CO2 20 - 29 mmol/L 26  [facility]   Anion Gap 7 - 16 mmol/L 11  [facility]   Calcium 8.4 - 10.4 mg/dL 9.2  [facility]   BUN 7 - 26 mg/dL 28 High   [facility]   Creatinine 0.73 - 1.18 mg/dL 1.38 High   [facility]   Glucose 70 - 100 mg/dL [facility]     Hemoglobin A1C <=5.6 % 8.0 High       9/7/22  WBC 3.5 - 10.5 x10(9)/L 8.4    RBC 4.32 - 5.72 x10(12)/L 4.69    Hemoglobin 13.5 - 17.5 g/dL 14.2    HCT 38.8 - 50.0 % 40.9    MCV 80.0 - 100.0 fL 87.2    MCH 27.6 - 33.3 pg 30.3    MCHC 31.5 - 35.2 g/dL 34.7    RDW 11.9 - 15.5 % 13.0    Platelets 150 - 450 x10(9)/L 184      EKG/ stress test - if available please see in ROS above          The patient's records and results  personally reviewed by this provider.     Outside records reviewed from: Care Everywhere      Assessment      Nirav Baker is a 78 year old male seen as a PAC referral for risk assessment and optimization for anesthesia.    Plan/Recommendations  Pt will be optimized for the proposed procedure.  See below for details on the assessment, risk, and preoperative recommendations    NEUROLOGY  - No history of TIA, CVA or seizure    -Post Op delirium risk factors:  Age and High co-morbid index    ENT  - No current airway concerns.  Will need to be reassessed day of surgery.  Mallampati: Unable to assess  TM: Unable to assess    CARDIAC  - NSTEMI 2012 in the setting of sepsis  - NM stress test negative for ischemia 2018  - echo 2018 with EF 49% and mild to moderate tricuspid regurgitation (in setting of sepsis due to cellulitis)  - pt denies chest pain, chest pressure, SOB, MURRAY    - METS (Metabolic Equivalents), patient climbs two flights of stairs in his home multiple times per day. He was more active before lazo placed 2/26/23.    - underwent procedure 9/2022 under [facility] without issues    Patient performs 4 or more METS exercise without symptoms            Total Score: 0      RCRI-Moderate risk: Class 3  6.6% complication rate            Total Score: 2    RCRI: CAD    RCRI: Diabetes        PULMONARY  - Obstructive Sleep Apnea  ELDON with home CPAP.  Patient will be instructed to bring their home CPAP device to the hospital with them.       - Denies asthma or inhaler use  - Tobacco History      History   Smoking Status     Never   Smokeless Tobacco     Never       GI  - GERD  Controlled on medications: Proton Pump Inhibitor  PONV Low Risk  Total Score: 1           1 AN PONV: Patient is not a current smoker        /RENAL  - Baseline Creatinine 1.38  - prostate cancer s/p radical prostatectomy and radiation  - vesicourethral anastomotic stricture s/p multiple dilations.  Recent ED visit for retention. Pt has indwelling  catheter with recent exchange 3/31/23    ENDOCRINE    - BMI: Estimated body mass index is 45.98 kg/m  as calculated from the following:    Height as of 3/6/23: 1.829 m (6').    Weight as of 3/6/23: 153.8 kg (339 lb).  Class 3 Obesity (BMI > 40)  - Diabetes  Diabetes Type 2, insulin dependent. Hold morning oral hypoglycemic medications and short acting insulin DOS. Take 80% of last scheduled dose of long-acting insulin prior to procedure.  Recommend close monitoring of the patient's blood glucose levels throughout the perioperative period.    HEME  VTE Low Risk 0.5%            Total Score: 4    VTE: Greater than 59 yrs old    VTE: BMI greater than 39    VTE: Male      - Platelet disfunction second to Aspirin (Tracee, many others)        Different anesthesia methods/types have been discussed with the patient, but they are aware that the final plan will be decided by the assigned anesthesia provider on the date of service.    Patient was discussed with Dr Joyce    The patient is optimized for their procedure. AVS with information on surgery time/arrival time, meds and NPO status given by nursing staff. No further diagnostic testing indicated.    Please refer to the physical examination documented by the anesthesiologist in the anesthesia record on the day of surgery.    Video-Visit Details    Type of service:  Video Visit    Provider received verbal consent for a Video Visit from the patient? Yes   Video Call Length: 18 minutes    Originating Location (pt. Location): Home    Distant Location (provider location):  Off-site  Mode of Communication:  Video Conference via [employer]  On the day of service:     Prep time: 15 minutes  Visit time: 18 minutes  Documentation time: 14 minutes  ------------------------------------------  Total time: 47 minutes      Bella Marino PA-C  Preoperative Assessment Center  [facility]  Phone: [phone]  Fax: [phone]

## 2023-04-06 ENCOUNTER — TELEPHONE (OUTPATIENT)
Dept: UROLOGY | Facility: CLINIC | Age: 78
End: 2023-04-06
Payer: COMMERCIAL

## 2023-04-06 DIAGNOSIS — N32.0 BLADDER NECK CONTRACTURE: ICD-10-CM

## 2023-04-06 DIAGNOSIS — R33.9 RETENTION OF URINE: Primary | ICD-10-CM

## 2023-04-06 RX ORDER — NITROFURANTOIN 25; 75 MG/1; MG/1
100 CAPSULE ORAL 2 TIMES DAILY
Qty: 14 CAPSULE | Refills: 0 | Status: SHIPPED | OUTPATIENT
Start: 2023-04-09 | End: 2023-04-16

## 2023-04-06 NOTE — TELEPHONE ENCOUNTER
Procedure: Cystoscopy with multiple incisions of the bladder neck    Date: 4/14/23  [place] [facility] [place]: Dr. Bacilio Encarnacion     Post op appt: 5/15/23    H&P: 4/4/23  UA/UC: 3/31/23 - send to [facility] - [place], [address]    Medications: Reviewed in PAC  Soap: reviewed in PAC  Reviewed when to start clear liquids and when to start NPO: reviewed in PAC  : Wife  24 hour observation: Wife    Pt or family member expressed understanding: yes    Carmita Bates CMA  4/6/2023  1:48 PM       Rx for Nitrofurantoin 100 mg bid x 7 days sent per provider to pt's preferred pharmacy.     Pt expressed understanding to take medication 5 days before surgery and finish the antibiotic the same after surgery.        Carmita Bates CMA  04/06/23  4:33 PM

## 2023-04-14 ENCOUNTER — ANESTHESIA (OUTPATIENT)
Dept: SURGERY | Facility: AMBULATORY SURGERY CENTER | Age: 78
End: 2023-04-14
Payer: COMMERCIAL

## 2023-04-14 ENCOUNTER — HOSPITAL ENCOUNTER (OUTPATIENT)
Facility: AMBULATORY SURGERY CENTER | Age: 78
Discharge: HOME | End: 2023-04-14
Attending: STUDENT IN AN ORGANIZED HEALTH CARE EDUCATION/TRAINING PROGRAM
Payer: COMMERCIAL

## 2023-04-14 VITALS
OXYGEN SATURATION: 96 % | SYSTOLIC BLOOD PRESSURE: 131 MMHG | HEART RATE: 80 BPM | RESPIRATION RATE: 12 BRPM | WEIGHT: 315 LBS | HEIGHT: 72 IN | BODY MASS INDEX: 42.66 KG/M2 | DIASTOLIC BLOOD PRESSURE: 65 MMHG | TEMPERATURE: 97.2 F

## 2023-04-14 DIAGNOSIS — N32.0 BLADDER NECK OBSTRUCTION: Primary | ICD-10-CM

## 2023-04-14 LAB
GLUCOSE BLDC GLUCOMTR-MCNC: 141 MG/DL (ref 70–99)
GLUCOSE BLDC GLUCOMTR-MCNC: 93 MG/DL (ref 70–99)

## 2023-04-14 PROCEDURE — 52276 CYSTOSCOPY AND TREATMENT: CPT

## 2023-04-14 PROCEDURE — 52283 CYSTOSCOPY AND TREATMENT: CPT | Performed by: UROLOGY

## 2023-04-14 PROCEDURE — 52276 CYSTOSCOPY AND TREATMENT: CPT | Performed by: UROLOGY

## 2023-04-14 PROCEDURE — 52283 CYSTOSCOPY AND TREATMENT: CPT

## 2023-04-14 PROCEDURE — 82962 GLUCOSE BLOOD TEST: CPT | Performed by: PATHOLOGY

## 2023-04-14 RX ORDER — KETOROLAC TROMETHAMINE 30 MG/ML
INJECTION, SOLUTION INTRAMUSCULAR; INTRAVENOUS PRN
Status: DISCONTINUED | OUTPATIENT
Start: 2023-04-14 | End: 2023-04-14

## 2023-04-14 RX ORDER — SODIUM CHLORIDE, SODIUM LACTATE, POTASSIUM CHLORIDE, CALCIUM CHLORIDE 600; 310; 30; 20 MG/100ML; MG/100ML; MG/100ML; MG/100ML
INJECTION, SOLUTION INTRAVENOUS CONTINUOUS
Status: DISCONTINUED | OUTPATIENT
Start: 2023-04-14 | End: 2023-04-14 | Stop reason: HOSPADM

## 2023-04-14 RX ORDER — ONDANSETRON 4 MG/1
4 TABLET, ORALLY DISINTEGRATING ORAL EVERY 30 MIN PRN
Status: DISCONTINUED | OUTPATIENT
Start: 2023-04-14 | End: 2023-04-14 | Stop reason: HOSPADM

## 2023-04-14 RX ORDER — HYDROMORPHONE HYDROCHLORIDE 1 MG/ML
0.2 INJECTION, SOLUTION INTRAMUSCULAR; INTRAVENOUS; SUBCUTANEOUS EVERY 5 MIN PRN
Status: DISCONTINUED | OUTPATIENT
Start: 2023-04-14 | End: 2023-04-14 | Stop reason: HOSPADM

## 2023-04-14 RX ORDER — PROPOFOL 10 MG/ML
INJECTION, EMULSION INTRAVENOUS CONTINUOUS PRN
Status: DISCONTINUED | OUTPATIENT
Start: 2023-04-14 | End: 2023-04-14

## 2023-04-14 RX ORDER — FENTANYL CITRATE 50 UG/ML
25 INJECTION, SOLUTION INTRAMUSCULAR; INTRAVENOUS EVERY 5 MIN PRN
Status: DISCONTINUED | OUTPATIENT
Start: 2023-04-14 | End: 2023-04-14 | Stop reason: HOSPADM

## 2023-04-14 RX ORDER — GLYCOPYRROLATE 0.2 MG/ML
INJECTION, SOLUTION INTRAMUSCULAR; INTRAVENOUS PRN
Status: DISCONTINUED | OUTPATIENT
Start: 2023-04-14 | End: 2023-04-14

## 2023-04-14 RX ORDER — ACETAMINOPHEN 325 MG/1
975 TABLET ORAL ONCE
Status: COMPLETED | OUTPATIENT
Start: 2023-04-14 | End: 2023-04-14

## 2023-04-14 RX ORDER — HYDROMORPHONE HYDROCHLORIDE 1 MG/ML
0.4 INJECTION, SOLUTION INTRAMUSCULAR; INTRAVENOUS; SUBCUTANEOUS EVERY 5 MIN PRN
Status: DISCONTINUED | OUTPATIENT
Start: 2023-04-14 | End: 2023-04-14 | Stop reason: HOSPADM

## 2023-04-14 RX ORDER — LIDOCAINE HYDROCHLORIDE 20 MG/ML
INJECTION, SOLUTION INFILTRATION; PERINEURAL PRN
Status: DISCONTINUED | OUTPATIENT
Start: 2023-04-14 | End: 2023-04-14

## 2023-04-14 RX ORDER — PROPOFOL 10 MG/ML
INJECTION, EMULSION INTRAVENOUS PRN
Status: DISCONTINUED | OUTPATIENT
Start: 2023-04-14 | End: 2023-04-14

## 2023-04-14 RX ORDER — FENTANYL CITRATE 50 UG/ML
INJECTION, SOLUTION INTRAMUSCULAR; INTRAVENOUS PRN
Status: DISCONTINUED | OUTPATIENT
Start: 2023-04-14 | End: 2023-04-14

## 2023-04-14 RX ORDER — LIDOCAINE 40 MG/G
CREAM TOPICAL
Status: DISCONTINUED | OUTPATIENT
Start: 2023-04-14 | End: 2023-04-14 | Stop reason: HOSPADM

## 2023-04-14 RX ORDER — ONDANSETRON 2 MG/ML
4 INJECTION INTRAMUSCULAR; INTRAVENOUS EVERY 30 MIN PRN
Status: DISCONTINUED | OUTPATIENT
Start: 2023-04-14 | End: 2023-04-14 | Stop reason: HOSPADM

## 2023-04-14 RX ORDER — FENTANYL CITRATE 50 UG/ML
50 INJECTION, SOLUTION INTRAMUSCULAR; INTRAVENOUS EVERY 5 MIN PRN
Status: DISCONTINUED | OUTPATIENT
Start: 2023-04-14 | End: 2023-04-14 | Stop reason: HOSPADM

## 2023-04-14 RX ADMIN — ACETAMINOPHEN 975 MG: 325 TABLET ORAL at 10:18

## 2023-04-14 RX ADMIN — PROPOFOL 200 MG: 10 INJECTION, EMULSION INTRAVENOUS at 11:58

## 2023-04-14 RX ADMIN — PROPOFOL 100 MCG/KG/MIN: 10 INJECTION, EMULSION INTRAVENOUS at 11:59

## 2023-04-14 RX ADMIN — FENTANYL CITRATE 100 MCG: 50 INJECTION, SOLUTION INTRAMUSCULAR; INTRAVENOUS at 11:59

## 2023-04-14 RX ADMIN — SODIUM CHLORIDE, SODIUM LACTATE, POTASSIUM CHLORIDE, CALCIUM CHLORIDE: 600; 310; 30; 20 INJECTION, SOLUTION INTRAVENOUS at 10:27

## 2023-04-14 RX ADMIN — GLYCOPYRROLATE 0.2 MG: 0.2 INJECTION, SOLUTION INTRAMUSCULAR; INTRAVENOUS at 11:45

## 2023-04-14 RX ADMIN — KETOROLAC TROMETHAMINE 30 MG: 30 INJECTION, SOLUTION INTRAMUSCULAR; INTRAVENOUS at 12:29

## 2023-04-14 RX ADMIN — LIDOCAINE HYDROCHLORIDE 100 MG: 20 INJECTION, SOLUTION INFILTRATION; PERINEURAL at 11:58

## 2023-04-14 ASSESSMENT — ENCOUNTER SYMPTOMS: ORTHOPNEA: 1

## 2023-04-14 NOTE — ANESTHESIA CARE TRANSFER NOTE
Patient: Nirav Baker    Procedure: Procedure(s):  CYSTOSCOPY, WITH MULTIPLE INCISIONS OF BLADDER NECK. Injection of Mitomycin to stricture       Diagnosis: Bladder neck obstruction [N32.0]  Diagnosis Additional Information: No value filed.    Anesthesia Type:   General     Note:    Oropharynx: oropharynx clear of all foreign objects and spontaneously breathing  Level of Consciousness: awake  Oxygen Supplementation: face mask    Independent Airway: airway patency satisfactory and stable  Dentition: dentition unchanged  Vital Signs Stable: post-procedure vital signs reviewed and stable  Report to RN Given: handoff report given  Patient transferred to: PACU    Handoff Report: Identifed the Patient, Identified the Reponsible Provider, Reviewed the pertinent medical history, Discussed the surgical course, Reviewed Intra-OP anesthesia mangement and issues during anesthesia, Set expectations for post-procedure period and Allowed opportunity for questions and acknowledgement of understanding      Vitals:  Vitals Value Taken Time   /64 04/14/23 1248   Temp 36.1  C (97  F) 04/14/23 1248   Pulse 74 04/14/23 1249   Resp 16 04/14/23 1249   SpO2 97 % 04/14/23 1249   Vitals shown include unvalidated device data.    Electronically Signed By: TIFFANY Wiggins CRNA  April 14, 2023  12:50 PM

## 2023-04-14 NOTE — OR NURSING
Patient's urine was clear and removed in Phase 2 recovery per Dr Figueroa's discharge instruction. Patient discharged and understands reasons to follow up.  Hadley Sparks RN

## 2023-04-14 NOTE — ANESTHESIA POSTPROCEDURE EVALUATION
Patient: Nirav Baker    Procedure: Procedure(s):  CYSTOSCOPY, WITH MULTIPLE INCISIONS OF BLADDER NECK. Injection of Mitomycin to stricture       Anesthesia Type:  General    Note:  Disposition: Outpatient   Postop Pain Control: Uneventful            Sign Out: Well controlled pain   PONV: No   Neuro/Psych: Uneventful            Sign Out: Acceptable/Baseline neuro status   Airway/Respiratory: Uneventful            Sign Out: Acceptable/Baseline resp. status   CV/Hemodynamics: Uneventful            Sign Out: Acceptable CV status; No obvious hypovolemia; No obvious fluid overload   Other NRE: NONE   DID A NON-ROUTINE EVENT OCCUR? No           Last vitals:  Vitals Value Taken Time   /63 04/14/23 1307   Temp 36.3  C (97.4  F) 04/14/23 1305   Pulse 74 04/14/23 1307   Resp 7 04/14/23 1306   SpO2 95 % 04/14/23 1307   Vitals shown include unvalidated device data.    Electronically Signed By: Harsh Mendieta MD, MD  April 14, 2023  2:33 PM

## 2023-04-14 NOTE — ANESTHESIA PREPROCEDURE EVALUATION
"Anesthesia Pre-Procedure Evaluation    Patient: Niarv Baker   MRN: [id number] : 1945        Procedure : Procedure(s):  CYSTOSCOPY, WITH MULTIPLE INCISIONS OF BLADDER NECK          Past Medical History:   Diagnosis Date     Claustrophobia      Diabetes (H)      Hiatal hernia      Hypertension      Motion sickness      Orthopnea      Personal history of fall     Twice     Sleep apnea      Walking troubles       Past Surgical History:   Procedure Laterality Date     APPENDECTOMY       BACK SURGERY      C5     CHOLECYSTECTOMY       COLONOSCOPY       COMBINED CYSTOSCOPY, INSERT STENT URETER(S) Left 2022    Procedure: CYSTOURETEROSCOPY, WITH BLADDER NECK DILATION WITH BLADDER NECK CONTRACTURE,  BLADDER CALCULUS REMOVAL;  Surgeon: Harvey Madden MD;  Location: [facility] OR     DENTAL SURGERY       HEMORRHOIDECTOMY       HERNIA REPAIR       PROSTATECTOMY        Allergies   Allergen Reactions     Amoxicillin Hives     Per Dr. Barrientos, patient has tolerated Keflex.     Atorvastatin Diarrhea     Benzalkonium Chloride Other (See Comments)     Fenofibrate Muscle Pain (Myalgia)     Gemfibrozil      Hydrocodone-Acetaminophen Other (See Comments)     Patient reports \"going white as a sheet\" and cold sweats   OK with Tylenol #3 No problems     Lisinopril Cough     Neosporin (Faf-[id number]) [Triple Antibiotic] Unknown     Penicillins Hives      had pcn and had hives. Pt. States allergic to all cillin's       Pravastatin Sodium [Pravastatin] Muscle Pain (Myalgia)     Trulicity [Dulaglutide] Unknown     Gluten [Gluten Meal] Other (See Comments)     Reports feeling worse with gluten; tested below threshold for Celiac but daughter is positive     Insulin Detemir Rash      Social History     Tobacco Use     Smoking status: Never     Passive exposure: Past     Smokeless tobacco: Never   Vaping Use     Vaping status: Not on file   Substance Use Topics     Alcohol use: Yes     Alcohol/week: 1.0 standard drink of " alcohol     Types: 1 Standard drinks or equivalent per week     Comment: very occasional      Wt Readings from Last 1 Encounters:   04/14/23 149.7 kg (330 lb)        Anesthesia Evaluation            ROS/MED HX  ENT/Pulmonary:     (+) sleep apnea,     Neurologic:  - neg neurologic ROS     Cardiovascular:     (+) hypertension--CAD ---orthopnea/PND,     METS/Exercise Tolerance:     Hematologic:  - neg hematologic  ROS     Musculoskeletal:  - neg musculoskeletal ROS     GI/Hepatic:     (+) GERD, hiatal hernia,     Renal/Genitourinary:     (+) renal disease, type: CRI,     Endo:     (+) type II DM, Not using insulin, - not using insulin pump. not previously admitted for DM/DKA. Obesity,     Psychiatric/Substance Use:  - neg psychiatric ROS     Infectious Disease:       Malignancy:       Other:               OUTSIDE LABS:  CBC:   Lab Results   Component Value Date    WBC 10.5 08/24/2022    WBC 10.4 02/19/2022    HGB 15.4 08/24/2022    HGB 15.8 02/19/2022    HCT 44.0 08/24/2022    HCT 45.5 02/19/2022     08/24/2022     02/19/2022     BMP:   Lab Results   Component Value Date     08/24/2022     03/30/2022    POTASSIUM 4.8 08/24/2022    POTASSIUM 3.9 03/30/2022    CHLORIDE 106 08/24/2022    CHLORIDE 104 03/30/2022    CO2 27 08/24/2022    CO2 27 03/30/2022    BUN 37 (H) 08/24/2022    BUN 24 03/30/2022    CR 2.28 (H) 08/24/2022    CR 1.49 (H) 03/30/2022     (A) 09/09/2022     (H) 08/24/2022     COAGS:   Lab Results   Component Value Date    INR 1.03 02/19/2022     POC: No results found for: BGM, HCG, HCGS  HEPATIC:   Lab Results   Component Value Date    ALBUMIN 3.4 (L) 08/24/2022    PROTTOTAL 7.5 08/24/2022    ALT 28 08/24/2022    AST 33 08/24/2022    ALKPHOS 76 08/24/2022    BILITOTAL 1.1 (H) 08/24/2022     OTHER:   Lab Results   Component Value Date    LACT 1.4 06/11/2018    A1C 6.5 (H) 09/08/2012    AURORA 10.0 08/24/2022    PHOS 3.5 03/30/2022    MAG 2.0 07/03/2018    LIPASE 19  02/19/2022    CRP 2.8 (H) 08/24/2022    SED 19 (H) 07/02/2018       Anesthesia Plan    ASA Status:  3      Anesthesia Type: General.     - Airway: LMA              Consents    Anesthesia Plan(s) and associated risks, benefits, and realistic alternatives discussed. Questions answered and patient/representative(s) expressed understanding.     - Discussed: Risks, Benefits and Alternatives for BOTH SEDATION and the PROCEDURE were discussed     - Discussed with:  Patient      - Extended Intubation/Ventilatory Support Discussed: No.      - Patient is DNR/DNI Status: No    Use of blood products discussed: No .     Postoperative Care    Pain management: IV analgesics, Oral pain medications.   PONV prophylaxis: Ondansetron (or other 5HT-3), Dexamethasone or Solumedrol     Comments:           H&P reviewed: Unable to attach H&P to encounter due to EHR limitations. H&P Update: appropriate H&P reviewed, patient examined. No interval changes since H&P (within 30 days).         Harsh Mendieta MD, MD

## 2023-04-14 NOTE — OP NOTE
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  Transurethral incision of bladder contracture, otherwise known as vesicourethral anastomotic stenosis  2.  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  This has been recurrent despite 2 previous dilations.. He understands the risks to include but not be limited to bleeding, infection, the need for additional procedures and recurrence of primary disease. He wishes to proceed.    Description of Procedure: After informed consent and pre-operative antibiotics were given the patient was taken to the operating room where general anesthesia was delivered. He was prepped and draped in the lithotomy position with all pressure points well-padded.   A 22 [place] cystoscope was inserted through a well-lubricated urethra into the bladder.  This was able to traverse the area of narrowing without difficulty.  Of note he came in with a large bore Campos catheter in the urethra.      The interior of the bladder looked rather normal with just a small amount of edema on the floor.  The ureteral orifice ease were well away from the bladder neck.      A 26 [place] resectoscope was then inserted through a well-lubricated urethra and met with no obstruction until we got to the bladder neck where there was an approximately 22 or 24 [place] narrowing.  The An knife was then used to incise the bladder neck until the tissue appeared to be normal bladder neck fibers.  This incision was carried down through the upper portion of the membranous urethra for a total distance of about 1.5 cm from the bladder neck.  This was done at the left lateral and right lateral aspects of the bladder neck.  The resectoscope  was removed.    The Osullivan injection scope was then inserted and the needle was used to inject 2 mg of Mitomycin-C evenly distributed between the 2 bladder neck cuts.  Injecting into some of the areas was very difficult which I assume represents persistent scar tissue.  Therefore the needle was redirected to other areas that were easier to inject into.  A 20 Kinyarwanda urethral catheter was then inserted at the end of the procedure and the effluent urine was clear.    Plan will be for the catheter to come out to day or Monday depending on if the urine is clear today.  We will then scope him in the near future in the office and decide on whether this was successful or whether he will need to do self intermittent dilation      Estimated blood loss: None  Specimens to pathology: None  Wound class: 2    As the attending surgeon I was present and scrubbed throughout the procedure

## 2023-04-14 NOTE — INTERVAL H&P NOTE
I have reviewed the surgical (or preoperative) H&P that is linked to this encounter, and examined the patient. There are no significant changes    Clinical Conditions Present on Arrival:  Clinically Significant Risk Factors Present on Admission                  # Severe Obesity: Estimated body mass index is 44.76 kg/m  as calculated from the following:    Height as of this encounter: 1.829 m (6').    Weight as of this encounter: 149.7 kg (330 lb).

## 2023-04-14 NOTE — DISCHARGE INSTRUCTIONS
[facility]  Home Care Following Anesthesia  For 24 hours after surgery:  Get plenty of rest.  A responsible adult must stay with you for at least 24 hours after you leave the surgery center.  Do not drive or use heavy equipment.  If you have weakness or tingling, don't drive or use heavy equipment until this feeling goes away.   Do not drink alcohol.   Avoid strenuous or risky activities.  Ask for help when climbing stairs.  You may feel lightheaded.  IF so, sit for a few minutes before standing.  Have someone help you get up.   If you have nausea (feel sick to your stomach): Drink only clear liquids such as apple juice, ginger ale, broth or 7-Up.  Rest may also help.  Be sure to drink enough fluids.  Move to a regular diet as you feel able.   You may have a slight fever.  Call the doctor if your fever is over 100 F (37.7 C) (taken under the tongue) or lasts longer than 24 hours.  You may have a dry mouth, a sore throat, muscle aches or trouble sleeping. These should go away after 24 hours.  Do not make important or legal decisions.   It is recommended to avoid smoking.               Tips for taking pain medications  To get the best pain relief possible, remember these points:  Take pain medications as directed, before pain becomes severe.  Pain medication can upset your stomach: taking it with food may help.  Constipation is a common side effect of pain medication. Drink plenty of  fluids.  Eat foods high in fiber. Take a stool softener if recommended by your doctor or pharmacist.  Do not drink alcohol, drive or operate machinery while taking pain medications.  Ask about other ways to control pain, such as with heat, ice or relaxation.    Tylenol/Acetaminophen Consumption  To help encourage the safe use of acetaminophen, the makers of TYLENOL  have lowered the maximum daily dose for single-ingredient Extra Strength TYLENOL  (acetaminophen) products sold in the [place] from 8 pills  per day (4,000 mg) to 6 pills per day (3,000 mg). The dosing interval has also changed from 2 pills every 4-6 hours to 2 pills every 6 hours.  If you feel your pain relief is insufficient, you may take Tylenol/Acetaminophen in addition to your narcotic pain medication.   Be careful not to exceed 3,000 mg of Tylenol/Acetaminophen in a 24 hour period from all sources.  If you are taking extra strength Tylenol/acetaminophen (500 mg), the maximum dose is 6 tablets in 24 hours.  If you are taking regular strength acetaminophen (325 mg), the maximum dose is 9 tablets in 24 hours.  You took 975 mg of tylenol at 10:20 am. You can take additional tylenol after 4:20 pm.    Call a doctor for any of the following:  Signs of infection (fever, growing tenderness at the surgery site, a large amount of drainage or bleeding, severe pain, foul-smelling drainage, redness, swelling).  It has been over 8 to 10 hours since surgery and you are still not able to urinate (pass water).  Headache for over 24 hours.  Signs of Covid-19 infection (temperature over 100 degrees, shortness of breath, cough, loss of taste/smell, generalized body aches, persistent headache, chills, sore throat, nausea/vomiting/diarrhea)  Your doctor is:  Dr. Gunner Figueroa, Prostate and Urology: [phone]                    Or dial [phone] and ask for the resident on call for:  Prostate Urology  For emergency care, call the:  [place] [facility]:  [phone] (TTY for hearing impaired: [phone])

## 2023-05-04 ENCOUNTER — PRE VISIT (OUTPATIENT)
Dept: UROLOGY | Facility: CLINIC | Age: 78
End: 2023-05-04
Payer: COMMERCIAL

## 2023-05-04 NOTE — TELEPHONE ENCOUNTER
Reason for visit: cystoscopy      Relevant information: Transurethral incision of bladder contracture, Injection of Mitomycin-C into vesicourethral anastomotic stenosis incisions     Records/imaging/labs/orders: all records available    Pt called: No need for a call    At Rooming: jules Bates CMA  5/4/2023  9:00 AM

## 2023-05-15 ENCOUNTER — OFFICE VISIT (OUTPATIENT)
Dept: UROLOGY | Facility: CLINIC | Age: 78
End: 2023-05-15
Payer: COMMERCIAL

## 2023-05-15 VITALS — HEART RATE: 74 BPM | SYSTOLIC BLOOD PRESSURE: 154 MMHG | DIASTOLIC BLOOD PRESSURE: 69 MMHG

## 2023-05-15 DIAGNOSIS — N32.0 BLADDER NECK CONTRACTURE: Primary | ICD-10-CM

## 2023-05-15 PROCEDURE — 52000 CYSTOURETHROSCOPY: CPT | Performed by: UROLOGY

## 2023-05-15 NOTE — PATIENT INSTRUCTIONS
"Schedule a cystoscopy with Dr. Pang in six months to assess the health of your urethra.      It was a pleasure meeting with you today.  Thank you for allowing me and my team the privilege of caring for you today.  YOU are the reason we are here, and I truly hope we provided you with the excellent service you deserve.  Please let us know if there is anything else we can do for you so that we can be sure you are leaving completely satisfied with your care experience.        AFTER YOUR CYSTOSCOPY        You have just completed a cystoscopy, or \"cysto\", which allowed your physician to learn more about your bladder (or to remove a stent placed after surgery). We suggest that you continue to avoid caffeine, fruit juice, and alcohol for the next 24 hours, however, you are encouraged to return to your normal activities.         A few things that are considered normal after your cystoscopy:     * Small amount of bleeding (or spotting) that clears within the next 24 hours     * Slight burning sensation with urination     * Sensation to of needing to avoid more frequently     * The feeling of \"air\" in your urine     * Mild discomfort that is relieved with Tylenol        Please contact our office promptly if you:     * Develop a fever above 101 degrees     * Are unable to urinate     * Develop bright red blood that does not stop     * Severe pain or swelling         Please contact our office with any concerns or questions @[email or web address].  "

## 2023-05-15 NOTE — LETTER
5/15/2023       RE: Nirav Baker  [address] [place] [address]     Dear Colleague,    Thank you for referring your patient, Nirav Baker, to the [facility] [place] at [facility]. Please see a copy of my visit note below.      Male Cystoscopy Procedure Note     PRE-PROCEDURE DIAGNOSIS: vesicourethral anastomotic stenosis (VUAS)  POST-PROCEDURE DIAGNOSIS: no current VUAS. Normal cysto  PROCEDURE: cystoscopy    HISTORY: Nirav Baker is a 78 year old man with VUAS after RP, no XRT.   4/14/23: TUI-VUAS and injection mitomycin C.    REVIEW OF OFFICE STUDIES: none       DESCRIPTION OF PROCEDURE:  After informed consent was obtained, the patient was brought to the procedure room where he was placed in the supine position with all pressure points well padded.  The penis and scrotum were prepped and draped in a sterile fashion. A flexible cystoscope was introduced through a well-lubricated urethra.  The anterior urethra was free of stricture. The urinary sphincter was intact. The prostate demonstrated removal. Bladder neck was open to 30F. Bladder was free of diverticuli, cellules, trabeculation, tumors or stones.The flexible cystoscope was removed and the findings were described to the patient.   ASSESSMENT AND PLAN:  78 year old man with excellent outcome after incision of VUAS and injection with Mitomycin C. Plan surveillance cysto in 6 months.         Gunner Pang MD

## 2023-05-15 NOTE — PROGRESS NOTES
Male Cystoscopy Procedure Note     PRE-PROCEDURE DIAGNOSIS: vesicourethral anastomotic stenosis (VUAS)  POST-PROCEDURE DIAGNOSIS: no current VUAS. Normal cysto  PROCEDURE: cystoscopy    HISTORY: Nirav Baker is a 78 year old man with VUAS after RP, no XRT.   4/14/23: TUI-VUAS and injection mitomycin C.    REVIEW OF OFFICE STUDIES: none       DESCRIPTION OF PROCEDURE:  After informed consent was obtained, the patient was brought to the procedure room where he was placed in the supine position with all pressure points well padded.  The penis and scrotum were prepped and draped in a sterile fashion. A flexible cystoscope was introduced through a well-lubricated urethra.  The anterior urethra was free of stricture. The urinary sphincter was intact. The prostate demonstrated removal. Bladder neck was open to 30F. Bladder was free of diverticuli, cellules, trabeculation, tumors or stones.The flexible cystoscope was removed and the findings were described to the patient.   ASSESSMENT AND PLAN:  78 year old man with excellent outcome after incision of VUAS and injection with Mitomycin C. Plan surveillance cysto in 6 months.

## 2023-11-07 ENCOUNTER — PRE VISIT (OUTPATIENT)
Dept: UROLOGY | Facility: CLINIC | Age: 78
End: 2023-11-07
Payer: COMMERCIAL

## 2023-11-07 NOTE — TELEPHONE ENCOUNTER
Reason for visit: cystoscopy        Relevant information: Transurethral incision of bladder contracture, Injection of Mitomycin-C into vesicourethral anastomotic stenosis incisions      Records/imaging/labs/orders: all records available     Pt called: No need for a call     At Rooming: jules Bates CMA  11/7/2023  10:26 AM

## 2023-11-13 ENCOUNTER — OFFICE VISIT (OUTPATIENT)
Dept: UROLOGY | Facility: CLINIC | Age: 78
End: 2023-11-13
Payer: COMMERCIAL

## 2023-11-13 VITALS
BODY MASS INDEX: 42.66 KG/M2 | HEART RATE: 75 BPM | OXYGEN SATURATION: 99 % | SYSTOLIC BLOOD PRESSURE: 144 MMHG | WEIGHT: 315 LBS | HEIGHT: 72 IN | DIASTOLIC BLOOD PRESSURE: 74 MMHG

## 2023-11-13 DIAGNOSIS — N32.0 BLADDER NECK OBSTRUCTION: Primary | ICD-10-CM

## 2023-11-13 PROCEDURE — 52000 CYSTOURETHROSCOPY: CPT | Performed by: UROLOGY

## 2023-11-13 ASSESSMENT — PAIN SCALES - GENERAL: PAINLEVEL: NO PAIN (0)

## 2023-11-13 NOTE — LETTER
11/13/2023       RE: Nirav Baker  [address] [place] [address]     Dear Colleague,    Thank you for referring your patient, Nirav Baker, to the [facility] [place] at [facility]. Please see a copy of my visit note below.      Male Cystoscopy Procedure Note      PRE-PROCEDURE DIAGNOSIS: vesicourethral anastomotic stenosis (VUAS)  POST-PROCEDURE DIAGNOSIS: no current VUAS. Normal cysto  PROCEDURE: cystoscopy     HISTORY: Nirav Baker is a 78 year old man with VUAS after RP, no XRT.   4/14/23: TUI-VUAS and injection mitomycin C  5/15/23: normal cysto (30F VUA)     REVIEW OF OFFICE STUDIES: none        DESCRIPTION OF PROCEDURE:  After informed consent was obtained, the patient was brought to the procedure room where he was placed in the supine position with all pressure points well padded.  The penis and scrotum were prepped and draped in a sterile fashion. A flexible cystoscope was introduced through a well-lubricated urethra.  The anterior urethra was free of stricture. The urinary sphincter was intact. The prostate demonstrated removal. Bladder neck was open to 30F. Bladder was free of diverticuli, cellules, trabeculation, tumors or stones.The flexible cystoscope was removed and the findings were described to the patient.   ASSESSMENT AND PLAN:  78 year old man with excellent outcome after incision of VUAS and injection with Mitomycin C.     F/U christal Pang MD

## 2023-11-13 NOTE — PROGRESS NOTES
Male Cystoscopy Procedure Note      PRE-PROCEDURE DIAGNOSIS: vesicourethral anastomotic stenosis (VUAS)  POST-PROCEDURE DIAGNOSIS: no current VUAS. Normal cysto  PROCEDURE: cystoscopy     HISTORY: Nirav Baker is a 78 year old man with VUAS after RP, no XRT.   4/14/23: TUI-VUAS and injection mitomycin C  5/15/23: normal cysto (30F VUA)     REVIEW OF OFFICE STUDIES: none        DESCRIPTION OF PROCEDURE:  After informed consent was obtained, the patient was brought to the procedure room where he was placed in the supine position with all pressure points well padded.  The penis and scrotum were prepped and draped in a sterile fashion. A flexible cystoscope was introduced through a well-lubricated urethra.  The anterior urethra was free of stricture. The urinary sphincter was intact. The prostate demonstrated removal. Bladder neck was open to 30F. Bladder was free of diverticuli, cellules, trabeculation, tumors or stones.The flexible cystoscope was removed and the findings were described to the patient.   ASSESSMENT AND PLAN:  78 year old man with excellent outcome after incision of VUAS and injection with Mitomycin C.     F/U prn

## 2023-11-13 NOTE — NURSING NOTE
What to expect after the procedure reviewed with patient: yes      Invasive Procedure Safety Checklist:    Procedure: Cystoscopy     Action: Complete sections and checkboxes as appropriate.    Pre-procedure:  1. Patient ID Verified with 2 identifiers (Leatha and  or MRN) : YES    2. Procedure and site verified with patient/designee (when able) : YES    3. Accurate consent documentation in medical record : YES    4. H&P (or appropriate assessment) documented in medical record : YES  H&P must be up to 30 days prior to procedure an updated within 24 hours of                 Procedure as applicable.     5. Relevant diagnostic and radiology test results appropriately labeled and displayed as applicable : YES    6. Blood products, implants, devices, and/or special equipment available for the procedure as applicable : YES    7. Procedure site(s) marked with provider initials [Exclusions: None] : NO    8. Marking not required. Reason : Yes  Procedure does not require site marking    Time Out:     Time-Out performed immediately prior to starting procedure, including verbal and active participation of all team members addressing: YES    1. Correct patient identity.  2. Confirmed that the correct side and site are marked.  3. An accurate procedure to be done.  4. Agreement on the procedure to be done.  5. Correct patient position.  6. Relevant images and results are properly labeled and appropriately displayed.  7. The need to administer antibiotics or fluids for irrigation purposes during the procedure as applicable.  8. Safety precautions based on patient history or medication use.    During Procedure: Verification of correct person, site, and procedure occurs any time the responsibility for care of the patient is transferred to another member of the care team.    No medications administered during this procedure.    Carmita Bates CMA  2023

## 2023-11-13 NOTE — NURSING NOTE
"Chief Complaint   Patient presents with    Follow Up     Cystoscopy        Blood pressure (!) 144/74, pulse 75, height 1.829 m (6'), weight (!) 151 kg (333 lb), SpO2 99%. Body mass index is 45.16 kg/m .    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)    Cellulitis of right leg    Cardiovascular disease    Dyslipidemia    Benign essential HTN    Actinic keratosis    Dermatographic urticaria    Adverse effect of antihyperlipidemic and antiarteriosclerotic drugs, subsequent encounter    Benign neoplasm of colon    Bilateral hip pain    Chronic bilateral low back pain without sciatica    Chronic right-sided thoracic back pain    Diarrhea    Elevated prostate specific antigen (PSA)    Hernia of abdominal wall    Hypovitaminosis D    Incisional hernia    Retention of urine    Sepsis due to Escherichia coli (H)    Stool fat increased    Uncontrolled daytime somnolence    Calculus of ureter    Calculus of kidney    Bladder neck obstruction       Allergies   Allergen Reactions    Amoxicillin Hives     Per Dr. Barrientos, patient has tolerated Keflex.    Atorvastatin Diarrhea    Benzalkonium Chloride Other (See Comments)    Fenofibrate Muscle Pain (Myalgia)    Gemfibrozil     Hydrocodone-Acetaminophen Other (See Comments)     Patient reports \"going white as a sheet\" and cold sweats   OK with Tylenol #3 No problems    Lisinopril Cough    Neosporin (Gcj-Zei-Regik) [Neomycin-Bacitracin Zn-Polymyx] Unknown    " Penicillins Hives     1992 had pcn and had hives. Pt. States allergic to all cillin's      Pravastatin Sodium [Pravastatin] Muscle Pain (Myalgia)    Trulicity [Dulaglutide] Unknown    Gluten [Gluten Meal] Other (See Comments)     Reports feeling worse with gluten; tested below threshold for Celiac but daughter is positive    Insulin Detemir Rash       Current Outpatient Medications   Medication Sig Dispense Refill    acetaminophen (TYLENOL) 325 MG tablet [ACETAMINOPHEN (TYLENOL) 325 MG TABLET] Take 2 tablets by mouth every 4 (four) hours as needed.      aspirin 81 MG EC tablet Take 81 mg by mouth every evening      aspirin-acetaminophen-caffeine (EXCEDRIN MIGRAINE) 250-250-65 mg per tablet Take 2 tablets by mouth At Bedtime      calcium carbonate (TUMS ULTRA) 400 mg calcium (1,000 mg) Chew Take 2 tablets by mouth as needed      chlorthalidone (HYGROTEN) 25 MG tablet [CHLORTHALIDONE (HYGROTEN) 25 MG TABLET] Take 12.5 mg by mouth at bedtime.       cholecalciferol, vitamin D3, 5,000 unit capsule [CHOLECALCIFEROL, VITAMIN D3, 5,000 UNIT CAPSULE] Take 5,000 Units by mouth at bedtime.       diphenhydrAMINE (BENADRYL) 25 mg tablet [DIPHENHYDRAMINE (BENADRYL) 25 MG TABLET] Take 25 mg by mouth at bedtime as needed for itching.      ezetimibe (ZETIA) 10 mg tablet [EZETIMIBE (ZETIA) 10 MG TABLET] Take 1 tablet by mouth at bedtime.       glipiZIDE (GLUCOTROL XL) 10 MG 24 hr tablet [GLIPIZIDE (GLUCOTROL XL) 10 MG 24 HR TABLET] Take 1 tablet (10 mg total) by mouth daily. (Patient taking differently: Take 20 mg by mouth every evening) 30 tablet 0    insulin lispro (HUMALOG KWIKPEN) 100 UNIT/ML (1 unit dial) KWIKPEN 20-30 Units 3 times daily (before meals) 20 units breakfast, 20 units lunch and 30 units dinner      irbesartan (AVAPRO) 300 MG tablet [IRBESARTAN (AVAPRO) 300 MG TABLET] Take 300 mg by mouth at bedtime.      LANTUS U-100 INSULIN 100 unit/mL injection [LANTUS U-100 INSULIN 100 UNIT/ML INJECTION] Inject 60 Units under  the skin 2 (two) times a day. (Patient taking differently: Inject 54-76 Units Subcutaneous 2 times daily 54 units AM   76 units PM) 10 mL PRN    magnesium 250 mg Tab [MAGNESIUM 250 MG TAB] Take 500 mg by mouth at bedtime.       melatonin 3 MG CAPS Take 6 mg by mouth At Bedtime      metFORMIN (GLUCOPHAGE) 1000 MG tablet Take 1 tablet by mouth daily (with dinner)      multivitamin (ONE A DAY) per tablet [MULTIVITAMIN (ONE A DAY) PER TABLET] Take 1 tablet by mouth at bedtime.       omega-3/dha/epa/fish oil (FISH OIL-OMEGA-3 FATTY ACIDS) 300-1,000 mg capsule [OMEGA-3/DHA/EPA/FISH OIL (FISH OIL-OMEGA-3 FATTY ACIDS) 300-1,000 MG CAPSULE] Take 6 g by mouth at bedtime.      omeprazole (PRILOSEC) 20 MG capsule Take 1 capsule by mouth 2 times daily      polyethylene glycol (MIRALAX) 17 gram packet [POLYETHYLENE GLYCOL (MIRALAX) 17 GRAM PACKET] Take 17 g by mouth at bedtime.       pseudoephedrine (SUDAFED) 30 MG tablet [PSEUDOEPHEDRINE (SUDAFED) 30 MG TABLET] Take 1 tablet by mouth every 12 (twelve) hours as needed.      riboflavin, vitamin B2, 100 mg Tab [RIBOFLAVIN, VITAMIN B2, 100 MG TAB] Take 1 tablet by mouth at bedtime.       TRADJENTA 5 mg Tab [TRADJENTA 5 MG TAB] Take 1 tablet by mouth at bedtime.          Social History     Tobacco Use    Smoking status: Never     Passive exposure: Past    Smokeless tobacco: Never   Substance Use Topics    Alcohol use: Yes     Alcohol/week: 1.0 standard drink of alcohol     Types: 1 Standard drinks or equivalent per week     Comment: very occasional    Drug use: No       Juan Sosa  11/13/2023  1:26 PM

## 2024-04-16 ENCOUNTER — HOSPITAL ENCOUNTER (INPATIENT)
Facility: CLINIC | Age: 79
LOS: 1 days | Discharge: ANOTHER HOSPITAL | DRG: 281 | End: 2024-04-17
Attending: EMERGENCY MEDICINE | Admitting: INTERNAL MEDICINE
Payer: COMMERCIAL

## 2024-04-16 ENCOUNTER — APPOINTMENT (OUTPATIENT)
Dept: RADIOLOGY | Facility: CLINIC | Age: 79
DRG: 281 | End: 2024-04-16
Attending: EMERGENCY MEDICINE
Payer: COMMERCIAL

## 2024-04-16 DIAGNOSIS — R07.9 ACUTE CHEST PAIN: ICD-10-CM

## 2024-04-16 DIAGNOSIS — I21.4 NSTEMI (NON-ST ELEVATED MYOCARDIAL INFARCTION) (H): ICD-10-CM

## 2024-04-16 LAB
ALBUMIN SERPL BCG-MCNC: 3.8 G/DL (ref 3.5–5.2)
ALP SERPL-CCNC: 83 U/L (ref 40–150)
ALT SERPL W P-5'-P-CCNC: 34 U/L (ref 0–70)
ANION GAP SERPL CALCULATED.3IONS-SCNC: 12 MMOL/L (ref 7–15)
AST SERPL W P-5'-P-CCNC: ABNORMAL U/L
ATRIAL RATE - MUSE: 96 BPM
BASOPHILS # BLD AUTO: 0.1 10E3/UL (ref 0–0.2)
BASOPHILS NFR BLD AUTO: 1 %
BILIRUB SERPL-MCNC: 0.6 MG/DL
BUN SERPL-MCNC: 33.7 MG/DL (ref 8–23)
CALCIUM SERPL-MCNC: 9.6 MG/DL (ref 8.8–10.2)
CHLORIDE SERPL-SCNC: 104 MMOL/L (ref 98–107)
CREAT SERPL-MCNC: 1.5 MG/DL (ref 0.67–1.17)
DEPRECATED HCO3 PLAS-SCNC: 21 MMOL/L (ref 22–29)
DIASTOLIC BLOOD PRESSURE - MUSE: 66 MMHG
EGFRCR SERPLBLD CKD-EPI 2021: 47 ML/MIN/1.73M2
EOSINOPHIL # BLD AUTO: 0.2 10E3/UL (ref 0–0.7)
EOSINOPHIL NFR BLD AUTO: 3 %
ERYTHROCYTE [DISTWIDTH] IN BLOOD BY AUTOMATED COUNT: 13.6 % (ref 10–15)
GLUCOSE SERPL-MCNC: 253 MG/DL (ref 70–99)
HCT VFR BLD AUTO: 41.3 % (ref 40–53)
HGB BLD-MCNC: 14.6 G/DL (ref 13.3–17.7)
IMM GRANULOCYTES # BLD: 0.1 10E3/UL
IMM GRANULOCYTES NFR BLD: 1 %
INR PPP: 0.95 (ref 0.85–1.15)
INTERPRETATION ECG - MUSE: NORMAL
LYMPHOCYTES # BLD AUTO: 1.7 10E3/UL (ref 0.8–5.3)
LYMPHOCYTES NFR BLD AUTO: 20 %
MAGNESIUM SERPL-MCNC: 2 MG/DL (ref 1.7–2.3)
MCH RBC QN AUTO: 30.1 PG (ref 26.5–33)
MCHC RBC AUTO-ENTMCNC: 35.4 G/DL (ref 31.5–36.5)
MCV RBC AUTO: 85 FL (ref 78–100)
MONOCYTES # BLD AUTO: 0.6 10E3/UL (ref 0–1.3)
MONOCYTES NFR BLD AUTO: 7 %
NEUTROPHILS # BLD AUTO: 5.7 10E3/UL (ref 1.6–8.3)
NEUTROPHILS NFR BLD AUTO: 68 %
NRBC # BLD AUTO: 0 10E3/UL
NRBC BLD AUTO-RTO: 0 /100
NT-PROBNP SERPL-MCNC: 87 PG/ML (ref 0–1800)
P AXIS - MUSE: 28 DEGREES
PLATELET # BLD AUTO: 170 10E3/UL (ref 150–450)
POTASSIUM SERPL-SCNC: 4.7 MMOL/L (ref 3.4–5.3)
PR INTERVAL - MUSE: 218 MS
PROT SERPL-MCNC: 6.6 G/DL (ref 6.4–8.3)
QRS DURATION - MUSE: 120 MS
QT - MUSE: 370 MS
QTC - MUSE: 467 MS
R AXIS - MUSE: -57 DEGREES
RBC # BLD AUTO: 4.85 10E6/UL (ref 4.4–5.9)
SODIUM SERPL-SCNC: 137 MMOL/L (ref 135–145)
SYSTOLIC BLOOD PRESSURE - MUSE: 141 MMHG
T AXIS - MUSE: 63 DEGREES
TROPONIN T SERPL HS-MCNC: 167 NG/L
VENTRICULAR RATE- MUSE: 96 BPM
WBC # BLD AUTO: 8.4 10E3/UL (ref 4–11)

## 2024-04-16 PROCEDURE — 85025 COMPLETE CBC W/AUTO DIFF WBC: CPT | Performed by: EMERGENCY MEDICINE

## 2024-04-16 PROCEDURE — 83880 ASSAY OF NATRIURETIC PEPTIDE: CPT | Performed by: EMERGENCY MEDICINE

## 2024-04-16 PROCEDURE — 36415 COLL VENOUS BLD VENIPUNCTURE: CPT | Performed by: EMERGENCY MEDICINE

## 2024-04-16 PROCEDURE — 93005 ELECTROCARDIOGRAM TRACING: CPT | Performed by: EMERGENCY MEDICINE

## 2024-04-16 PROCEDURE — 85610 PROTHROMBIN TIME: CPT | Performed by: EMERGENCY MEDICINE

## 2024-04-16 PROCEDURE — 84155 ASSAY OF PROTEIN SERUM: CPT | Performed by: EMERGENCY MEDICINE

## 2024-04-16 PROCEDURE — 84484 ASSAY OF TROPONIN QUANT: CPT | Performed by: EMERGENCY MEDICINE

## 2024-04-16 PROCEDURE — 83735 ASSAY OF MAGNESIUM: CPT | Performed by: EMERGENCY MEDICINE

## 2024-04-16 PROCEDURE — 71045 X-RAY EXAM CHEST 1 VIEW: CPT

## 2024-04-16 PROCEDURE — 99285 EMERGENCY DEPT VISIT HI MDM: CPT | Mod: 25

## 2024-04-16 RX ORDER — HEPARIN SODIUM 10000 [USP'U]/100ML
0-5000 INJECTION, SOLUTION INTRAVENOUS CONTINUOUS
Status: DISCONTINUED | OUTPATIENT
Start: 2024-04-17 | End: 2024-04-17 | Stop reason: HOSPADM

## 2024-04-16 ASSESSMENT — COLUMBIA-SUICIDE SEVERITY RATING SCALE - C-SSRS
1. IN THE PAST MONTH, HAVE YOU WISHED YOU WERE DEAD OR WISHED YOU COULD GO TO SLEEP AND NOT WAKE UP?: NO
6. HAVE YOU EVER DONE ANYTHING, STARTED TO DO ANYTHING, OR PREPARED TO DO ANYTHING TO END YOUR LIFE?: NO
2. HAVE YOU ACTUALLY HAD ANY THOUGHTS OF KILLING YOURSELF IN THE PAST MONTH?: NO

## 2024-04-16 ASSESSMENT — ACTIVITIES OF DAILY LIVING (ADL)
ADLS_ACUITY_SCORE: 35
ADLS_ACUITY_SCORE: 35

## 2024-04-17 ENCOUNTER — HOSPITAL ENCOUNTER (INPATIENT)
Facility: HOSPITAL | Age: 79
LOS: 10 days | Discharge: HOME HEALTH | DRG: 234 | End: 2024-04-27
Attending: INTERNAL MEDICINE | Admitting: INTERNAL MEDICINE
Payer: COMMERCIAL

## 2024-04-17 ENCOUNTER — APPOINTMENT (OUTPATIENT)
Dept: CARDIOLOGY | Facility: CLINIC | Age: 79
DRG: 281 | End: 2024-04-17
Attending: INTERNAL MEDICINE
Payer: COMMERCIAL

## 2024-04-17 VITALS
SYSTOLIC BLOOD PRESSURE: 112 MMHG | TEMPERATURE: 98.2 F | HEART RATE: 65 BPM | DIASTOLIC BLOOD PRESSURE: 58 MMHG | HEIGHT: 71 IN | BODY MASS INDEX: 44.1 KG/M2 | OXYGEN SATURATION: 97 % | WEIGHT: 315 LBS | RESPIRATION RATE: 19 BRPM

## 2024-04-17 DIAGNOSIS — L30.4 INTERTRIGINOUS DERMATITIS ASSOCIATED WITH MOISTURE: ICD-10-CM

## 2024-04-17 DIAGNOSIS — I21.4 ACUTE NON-ST SEGMENT ELEVATION MYOCARDIAL INFARCTION (H): ICD-10-CM

## 2024-04-17 DIAGNOSIS — R07.9 ACUTE CHEST PAIN: ICD-10-CM

## 2024-04-17 DIAGNOSIS — I25.10 CORONARY ARTERY DISEASE INVOLVING NATIVE CORONARY ARTERY OF NATIVE HEART, UNSPECIFIED WHETHER ANGINA PRESENT: ICD-10-CM

## 2024-04-17 DIAGNOSIS — I10 BENIGN ESSENTIAL HTN: ICD-10-CM

## 2024-04-17 DIAGNOSIS — E11.22 TYPE 2 DM WITH CKD STAGE 1 AND HYPERTENSION (H): ICD-10-CM

## 2024-04-17 DIAGNOSIS — N18.1 TYPE 2 DM WITH CKD STAGE 1 AND HYPERTENSION (H): ICD-10-CM

## 2024-04-17 DIAGNOSIS — I21.4 NSTEMI (NON-ST ELEVATED MYOCARDIAL INFARCTION) (H): ICD-10-CM

## 2024-04-17 DIAGNOSIS — I12.9 TYPE 2 DM WITH CKD STAGE 1 AND HYPERTENSION (H): ICD-10-CM

## 2024-04-17 DIAGNOSIS — E78.5 DYSLIPIDEMIA: ICD-10-CM

## 2024-04-17 DIAGNOSIS — Z95.1 S/P CABG (CORONARY ARTERY BYPASS GRAFT): Primary | ICD-10-CM

## 2024-04-17 LAB
ABO/RH(D): NORMAL
ACT BLD: 178 SECONDS (ref 74–150)
ALBUMIN SERPL BCG-MCNC: 3.9 G/DL (ref 3.5–5.2)
ALP SERPL-CCNC: 84 U/L (ref 40–150)
ALT SERPL W P-5'-P-CCNC: 32 U/L (ref 0–70)
ANION GAP SERPL CALCULATED.3IONS-SCNC: 14 MMOL/L (ref 7–15)
ANTIBODY SCREEN: NEGATIVE
AST SERPL W P-5'-P-CCNC: 65 U/L (ref 0–45)
BASOPHILS # BLD AUTO: 0.1 10E3/UL (ref 0–0.2)
BASOPHILS NFR BLD AUTO: 1 %
BILIRUB SERPL-MCNC: 0.6 MG/DL
BLOOD BANK CHART COMMENT: NORMAL
BUN SERPL-MCNC: 31.5 MG/DL (ref 8–23)
CALCIUM SERPL-MCNC: 9.7 MG/DL (ref 8.8–10.2)
CHLORIDE SERPL-SCNC: 106 MMOL/L (ref 98–107)
CREAT SERPL-MCNC: 1.36 MG/DL (ref 0.67–1.17)
DEPRECATED HCO3 PLAS-SCNC: 21 MMOL/L (ref 22–29)
EGFRCR SERPLBLD CKD-EPI 2021: 53 ML/MIN/1.73M2
EOSINOPHIL # BLD AUTO: 0.3 10E3/UL (ref 0–0.7)
EOSINOPHIL NFR BLD AUTO: 4 %
ERYTHROCYTE [DISTWIDTH] IN BLOOD BY AUTOMATED COUNT: 13.7 % (ref 10–15)
GLUCOSE BLDC GLUCOMTR-MCNC: 149 MG/DL (ref 70–99)
GLUCOSE BLDC GLUCOMTR-MCNC: 150 MG/DL (ref 70–99)
GLUCOSE BLDC GLUCOMTR-MCNC: 172 MG/DL (ref 70–99)
GLUCOSE BLDC GLUCOMTR-MCNC: 179 MG/DL (ref 70–99)
GLUCOSE BLDC GLUCOMTR-MCNC: 184 MG/DL (ref 70–99)
GLUCOSE SERPL-MCNC: 160 MG/DL (ref 70–99)
HCT VFR BLD AUTO: 42 % (ref 40–53)
HGB BLD-MCNC: 14.6 G/DL (ref 13.3–17.7)
IMM GRANULOCYTES # BLD: 0.1 10E3/UL
IMM GRANULOCYTES NFR BLD: 1 %
LYMPHOCYTES # BLD AUTO: 2.3 10E3/UL (ref 0.8–5.3)
LYMPHOCYTES NFR BLD AUTO: 31 %
MCH RBC QN AUTO: 30 PG (ref 26.5–33)
MCHC RBC AUTO-ENTMCNC: 34.8 G/DL (ref 31.5–36.5)
MCV RBC AUTO: 86 FL (ref 78–100)
MONOCYTES # BLD AUTO: 0.6 10E3/UL (ref 0–1.3)
MONOCYTES NFR BLD AUTO: 8 %
NEUTROPHILS # BLD AUTO: 4.1 10E3/UL (ref 1.6–8.3)
NEUTROPHILS NFR BLD AUTO: 56 %
NRBC # BLD AUTO: 0 10E3/UL
NRBC BLD AUTO-RTO: 0 /100
PLATELET # BLD AUTO: 163 10E3/UL (ref 150–450)
POTASSIUM SERPL-SCNC: 4 MMOL/L (ref 3.4–5.3)
PROT SERPL-MCNC: 6.7 G/DL (ref 6.4–8.3)
RBC # BLD AUTO: 4.87 10E6/UL (ref 4.4–5.9)
SODIUM SERPL-SCNC: 141 MMOL/L (ref 135–145)
SPECIMEN EXPIRATION DATE: NORMAL
SPECIMEN EXPIRATION DATE: NORMAL
TROPONIN T SERPL HS-MCNC: 212 NG/L
UFH PPP CHRO-ACNC: 0.22 IU/ML
UFH PPP CHRO-ACNC: 0.28 IU/ML
WBC # BLD AUTO: 7.2 10E3/UL (ref 4–11)

## 2024-04-17 PROCEDURE — C1894 INTRO/SHEATH, NON-LASER: HCPCS | Performed by: INTERNAL MEDICINE

## 2024-04-17 PROCEDURE — 93306 TTE W/DOPPLER COMPLETE: CPT | Mod: 26 | Performed by: INTERNAL MEDICINE

## 2024-04-17 PROCEDURE — 99233 SBSQ HOSP IP/OBS HIGH 50: CPT | Performed by: INTERNAL MEDICINE

## 2024-04-17 PROCEDURE — 99223 1ST HOSP IP/OBS HIGH 75: CPT | Performed by: INTERNAL MEDICINE

## 2024-04-17 PROCEDURE — 80061 LIPID PANEL: CPT | Performed by: INTERNAL MEDICINE

## 2024-04-17 PROCEDURE — 36415 COLL VENOUS BLD VENIPUNCTURE: CPT | Performed by: INTERNAL MEDICINE

## 2024-04-17 PROCEDURE — 250N000012 HC RX MED GY IP 250 OP 636 PS 637: Performed by: INTERNAL MEDICINE

## 2024-04-17 PROCEDURE — 250N000013 HC RX MED GY IP 250 OP 250 PS 637: Performed by: INTERNAL MEDICINE

## 2024-04-17 PROCEDURE — 250N000011 HC RX IP 250 OP 636: Performed by: EMERGENCY MEDICINE

## 2024-04-17 PROCEDURE — 85025 COMPLETE CBC W/AUTO DIFF WBC: CPT | Performed by: INTERNAL MEDICINE

## 2024-04-17 PROCEDURE — 82962 GLUCOSE BLOOD TEST: CPT

## 2024-04-17 PROCEDURE — 93458 L HRT ARTERY/VENTRICLE ANGIO: CPT | Performed by: INTERNAL MEDICINE

## 2024-04-17 PROCEDURE — 80053 COMPREHEN METABOLIC PANEL: CPT | Performed by: INTERNAL MEDICINE

## 2024-04-17 PROCEDURE — 120N000004 HC R&B MS OVERFLOW

## 2024-04-17 PROCEDURE — C1887 CATHETER, GUIDING: HCPCS | Performed by: INTERNAL MEDICINE

## 2024-04-17 PROCEDURE — 255N000002 HC RX 255 OP 636: Performed by: INTERNAL MEDICINE

## 2024-04-17 PROCEDURE — 96374 THER/PROPH/DIAG INJ IV PUSH: CPT

## 2024-04-17 PROCEDURE — B2111ZZ FLUOROSCOPY OF MULTIPLE CORONARY ARTERIES USING LOW OSMOLAR CONTRAST: ICD-10-PCS | Performed by: INTERNAL MEDICINE

## 2024-04-17 PROCEDURE — C1769 GUIDE WIRE: HCPCS | Performed by: INTERNAL MEDICINE

## 2024-04-17 PROCEDURE — 999N000157 HC STATISTIC RCP TIME EA 10 MIN

## 2024-04-17 PROCEDURE — 85347 COAGULATION TIME ACTIVATED: CPT

## 2024-04-17 PROCEDURE — 85520 HEPARIN ASSAY: CPT | Performed by: INTERNAL MEDICINE

## 2024-04-17 PROCEDURE — 120N000001 HC R&B MED SURG/OB

## 2024-04-17 PROCEDURE — 99152 MOD SED SAME PHYS/QHP 5/>YRS: CPT | Performed by: INTERNAL MEDICINE

## 2024-04-17 PROCEDURE — 85520 HEPARIN ASSAY: CPT | Performed by: EMERGENCY MEDICINE

## 2024-04-17 PROCEDURE — 36415 COLL VENOUS BLD VENIPUNCTURE: CPT | Performed by: EMERGENCY MEDICINE

## 2024-04-17 PROCEDURE — 99207 PR APP CREDIT; MD BILLING SHARED VISIT: CPT | Performed by: INTERNAL MEDICINE

## 2024-04-17 PROCEDURE — 86900 BLOOD TYPING SEROLOGIC ABO: CPT | Performed by: INTERNAL MEDICINE

## 2024-04-17 PROCEDURE — 99418 PROLNG IP/OBS E/M EA 15 MIN: CPT | Performed by: INTERNAL MEDICINE

## 2024-04-17 PROCEDURE — 250N000011 HC RX IP 250 OP 636: Performed by: INTERNAL MEDICINE

## 2024-04-17 PROCEDURE — 93458 L HRT ARTERY/VENTRICLE ANGIO: CPT | Mod: 26 | Performed by: INTERNAL MEDICINE

## 2024-04-17 PROCEDURE — 250N000009 HC RX 250: Performed by: INTERNAL MEDICINE

## 2024-04-17 PROCEDURE — 250N000013 HC RX MED GY IP 250 OP 250 PS 637: Performed by: NURSE PRACTITIONER

## 2024-04-17 PROCEDURE — 272N000001 HC OR GENERAL SUPPLY STERILE: Performed by: INTERNAL MEDICINE

## 2024-04-17 PROCEDURE — 258N000003 HC RX IP 258 OP 636: Performed by: INTERNAL MEDICINE

## 2024-04-17 PROCEDURE — 999N000208 ECHOCARDIOGRAM COMPLETE

## 2024-04-17 RX ORDER — LIDOCAINE 40 MG/G
CREAM TOPICAL
Status: CANCELLED | OUTPATIENT
Start: 2024-04-17

## 2024-04-17 RX ORDER — DEXTROSE MONOHYDRATE 25 G/50ML
25-50 INJECTION, SOLUTION INTRAVENOUS
Status: DISCONTINUED | OUTPATIENT
Start: 2024-04-17 | End: 2024-04-17 | Stop reason: HOSPADM

## 2024-04-17 RX ORDER — OXYCODONE HYDROCHLORIDE 5 MG/1
10 TABLET ORAL EVERY 4 HOURS PRN
Status: DISCONTINUED | OUTPATIENT
Start: 2024-04-17 | End: 2024-04-19

## 2024-04-17 RX ORDER — INSULIN LISPRO 100 [IU]/ML
20-30 INJECTION, SOLUTION INTRAVENOUS; SUBCUTANEOUS 2 TIMES DAILY WITH MEALS
Status: DISCONTINUED | OUTPATIENT
Start: 2024-04-17 | End: 2024-04-17

## 2024-04-17 RX ORDER — AMOXICILLIN 250 MG
1 CAPSULE ORAL 2 TIMES DAILY PRN
Status: DISCONTINUED | OUTPATIENT
Start: 2024-04-17 | End: 2024-04-19

## 2024-04-17 RX ORDER — DEXTROSE MONOHYDRATE 25 G/50ML
25-50 INJECTION, SOLUTION INTRAVENOUS
Status: DISCONTINUED | OUTPATIENT
Start: 2024-04-17 | End: 2024-04-17

## 2024-04-17 RX ORDER — ROSUVASTATIN CALCIUM 40 MG/1
40 TABLET, COATED ORAL DAILY
Qty: 90 TABLET | Refills: 3 | Status: SHIPPED | OUTPATIENT
Start: 2024-04-17 | End: 2024-04-24

## 2024-04-17 RX ORDER — NALOXONE HYDROCHLORIDE 0.4 MG/ML
0.4 INJECTION, SOLUTION INTRAMUSCULAR; INTRAVENOUS; SUBCUTANEOUS
Status: ACTIVE | OUTPATIENT
Start: 2024-04-17 | End: 2024-04-18

## 2024-04-17 RX ORDER — INSULIN GLARGINE 100 [IU]/ML
60 INJECTION, SOLUTION SUBCUTANEOUS EVERY MORNING
Status: ON HOLD | COMMUNITY
End: 2024-04-26

## 2024-04-17 RX ORDER — NICOTINE POLACRILEX 4 MG
15-30 LOZENGE BUCCAL
Status: DISCONTINUED | OUTPATIENT
Start: 2024-04-17 | End: 2024-04-19

## 2024-04-17 RX ORDER — NITROGLYCERIN 0.4 MG/1
0.4 TABLET SUBLINGUAL EVERY 5 MIN PRN
Status: CANCELLED | OUTPATIENT
Start: 2024-04-17

## 2024-04-17 RX ORDER — ASPIRIN 81 MG/1
243 TABLET, CHEWABLE ORAL ONCE
Status: COMPLETED | OUTPATIENT
Start: 2024-04-17 | End: 2024-04-17

## 2024-04-17 RX ORDER — EZETIMIBE 10 MG/1
10 TABLET ORAL AT BEDTIME
Status: DISCONTINUED | OUTPATIENT
Start: 2024-04-17 | End: 2024-04-17

## 2024-04-17 RX ORDER — AMOXICILLIN 250 MG
2 CAPSULE ORAL 2 TIMES DAILY PRN
Status: DISCONTINUED | OUTPATIENT
Start: 2024-04-17 | End: 2024-04-17 | Stop reason: HOSPADM

## 2024-04-17 RX ORDER — SODIUM CHLORIDE 9 MG/ML
INJECTION, SOLUTION INTRAVENOUS CONTINUOUS
Status: CANCELLED | OUTPATIENT
Start: 2024-04-17

## 2024-04-17 RX ORDER — AMOXICILLIN 250 MG
1 CAPSULE ORAL 2 TIMES DAILY PRN
Status: DISCONTINUED | OUTPATIENT
Start: 2024-04-17 | End: 2024-04-17 | Stop reason: HOSPADM

## 2024-04-17 RX ORDER — IODIXANOL 320 MG/ML
INJECTION, SOLUTION INTRAVASCULAR
Status: DISCONTINUED | OUTPATIENT
Start: 2024-04-17 | End: 2024-04-17 | Stop reason: HOSPADM

## 2024-04-17 RX ORDER — NITROGLYCERIN 0.4 MG/1
0.4 TABLET SUBLINGUAL EVERY 5 MIN PRN
Status: DISCONTINUED | OUTPATIENT
Start: 2024-04-17 | End: 2024-04-19

## 2024-04-17 RX ORDER — EZETIMIBE 10 MG/1
10 TABLET ORAL AT BEDTIME
Status: DISCONTINUED | OUTPATIENT
Start: 2024-04-17 | End: 2024-04-19

## 2024-04-17 RX ORDER — ASPIRIN 81 MG/1
81 TABLET, CHEWABLE ORAL DAILY
Status: CANCELLED | OUTPATIENT
Start: 2024-04-18

## 2024-04-17 RX ORDER — POLYETHYLENE GLYCOL 3350 17 G/17G
17 POWDER, FOR SOLUTION ORAL AT BEDTIME
Status: DISCONTINUED | OUTPATIENT
Start: 2024-04-17 | End: 2024-04-19

## 2024-04-17 RX ORDER — EZETIMIBE 10 MG/1
10 TABLET ORAL AT BEDTIME
Status: CANCELLED | OUTPATIENT
Start: 2024-04-17

## 2024-04-17 RX ORDER — IRBESARTAN 300 MG/1
300 TABLET ORAL AT BEDTIME
Status: DISCONTINUED | OUTPATIENT
Start: 2024-04-17 | End: 2024-04-17

## 2024-04-17 RX ORDER — CALCIUM CARBONATE 500 MG/1
1000 TABLET, CHEWABLE ORAL 4 TIMES DAILY PRN
Status: CANCELLED | OUTPATIENT
Start: 2024-04-17

## 2024-04-17 RX ORDER — HEPARIN SODIUM 10000 [USP'U]/100ML
0-5000 INJECTION, SOLUTION INTRAVENOUS CONTINUOUS
Status: CANCELLED | OUTPATIENT
Start: 2024-04-17

## 2024-04-17 RX ORDER — MAGNESIUM OXIDE 400 MG/1
400 TABLET ORAL AT BEDTIME
Status: DISCONTINUED | OUTPATIENT
Start: 2024-04-18 | End: 2024-04-19

## 2024-04-17 RX ORDER — POLYETHYLENE GLYCOL 3350 17 G/17G
17 POWDER, FOR SOLUTION ORAL AT BEDTIME
Status: DISCONTINUED | OUTPATIENT
Start: 2024-04-17 | End: 2024-04-17 | Stop reason: HOSPADM

## 2024-04-17 RX ORDER — LANOLIN ALCOHOL/MO/W.PET/CERES
3 CREAM (GRAM) TOPICAL
Status: DISCONTINUED | OUTPATIENT
Start: 2024-04-17 | End: 2024-04-19

## 2024-04-17 RX ORDER — FENTANYL CITRATE 50 UG/ML
INJECTION, SOLUTION INTRAMUSCULAR; INTRAVENOUS
Status: DISCONTINUED | OUTPATIENT
Start: 2024-04-17 | End: 2024-04-17 | Stop reason: HOSPADM

## 2024-04-17 RX ORDER — NALOXONE HYDROCHLORIDE 0.4 MG/ML
0.2 INJECTION, SOLUTION INTRAMUSCULAR; INTRAVENOUS; SUBCUTANEOUS
Status: ACTIVE | OUTPATIENT
Start: 2024-04-17 | End: 2024-04-18

## 2024-04-17 RX ORDER — NICOTINE POLACRILEX 4 MG
15-30 LOZENGE BUCCAL
Status: DISCONTINUED | OUTPATIENT
Start: 2024-04-17 | End: 2024-04-17 | Stop reason: HOSPADM

## 2024-04-17 RX ORDER — CHLORTHALIDONE 25 MG/1
25 TABLET ORAL EVERY OTHER DAY
Status: DISCONTINUED | OUTPATIENT
Start: 2024-04-18 | End: 2024-04-17

## 2024-04-17 RX ORDER — DEXTROSE MONOHYDRATE 25 G/50ML
25-50 INJECTION, SOLUTION INTRAVENOUS
Status: DISCONTINUED | OUTPATIENT
Start: 2024-04-17 | End: 2024-04-19

## 2024-04-17 RX ORDER — CHLORTHALIDONE 25 MG/1
25 TABLET ORAL EVERY OTHER DAY
Status: DISCONTINUED | OUTPATIENT
Start: 2024-04-17 | End: 2024-04-19

## 2024-04-17 RX ORDER — IRBESARTAN 150 MG/1
300 TABLET ORAL AT BEDTIME
Status: CANCELLED | OUTPATIENT
Start: 2024-04-18

## 2024-04-17 RX ORDER — ASPIRIN 81 MG/1
243 TABLET, CHEWABLE ORAL ONCE
Status: CANCELLED | OUTPATIENT
Start: 2024-04-17

## 2024-04-17 RX ORDER — AMOXICILLIN 250 MG
1 CAPSULE ORAL 2 TIMES DAILY PRN
Status: CANCELLED | OUTPATIENT
Start: 2024-04-17

## 2024-04-17 RX ORDER — LIDOCAINE 40 MG/G
CREAM TOPICAL
Status: DISCONTINUED | OUTPATIENT
Start: 2024-04-17 | End: 2024-04-19

## 2024-04-17 RX ORDER — ASPIRIN 81 MG/1
81 TABLET, CHEWABLE ORAL DAILY
Status: DISCONTINUED | OUTPATIENT
Start: 2024-04-17 | End: 2024-04-17 | Stop reason: HOSPADM

## 2024-04-17 RX ORDER — ASPIRIN 325 MG
325 TABLET ORAL ONCE
Status: CANCELLED | OUTPATIENT
Start: 2024-04-17 | End: 2024-04-17

## 2024-04-17 RX ORDER — LIDOCAINE 40 MG/G
CREAM TOPICAL
Status: DISCONTINUED | OUTPATIENT
Start: 2024-04-17 | End: 2024-04-17 | Stop reason: HOSPADM

## 2024-04-17 RX ORDER — OXYCODONE HYDROCHLORIDE 5 MG/1
5 TABLET ORAL EVERY 4 HOURS PRN
Status: DISCONTINUED | OUTPATIENT
Start: 2024-04-17 | End: 2024-04-19

## 2024-04-17 RX ORDER — DEXTROSE MONOHYDRATE 25 G/50ML
25-50 INJECTION, SOLUTION INTRAVENOUS
Status: CANCELLED | OUTPATIENT
Start: 2024-04-17

## 2024-04-17 RX ORDER — METOPROLOL TARTRATE 25 MG/1
25 TABLET, FILM COATED ORAL 2 TIMES DAILY
Status: DISCONTINUED | OUTPATIENT
Start: 2024-04-17 | End: 2024-04-17 | Stop reason: HOSPADM

## 2024-04-17 RX ORDER — FENTANYL CITRATE 50 UG/ML
25 INJECTION, SOLUTION INTRAMUSCULAR; INTRAVENOUS
Status: DISCONTINUED | OUTPATIENT
Start: 2024-04-17 | End: 2024-04-17

## 2024-04-17 RX ORDER — FLUMAZENIL 0.1 MG/ML
0.2 INJECTION, SOLUTION INTRAVENOUS
Status: ACTIVE | OUTPATIENT
Start: 2024-04-17 | End: 2024-04-18

## 2024-04-17 RX ORDER — POLYETHYLENE GLYCOL 3350 17 G/17G
17 POWDER, FOR SOLUTION ORAL AT BEDTIME
Status: CANCELLED | OUTPATIENT
Start: 2024-04-17

## 2024-04-17 RX ORDER — METOPROLOL TARTRATE 25 MG/1
25 TABLET, FILM COATED ORAL 2 TIMES DAILY
Status: DISCONTINUED | OUTPATIENT
Start: 2024-04-17 | End: 2024-04-19

## 2024-04-17 RX ORDER — NICOTINE POLACRILEX 4 MG
15-30 LOZENGE BUCCAL
Status: DISCONTINUED | OUTPATIENT
Start: 2024-04-17 | End: 2024-04-17

## 2024-04-17 RX ORDER — ATROPINE SULFATE 0.1 MG/ML
0.5 INJECTION INTRAVENOUS
Status: ACTIVE | OUTPATIENT
Start: 2024-04-17 | End: 2024-04-18

## 2024-04-17 RX ORDER — SODIUM CHLORIDE 9 MG/ML
75 INJECTION, SOLUTION INTRAVENOUS CONTINUOUS
Status: DISCONTINUED | OUTPATIENT
Start: 2024-04-17 | End: 2024-04-17

## 2024-04-17 RX ORDER — HYDRALAZINE HYDROCHLORIDE 20 MG/ML
10 INJECTION INTRAMUSCULAR; INTRAVENOUS
Status: COMPLETED | OUTPATIENT
Start: 2024-04-17 | End: 2024-04-17

## 2024-04-17 RX ORDER — ASPIRIN 81 MG/1
162 TABLET, CHEWABLE ORAL ONCE
Status: COMPLETED | OUTPATIENT
Start: 2024-04-17 | End: 2024-04-17

## 2024-04-17 RX ORDER — ASPIRIN 325 MG
325 TABLET ORAL ONCE
Status: COMPLETED | OUTPATIENT
Start: 2024-04-17 | End: 2024-04-17

## 2024-04-17 RX ORDER — ASPIRIN 81 MG/1
81 TABLET ORAL EVERY EVENING
Status: DISCONTINUED | OUTPATIENT
Start: 2024-04-18 | End: 2024-04-19

## 2024-04-17 RX ORDER — SODIUM CHLORIDE 9 MG/ML
INJECTION, SOLUTION INTRAVENOUS CONTINUOUS
Status: DISCONTINUED | OUTPATIENT
Start: 2024-04-17 | End: 2024-04-17 | Stop reason: HOSPADM

## 2024-04-17 RX ORDER — CHLORTHALIDONE 25 MG/1
25 TABLET ORAL EVERY OTHER DAY
Status: DISCONTINUED | OUTPATIENT
Start: 2024-04-17 | End: 2024-04-17 | Stop reason: HOSPADM

## 2024-04-17 RX ORDER — HEPARIN SODIUM 10000 [USP'U]/100ML
0-5000 INJECTION, SOLUTION INTRAVENOUS CONTINUOUS
Status: DISCONTINUED | OUTPATIENT
Start: 2024-04-17 | End: 2024-04-19

## 2024-04-17 RX ORDER — PANTOPRAZOLE SODIUM 40 MG/1
40 TABLET, DELAYED RELEASE ORAL AT BEDTIME
Status: DISCONTINUED | OUTPATIENT
Start: 2024-04-17 | End: 2024-04-19

## 2024-04-17 RX ORDER — ROSUVASTATIN CALCIUM 5 MG/1
5 TABLET, COATED ORAL DAILY
Status: DISCONTINUED | OUTPATIENT
Start: 2024-04-17 | End: 2024-04-17

## 2024-04-17 RX ORDER — METOPROLOL TARTRATE 25 MG/1
25 TABLET, FILM COATED ORAL 2 TIMES DAILY
Status: CANCELLED | OUTPATIENT
Start: 2024-04-17

## 2024-04-17 RX ORDER — ASPIRIN 81 MG/1
81 TABLET ORAL DAILY
Qty: 90 TABLET | Refills: 3 | Status: SHIPPED | OUTPATIENT
Start: 2024-04-18 | End: 2024-04-24

## 2024-04-17 RX ORDER — ASPIRIN 81 MG/1
81 TABLET ORAL DAILY
Status: DISCONTINUED | OUTPATIENT
Start: 2024-04-17 | End: 2024-04-17

## 2024-04-17 RX ORDER — POLYETHYLENE GLYCOL 3350 17 G/17G
17 POWDER, FOR SOLUTION ORAL AT BEDTIME
Status: DISCONTINUED | OUTPATIENT
Start: 2024-04-17 | End: 2024-04-17

## 2024-04-17 RX ORDER — GLIPIZIDE 10 MG/1
20 TABLET, FILM COATED, EXTENDED RELEASE ORAL AT BEDTIME
Status: ON HOLD | COMMUNITY
End: 2024-04-26

## 2024-04-17 RX ORDER — ASPIRIN 81 MG/1
81 TABLET, CHEWABLE ORAL DAILY
Status: DISCONTINUED | OUTPATIENT
Start: 2024-04-18 | End: 2024-04-17

## 2024-04-17 RX ORDER — NICOTINE POLACRILEX 4 MG
15-30 LOZENGE BUCCAL
Status: CANCELLED | OUTPATIENT
Start: 2024-04-17

## 2024-04-17 RX ORDER — CALCIUM CARBONATE 500 MG/1
1000 TABLET, CHEWABLE ORAL 4 TIMES DAILY PRN
Status: DISCONTINUED | OUTPATIENT
Start: 2024-04-17 | End: 2024-04-17 | Stop reason: HOSPADM

## 2024-04-17 RX ORDER — CHLORTHALIDONE 25 MG/1
25 TABLET ORAL EVERY OTHER DAY
Status: CANCELLED | OUTPATIENT
Start: 2024-04-18

## 2024-04-17 RX ORDER — IRBESARTAN 150 MG/1
300 TABLET ORAL AT BEDTIME
Status: DISCONTINUED | OUTPATIENT
Start: 2024-04-18 | End: 2024-04-17 | Stop reason: HOSPADM

## 2024-04-17 RX ORDER — SODIUM CHLORIDE 9 MG/ML
INJECTION, SOLUTION INTRAVENOUS CONTINUOUS
Status: DISCONTINUED | OUTPATIENT
Start: 2024-04-17 | End: 2024-04-18

## 2024-04-17 RX ORDER — CALCIUM CARBONATE 500 MG/1
1000 TABLET, CHEWABLE ORAL 4 TIMES DAILY PRN
Status: DISCONTINUED | OUTPATIENT
Start: 2024-04-17 | End: 2024-04-19

## 2024-04-17 RX ORDER — NITROGLYCERIN 0.4 MG/1
0.4 TABLET SUBLINGUAL EVERY 5 MIN PRN
Status: DISCONTINUED | OUTPATIENT
Start: 2024-04-17 | End: 2024-04-17 | Stop reason: HOSPADM

## 2024-04-17 RX ORDER — INSULIN GLARGINE 100 [IU]/ML
80 INJECTION, SOLUTION SUBCUTANEOUS AT BEDTIME
Status: ON HOLD | COMMUNITY
End: 2024-04-26

## 2024-04-17 RX ORDER — AMOXICILLIN 250 MG
2 CAPSULE ORAL 2 TIMES DAILY PRN
Status: CANCELLED | OUTPATIENT
Start: 2024-04-17

## 2024-04-17 RX ORDER — ROSUVASTATIN CALCIUM 40 MG/1
40 TABLET, COATED ORAL AT BEDTIME
Status: DISCONTINUED | OUTPATIENT
Start: 2024-04-17 | End: 2024-04-19

## 2024-04-17 RX ORDER — DIAZEPAM 5 MG
5 TABLET ORAL ONCE
Status: COMPLETED | OUTPATIENT
Start: 2024-04-17 | End: 2024-04-17

## 2024-04-17 RX ORDER — ACETAMINOPHEN 325 MG/1
650 TABLET ORAL EVERY 4 HOURS PRN
Status: DISCONTINUED | OUTPATIENT
Start: 2024-04-17 | End: 2024-04-19

## 2024-04-17 RX ORDER — PANTOPRAZOLE SODIUM 40 MG/1
40 TABLET, DELAYED RELEASE ORAL AT BEDTIME
Status: DISCONTINUED | OUTPATIENT
Start: 2024-04-17 | End: 2024-04-17 | Stop reason: HOSPADM

## 2024-04-17 RX ORDER — EZETIMIBE 10 MG/1
10 TABLET ORAL AT BEDTIME
Status: DISCONTINUED | OUTPATIENT
Start: 2024-04-17 | End: 2024-04-17 | Stop reason: HOSPADM

## 2024-04-17 RX ORDER — AMOXICILLIN 250 MG
2 CAPSULE ORAL 2 TIMES DAILY PRN
Status: DISCONTINUED | OUTPATIENT
Start: 2024-04-17 | End: 2024-04-19

## 2024-04-17 RX ORDER — IRBESARTAN 300 MG/1
300 TABLET ORAL AT BEDTIME
Status: DISCONTINUED | OUTPATIENT
Start: 2024-04-18 | End: 2024-04-19

## 2024-04-17 RX ADMIN — PANTOPRAZOLE SODIUM 40 MG: 40 TABLET, DELAYED RELEASE ORAL at 20:38

## 2024-04-17 RX ADMIN — SODIUM CHLORIDE: 9 INJECTION, SOLUTION INTRAVENOUS at 02:32

## 2024-04-17 RX ADMIN — SODIUM CHLORIDE 50 ML/HR: 9 INJECTION, SOLUTION INTRAVENOUS at 19:15

## 2024-04-17 RX ADMIN — EZETIMIBE 10 MG: 10 TABLET ORAL at 20:38

## 2024-04-17 RX ADMIN — HEPARIN SODIUM 1350 UNITS/HR: 10000 INJECTION, SOLUTION INTRAVENOUS at 11:01

## 2024-04-17 RX ADMIN — INSULIN GLARGINE 80 UNITS: 100 INJECTION, SOLUTION SUBCUTANEOUS at 20:43

## 2024-04-17 RX ADMIN — METOPROLOL TARTRATE 25 MG: 25 TABLET, FILM COATED ORAL at 10:08

## 2024-04-17 RX ADMIN — ASPIRIN 81 MG CHEWABLE TABLET 162 MG: 81 TABLET CHEWABLE at 02:29

## 2024-04-17 RX ADMIN — Medication 5000 UNITS: at 20:38

## 2024-04-17 RX ADMIN — HEPARIN SODIUM 1200 UNITS/HR: 10000 INJECTION, SOLUTION INTRAVENOUS at 00:40

## 2024-04-17 RX ADMIN — HYDRALAZINE HYDROCHLORIDE 10 MG: 20 INJECTION INTRAMUSCULAR; INTRAVENOUS at 19:01

## 2024-04-17 RX ADMIN — ROSUVASTATIN CALCIUM 40 MG: 40 TABLET, COATED ORAL at 20:38

## 2024-04-17 RX ADMIN — DIAZEPAM 5 MG: 5 TABLET ORAL at 16:22

## 2024-04-17 RX ADMIN — SODIUM CHLORIDE: 9 INJECTION, SOLUTION INTRAVENOUS at 19:07

## 2024-04-17 RX ADMIN — METOPROLOL TARTRATE 25 MG: 25 TABLET, FILM COATED ORAL at 20:38

## 2024-04-17 RX ADMIN — ASPIRIN 81 MG CHEWABLE TABLET 243 MG: 81 TABLET CHEWABLE at 14:08

## 2024-04-17 RX ADMIN — CHLORTHALIDONE 25 MG: 25 TABLET ORAL at 20:42

## 2024-04-17 RX ADMIN — ASPIRIN 81 MG CHEWABLE TABLET 81 MG: 81 TABLET CHEWABLE at 10:08

## 2024-04-17 RX ADMIN — POLYETHYLENE GLYCOL 3350 17 G: 17 POWDER, FOR SOLUTION ORAL at 20:43

## 2024-04-17 RX ADMIN — PERFLUTREN 1.5 ML: 6.52 INJECTION, SUSPENSION INTRAVENOUS at 09:00

## 2024-04-17 ASSESSMENT — ACTIVITIES OF DAILY LIVING (ADL)
ADLS_ACUITY_SCORE: 35
ADLS_ACUITY_SCORE: 25
WEAR_GLASSES_OR_BLIND: YES
HEARING_DIFFICULTY_OR_DEAF: NO
ADLS_ACUITY_SCORE: 35
ADLS_ACUITY_SCORE: 35
DOING_ERRANDS_INDEPENDENTLY_DIFFICULTY: NO
ADLS_ACUITY_SCORE: 35
ADLS_ACUITY_SCORE: 35
CONCENTRATING,_REMEMBERING_OR_MAKING_DECISIONS_DIFFICULTY: NO
ADLS_ACUITY_SCORE: 35
ADLS_ACUITY_SCORE: 25
DIFFICULTY_EATING/SWALLOWING: NO
VISION_MANAGEMENT: GLASSES
ADLS_ACUITY_SCORE: 35
WALKING_OR_CLIMBING_STAIRS_DIFFICULTY: YES
ADLS_ACUITY_SCORE: 25
DRESSING/BATHING_DIFFICULTY: YES
ADLS_ACUITY_SCORE: 24
ADLS_ACUITY_SCORE: 35
TOILETING_ISSUES: NO
ADLS_ACUITY_SCORE: 35
ADLS_ACUITY_SCORE: 35
ADLS_ACUITY_SCORE: 25
ADLS_ACUITY_SCORE: 25
ADLS_ACUITY_SCORE: 35
WALKING_OR_CLIMBING_STAIRS: OTHER (SEE COMMENTS)
CHANGE_IN_FUNCTIONAL_STATUS_SINCE_ONSET_OF_CURRENT_ILLNESS/INJURY: NO
FALL_HISTORY_WITHIN_LAST_SIX_MONTHS: NO
DIFFICULTY_COMMUNICATING: NO
ADLS_ACUITY_SCORE: 35
ADLS_ACUITY_SCORE: 35

## 2024-04-17 ASSESSMENT — EJECTION FRACTION: EF_VALUE: .3

## 2024-04-17 ASSESSMENT — ENCOUNTER SYMPTOMS: NUMBNESS: 1

## 2024-04-17 NOTE — ED PROVIDER NOTES
NAME: Nirav Baker  AGE: 79 year old male  YOB: 1945  MRN: [id number]  EVALUATION DATE & TIME: 2024  9:19 PM    PCP: [facility]    ED PROVIDER: Bacilio Daniel M.D.      Chief Complaint   Patient presents with    Chest Pain         FINAL IMPRESSION:  1. NSTEMI (non-ST elevated myocardial infarction) (H)    2. Acute chest pain        MEDICAL DECISION MAKIN:26 PM I met with the patient, obtained history, performed an initial exam, and discussed options and plan for diagnostics and treatment here in the ED.  11:06 PM Rechecked and updated patient. Patient reports feeling fine and notes pain resolved with no returning pain.  11:30 Lab called with critical lab result reporting patient's troponin is elevated at 167. Plan to call on-call Cardiologist.  11:47 PM Rechecked and updated patient on lab results and plan of care. Patient expresses agreement to plan.  12:10 AM Spoke with Dr. Hill, Cardiology, regarding patient plan of care.  12:15 AM Patient's repeat troponin is 212.  12:42 AM Spoke to Dr. Aguilar, Hospitalist, regarding plan for admission.  1:09 AM Rechecked and updated patient.    Patient was clinically assessed and consented to treatment. After assessment, medical decision making and workup were discussed with the patient. The patient was agreeable to plan for testing, workup, and treatment.  Pertinent Labs & Imaging studies reviewed. (See chart for details)       Medical Decision Making  Obtained supplemental history:Supplemental history obtained?: Documented in chart and EMS  Reviewed external records: External records reviewed?: Documented in chart  Care impacted by chronic illness:Heart Disease  Care significantly affected by social determinants of health:Access to Affordable Health Care  Did you consider but not order tests?: Work up considered but not performed and documented in chart, if applicable  Did you interpret images independently?:  Independent interpretation of ECG and images noted in documentation, when applicable.  Consultation discussion with other provider:Did you involve another provider (consultant, , pharmacy, etc.)?: I discussed the care with another health care provider, see documentation for details.  Admit.    Nirav Baker is a 79 year old male who presents with chest pain.   Differential diagnosis includes but not limited to acute coronary syndrome, pulmonary embolism, costochondritis, musculoskeletal pain, pleuritis.  Patient with right-sided chest pain that is now gone but reproducible over the sternum.  This would be unusual for cardiac however patient's pain also went away with nitroglycerin which would be more concerning.  Patient does have history of coronary disease and past Lexiscan in 2018 was otherwise unremarkable.  He did have angiography done in 2012 per patient's report which was remarkable for only 10% stenosis.  Given patient's symptoms are now gone patient will be worked up in the ER with EKG and labs.  EKG showed similar morphology to prior with no acute ischemic changes.  Patient had labs done which showed stable CBC, unremarkable metabolic panel but did show elevated troponin.  Patient's troponin is significantly elevated enough to rule in for positive for NSTEMI.  I did discuss this with cardiology who recommended heparin and admission to the [facility] for trending of troponin, echocardiogram, and cardiology consultation for possible transfer to [facility] for angiography.  Patient comfortable at this time and I discussed the results with the patient and recommendations which she was agreeable to.  Patient discussed with hospitalist for admission and heparin was started.    0 minutes of critical care time    MEDICATIONS GIVEN IN THE EMERGENCY:  Medications   heparin 25,000 units in 0.45% NaCl 250 mL ANTICOAGULANT infusion (1,200 Units/hr Intravenous Rate/Dose Verify 4/17/24 0301)   lidocaine 1  % 0.1-1 mL (has no administration in time range)   lidocaine (LMX4) cream (has no administration in time range)   sodium chloride (PF) 0.9% PF flush 3 mL (3 mLs Intracatheter Not Given 4/17/24 0232)   sodium chloride (PF) 0.9% PF flush 3 mL (has no administration in time range)   senna-docusate (SENOKOT-S/PERICOLACE) 8.6-50 MG per tablet 1 tablet (has no administration in time range)     Or   senna-docusate (SENOKOT-S/PERICOLACE) 8.6-50 MG per tablet 2 tablet (has no administration in time range)   calcium carbonate (TUMS) chewable tablet 1,000 mg (has no administration in time range)   sodium chloride 0.9 % infusion ( Intravenous $New Bag 4/17/24 0232)   nitroGLYcerin (NITROSTAT) sublingual tablet 0.4 mg (has no administration in time range)   glucose gel 15-30 g (has no administration in time range)     Or   dextrose 50 % injection 25-50 mL (has no administration in time range)     Or   glucagon injection 1 mg (has no administration in time range)   heparin ANTICOAGULANT loading dose for  LOW INTENSITY TREATMENT* Give BEFORE starting heparin infusion (6,000 Units Intravenous $Given 4/17/24 0039)   aspirin (ASA) chewable tablet 162 mg (162 mg Oral $Given 4/17/24 0229)       NEW PRESCRIPTIONS STARTED AT TODAY'S ER VISIT:  New Prescriptions    No medications on file          =================================================================    HPI    Patient information was obtained from: Patient and EMS    Use of : N/A    Nirav Baker is a 79 year old male with a past medical history of morbid obesity, acute non-ST segment elevation myocardial infarction, CAD, celiac disease, T2DM, HTN, GERD, HLD, cardiovascular disease, actinic keratosis, chronic bilateral low back pain, and chronic right-sided thoracic back pain, who presents with chest pain.  Patient reports about 8:00 PM he was getting some large foam blocks out of his car and felt pain in his right chest.  He reports after few minutes it  radiated to his right arm with a tingling or heaviness.  He did not feel short of breath, was not sweaty, nauseous or lightheaded.  He reports the pain was tolerable initially got into his car and decided to drive home.  Patient was on his way home when the pain became more severe and he was able to pull into his driveway and call EMS.  Patient reports EMS gave him aspirin and 1 dose of nitroglycerin.  Patient's pain went away after the 1 dose of nitroglycerin.  Patient episode of right-sided chest pain with right upper extremity along with tingling down this extremity. Mentions a hx of cardiac workup a few years ago.  He does note however his family does have a history of cardiac disease including his brother who  recently from  [place].    REVIEW OF SYSTEMS   Review of Systems   Cardiovascular:  Positive for chest pain (right-sided chest).   Neurological:  Positive for numbness (right upper extremity along with tingling).        PAST MEDICAL HISTORY:  Past Medical History:   Diagnosis Date    Claustrophobia     Diabetes (H)     Hiatal hernia     Hypertension     Motion sickness     NSTEMI (non-ST elevated myocardial infarction) (H) 2024    Orthopnea     Personal history of fall     Twice    Sleep apnea     Walking troubles        PAST SURGICAL HISTORY:  Past Surgical History:   Procedure Laterality Date    APPENDECTOMY      BACK SURGERY      C5    CHOLECYSTECTOMY      COLONOSCOPY      COMBINED CYSTOSCOPY, INSERT STENT URETER(S) Left 2022    Procedure: CYSTOURETEROSCOPY, WITH BLADDER NECK DILATION WITH BLADDER NECK CONTRACTURE,  BLADDER CALCULUS REMOVAL;  Surgeon: Harvey Madden MD;  Location: [facility] OR    CYSTOSCOPY, BLADDER NECK CUTS, COMBINED N/A 2023    Procedure: CYSTOSCOPY, WITH MULTIPLE INCISIONS OF BLADDER NECK. Injection of Mitomycin to stricture;  Surgeon: Gunner Pang MD;  Location: [facility] OR    DENTAL SURGERY      HEMORRHOIDECTOMY      HERNIA REPAIR       PROSTATECTOMY         CURRENT MEDICATIONS:      Current Facility-Administered Medications:     calcium carbonate (TUMS) chewable tablet 1,000 mg, 1,000 mg, Oral, 4x Daily PRN, Gerardo Aguilar MD    glucose gel 15-30 g, 15-30 g, Oral, Q15 Min PRN **OR** dextrose 50 % injection 25-50 mL, 25-50 mL, Intravenous, Q15 Min PRN **OR** glucagon injection 1 mg, 1 mg, Subcutaneous, Q15 Min PRN, Gerardo Aguilar MD    heparin 25,000 units in 0.45% NaCl 250 mL ANTICOAGULANT infusion, 0-5,000 Units/hr, Intravenous, Continuous, WallBacilio MD, Last Rate: 12 mL/hr at 04/17/24 0301, 1,200 Units/hr at 04/17/24 0301    lidocaine (LMX4) cream, , Topical, Q1H PRN, Gerardo Aguilar MD    lidocaine 1 % 0.1-1 mL, 0.1-1 mL, Other, Q1H PRN, Gerardo Aguilar MD    nitroGLYcerin (NITROSTAT) sublingual tablet 0.4 mg, 0.4 mg, Sublingual, Q5 Min PRN, Gerardo Aguilar MD    senna-docusate (SENOKOT-S/PERICOLACE) 8.6-50 MG per tablet 1 tablet, 1 tablet, Oral, BID PRN **OR** senna-docusate (SENOKOT-S/PERICOLACE) 8.6-50 MG per tablet 2 tablet, 2 tablet, Oral, BID PRN, Gerardo Aguilar MD    sodium chloride (PF) 0.9% PF flush 3 mL, 3 mL, Intracatheter, Q8H, Gerardo Aguilar MD    sodium chloride (PF) 0.9% PF flush 3 mL, 3 mL, Intracatheter, q1 min prn, Gerardo Aguilar MD    sodium chloride 0.9 % infusion, , Intravenous, Continuous, Gerardo Aguilar MD, Last Rate: 50 mL/hr at 04/17/24 0232, New Bag at 04/17/24 0232    Current Outpatient Medications:     acetaminophen (TYLENOL) 325 MG tablet, [ACETAMINOPHEN (TYLENOL) 325 MG TABLET] Take 2 tablets by mouth every 4 (four) hours as needed., Disp: , Rfl:     aspirin 81 MG EC tablet, Take 81 mg by mouth every evening, Disp: , Rfl:     aspirin-acetaminophen-caffeine (EXCEDRIN MIGRAINE) 250-250-65 mg per tablet, Take 2 tablets by mouth At Bedtime, Disp: , Rfl:     calcium carbonate (TUMS ULTRA) 400 mg calcium (1,000 mg) Chew, Take 2 tablets by mouth as needed, Disp: , Rfl:      chlorthalidone (HYGROTEN) 25 MG tablet, [CHLORTHALIDONE (HYGROTEN) 25 MG TABLET] Take 12.5 mg by mouth at bedtime. , Disp: , Rfl:     cholecalciferol, vitamin D3, 5,000 unit capsule, [CHOLECALCIFEROL, VITAMIN D3, 5,000 UNIT CAPSULE] Take 5,000 Units by mouth at bedtime. , Disp: , Rfl:     diphenhydrAMINE (BENADRYL) 25 mg tablet, [DIPHENHYDRAMINE (BENADRYL) 25 MG TABLET] Take 25 mg by mouth at bedtime as needed for itching., Disp: , Rfl:     ezetimibe (ZETIA) 10 mg tablet, [EZETIMIBE (ZETIA) 10 MG TABLET] Take 1 tablet by mouth at bedtime. , Disp: , Rfl:     glipiZIDE (GLUCOTROL XL) 10 MG 24 hr tablet, [GLIPIZIDE (GLUCOTROL XL) 10 MG 24 HR TABLET] Take 1 tablet (10 mg total) by mouth daily. (Patient taking differently: Take 20 mg by mouth every evening), Disp: 30 tablet, Rfl: 0    insulin lispro (HUMALOG KWIKPEN) 100 UNIT/ML (1 unit dial) KWIKPEN, 20-30 Units 3 times daily (before meals) 20 units breakfast, 20 units lunch and 30 units dinner, Disp: , Rfl:     irbesartan (AVAPRO) 300 MG tablet, [IRBESARTAN (AVAPRO) 300 MG TABLET] Take 300 mg by mouth at bedtime., Disp: , Rfl:     LANTUS U-100 INSULIN 100 unit/mL injection, [LANTUS U-100 INSULIN 100 UNIT/ML INJECTION] Inject 60 Units under the skin 2 (two) times a day. (Patient taking differently: Inject 54-76 Units Subcutaneous 2 times daily 54 units AM   76 units PM), Disp: 10 mL, Rfl: PRN    magnesium 250 mg Tab, [MAGNESIUM 250 MG TAB] Take 500 mg by mouth at bedtime. , Disp: , Rfl:     melatonin 3 MG CAPS, Take 6 mg by mouth At Bedtime, Disp: , Rfl:     metFORMIN (GLUCOPHAGE) 1000 MG tablet, Take 1 tablet by mouth daily (with dinner), Disp: , Rfl:     multivitamin (ONE A DAY) per tablet, [MULTIVITAMIN (ONE A DAY) PER TABLET] Take 1 tablet by mouth at bedtime. , Disp: , Rfl:     omega-3/dha/epa/fish oil (FISH OIL-OMEGA-3 FATTY ACIDS) 300-1,000 mg capsule, [OMEGA-3/DHA/EPA/FISH OIL (FISH OIL-OMEGA-3 FATTY ACIDS) 300-1,000 MG CAPSULE] Take 6 g by mouth at bedtime.,  "Disp: , Rfl:     omeprazole (PRILOSEC) 20 MG capsule, Take 1 capsule by mouth 2 times daily, Disp: , Rfl:     polyethylene glycol (MIRALAX) 17 gram packet, [POLYETHYLENE GLYCOL (MIRALAX) 17 GRAM PACKET] Take 17 g by mouth at bedtime. , Disp: , Rfl:     pseudoephedrine (SUDAFED) 30 MG tablet, [PSEUDOEPHEDRINE (SUDAFED) 30 MG TABLET] Take 1 tablet by mouth every 12 (twelve) hours as needed., Disp: , Rfl:     riboflavin, vitamin B2, 100 mg Tab, [RIBOFLAVIN, VITAMIN B2, 100 MG TAB] Take 1 tablet by mouth at bedtime. , Disp: , Rfl:     TRADJENTA 5 mg Tab, [TRADJENTA 5 MG TAB] Take 1 tablet by mouth at bedtime. , Disp: , Rfl:     ALLERGIES:  Allergies   Allergen Reactions    Amoxicillin Hives     Per Dr. Barrientos, patient has tolerated Keflex.    Atorvastatin Diarrhea    Benzalkonium Chloride Other (See Comments)    Fenofibrate Muscle Pain (Myalgia)    Gemfibrozil     Hydrocodone-Acetaminophen Other (See Comments)     Patient reports \"going white as a sheet\" and cold sweats   OK with Tylenol #3 No problems    Lisinopril Cough    Neosporin (Joq-Cyk-Fmweh) [Neomycin-Bacitracin Zn-Polymyx] Unknown    Penicillins Hives     1992 had pcn and had hives. Pt. States allergic to all cillin's      Pravastatin Sodium [Pravastatin] Muscle Pain (Myalgia)    Trulicity [Dulaglutide] Unknown    Gluten [Gluten Meal] Other (See Comments)     Reports feeling worse with gluten; tested below threshold for Celiac but daughter is positive    Insulin Detemir Rash       FAMILY HISTORY:  Family History   Problem Relation Age of Onset    Anesthesia Reaction No family hx of     Bleeding Disorder No family hx of     Venous thrombosis No family hx of        SOCIAL HISTORY:   Social History     Socioeconomic History    Marital status:    Tobacco Use    Smoking status: Never     Passive exposure: Past    Smokeless tobacco: Never   Substance and Sexual Activity    Alcohol use: Yes     Alcohol/week: 1.0 standard drink of alcohol     Types: 1 Standard " "drinks or equivalent per week     Comment: very occasional    Drug use: No       PHYSICAL EXAM:    Vitals: BP (!) 144/64   Pulse 73   Temp 98.5  F (36.9  C) (Oral)   Resp 24   Ht 1.803 m (5' 11\")   Wt 148.5 kg (327 lb 4.8 oz)   SpO2 96%   BMI 45.65 kg/m     Physical Exam  Vitals and nursing note reviewed.   Constitutional:       General: He is not in acute distress.     Appearance: He is well-developed. He is obese. He is not ill-appearing, toxic-appearing or diaphoretic.   HENT:      Head: Normocephalic.   Cardiovascular:      Rate and Rhythm: Normal rate and regular rhythm.      Heart sounds: Normal heart sounds.   Pulmonary:      Effort: Pulmonary effort is normal.      Breath sounds: Normal breath sounds.   Chest:      Chest wall: Tenderness (Tender along right sternal border.) present. No deformity or crepitus.   Abdominal:      Palpations: Abdomen is soft.      Tenderness: There is no abdominal tenderness. There is no guarding or rebound.   Musculoskeletal:      Right lower leg: No tenderness. Edema (Trace to 1+ bilateral lower extremity edema) present.      Left lower leg: No tenderness. Edema present.   Skin:     General: Skin is warm and dry.      Capillary Refill: Capillary refill takes less than 2 seconds.      Coloration: Skin is not cyanotic.   Neurological:      General: No focal deficit present.      Mental Status: He is alert.   Psychiatric:         Behavior: Behavior normal.           LAB:  All pertinent labs reviewed and interpreted.  Labs Ordered and Resulted from Time of ED Arrival to Time of ED Departure   COMPREHENSIVE METABOLIC PANEL - Abnormal       Result Value    Sodium 137      Potassium 4.7      Carbon Dioxide (CO2) 21 (*)     Anion Gap 12      Urea Nitrogen 33.7 (*)     Creatinine 1.50 (*)     GFR Estimate 47 (*)     Calcium 9.6      Chloride 104      Glucose 253 (*)     Alkaline Phosphatase 83      AST        ALT 34      Protein Total 6.6      Albumin 3.8      Bilirubin Total 0.6 "     TROPONIN T, HIGH SENSITIVITY - Abnormal    Troponin T, High Sensitivity 167 (*)    TROPONIN T, HIGH SENSITIVITY - Abnormal    Troponin T, High Sensitivity 212 (*)    GLUCOSE BY METER - Abnormal    GLUCOSE BY METER POCT 184 (*)    INR - Normal    INR 0.95     MAGNESIUM - Normal    Magnesium 2.0     NT PROBNP INPATIENT - Normal    N terminal Pro BNP Inpatient 87     CBC WITH PLATELETS AND DIFFERENTIAL    WBC Count 8.4      RBC Count 4.85      Hemoglobin 14.6      Hematocrit 41.3      MCV 85      MCH 30.1      MCHC 35.4      RDW 13.6      Platelet Count 170      % Neutrophils 68      % Lymphocytes 20      % Monocytes 7      % Eosinophils 3      % Basophils 1      % Immature Granulocytes 1      NRBCs per 100 WBC 0      Absolute Neutrophils 5.7      Absolute Lymphocytes 1.7      Absolute Monocytes 0.6      Absolute Eosinophils 0.2      Absolute Basophils 0.1      Absolute Immature Granulocytes 0.1      Absolute NRBCs 0.0     HEPARIN UNFRACTIONATED ANTI XA LEVEL   COMPREHENSIVE METABOLIC PANEL   GLUCOSE MONITOR NURSING POCT   GLUCOSE MONITOR NURSING POCT   GLUCOSE MONITOR NURSING POCT   GLUCOSE MONITOR NURSING POCT   GLUCOSE MONITOR NURSING POCT       RADIOLOGY:  XR Chest Port 1 View   Final Result   IMPRESSION:    1. A few curvilinear opacities in the left lung base most likely represent atelectasis or scarring, but pneumonia cannot be excluded.   2. No other findings suspicious for active cardiopulmonary disease.       Echocardiogram Complete    (Results Pending)       EKG:   Performed at: 04/16/2024 21:30:22  Impression: Sinus rhythm with first-degree AV block, similar to prior with rhythm, left axis deviation with slight QRS widening compared to prior these are similar findings and only noted for decrease in PVCs compared to prior.  Rate: 96 BPM  Rhythm: Sinus rhythm  QRS Interval: 120 ms  QTc Interval: 467 ms  Comparison: When compared with ECG of 01/03/2023 19:42, Premature ventricular complexes are no longer  Present FL interval has increased.    I have independently reviewed and interpreted the EKG(s) documented above.     PROCEDURES:   Procedures       I, Rita Montez, am serving as a scribe to document services personally performed by Dr. Bacilio Daniel  based on my observation and the provider's statements to me. I, Bacilio Daniel MD attest that Rita Montez is acting in a scribe capacity, has observed my performance of the services and has documented them in accordance with my direction.      Bacilio Daniel M.D.  Emergency Medicine  [facility] Emergency Department       Bacilio Daniel MD  04/17/24 0426

## 2024-04-17 NOTE — PHARMACY-ADMISSION MEDICATION HISTORY
Admission medication history completed at [facility]. Please see Pharmacist Admission Medication History note from 04/17/2024.

## 2024-04-17 NOTE — H&P (VIEW-ONLY)
"  HEART CARE CONSULTATON NOTE        Assessment/Recommendations   Assessment:    Non-ST elevation myocardial infarction: Episode of chest discomfort last night now resolved after nitroglycerin with elevation in his troponins.  Recommend proceeding with coronary angiogram with possible intervention.  Discussed with patient and daughter who are at the bedside.  He understands risk and benefit of the procedure and agrees to proceed.  Underwent angiography in 2012 that showed mild nonobstructive coronary artery disease in RCA and LAD.  Hyperlipidemia with statin intolerance: Refused statin therapy on this admission restarted Zetia 10 mg daily.  If continues to decline statin therapy recommend PCSK9 inhibitor.  Hypertension   Insulin-dependent diabetes mellitus type 2 poorly controlled  Chronic kidney disease stage III  Morbid obesity  ELDON on CPAP  Significant family history of premature coronary disease: Brother passed away in his 50s    Plan:    Transfer to [facility] for coronary angiography with possible intervention  Echocardiogram  Continue heparin drip and PRN nitroglycerin, reordered PTA ASA, Zetia, added metoprolol tartrate 25 mg BID  Lipid panel pending      Clinically Significant Risk Factors Present on Admission                  # Drug Induced Platelet Defect: home medication list includes an antiplatelet medication       # Hypertension: Noted on problem list        # Severe Obesity: Estimated body mass index is 45.65 kg/m  as calculated from the following:    Height as of this encounter: 1.803 m (5' 11\").    Weight as of this encounter: 148.5 kg (327 lb 4.8 oz).                History of Present Illness/Subjective    HPI: Nirav Baker is a 79 year old male with PMH including type 2 diabetes, CKD3, HLD, hypertension, and NSTEMI who is admitted for concern for NSTEMI.    NSTEMI - concern for this during admission in 2012; underwent coronary angiogram with no lesions amenable to stenting - " "troponin elevation felt to be 2/2 sepsis.    On ASA 81mg, Zetia (myalgias with statin and fenofibrate), irbesartan, chlorthalidone PTA. Uses CPAP for ELDON. Has family history in brother of coronary artery disease and death due to MI. Does not smoke.    Lexiscan normal/negative in 5/2018. Last echo in 6/2018 with LVEF 49%.    Presented to ED yesterday after episode of chest pain described as internal/dull radiating down R arm with numbness/tingling that started with walking. Was not lifting heavy objects. Has not had this pain before. No nausea or dyspnea. Received ASA and nitroglycerin and chest pain resolved at 10pm last night; no recurrence since. Troponin increasing; admitted and receiving heparin drip. EKG in ED with no significant changes or ST elevation since last EKG in 5/2023. Vitals WNL on RA. Cr 1.36 which appears to be around/better than his baseline. CBC normal.         Physical Examination  Review of Systems   VITALS: /60   Pulse 71   Temp 98.2  F (36.8  C) (Oral)   Resp 22   Ht 1.803 m (5' 11\")   Wt 148.5 kg (327 lb 4.8 oz)   SpO2 98%   BMI 45.65 kg/m    BMI: Body mass index is 45.65 kg/m .  Wt Readings from Last 3 Encounters:   04/17/24 148.5 kg (327 lb 4.8 oz)   11/13/23 (!) 151 kg (333 lb)   04/14/23 149.7 kg (330 lb)       Intake/Output Summary (Last 24 hours) at 4/17/2024 0857  Last data filed at 4/17/2024 0742  Gross per 24 hour   Intake 344.78 ml   Output --   Net 344.78 ml     General Appearance:   no distress, obese body habitus   ENT/Mouth: membranes moist, no oral lesions or bleeding gums.   EYES:  no scleral icterus, normal conjunctivae   Chest/Lungs:   lungs are clear to auscultation, no rales or wheezing   Cardiovascular:   Regular. Normal first and second heart sounds with no murmurs, rubs, or gallops. 2+ pitting edema in bilateral lower legs.   Abdomen:  no organomegaly, masses, bruits, or tenderness; bowel sounds are present   Extremities: no cyanosis or clubbing   Skin: no " "xanthelasma, warm.    Neurologic: no tremors     Psychiatric: alert and oriented x3, calm     Pertinent positives and negatives as noted in HPI.          Lab Results    Chemistry/lipid CBC Cardiac Enzymes/BNP/TSH/INR   No results for input(s): \"CHOL\", \"HDL\", \"LDL\", \"TRIG\", \"CHOLHDLRATIO\" in the last 03218 hours.  No results for input(s): \"LDL\" in the last 58026 hours.  Recent Labs   Lab Test 04/17/24  0728 04/17/24  0548   NA  --  141   POTASSIUM  --  4.0   CHLORIDE  --  106   CO2  --  21*   * 160*   BUN  --  31.5*   CR  --  1.36*   GFRESTIMATED  --  53*   AURORA  --  9.7     Recent Labs   Lab Test 04/17/24  0548 04/16/24 2206 08/24/22  1548   CR 1.36* 1.50* 2.28*     No results for input(s): \"A1C\" in the last 20992 hours.       Recent Labs   Lab Test 04/17/24  0548   WBC 7.2   HGB 14.6   HCT 42.0   MCV 86        Recent Labs   Lab Test 04/17/24  0548 04/16/24  2254 08/24/22  1548   HGB 14.6 14.6 15.4    Recent Labs   Lab Test 02/19/22 2312   TROPONINI <0.01     Recent Labs   Lab Test 04/16/24 2206 06/09/18  0525   BNP  --  34   NTBNPI 87  --      No results for input(s): \"TSH\" in the last 68430 hours.  Recent Labs   Lab Test 04/16/24 2221 02/19/22  2312 06/09/18  0020   INR 0.95 1.03 1.03        Medical History  Surgical History Family History Social History   Past Medical History:   Diagnosis Date     Claustrophobia      Diabetes (H)      Hiatal hernia      Hypertension      Motion sickness      NSTEMI (non-ST elevated myocardial infarction) (H) 4/17/2024     Orthopnea      Personal history of fall     Twice     Sleep apnea      Walking troubles      Past Surgical History:   Procedure Laterality Date     APPENDECTOMY       BACK SURGERY      C5     CHOLECYSTECTOMY       COLONOSCOPY       COMBINED CYSTOSCOPY, INSERT STENT URETER(S) Left 9/9/2022    Procedure: CYSTOURETEROSCOPY, WITH BLADDER NECK DILATION WITH BLADDER NECK CONTRACTURE,  BLADDER CALCULUS REMOVAL;  Surgeon: Harvey Madden MD;  " Location: [facility] OR     CYSTOSCOPY, BLADDER NECK CUTS, COMBINED N/A 4/14/2023    Procedure: CYSTOSCOPY, WITH MULTIPLE INCISIONS OF BLADDER NECK. Injection of Mitomycin to stricture;  Surgeon: Gunner Pang MD;  Location: [facility] OR     DENTAL SURGERY       HEMORRHOIDECTOMY       HERNIA REPAIR       PROSTATECTOMY       Family History   Problem Relation Age of Onset     Anesthesia Reaction No family hx of      Bleeding Disorder No family hx of      Venous thrombosis No family hx of         Social History     Socioeconomic History     Marital status:      Spouse name: Not on file     Number of children: Not on file     Years of education: Not on file     Highest education level: Not on file   Occupational History     Not on file   Tobacco Use     Smoking status: Never     Passive exposure: Past     Smokeless tobacco: Never   Substance and Sexual Activity     Alcohol use: Yes     Alcohol/week: 1.0 standard drink of alcohol     Types: 1 Standard drinks or equivalent per week     Comment: very occasional     Drug use: No     Sexual activity: Not on file   Other Topics Concern     Not on file   Social History Narrative     Not on file     Social Determinants of Health     Financial Resource Strain: Not on file   Food Insecurity: Not on file   Transportation Needs: Not on file   Physical Activity: Not on file   Stress: Not on file   Social Connections: Not on file   Interpersonal Safety: Not on file   Housing Stability: Not on file         Medications  Allergies   Current Outpatient Medications   Medication Sig Dispense Refill     acetaminophen (TYLENOL) 325 MG tablet [ACETAMINOPHEN (TYLENOL) 325 MG TABLET] Take 2 tablets by mouth every 4 (four) hours as needed.       aspirin 81 MG EC tablet Take 81 mg by mouth every evening       aspirin-acetaminophen-caffeine (EXCEDRIN MIGRAINE) 250-250-65 mg per tablet Take 2 tablets by mouth daily as needed for headaches       calcium carbonate (TUMS ULTRA) 400 mg  calcium (1,000 mg) Chew Take 2 tablets by mouth as needed       chlorthalidone (HYGROTEN) 25 MG tablet Take 25 mg by mouth every other day       cholecalciferol, vitamin D3, 5,000 unit capsule [CHOLECALCIFEROL, VITAMIN D3, 5,000 UNIT CAPSULE] Take 5,000 Units by mouth at bedtime.        diphenhydrAMINE (BENADRYL) 25 mg tablet [DIPHENHYDRAMINE (BENADRYL) 25 MG TABLET] Take 25 mg by mouth at bedtime as needed for itching.       ezetimibe (ZETIA) 10 mg tablet [EZETIMIBE (ZETIA) 10 MG TABLET] Take 1 tablet by mouth at bedtime.        glipiZIDE (GLUCOTROL XL) 10 MG 24 hr tablet Take 20 mg by mouth at bedtime       insulin glargine (LANTUS VIAL) 100 UNIT/ML vial Inject 60 Units Subcutaneous every morning       insulin glargine (LANTUS VIAL) 100 UNIT/ML vial Inject 80 Units Subcutaneous at bedtime       insulin lispro (HUMALOG KWIKPEN) 100 UNIT/ML (1 unit dial) KWIKPEN 20-30 Units 2 times daily (with meals) 20 units breakfast, 30 units dinner       irbesartan (AVAPRO) 300 MG tablet [IRBESARTAN (AVAPRO) 300 MG TABLET] Take 300 mg by mouth at bedtime.       magnesium 500 MG TABS Take 500 mg by mouth at bedtime       melatonin 3 MG CAPS Take 6 mg by mouth At Bedtime       metFORMIN (GLUCOPHAGE) 500 MG tablet Take 1 tablet by mouth at bedtime       multivitamin (ONE A DAY) per tablet [MULTIVITAMIN (ONE A DAY) PER TABLET] Take 1 tablet by mouth at bedtime.        omega-3/dha/epa/fish oil (FISH OIL-OMEGA-3 FATTY ACIDS) 300-1,000 mg capsule [OMEGA-3/DHA/EPA/FISH OIL (FISH OIL-OMEGA-3 FATTY ACIDS) 300-1,000 MG CAPSULE] Take 6 g by mouth at bedtime.       omeprazole (PRILOSEC) 20 MG capsule Take 1 capsule by mouth at bedtime       polyethylene glycol (MIRALAX) 17 gram packet [POLYETHYLENE GLYCOL (MIRALAX) 17 GRAM PACKET] Take 17 g by mouth at bedtime.        pseudoephedrine (SUDAFED) 30 MG tablet [PSEUDOEPHEDRINE (SUDAFED) 30 MG TABLET] Take 1 tablet by mouth every 12 (twelve) hours as needed.       riboflavin, vitamin B2, 100 mg  "Tab [RIBOFLAVIN, VITAMIN B2, 100 MG TAB] Take 1 tablet by mouth at bedtime.        TRADJENTA 5 mg Tab [TRADJENTA 5 MG TAB] Take 1 tablet by mouth at bedtime.           Allergies   Allergen Reactions     Amoxicillin Hives     Per Dr. Barrientos, patient has tolerated Keflex.     Atorvastatin Diarrhea     Benzalkonium Chloride Other (See Comments)     Fenofibrate Muscle Pain (Myalgia)     Gemfibrozil      Hydrocodone-Acetaminophen Other (See Comments)     Patient reports \"going white as a sheet\" and cold sweats   OK with Tylenol #3 No problems     Lisinopril Cough     Neosporin (Bik-Ozd-Tiztu) [Neomycin-Bacitracin Zn-Polymyx] Unknown     Penicillins Hives     1992 had pcn and had hives. Pt. States allergic to all cillin's       Pravastatin Sodium [Pravastatin] Muscle Pain (Myalgia)     Trulicity [Dulaglutide] Unknown     Gluten [Gluten Meal] Other (See Comments)     Reports feeling worse with gluten; tested below threshold for Celiac but daughter is positive     Insulin Detemir Rash         Patient discussed with Dr. Nath.    Katie Castellanos MD  Patient seen and examined with Dr. Castellanos.  Spent 60 minutes.  Agree with findings including HPI, assessment and plan, physical exam.  "

## 2024-04-17 NOTE — ED NOTES
Transport here to transport patient to [facility] lab.   Seth Cedeño RN  4/17/2024  12:53 PM

## 2024-04-17 NOTE — H&P
[facility] MEDICINE ADMISSION HISTORY AND PHYSICAL       Assessment & Plan      Present on Admission (POA)    1. Consider NSTEMI - presented with mid chest pain and Elevated troponin. Multiple cardiac risk factors including poorly controlled DM    - He is chest pain free at this point  - Continue Heparin infusion,  mgs. He takes ASA bedtime  - Cardiology evaluation  - ECHO in AM  - Nitroglycerin for chest pain  - tele and pulse ox       Chronic Medical Conditions    1. DM2 with CKD stage III - A1c is 9+ recently - NPO   - sliding scale insulin/FS - Holding sliding scale insulin while NPO in the settings of renal failure. Cover if Glucose above 180    2. HTN and HL     Aware that he might need to be transferred for possible [facility]       VTE prophylaxis --  SCDs,   Diet --   NPO  Code Status -- Full,  Barriers to discharge -- Admitting/Acute medical condition/s  Discharge Disposition and goals --  Unable to determine at this point, pending progress/treatment response.     PPE - I was wearing PPE including but not limited to - N95 mask, Gloves, and/or Safety glasses.  Admission Status -- Inpatient     Care plan is based on available information and patient's condition at encounter. Care plan was discussed and agreed to proceed by the patient/family member. Made aware that care plan may change.     I recommend to review/revise history/care plan for information/results not available during my encounter. All or some home medication/s were not resumed or was modified on admission due to safety concerns. Will have rounding AM doc  review safety to resume held/modified home medications.     Availability -- If need to coordinate care after night shift  -- Contact assigned AM rounding Hospitalist.     75 minutes spent by me on the date of service doing chart review, history, exam, diagnostic test results interpretation, care coordination with RN, RT and/or Pharm, & further activities per the  note.      Redd Aguilar MD, MPH, FACP, [facility]  Internal Medicine - Hospitalist        Chief Complaint      HISTORY     - Patient was seen in ED - 8. Met his wife too.   - He is here with chest pain - mid area and had tingling of both arms R>L.   - This occurred after lifting light items to the car. They went home after.   - Symptoms persisted and they called EMS and gave Nitroglycerin - and went away.  - He remains CP free when I met him.  - He reported he was mild short lived mid chest pain before, but this was concerning as it was lasting longer  - No cough or colds.     - ED course/treatments/referral - elev troponin, EKG, Cardiology consult, IV heparin      - ROS --- No headache. No dizziness. No weakness. No palpitations. No abdominal pain. No nausea or vomiting. No urinary symptoms. No bleeding symptoms. No weight loss. Rest of 12 point ROS was reviewed and negative.       Past Medical History     Past Medical History:   Diagnosis Date    Claustrophobia     Diabetes (H)     Hiatal hernia     Hypertension     Motion sickness     Orthopnea     Personal history of fall     Twice    Sleep apnea     Walking troubles          Surgical History     Past Surgical History:   Procedure Laterality Date    APPENDECTOMY      BACK SURGERY      C5    CHOLECYSTECTOMY      COLONOSCOPY      COMBINED CYSTOSCOPY, INSERT STENT URETER(S) Left 9/9/2022    Procedure: CYSTOURETEROSCOPY, WITH BLADDER NECK DILATION WITH BLADDER NECK CONTRACTURE,  BLADDER CALCULUS REMOVAL;  Surgeon: Harvey Madden MD;  Location: [facility] OR    CYSTOSCOPY, BLADDER NECK CUTS, COMBINED N/A 4/14/2023    Procedure: CYSTOSCOPY, WITH MULTIPLE INCISIONS OF BLADDER NECK. 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  Social History     Socioeconomic History    Marital status:      Spouse name: Not on file    Number of children: Not on file    Years of education: Not on file    Highest education level: Not on file   Occupational History    Not on file   Tobacco Use    Smoking status: Never     Passive exposure: Past    Smokeless tobacco: Never   Substance and Sexual Activity    Alcohol use: Yes     Alcohol/week: 1.0 standard drink of alcohol     Types: 1 Standard drinks or equivalent per week     Comment: very occasional    Drug use: No    Sexual activity: Not on file   Other Topics Concern    Not on file   Social History Narrative    Not on file     Social Determinants of Health     Financial Resource Strain: Not on file   Food Insecurity: Not on file   Transportation Needs: Not on file   Physical Activity: Not on file   Stress: Not on file   Social Connections: Not on file   Interpersonal Safety: Not on file   Housing Stability: Not on file          Allergies        Allergies   Allergen Reactions    Amoxicillin Hives     Per Dr. Barrientos, patient has tolerated Keflex.    Atorvastatin Diarrhea    Benzalkonium Chloride Other (See Comments)    Fenofibrate Muscle Pain (Myalgia)    Gemfibrozil     Hydrocodone-Acetaminophen Other (See Comments)     Patient reports \"going white as a sheet\" and cold sweats   OK with Tylenol #3 No problems    Lisinopril Cough    Neosporin (Mla-Dly-Rwuty) [Neomycin-Bacitracin Zn-Polymyx] Unknown    Penicillins Hives     1992 had pcn and had hives. Pt. States allergic to all cillin's      Pravastatin Sodium [Pravastatin] Muscle Pain (Myalgia)    Trulicity [Dulaglutide] Unknown    Gluten [Gluten Meal] Other (See Comments)     Reports feeling worse with gluten; tested below threshold for Celiac but daughter is positive    Insulin Detemir Rash         Prior to Admission Medications      Current " Facility-Administered Medications   Medication Dose Route Frequency Provider Last Rate Last Admin    heparin 25,000 units in 0.45% NaCl 250 mL ANTICOAGULANT infusion  0-5,000 Units/hr Intravenous Continuous Bacilio Daniel MD        heparin ANTICOAGULANT loading dose for  LOW INTENSITY TREATMENT* Give BEFORE starting heparin infusion  6,000 Units Intravenous Once Bacilio Daniel MD         Current Outpatient Medications   Medication Sig Dispense Refill    acetaminophen (TYLENOL) 325 MG tablet [ACETAMINOPHEN (TYLENOL) 325 MG TABLET] Take 2 tablets by mouth every 4 (four) hours as needed.      aspirin 81 MG EC tablet Take 81 mg by mouth every evening      aspirin-acetaminophen-caffeine (EXCEDRIN MIGRAINE) 250-250-65 mg per tablet Take 2 tablets by mouth At Bedtime      calcium carbonate (TUMS ULTRA) 400 mg calcium (1,000 mg) Chew Take 2 tablets by mouth as needed      chlorthalidone (HYGROTEN) 25 MG tablet [CHLORTHALIDONE (HYGROTEN) 25 MG TABLET] Take 12.5 mg by mouth at bedtime.       cholecalciferol, vitamin D3, 5,000 unit capsule [CHOLECALCIFEROL, VITAMIN D3, 5,000 UNIT CAPSULE] Take 5,000 Units by mouth at bedtime.       diphenhydrAMINE (BENADRYL) 25 mg tablet [DIPHENHYDRAMINE (BENADRYL) 25 MG TABLET] Take 25 mg by mouth at bedtime as needed for itching.      ezetimibe (ZETIA) 10 mg tablet [EZETIMIBE (ZETIA) 10 MG TABLET] Take 1 tablet by mouth at bedtime.       glipiZIDE (GLUCOTROL XL) 10 MG 24 hr tablet [GLIPIZIDE (GLUCOTROL XL) 10 MG 24 HR TABLET] Take 1 tablet (10 mg total) by mouth daily. (Patient taking differently: Take 20 mg by mouth every evening) 30 tablet 0    insulin lispro (HUMALOG KWIKPEN) 100 UNIT/ML (1 unit dial) KWIKPEN 20-30 Units 3 times daily (before meals) 20 units breakfast, 20 units lunch and 30 units dinner      irbesartan (AVAPRO) 300 MG tablet [IRBESARTAN (AVAPRO) 300 MG TABLET] Take 300 mg by mouth at bedtime.      LANTUS U-100 INSULIN 100 unit/mL injection  "[LANTUS U-100 INSULIN 100 UNIT/ML INJECTION] Inject 60 Units under the skin 2 (two) times a day. (Patient taking differently: Inject 54-76 Units Subcutaneous 2 times daily 54 units AM   76 units PM) 10 mL PRN    magnesium 250 mg Tab [MAGNESIUM 250 MG TAB] Take 500 mg by mouth at bedtime.       melatonin 3 MG CAPS Take 6 mg by mouth At Bedtime      metFORMIN (GLUCOPHAGE) 1000 MG tablet Take 1 tablet by mouth daily (with dinner)      multivitamin (ONE A DAY) per tablet [MULTIVITAMIN (ONE A DAY) PER TABLET] Take 1 tablet by mouth at bedtime.       omega-3/dha/epa/fish oil (FISH OIL-OMEGA-3 FATTY ACIDS) 300-1,000 mg capsule [OMEGA-3/DHA/EPA/FISH OIL (FISH OIL-OMEGA-3 FATTY ACIDS) 300-1,000 MG CAPSULE] Take 6 g by mouth at bedtime.      omeprazole (PRILOSEC) 20 MG capsule Take 1 capsule by mouth 2 times daily      polyethylene glycol (MIRALAX) 17 gram packet [POLYETHYLENE GLYCOL (MIRALAX) 17 GRAM PACKET] Take 17 g by mouth at bedtime.       pseudoephedrine (SUDAFED) 30 MG tablet [PSEUDOEPHEDRINE (SUDAFED) 30 MG TABLET] Take 1 tablet by mouth every 12 (twelve) hours as needed.      riboflavin, vitamin B2, 100 mg Tab [RIBOFLAVIN, VITAMIN B2, 100 MG TAB] Take 1 tablet by mouth at bedtime.       TRADJENTA 5 mg Tab [TRADJENTA 5 MG TAB] Take 1 tablet by mouth at bedtime.              Review of Systems     A 12 point comprehensive review of systems was negative except as noted above in HPI.    PHYSICAL EXAMINATION       Vitals      Vitals: /58   Pulse 77   Temp 98.8  F (37.1  C) (Oral)   Resp 19   Ht 1.803 m (5' 11\")   Wt 148.5 kg (327 lb 4.8 oz)   SpO2 96%   BMI 45.65 kg/m    BMI= Body mass index is 45.65 kg/m .      Examination     General Appearance:  Alert, cooperative, no distress  Head:    Normocephalic, without obvious abnormality, atraumatic  EENT:  PERRL, conjunctiva/corneas clear, EOM's intact.   Neck:   Supple, symmetrical, trachea midline, no adenopathy; no NVE  Back:  Symmetric, no curvature, no CVA " tenderness  Chest/Lungs: Clear to auscultation bilaterally, respirations unlabored, No tenderness or deformity. No abdominal breathing or use of accessory muscles.   Heart:    Regular rate and rhythm, S1 and S2 normal, no murmur, rub   or gallop  Abdomen: Soft, non-tender, bowel sounds active all four quadrants, not peritoneal on palpation. Not distended  Extremities:  Extremities normal, atraumatic, no swelling   Skin:  Skin color, texture, turgor normal, no rashes or lesion  Neurologic:  Awake and alert, No lateralizing or localizing signs            Pertinent Lab     Results for orders placed or performed during the hospital encounter of 04/16/24   XR Chest Port 1 View    Impression    IMPRESSION:   1. A few curvilinear opacities in the left lung base most likely represent atelectasis or scarring, but pneumonia cannot be excluded.  2. No other findings suspicious for active cardiopulmonary disease.    Result Value Ref Range    INR 0.95 0.85 - 1.15   Comprehensive metabolic panel   Result Value Ref Range    Sodium 137 135 - 145 mmol/L    Potassium 4.7 3.4 - 5.3 mmol/L    Carbon Dioxide (CO2) 21 (L) 22 - 29 mmol/L    Anion Gap 12 7 - 15 mmol/L    Urea Nitrogen 33.7 (H) 8.0 - 23.0 mg/dL    Creatinine 1.50 (H) 0.67 - 1.17 mg/dL    GFR Estimate 47 (L) >60 mL/min/1.73m2    Calcium 9.6 8.8 - 10.2 mg/dL    Chloride 104 98 - 107 mmol/L    Glucose 253 (H) 70 - 99 mg/dL    Alkaline Phosphatase 83 40 - 150 U/L    AST      ALT 34 0 - 70 U/L    Protein Total 6.6 6.4 - 8.3 g/dL    Albumin 3.8 3.5 - 5.2 g/dL    Bilirubin Total 0.6 <=1.2 mg/dL   Result Value Ref Range    Troponin T, High Sensitivity 167 (HH) <=22 ng/L   Result Value Ref Range    Magnesium 2.0 1.7 - 2.3 mg/dL   Nt probnp inpatient (BNP)   Result Value Ref Range    N terminal Pro BNP Inpatient 87 0 - 1,800 pg/mL   CBC with platelets and differential   Result Value Ref Range    WBC Count 8.4 4.0 - 11.0 10e3/uL    RBC Count 4.85 4.40 - 5.90 10e6/uL    Hemoglobin 14.6  13.3 - 17.7 g/dL    Hematocrit 41.3 40.0 - 53.0 %    MCV 85 78 - 100 fL    MCH 30.1 26.5 - 33.0 pg    MCHC 35.4 31.5 - 36.5 g/dL    RDW 13.6 10.0 - 15.0 %    Platelet Count 170 150 - 450 10e3/uL    % Neutrophils 68 %    % Lymphocytes 20 %    % Monocytes 7 %    % Eosinophils 3 %    % Basophils 1 %    % Immature Granulocytes 1 %    NRBCs per 100 WBC 0 <1 /100    Absolute Neutrophils 5.7 1.6 - 8.3 10e3/uL    Absolute Lymphocytes 1.7 0.8 - 5.3 10e3/uL    Absolute Monocytes 0.6 0.0 - 1.3 10e3/uL    Absolute Eosinophils 0.2 0.0 - 0.7 10e3/uL    Absolute Basophils 0.1 0.0 - 0.2 10e3/uL    Absolute Immature Granulocytes 0.1 <=0.4 10e3/uL    Absolute NRBCs 0.0 10e3/uL   Result Value Ref Range    Troponin T, High Sensitivity 212 (HH) <=22 ng/L   ECG 12-LEAD WITH MUSE (LHE)   Result Value Ref Range    Systolic Blood Pressure 141 mmHg    Diastolic Blood Pressure 66 mmHg    Ventricular Rate 96 BPM    Atrial Rate 96 BPM    NC Interval 218 ms    QRS Duration 120 ms     ms    QTc 467 ms    P Axis 28 degrees    R AXIS -57 degrees    T Axis 63 degrees    Interpretation ECG       Sinus rhythm with 1st degree A-V block  Left axis deviation  Septal infarct (cited on or before 09-SEP-2012)  Possible Lateral infarct (cited on or before 09-JUN-2018)  Abnormal ECG  When compared with ECG of 03-JAN-2023 19:42,  Premature ventricular complexes are no longer Present  NC interval has increased  Confirmed by SEE ED PROVIDER NOTE FOR, ECG INTERPRETATION ([id number]),  DEREK TODD ([id number]) on 4/16/2024 10:46:51 PM             Pertinent Radiology

## 2024-04-17 NOTE — PHARMACY-ADMISSION MEDICATION HISTORY
Pharmacist Admission Medication History    Admission medication history is complete. The information provided in this note is only as accurate as the sources available at the time of the update.    Information Source(s): Patient[facility], and patient had a medication list   via in-person    Pertinent Information:     Changes made to PTA medication list:  Added: None  Deleted: None  Changed: Metformin, Omeprazole, Excedrin, Chlorthalidone, Glipizide, Magnesium, Insulin,     Allergies reviewed with patient and updates made in EHR: yes    Medication History Completed By: Arline Viera PharmD 4/17/2024 8:28 AM    PTA Med List   Medication Sig Last Dose    acetaminophen (TYLENOL) 325 MG tablet [ACETAMINOPHEN (TYLENOL) 325 MG TABLET] Take 2 tablets by mouth every 4 (four) hours as needed. Unknown at prn    aspirin 81 MG EC tablet Take 81 mg by mouth every evening 4/15/2024 at hs    aspirin-acetaminophen-caffeine (EXCEDRIN MIGRAINE) 250-250-65 mg per tablet Take 2 tablets by mouth daily as needed for headaches Unknown at prm    calcium carbonate (TUMS ULTRA) 400 mg calcium (1,000 mg) Chew Take 2 tablets by mouth as needed Unknown at prn    chlorthalidone (HYGROTEN) 25 MG tablet Take 25 mg by mouth every other day 4/15/2024 at hs    cholecalciferol, vitamin D3, 5,000 unit capsule [CHOLECALCIFEROL, VITAMIN D3, 5,000 UNIT CAPSULE] Take 5,000 Units by mouth at bedtime.  4/15/2024 at hs    diphenhydrAMINE (BENADRYL) 25 mg tablet [DIPHENHYDRAMINE (BENADRYL) 25 MG TABLET] Take 25 mg by mouth at bedtime as needed for itching. Unknown at prn    ezetimibe (ZETIA) 10 mg tablet [EZETIMIBE (ZETIA) 10 MG TABLET] Take 1 tablet by mouth at bedtime.  4/15/2024 at hs    glipiZIDE (GLUCOTROL XL) 10 MG 24 hr tablet Take 20 mg by mouth at bedtime 4/15/2024 at hs    insulin glargine (LANTUS VIAL) 100 UNIT/ML vial Inject 60 Units Subcutaneous every morning 4/16/2024 at am    insulin glargine (LANTUS VIAL)  100 UNIT/ML vial Inject 80 Units Subcutaneous at bedtime 4/15/2024 at hs    insulin lispro (HUMALOG KWIKPEN) 100 UNIT/ML (1 unit dial) KWIKPEN 20-30 Units 2 times daily (with meals) 20 units breakfast, 30 units dinner 4/16/2024 at am    irbesartan (AVAPRO) 300 MG tablet [IRBESARTAN (AVAPRO) 300 MG TABLET] Take 300 mg by mouth at bedtime. 4/15/2024 at hs    magnesium 500 MG TABS Take 500 mg by mouth at bedtime 4/15/2024 at hs    melatonin 3 MG CAPS Take 6 mg by mouth At Bedtime 4/15/2024 at hs    metFORMIN (GLUCOPHAGE) 500 MG tablet Take 1 tablet by mouth at bedtime 4/15/2024 at hs    multivitamin (ONE A DAY) per tablet [MULTIVITAMIN (ONE A DAY) PER TABLET] Take 1 tablet by mouth at bedtime.  4/15/2024 at hs    omega-3/dha/epa/fish oil (FISH OIL-OMEGA-3 FATTY ACIDS) 300-1,000 mg capsule [OMEGA-3/DHA/EPA/FISH OIL (FISH OIL-OMEGA-3 FATTY ACIDS) 300-1,000 MG CAPSULE] Take 6 g by mouth at bedtime. 4/15/2024 at hs    omeprazole (PRILOSEC) 20 MG capsule Take 1 capsule by mouth at bedtime 4/15/2024 at hs    polyethylene glycol (MIRALAX) 17 gram packet [POLYETHYLENE GLYCOL (MIRALAX) 17 GRAM PACKET] Take 17 g by mouth at bedtime.  4/15/2024 at hs    pseudoephedrine (SUDAFED) 30 MG tablet [PSEUDOEPHEDRINE (SUDAFED) 30 MG TABLET] Take 1 tablet by mouth every 12 (twelve) hours as needed. Unknown at prn    riboflavin, vitamin B2, 100 mg Tab [RIBOFLAVIN, VITAMIN B2, 100 MG TAB] Take 1 tablet by mouth at bedtime.  4/15/2024 at hs    TRADJENTA 5 mg Tab [TRADJENTA 5 MG TAB] Take 1 tablet by mouth at bedtime.  4/15/2024 at hs

## 2024-04-17 NOTE — CONSULTS
"  HEART CARE CONSULTATON NOTE        Assessment/Recommendations   Assessment:    Non-ST elevation myocardial infarction: Episode of chest discomfort last night now resolved after nitroglycerin with elevation in his troponins.  Recommend proceeding with coronary angiogram with possible intervention.  Discussed with patient and daughter who are at the bedside.  He understands risk and benefit of the procedure and agrees to proceed.  Underwent angiography in 2012 that showed mild nonobstructive coronary artery disease in RCA and LAD.  Hyperlipidemia with statin intolerance: Refused statin therapy on this admission restarted Zetia 10 mg daily.  If continues to decline statin therapy recommend PCSK9 inhibitor.  Hypertension   Insulin-dependent diabetes mellitus type 2 poorly controlled  Chronic kidney disease stage III  Morbid obesity  ELDON on CPAP  Significant family history of premature coronary disease: Brother passed away in his 50s    Plan:    Transfer to [facility] for coronary angiography with possible intervention  Echocardiogram  Continue heparin drip and PRN nitroglycerin, reordered PTA ASA, Zetia, added metoprolol tartrate 25 mg BID  Lipid panel pending      Clinically Significant Risk Factors Present on Admission                  # Drug Induced Platelet Defect: home medication list includes an antiplatelet medication       # Hypertension: Noted on problem list        # Severe Obesity: Estimated body mass index is 45.65 kg/m  as calculated from the following:    Height as of this encounter: 1.803 m (5' 11\").    Weight as of this encounter: 148.5 kg (327 lb 4.8 oz).                History of Present Illness/Subjective    HPI: Nirav Baker is a 79 year old male with PMH including type 2 diabetes, CKD3, HLD, hypertension, and NSTEMI who is admitted for concern for NSTEMI.    NSTEMI - concern for this during admission in 2012; underwent coronary angiogram with no lesions amenable to stenting - " "troponin elevation felt to be 2/2 sepsis.    On ASA 81mg, Zetia (myalgias with statin and fenofibrate), irbesartan, chlorthalidone PTA. Uses CPAP for ELDON. Has family history in brother of coronary artery disease and death due to MI. Does not smoke.    Lexiscan normal/negative in 5/2018. Last echo in 6/2018 with LVEF 49%.    Presented to ED yesterday after episode of chest pain described as internal/dull radiating down R arm with numbness/tingling that started with walking. Was not lifting heavy objects. Has not had this pain before. No nausea or dyspnea. Received ASA and nitroglycerin and chest pain resolved at 10pm last night; no recurrence since. Troponin increasing; admitted and receiving heparin drip. EKG in ED with no significant changes or ST elevation since last EKG in 5/2023. Vitals WNL on RA. Cr 1.36 which appears to be around/better than his baseline. CBC normal.         Physical Examination  Review of Systems   VITALS: /60   Pulse 71   Temp 98.2  F (36.8  C) (Oral)   Resp 22   Ht 1.803 m (5' 11\")   Wt 148.5 kg (327 lb 4.8 oz)   SpO2 98%   BMI 45.65 kg/m    BMI: Body mass index is 45.65 kg/m .  Wt Readings from Last 3 Encounters:   04/17/24 148.5 kg (327 lb 4.8 oz)   11/13/23 (!) 151 kg (333 lb)   04/14/23 149.7 kg (330 lb)       Intake/Output Summary (Last 24 hours) at 4/17/2024 0857  Last data filed at 4/17/2024 0742  Gross per 24 hour   Intake 344.78 ml   Output --   Net 344.78 ml     General Appearance:   no distress, obese body habitus   ENT/Mouth: membranes moist, no oral lesions or bleeding gums.   EYES:  no scleral icterus, normal conjunctivae   Chest/Lungs:   lungs are clear to auscultation, no rales or wheezing   Cardiovascular:   Regular. Normal first and second heart sounds with no murmurs, rubs, or gallops. 2+ pitting edema in bilateral lower legs.   Abdomen:  no organomegaly, masses, bruits, or tenderness; bowel sounds are present   Extremities: no cyanosis or clubbing   Skin: no " "xanthelasma, warm.    Neurologic: no tremors     Psychiatric: alert and oriented x3, calm     Pertinent positives and negatives as noted in HPI.          Lab Results    Chemistry/lipid CBC Cardiac Enzymes/BNP/TSH/INR   No results for input(s): \"CHOL\", \"HDL\", \"LDL\", \"TRIG\", \"CHOLHDLRATIO\" in the last 03766 hours.  No results for input(s): \"LDL\" in the last 72759 hours.  Recent Labs   Lab Test 04/17/24  0728 04/17/24  0548   NA  --  141   POTASSIUM  --  4.0   CHLORIDE  --  106   CO2  --  21*   * 160*   BUN  --  31.5*   CR  --  1.36*   GFRESTIMATED  --  53*   AURORA  --  9.7     Recent Labs   Lab Test 04/17/24  0548 04/16/24 2206 08/24/22  1548   CR 1.36* 1.50* 2.28*     No results for input(s): \"A1C\" in the last 67338 hours.       Recent Labs   Lab Test 04/17/24  0548   WBC 7.2   HGB 14.6   HCT 42.0   MCV 86        Recent Labs   Lab Test 04/17/24  0548 04/16/24  2254 08/24/22  1548   HGB 14.6 14.6 15.4    Recent Labs   Lab Test 02/19/22 2312   TROPONINI <0.01     Recent Labs   Lab Test 04/16/24 2206 06/09/18  0525   BNP  --  34   NTBNPI 87  --      No results for input(s): \"TSH\" in the last 00716 hours.  Recent Labs   Lab Test 04/16/24 2221 02/19/22  2312 06/09/18  0020   INR 0.95 1.03 1.03        Medical History  Surgical History Family History Social History   Past Medical History:   Diagnosis Date    Claustrophobia     Diabetes (H)     Hiatal hernia     Hypertension     Motion sickness     NSTEMI (non-ST elevated myocardial infarction) (H) 4/17/2024    Orthopnea     Personal history of fall     Twice    Sleep apnea     Walking troubles      Past Surgical History:   Procedure Laterality Date    APPENDECTOMY      BACK SURGERY      C5    CHOLECYSTECTOMY      COLONOSCOPY      COMBINED CYSTOSCOPY, INSERT STENT URETER(S) Left 9/9/2022    Procedure: CYSTOURETEROSCOPY, WITH BLADDER NECK DILATION WITH BLADDER NECK CONTRACTURE,  BLADDER CALCULUS REMOVAL;  Surgeon: Harvey Madden MD;  Location: [facility]" Main OR    CYSTOSCOPY, BLADDER NECK CUTS, COMBINED N/A 4/14/2023    Procedure: CYSTOSCOPY, WITH MULTIPLE INCISIONS OF BLADDER NECK. Injection of Mitomycin to stricture;  Surgeon: Gunner Pang MD;  Location: [facility] OR    DENTAL SURGERY      HEMORRHOIDECTOMY      HERNIA REPAIR      PROSTATECTOMY       Family History   Problem Relation Age of Onset    Anesthesia Reaction No family hx of     Bleeding Disorder No family hx of     Venous thrombosis No family hx of         Social History     Socioeconomic History    Marital status:      Spouse name: Not on file    Number of children: Not on file    Years of education: Not on file    Highest education level: Not on file   Occupational History    Not on file   Tobacco Use    Smoking status: Never     Passive exposure: Past    Smokeless tobacco: Never   Substance and Sexual Activity    Alcohol use: Yes     Alcohol/week: 1.0 standard drink of alcohol     Types: 1 Standard drinks or equivalent per week     Comment: very occasional    Drug use: No    Sexual activity: Not on file   Other Topics Concern    Not on file   Social History Narrative    Not on file     Social Determinants of Health     Financial Resource Strain: Not on file   Food Insecurity: Not on file   Transportation Needs: Not on file   Physical Activity: Not on file   Stress: Not on file   Social Connections: Not on file   Interpersonal Safety: Not on file   Housing Stability: Not on file         Medications  Allergies   Current Outpatient Medications   Medication Sig Dispense Refill    acetaminophen (TYLENOL) 325 MG tablet [ACETAMINOPHEN (TYLENOL) 325 MG TABLET] Take 2 tablets by mouth every 4 (four) hours as needed.      aspirin 81 MG EC tablet Take 81 mg by mouth every evening      aspirin-acetaminophen-caffeine (EXCEDRIN MIGRAINE) 250-250-65 mg per tablet Take 2 tablets by mouth daily as needed for headaches      calcium carbonate (TUMS ULTRA) 400 mg calcium (1,000 mg) Chew Take 2 tablets by  mouth as needed      chlorthalidone (HYGROTEN) 25 MG tablet Take 25 mg by mouth every other day      cholecalciferol, vitamin D3, 5,000 unit capsule [CHOLECALCIFEROL, VITAMIN D3, 5,000 UNIT CAPSULE] Take 5,000 Units by mouth at bedtime.       diphenhydrAMINE (BENADRYL) 25 mg tablet [DIPHENHYDRAMINE (BENADRYL) 25 MG TABLET] Take 25 mg by mouth at bedtime as needed for itching.      ezetimibe (ZETIA) 10 mg tablet [EZETIMIBE (ZETIA) 10 MG TABLET] Take 1 tablet by mouth at bedtime.       glipiZIDE (GLUCOTROL XL) 10 MG 24 hr tablet Take 20 mg by mouth at bedtime      insulin glargine (LANTUS VIAL) 100 UNIT/ML vial Inject 60 Units Subcutaneous every morning      insulin glargine (LANTUS VIAL) 100 UNIT/ML vial Inject 80 Units Subcutaneous at bedtime      insulin lispro (HUMALOG KWIKPEN) 100 UNIT/ML (1 unit dial) KWIKPEN 20-30 Units 2 times daily (with meals) 20 units breakfast, 30 units dinner      irbesartan (AVAPRO) 300 MG tablet [IRBESARTAN (AVAPRO) 300 MG TABLET] Take 300 mg by mouth at bedtime.      magnesium 500 MG TABS Take 500 mg by mouth at bedtime      melatonin 3 MG CAPS Take 6 mg by mouth At Bedtime      metFORMIN (GLUCOPHAGE) 500 MG tablet Take 1 tablet by mouth at bedtime      multivitamin (ONE A DAY) per tablet [MULTIVITAMIN (ONE A DAY) PER TABLET] Take 1 tablet by mouth at bedtime.       omega-3/dha/epa/fish oil (FISH OIL-OMEGA-3 FATTY ACIDS) 300-1,000 mg capsule [OMEGA-3/DHA/EPA/FISH OIL (FISH OIL-OMEGA-3 FATTY ACIDS) 300-1,000 MG CAPSULE] Take 6 g by mouth at bedtime.      omeprazole (PRILOSEC) 20 MG capsule Take 1 capsule by mouth at bedtime      polyethylene glycol (MIRALAX) 17 gram packet [POLYETHYLENE GLYCOL (MIRALAX) 17 GRAM PACKET] Take 17 g by mouth at bedtime.       pseudoephedrine (SUDAFED) 30 MG tablet [PSEUDOEPHEDRINE (SUDAFED) 30 MG TABLET] Take 1 tablet by mouth every 12 (twelve) hours as needed.      riboflavin, vitamin B2, 100 mg Tab [RIBOFLAVIN, VITAMIN B2, 100 MG TAB] Take 1 tablet by  "mouth at bedtime.       TRADJENTA 5 mg Tab [TRADJENTA 5 MG TAB] Take 1 tablet by mouth at bedtime.           Allergies   Allergen Reactions    Amoxicillin Hives     Per Dr. Barrientos, patient has tolerated Keflex.    Atorvastatin Diarrhea    Benzalkonium Chloride Other (See Comments)    Fenofibrate Muscle Pain (Myalgia)    Gemfibrozil     Hydrocodone-Acetaminophen Other (See Comments)     Patient reports \"going white as a sheet\" and cold sweats   OK with Tylenol #3 No problems    Lisinopril Cough    Neosporin (Mxf-Eyy-Unkoi) [Neomycin-Bacitracin Zn-Polymyx] Unknown    Penicillins Hives     1992 had pcn and had hives. Pt. States allergic to all cillin's      Pravastatin Sodium [Pravastatin] Muscle Pain (Myalgia)    Trulicity [Dulaglutide] Unknown    Gluten [Gluten Meal] Other (See Comments)     Reports feeling worse with gluten; tested below threshold for Celiac but daughter is positive    Insulin Detemir Rash         Patient discussed with Dr. Nath.    Katie Castellanos MD  Patient seen and examined with Dr. Castellanos.  Spent 60 minutes.  Agree with findings including HPI, assessment and plan, physical exam.  "

## 2024-04-17 NOTE — ED NOTES
"Currently denies chest pain or shortness of breath. He has been NPO pending cardiology. Heparin drip at 1350 units per hour next ati xa will be at 1215.  /60   Pulse 71   Temp 98.2  F (36.8  C) (Oral)   Resp 22   Ht 1.803 m (5' 11\")   Wt 148.5 kg (327 lb 4.8 oz)   SpO2 98%   BMI 45.65 kg/m         Seth Cedeño RN  4/17/2024  8:59 AM    " General Sunscreen Counseling: I recommended a broad spectrum sunscreen with a SPF of 30 or higher.  I explained that SPF 30 sunscreens block approximately 97 percent of the sun's harmful rays.  Sunscreens should be applied at least 15 minutes prior to expected sun exposure and then every 2 hours after that as long as sun exposure continues. If swimming or exercising sunscreen should be reapplied every 45 minutes to an hour after getting wet or sweating.  One ounce, or the equivalent of a shot glass full of sunscreen, is adequate to protect the skin not covered by a bathing suit. I also recommended a lip balm with a sunscreen as well. Sun protective clothing can be used in lieu of sunscreen but must be worn the entire time you are exposed to the sun's rays. Detail Level: Detailed Products Recommended: Cerave

## 2024-04-17 NOTE — PRE-PROCEDURE
GENERAL PRE-PROCEDURE:   Procedure:  Coronary angiogram, possible percutaneous coronary intervention, left heart catheterization  Date/Time:  4/17/2024 2:15 PM    Written consent obtained?: Yes    Risks and benefits: Risks, benefits and alternatives were discussed    Consent given by:  Patient  Patient states understanding of procedure being performed: Yes    Patient's understanding of procedure matches consent: Yes    Procedure consent matches procedure scheduled: Yes    Expected level of sedation:  Moderate  Appropriately NPO:  Yes  ASA Class:  4 (NSTEMI, HTN, HLD, DM type II, CKD stage IIIa, LEDON; on CPAP, class III obesity; BMI 45.65 kg/m )  Mallampati  :  Grade 3- soft palate visible, posterior pharyngeal wall not visible  Lungs:  Lungs clear with good breath sounds bilaterally  Heart:  Normal heart sounds and rate  History & Physical reviewed:  History and physical reviewed and updates made (see comment)  H&P Comments:  Clinically Significant Risk Factors Present on Admission    Cardiovascular : NSTEMI, HTN, HLD, DM type II, Class III obesity; BMI 45.65 kg/m     Fluid & Electrolyte Disorders : Not present on admission    Gastroenterology : Not present on admission    Hematology/Oncology : Not present on admission    Nephrology : CKD stage IIIa; stable, will minimize contrast administration    Neurology : Not present on admission    Pulmonology : ELDON; on CPAP    Systemic : Class III obesity; BMI 45.65 kg/m   Statement of review:  I have reviewed the lab findings, diagnostic data, medications, and the plan for sedation

## 2024-04-17 NOTE — ED TRIAGE NOTES
Pt arrived via EMS. Reports he was loading his car around 2010 and began to have substernal chest pain described as dull and aching. Soon after onset, pt began to experience tingling down RUE. Pt was given 324 mg ASA and SL NTG x1 prior to arrival with partial relief of pain. CBG was 274 per EMS.     Triage Assessment (Adult)       Row Name 04/16/24 4507          Triage Assessment    Airway WDL WDL        Respiratory WDL    Respiratory WDL WDL        Skin Circulation/Temperature WDL    Skin Circulation/Temperature WDL WDL        Cardiac WDL    Cardiac WDL X;chest pain        Chest Pain Assessment    Chest Pain Location substernal     Character aching        Peripheral/Neurovascular WDL    Peripheral Neurovascular WDL WDL        Cognitive/Neuro/Behavioral WDL    Cognitive/Neuro/Behavioral WDL WDL

## 2024-04-17 NOTE — INTERVAL H&P NOTE
"I have reviewed the surgical (or preoperative) H&P that is linked to this encounter, and examined the patient. There are no significant changes    Clinical Conditions Present on Arrival:  Clinically Significant Risk Factors Present on Admission                 # Drug Induced Platelet Defect: home medication list includes an antiplatelet medication  # Severe Obesity: Estimated body mass index is 45.61 kg/m  as calculated from the following:    Height as of this encounter: 1.803 m (5' 11\").    Weight as of this encounter: 148.3 kg (327 lb).       "

## 2024-04-17 NOTE — DISCHARGE SUMMARY
[facility] MEDICINE  DISCHARGE SUMMARY     Primary Care Physician: [facility]  Admission Date: 4/16/2024   Discharge Provider: Lennie Ortiz MD Discharge Date: 4/17/2024   Diet: Orders Placed This Encounter      NPO for Medical/Clinical Reasons Except for: Meds, Ice Chips      Code Status: Full Code   Activity: activity as tolerated   [facility]      Condition at Discharge: Stable      REASON FOR PRESENTATION(See Admission Note for Details)   Chest pain    PRINCIPAL & ACTIVE DISCHARGE DIAGNOSES     Active Problems:    Acute chest pain    NSTEMI (non-ST elevated myocardial infarction) (H)  Dyslipidemia with statin intolerance  Hypertension  Poorly controlled diabetes mellitus type 2  CKD stage III  Morbid obesity  Sleep apnea on CPAP    SIGNIFICANT FINDINGS (Imaging, labs):     Most Recent 3 CBC's:  Recent Labs   Lab Test 04/17/24  0548 04/16/24 2254 08/24/22  1548   WBC 7.2 8.4 10.5   HGB 14.6 14.6 15.4   MCV 86 85 87    170 208      Most Recent 3 BMP's:  Recent Labs   Lab Test 04/17/24  1133 04/17/24  0728 04/17/24 0548 04/17/24 0235 04/16/24 2206 09/09/22  1405 08/24/22  1548   NA  --   --  141  --  137  --  140   POTASSIUM  --   --  4.0  --  4.7  --  4.8   CHLORIDE  --   --  106  --  104  --  106   CO2  --   --  21*  --  21*  --  27   BUN  --   --  31.5*  --  33.7*  --  37*   CR  --   --  1.36*  --  1.50*  --  2.28*   ANIONGAP  --   --  14  --  12  --  7   AURORA  --   --  9.7  --  9.6  --  10.0   * 172* 160*   < > 253*   < > 142*    < > = values in this interval not displayed.     Most Recent 2 LFT's:  Recent Labs   Lab Test 04/17/24  0548 04/16/24 2206 08/24/22  1548   AST 65*  --  33   ALT 32 34 28   ALKPHOS 84 83 76   BILITOTAL 0.6 0.6 1.1*     Troponin 167, 212    Results for orders placed or performed during the hospital encounter of 04/16/24   XR Chest Port 1 View    Narrative    EXAM: CHEST SINGLE VIEW PORTABLE  LOCATION: [facility]  [facility]  DATE: 2024    INDICATION: Chest pain.  COMPARISON: 2021.    FINDINGS: A few apparent small curvilinear opacities are present in the left lung base. The lungs are otherwise clear. Normal size cardiac silhouette.      Impression    IMPRESSION:   1. A few curvilinear opacities in the left lung base most likely represent atelectasis or scarring, but pneumonia cannot be excluded.  2. No other findings suspicious for active cardiopulmonary disease.    Echocardiogram Complete    Narrative    [id number]  [id number]  [id number]^KAHLIL[place] [address]     Name: WILLIS GARSIA  MRN: [id number]  : 1945  Study Date: 2024 08:38 AM  Age: 79 yrs  Gender: Male  Patient [facility]: [facility]  Reason For Study: Chest Pain  Ordering Physician: TAWNY GUILLORY  Performed By: DARNELL     BSA: 2.6 m2  Height: 71 in  Weight: 327 lb  HR: 73  BP: 144/64 mmHg  ______________________________________________________________________________  Procedure  Complete Portable Echo Adult. Definity (NDC #29789-332) given intravenously.  ______________________________________________________________________________  Interpretation Summary     1. Normal left ventricular size and systolic performance with a visually  estimated ejection fraction of 65%.  2. No significant valvular heart disease is identified on this study.  3. Normal right ventricular size and systolic performance.  ______________________________________________________________________________  Left ventricle:  Normal left ventricular size and systolic performance with a visually  estimated ejection fraction of 55%. There is normal regional wall motion.  There is mild concentric increase in left ventricular wall thickness.     Assessment of LV Diastolic Function: The cumulative findings suggest normal  diastolic filling [The septal e' velocity is < 7 cm/s & lateral e'  velocity  is  < 10 cm/s. The average E/e' is < 14. TR velocity is < 2.8 m/s. Left atrial  volume index is less than 34 mL/mÂ ].     Right ventricle:  Normal right ventricular size and systolic performance.     Left atrium:  The left atrium is of normal size.     Right atrium:  The right atrium is of probable normal size (not well-visualized).     IVC:  The IVC is not well-visualized.     Aortic valve:  The aortic valve is not well visualized, but suspected to be comprised of  three cusps. No significant aortic stenosis or aortic insufficiency is  detected on this study.     Mitral valve:  The mitral valve appears morphologically normal. There is trace mitral  insufficiency.     Tricuspid valve:  The tricuspid valve is grossly morphologically normal. There is trace  tricuspid insufficiency.     Pulmonic valve:  The pulmonic valve is grossly morphologically normal.     Thoracic aorta:  The aortic root and proximal ascending aorta are of normal dimension.     Pericardium:  There is no significant pericardial effusion.  ______________________________________________________________________________  ______________________________________________________________________________  MMode/2D Measurements & Calculations  IVSd: 1.4 cm  LVIDd: 5.7 cm  LVIDs: 3.6 cm  LVPWd: 1.5 cm  FS: 36.5 %  LV mass(C)d: 376.0 grams  LV mass(C)dI: 144.7 grams/m2  Ao root diam: 3.2 cm  LA dimension: 4.0 cm  LA/Ao: 1.3  LVOT diam: 2.1 cm  LVOT area: 3.5 cm2  Ao root diam index Ht(cm/m): 1.8  Ao root diam index BSA (cm/m2): 1.2  EF Biplane: 52.6 %     LA Volume Indexed (AL/bp): 27.6 ml/m2  RV Base: 4.3 cm  RWT: 0.51  TAPSE: 2.6 cm     Time Measurements  MM HR: 65.0 BPM     Doppler Measurements & Calculations  MV E max ormie: 75.4 cm/sec  MV A max romie: 76.3 cm/sec  MV E/A: 0.99  MV dec slope: 439.3 cm/sec2  MV dec time: 0.17 sec  LV V1 max P.9 mmHg  LV V1 max: 69.4 cm/sec  LV V1 VTI: 15.7 cm  SV(LVOT): 54.9 ml  SI(LVOT): 21.1 ml/m2  PA acc time:  0.07 sec  TR max fito: 264.7 cm/sec  TR max P.0 mmHg     E/E' avg: 10.9  Lateral E/e': 9.9  Medial E/e': 11.9  RV S Fito: 14.3 cm/sec     ______________________________________________________________________________  Report approved by: Mai Hernandez 2024 10:59 AM              PENDING LABS         PROCEDURES ( this hospitalization only)          RECOMMENDATION FOR F/U VISIT         DISPOSITION     [facility]    SUMMARY OF HOSPITAL COURSE:    79-year-old male with past medical history significant for poorly controlled diabetes mellitus type 2, hypertension dyslipidemia, CKD 3, last coronary angiogram in  no intervention done, family history of premature coronary disease presented to the emergency room with complaints of chest pain dull radiating to the right arm with tingling started with walking.  Resolved with aspirin, nitroglycerin.  EKG no acute ST-T wave changes.  Was started on heparin drip.  Troponin elevated up to 167, 212.  Echo with EF of 65%. Seen by cardiology, recommending transfer to [facility] for coronary angiogram.  Home Lantus and prandial insulin on hold as he is NPO.    Discharge Medications with Med changes:        Review of your medicines        UNREVIEWED medicines. Ask your doctor about these medicines        Dose / Directions   acetaminophen 325 MG tablet  Commonly known as: TYLENOL      Dose: 2 tablet  [ACETAMINOPHEN (TYLENOL) 325 MG TABLET] Take 2 tablets by mouth every 4 (four) hours as needed.  Refills: 0     aspirin 81 MG EC tablet      Dose: 81 mg  Take 81 mg by mouth every evening  Refills: 0     aspirin-acetaminophen-caffeine 250-250-65 MG tablet  Commonly known as: EXCEDRIN MIGRAINE      Dose: 2 tablet  Take 2 tablets by mouth daily as needed for headaches  Refills: 0     Calcium Carbonate Antacid 1000 MG Tabs      Dose: 2 tablet  Take 2 tablets by mouth as needed  Refills: 0     chlorthalidone 25 MG tablet  Commonly known as: HYGROTON      Dose: 25  mg  Take 25 mg by mouth every other day  Refills: 0     cholecalciferol 125 MCG (5000 UT) Caps      Dose: 5,000 Units  [CHOLECALCIFEROL, VITAMIN D3, 5,000 UNIT CAPSULE] Take 5,000 Units by mouth at bedtime.  Refills: 0     diphenhydrAMINE 25 MG tablet  Commonly known as: BENADRYL      Dose: 25 mg  [DIPHENHYDRAMINE (BENADRYL) 25 MG TABLET] Take 25 mg by mouth at bedtime as needed for itching.  Refills: 0     ezetimibe 10 MG tablet  Commonly known as: ZETIA      Dose: 1 tablet  [EZETIMIBE (ZETIA) 10 MG TABLET] Take 1 tablet by mouth at bedtime.  Refills: 0     glipiZIDE 10 MG 24 hr tablet  Commonly known as: GLUCOTROL XL  Ask about: Which instructions should I use?      Dose: 20 mg  Take 20 mg by mouth at bedtime  Refills: 0     * insulin glargine 100 UNIT/ML vial  Commonly known as: LANTUS VIAL  Ask about: Which instructions should I use?      Dose: 60 Units  Inject 60 Units Subcutaneous every morning  Refills: 0     * insulin glargine 100 UNIT/ML vial  Commonly known as: LANTUS VIAL  Ask about: Which instructions should I use?      Dose: 80 Units  Inject 80 Units Subcutaneous at bedtime  Refills: 0     insulin lispro 100 UNIT/ML (1 unit dial) KWIKPEN  Commonly known as: HumaLOG KWIKPEN      Dose: 20-30 Units  20-30 Units 2 times daily (with meals) 20 units breakfast, 30 units dinner  Refills: 0     irbesartan 300 MG tablet  Commonly known as: AVAPRO      Dose: 300 mg  [IRBESARTAN (AVAPRO) 300 MG TABLET] Take 300 mg by mouth at bedtime.  Refills: 0     magnesium 500 MG Tabs      Dose: 500 mg  Take 500 mg by mouth at bedtime  Refills: 0     metFORMIN 500 MG tablet  Commonly known as: GLUCOPHAGE      Dose: 1 tablet  Take 1 tablet by mouth at bedtime  Refills: 0     Multi Vitamin Tabs      Dose: 1 tablet  [MULTIVITAMIN (ONE A DAY) PER TABLET] Take 1 tablet by mouth at bedtime.  Refills: 0     omeprazole 20 MG DR capsule  Commonly known as: PriLOSEC      Dose: 1 capsule  Take 1 capsule by mouth at bedtime  Refills: 0    "  polyethylene glycol 17 g packet  Commonly known as: MIRALAX      Dose: 17 g  [POLYETHYLENE GLYCOL (MIRALAX) 17 GRAM PACKET] Take 17 g by mouth at bedtime.  Refills: 0     pseudoePHEDrine 30 MG tablet  Commonly known as: SUDAFED      Dose: 1 tablet  [PSEUDOEPHEDRINE (SUDAFED) 30 MG TABLET] Take 1 tablet by mouth every 12 (twelve) hours as needed.  Refills: 0     Tradjenta 5 MG Tabs tablet  Generic drug: linagliptin      Dose: 1 tablet  [TRADJENTA 5 MG TAB] Take 1 tablet by mouth at bedtime.  Refills: 0     vitamin B-2 100 MG Tabs tablet  Commonly known as: RIBOFLAVIN      Dose: 1 tablet  [RIBOFLAVIN, VITAMIN B2, 100 MG TAB] Take 1 tablet by mouth at bedtime.  Refills: 0           * This list has 2 medication(s) that are the same as other medications prescribed for you. Read the directions carefully, and ask your doctor or other care provider to review them with you.                CONTINUE these medicines which have NOT CHANGED        Dose / Directions   fish oil-omega-3 fatty acids 1000 MG capsule      Dose: 6 g  [OMEGA-3/DHA/EPA/FISH OIL (FISH OIL-OMEGA-3 FATTY ACIDS) 300-1,000 MG CAPSULE] Take 6 g by mouth at bedtime.  Refills: 0     melatonin 3 MG Caps      Dose: 6 mg  Take 6 mg by mouth At Bedtime  Refills: 0                   Rationale for medication changes:            Consults   Cardiology      Immunizations given this encounter     Examination     Vital Signs in last 24 hours:   Vital signs:  Temp: 98.2  F (36.8  C) Temp src: Oral BP: 112/58 Pulse: 65   Resp: 23 SpO2: 98 % O2 Device: None (Room air)   Height: 180.3 cm (5' 11\") Weight: 148.5 kg (327 lb 4.8 oz)  Estimated body mass index is 45.65 kg/m  as calculated from the following:    Height as of this encounter: 1.803 m (5' 11\").    Weight as of this encounter: 148.5 kg (327 lb 4.8 oz).       Pertinent positives on exam:  Heart S1-S2 heard regular rate and rhythm  Lungs: Clear to auscultation bilaterally  Extremities: 1+ pitting edema in bilateral lower " extremities..    Please see EMR for more detailed significant labs, imaging, consultant notes etc.  Total time spent on discharge: > 35 minutes    Lennie Ortiz MD   [facility] Hospitalist Service: Ph:[phone]    CC:Clinic, [facility]

## 2024-04-18 ENCOUNTER — PREP FOR PROCEDURE (OUTPATIENT)
Dept: CARDIOLOGY | Facility: CLINIC | Age: 79
End: 2024-04-18
Payer: COMMERCIAL

## 2024-04-18 ENCOUNTER — APPOINTMENT (OUTPATIENT)
Dept: ULTRASOUND IMAGING | Facility: HOSPITAL | Age: 79
DRG: 234 | End: 2024-04-18
Attending: STUDENT IN AN ORGANIZED HEALTH CARE EDUCATION/TRAINING PROGRAM
Payer: COMMERCIAL

## 2024-04-18 ENCOUNTER — ANESTHESIA EVENT (OUTPATIENT)
Dept: SURGERY | Facility: HOSPITAL | Age: 79
DRG: 234 | End: 2024-04-18
Payer: COMMERCIAL

## 2024-04-18 DIAGNOSIS — I25.10 CAD (CORONARY ARTERY DISEASE): Primary | ICD-10-CM

## 2024-04-18 LAB
ABO/RH(D): NORMAL
ALBUMIN UR-MCNC: NEGATIVE MG/DL
ANION GAP SERPL CALCULATED.3IONS-SCNC: 10 MMOL/L (ref 7–15)
ANTIBODY SCREEN: NEGATIVE
APPEARANCE UR: CLEAR
APTT PPP: 54 SECONDS (ref 22–38)
BILIRUB UR QL STRIP: NEGATIVE
BLD PROD TYP BPU: NORMAL
BLD PROD TYP BPU: NORMAL
BLOOD COMPONENT TYPE: NORMAL
BLOOD COMPONENT TYPE: NORMAL
BUN SERPL-MCNC: 25.9 MG/DL (ref 8–23)
CALCIUM SERPL-MCNC: 9.2 MG/DL (ref 8.8–10.2)
CHLORIDE SERPL-SCNC: 103 MMOL/L (ref 98–107)
CODING SYSTEM: NORMAL
CODING SYSTEM: NORMAL
COLOR UR AUTO: NORMAL
CREAT SERPL-MCNC: 1.3 MG/DL (ref 0.67–1.17)
CROSSMATCH: NORMAL
CROSSMATCH: NORMAL
DEPRECATED HCO3 PLAS-SCNC: 25 MMOL/L (ref 22–29)
EGFRCR SERPLBLD CKD-EPI 2021: 56 ML/MIN/1.73M2
GLUCOSE BLDC GLUCOMTR-MCNC: 177 MG/DL (ref 70–99)
GLUCOSE BLDC GLUCOMTR-MCNC: 185 MG/DL (ref 70–99)
GLUCOSE BLDC GLUCOMTR-MCNC: 69 MG/DL (ref 70–99)
GLUCOSE BLDC GLUCOMTR-MCNC: 82 MG/DL (ref 70–99)
GLUCOSE BLDC GLUCOMTR-MCNC: 91 MG/DL (ref 70–99)
GLUCOSE SERPL-MCNC: 97 MG/DL (ref 70–99)
GLUCOSE UR STRIP-MCNC: NEGATIVE MG/DL
HBA1C MFR BLD: 7.2 %
HGB UR QL STRIP: NEGATIVE
INR PPP: 1.02 (ref 0.85–1.15)
ISSUE DATE AND TIME: NORMAL
ISSUE DATE AND TIME: NORMAL
KETONES UR STRIP-MCNC: NEGATIVE MG/DL
LEUKOCYTE ESTERASE UR QL STRIP: NEGATIVE
MAGNESIUM SERPL-MCNC: 1.9 MG/DL (ref 1.7–2.3)
MRSA DNA SPEC QL NAA+PROBE: NEGATIVE
NITRATE UR QL: NEGATIVE
PH UR STRIP: 6 [PH] (ref 5–7)
POTASSIUM SERPL-SCNC: 3.7 MMOL/L (ref 3.4–5.3)
PREALB SERPL-MCNC: 18.6 MG/DL (ref 20–40)
RBC URINE: 2 /HPF
SA TARGET DNA: NEGATIVE
SODIUM SERPL-SCNC: 138 MMOL/L (ref 135–145)
SP GR UR STRIP: 1.01 (ref 1–1.03)
SPECIMEN EXPIRATION DATE: NORMAL
SQUAMOUS EPITHELIAL: <1 /HPF
UFH PPP CHRO-ACNC: 0.26 IU/ML
UFH PPP CHRO-ACNC: 0.35 IU/ML
UFH PPP CHRO-ACNC: <0.1 IU/ML
UNIT ABO/RH: NORMAL
UNIT ABO/RH: NORMAL
UNIT NUMBER: NORMAL
UNIT NUMBER: NORMAL
UNIT STATUS: NORMAL
UNIT STATUS: NORMAL
UNIT TYPE ISBT: 5100
UNIT TYPE ISBT: 5100
UROBILINOGEN UR STRIP-MCNC: <2 MG/DL
WBC URINE: 1 /HPF

## 2024-04-18 PROCEDURE — 84134 ASSAY OF PREALBUMIN: CPT | Performed by: STUDENT IN AN ORGANIZED HEALTH CARE EDUCATION/TRAINING PROGRAM

## 2024-04-18 PROCEDURE — 87641 MR-STAPH DNA AMP PROBE: CPT | Performed by: STUDENT IN AN ORGANIZED HEALTH CARE EDUCATION/TRAINING PROGRAM

## 2024-04-18 PROCEDURE — 87640 STAPH A DNA AMP PROBE: CPT | Performed by: STUDENT IN AN ORGANIZED HEALTH CARE EDUCATION/TRAINING PROGRAM

## 2024-04-18 PROCEDURE — 99233 SBSQ HOSP IP/OBS HIGH 50: CPT | Performed by: INTERNAL MEDICINE

## 2024-04-18 PROCEDURE — 36415 COLL VENOUS BLD VENIPUNCTURE: CPT | Performed by: INTERNAL MEDICINE

## 2024-04-18 PROCEDURE — 250N000011 HC RX IP 250 OP 636: Performed by: INTERNAL MEDICINE

## 2024-04-18 PROCEDURE — 85520 HEPARIN ASSAY: CPT | Performed by: INTERNAL MEDICINE

## 2024-04-18 PROCEDURE — 80048 BASIC METABOLIC PNL TOTAL CA: CPT | Performed by: INTERNAL MEDICINE

## 2024-04-18 PROCEDURE — 93880 EXTRACRANIAL BILAT STUDY: CPT

## 2024-04-18 PROCEDURE — 250N000013 HC RX MED GY IP 250 OP 250 PS 637: Performed by: INTERNAL MEDICINE

## 2024-04-18 PROCEDURE — 85730 THROMBOPLASTIN TIME PARTIAL: CPT | Performed by: STUDENT IN AN ORGANIZED HEALTH CARE EDUCATION/TRAINING PROGRAM

## 2024-04-18 PROCEDURE — 36415 COLL VENOUS BLD VENIPUNCTURE: CPT | Performed by: STUDENT IN AN ORGANIZED HEALTH CARE EDUCATION/TRAINING PROGRAM

## 2024-04-18 PROCEDURE — 99223 1ST HOSP IP/OBS HIGH 75: CPT | Mod: 57 | Performed by: STUDENT IN AN ORGANIZED HEALTH CARE EDUCATION/TRAINING PROGRAM

## 2024-04-18 PROCEDURE — 83735 ASSAY OF MAGNESIUM: CPT | Performed by: STUDENT IN AN ORGANIZED HEALTH CARE EDUCATION/TRAINING PROGRAM

## 2024-04-18 PROCEDURE — 120N000004 HC R&B MS OVERFLOW

## 2024-04-18 PROCEDURE — 85610 PROTHROMBIN TIME: CPT | Performed by: STUDENT IN AN ORGANIZED HEALTH CARE EDUCATION/TRAINING PROGRAM

## 2024-04-18 PROCEDURE — 81001 URINALYSIS AUTO W/SCOPE: CPT | Performed by: STUDENT IN AN ORGANIZED HEALTH CARE EDUCATION/TRAINING PROGRAM

## 2024-04-18 PROCEDURE — 86923 COMPATIBILITY TEST ELECTRIC: CPT | Performed by: STUDENT IN AN ORGANIZED HEALTH CARE EDUCATION/TRAINING PROGRAM

## 2024-04-18 PROCEDURE — 86900 BLOOD TYPING SEROLOGIC ABO: CPT | Performed by: STUDENT IN AN ORGANIZED HEALTH CARE EDUCATION/TRAINING PROGRAM

## 2024-04-18 PROCEDURE — 83036 HEMOGLOBIN GLYCOSYLATED A1C: CPT | Performed by: INTERNAL MEDICINE

## 2024-04-18 PROCEDURE — 99223 1ST HOSP IP/OBS HIGH 75: CPT | Performed by: INTERNAL MEDICINE

## 2024-04-18 PROCEDURE — 999N000157 HC STATISTIC RCP TIME EA 10 MIN

## 2024-04-18 PROCEDURE — 250N000012 HC RX MED GY IP 250 OP 636 PS 637: Performed by: INTERNAL MEDICINE

## 2024-04-18 RX ORDER — ASPIRIN 81 MG/1
162 TABLET, CHEWABLE ORAL
Status: COMPLETED | OUTPATIENT
Start: 2024-04-18 | End: 2024-04-19

## 2024-04-18 RX ORDER — CLINDAMYCIN PHOSPHATE 900 MG/50ML
900 INJECTION, SOLUTION INTRAVENOUS SEE ADMIN INSTRUCTIONS
Status: CANCELLED | OUTPATIENT
Start: 2024-04-18

## 2024-04-18 RX ORDER — ASPIRIN 81 MG/1
81 TABLET, CHEWABLE ORAL
Status: COMPLETED | OUTPATIENT
Start: 2024-04-18 | End: 2024-04-19

## 2024-04-18 RX ORDER — CHLORHEXIDINE GLUCONATE ORAL RINSE 1.2 MG/ML
10 SOLUTION DENTAL ONCE
Status: COMPLETED | OUTPATIENT
Start: 2024-04-18 | End: 2024-04-19

## 2024-04-18 RX ORDER — CLINDAMYCIN PHOSPHATE 900 MG/50ML
900 INJECTION, SOLUTION INTRAVENOUS
Status: CANCELLED | OUTPATIENT
Start: 2024-04-18

## 2024-04-18 RX ORDER — METHADONE HYDROCHLORIDE 10 MG/ML
20 INJECTION, SOLUTION INTRAMUSCULAR; INTRAVENOUS; SUBCUTANEOUS ONCE
Qty: 2 ML | Refills: 0 | Status: COMPLETED | OUTPATIENT
Start: 2024-04-19

## 2024-04-18 RX ORDER — METHADONE HYDROCHLORIDE 10 MG/ML
20 INJECTION, SOLUTION INTRAMUSCULAR; INTRAVENOUS; SUBCUTANEOUS ONCE
Status: COMPLETED | OUTPATIENT
Start: 2024-04-19 | End: 2024-04-19

## 2024-04-18 RX ADMIN — Medication 5000 UNITS: at 21:50

## 2024-04-18 RX ADMIN — ASPIRIN 81 MG: 81 TABLET, COATED ORAL at 21:48

## 2024-04-18 RX ADMIN — POLYETHYLENE GLYCOL 3350 17 G: 17 POWDER, FOR SOLUTION ORAL at 21:40

## 2024-04-18 RX ADMIN — IRBESARTAN 300 MG: 300 TABLET ORAL at 21:40

## 2024-04-18 RX ADMIN — INSULIN ASPART 20 UNITS: 100 INJECTION, SOLUTION INTRAVENOUS; SUBCUTANEOUS at 08:54

## 2024-04-18 RX ADMIN — METOPROLOL TARTRATE 25 MG: 25 TABLET, FILM COATED ORAL at 08:15

## 2024-04-18 RX ADMIN — MAGNESIUM OXIDE TAB 400 MG (241.3 MG ELEMENTAL MG) 400 MG: 400 (241.3 MG) TAB at 21:40

## 2024-04-18 RX ADMIN — HEPARIN SODIUM 1500 UNITS/HR: 10000 INJECTION, SOLUTION INTRAVENOUS at 10:41

## 2024-04-18 RX ADMIN — METOPROLOL TARTRATE 25 MG: 25 TABLET, FILM COATED ORAL at 21:40

## 2024-04-18 RX ADMIN — PANTOPRAZOLE SODIUM 40 MG: 40 TABLET, DELAYED RELEASE ORAL at 21:40

## 2024-04-18 RX ADMIN — EZETIMIBE 10 MG: 10 TABLET ORAL at 21:40

## 2024-04-18 RX ADMIN — ROSUVASTATIN CALCIUM 40 MG: 40 TABLET, COATED ORAL at 21:40

## 2024-04-18 ASSESSMENT — ACTIVITIES OF DAILY LIVING (ADL)
ADLS_ACUITY_SCORE: 27
ADLS_ACUITY_SCORE: 28
ADLS_ACUITY_SCORE: 28
ADLS_ACUITY_SCORE: 27
ADLS_ACUITY_SCORE: 25
ADLS_ACUITY_SCORE: 28
ADLS_ACUITY_SCORE: 25
ADLS_ACUITY_SCORE: 27
ADLS_ACUITY_SCORE: 28
DEPENDENT_IADLS:: INDEPENDENT
ADLS_ACUITY_SCORE: 29
ADLS_ACUITY_SCORE: 27
ADLS_ACUITY_SCORE: 29
ADLS_ACUITY_SCORE: 28
ADLS_ACUITY_SCORE: 28
ADLS_ACUITY_SCORE: 25
ADLS_ACUITY_SCORE: 28
ADLS_ACUITY_SCORE: 27

## 2024-04-18 ASSESSMENT — ENCOUNTER SYMPTOMS: ORTHOPNEA: 1

## 2024-04-18 NOTE — PROGRESS NOTES
"[facility]    Medicine Progress Note - Hospitalist Service    Date of Admission:  4/17/2024    Assessment & Plan     Nirav Baker is a 79 year old old male with HTN, HL, DM, high BMI 45, ELDON, mild CKD who presented to [facility] with acute chest pain, was diagnosed with NSTEMI, transferred to our hospital for coronary angiogram.     #CAD  #NSTEMI  Coronary angiogram 4/17/2024:  LM:no obstruction  LAD:50% mid-, 90% mid-distal narrowing  RIL large branch w/ long area of up to 95-99% narrowing ostial/proximal  Lcx:50-60% proximal narrowing  RCA:dominant, rPLV branch  w/ 80% narrowing  - multi-vessel native CADl mildly elevated L-sided filling pressures  -Consult to CTS consult for CABG evaluation  - continue ASA 81mg daily indefinitely,  -Continue with heparin for at least 48 hrs in setting of NSTEMI  0 on Avapro, metoprolol  - added rosuvastatin 40, cardiology considering PCSK9 inhibitors  - continue aggressive risk factor modification     #DM 2, with high insulin resistance.  A1c 7.2.  -Continue PTA regimen of NovoLog 20 units with breakfast and 30 units with supper, Lantus 80 units twice daily.  -SSI NovoLog  -Diabetic diet    #GERD-on PPI.  #ELDON-on CPAP.    Diet: Gluten Free Diet (and additional linked orders)    DVT Prophylaxis: Heparin drip  Campos Catheter: Not present  Lines: None     Cardiac Monitoring: ACTIVE order. Indication: Chest pain/ ACS rule out (24 hours)  Code Status: Full Code      Clinically Significant Risk Factors Present on Admission   # Drug Induced Platelet Defect: home medication list includes an antiplatelet medication   # Hypertension: Noted on problem list      # Severe Obesity: Estimated body mass index is 45.61 kg/m  as calculated from the following:    Height as of this encounter: 1.803 m (5' 11\").    Weight as of this encounter: 148.3 kg (327 lb).           Disposition Plan     Medically Ready for Discharge: Anticipated in 2-4 Days    Yulinaa" MD Isabella  Hospitalist Service  [facility]  Securely message with [employer] (more info)  Text page via [employer] Paging/Directory   ______________________________________________________________________    Interval History   Chest pain    Physical Exam   Vital Signs: Temp: 97.9  F (36.6  C) Temp src: Oral BP: (!) 165/67 Pulse: 67   Resp: 18 SpO2: 99 % O2 Device: None (Room air) Oxygen Delivery: 2 LPM  Weight: 327 lbs 0 oz  General: Alert and oriented x 3. Not in obvious distress.  HEENT: NC, AT. Neck- supple, No JVP elevation, lymphadenopathy or thyromegaly. Trachea-central.  Chest: Clear to auscultation bilaterally.  Heart: S1S2 regular. No M/R/G.  Abdomen: Soft. NT, ND. No organomegaly. Bowel sounds- active.  Back: No spine tenderness. No CVA tenderness.  Extremities: No leg swelling. Peripheral pulses 2+ bilaterally.  Neuro: Cranial nerves 1-12 grossly normal. No focal neurological deficit    Medical Decision Making       50 MINUTES SPENT BY ME on the date of service doing chart review, history, exam, documentation & further activities per the note.      Data     I have personally reviewed the following data over the past 24 hrs:    N/A  \   N/A   / N/A     138 103 25.9 (H) /  97   3.7 25 1.30 (H) \     TSH: N/A T4: N/A A1C: 7.2 (H)       Imaging results reviewed over the past 24 hrs:   Recent Results (from the past 24 hour(s))   Echocardiogram Complete    Narrative    [id number]  [id number]  [id number]  [id number]^KAHLIL^[place] [address]     Name: WILLIS GARSIA  MRN: [id number]  : 1945  Study Date: 2024 08:38 AM  Age: 79 yrs  Gender: Male  Patient [facility]  Reason For Study: Chest Pain  Ordering Physician: TAWNY GUILLORY  Performed By: DARNELL     BSA: 2.6 m2  Height: 71 in  Weight: 327 lb  HR: 73  BP: 144/64 mmHg  ______________________________________________________________________________  Procedure  Complete  Portable Echo Adult. Definity (NDC #94666-364) given intravenously.  ______________________________________________________________________________  Interpretation Summary     1. Normal left ventricular size and systolic performance with a visually  estimated ejection fraction of 65%.  2. No significant valvular heart disease is identified on this study.  3. Normal right ventricular size and systolic performance.  ______________________________________________________________________________  Left ventricle:  Normal left ventricular size and systolic performance with a visually  estimated ejection fraction of 55%. There is normal regional wall motion.  There is mild concentric increase in left ventricular wall thickness.     Assessment of LV Diastolic Function: The cumulative findings suggest normal  diastolic filling [The septal e' velocity is < 7 cm/s & lateral e' velocity  is  < 10 cm/s. The average E/e' is < 14. TR velocity is < 2.8 m/s. Left atrial  volume index is less than 34 mL/mÂ ].     Right ventricle:  Normal right ventricular size and systolic performance.     Left atrium:  The left atrium is of normal size.     Right atrium:  The right atrium is of probable normal size (not well-visualized).     IVC:  The IVC is not well-visualized.     Aortic valve:  The aortic valve is not well visualized, but suspected to be comprised of  three cusps. No significant aortic stenosis or aortic insufficiency is  detected on this study.     Mitral valve:  The mitral valve appears morphologically normal. There is trace mitral  insufficiency.     Tricuspid valve:  The tricuspid valve is grossly morphologically normal. There is trace  tricuspid insufficiency.     Pulmonic valve:  The pulmonic valve is grossly morphologically normal.     Thoracic aorta:  The aortic root and proximal ascending aorta are of normal dimension.     Pericardium:  There is no significant pericardial  effusion.  ______________________________________________________________________________  ______________________________________________________________________________  MMode/2D Measurements & Calculations  IVSd: 1.4 cm  LVIDd: 5.7 cm  LVIDs: 3.6 cm  LVPWd: 1.5 cm  FS: 36.5 %  LV mass(C)d: 376.0 grams  LV mass(C)dI: 144.7 grams/m2  Ao root diam: 3.2 cm  LA dimension: 4.0 cm  LA/Ao: 1.3  LVOT diam: 2.1 cm  LVOT area: 3.5 cm2  Ao root diam index Ht(cm/m): 1.8  Ao root diam index BSA (cm/m2): 1.2  EF Biplane: 52.6 %     LA Volume Indexed (AL/bp): 27.6 ml/m2  RV Base: 4.3 cm  RWT: 0.51  TAPSE: 2.6 cm     Time Measurements  MM HR: 65.0 BPM     Doppler Measurements & Calculations  MV E max fito: 75.4 cm/sec  MV A max fito: 76.3 cm/sec  MV E/A: 0.99  MV dec slope: 439.3 cm/sec2  MV dec time: 0.17 sec  LV V1 max P.9 mmHg  LV V1 max: 69.4 cm/sec  LV V1 VTI: 15.7 cm  SV(LVOT): 54.9 ml  SI(LVOT): 21.1 ml/m2  PA acc time: 0.07 sec  TR max fito: 264.7 cm/sec  TR max P.0 mmHg     E/E' avg: 10.9  Lateral E/e': 9.9  Medial E/e': 11.9  RV S Fito: 14.3 cm/sec     ______________________________________________________________________________  Report approved by: Mai Hernandez 2024 10:59 AM         Cardiac Catheterization    Narrative    Images from the original result were not included.  Nirav Baker is a 79 year old old male with HTN, HL, DM, high BMI   45, ELDON, mild CKD who is here for NSTEMI.    - multi-vessel native CADl mildly elevated L-sided filling pressures  - will stop to obtain CTS consult for CABG evaluation  - continue ASA 81mg daily indefinitely, re-start heparin for at least 48   hrs in setting of NSTEMI  - add rosuva 40, consider PCSK9 inhibitors  - continue aggressive risk factor modification          Findings:  LM:no obstruction  LAD:50% mid-, 90% mid-distal narrowing  RIL large branch w/ long area of up to 95-99% narrowing ostial/proximal  Lcx:50-60% proximal narrowing  RCA:dominant, rPLV  branch  w/ 80% narrowing    LVEDP:20    Access:  R Radial artery    Closure:   Vasc Band

## 2024-04-18 NOTE — PROGRESS NOTES
Patient's home CPAP set up in room on RA and is ready for use.     RT will follow.     Jaclyn Hays, RT

## 2024-04-18 NOTE — CONSULTS
Cardiac Surgery Consultation    Nirav Baker MRN# [id number]   YOB: 1945 Age: 79 year old   Date of Admission: 4/17/2024     Reason for consult: Severe multivessel coronary artery disease           Assessment and Plan:   Nirav Sorto is a 79-year-old man with a history of HTN, HLD, DM2 (A1c 7.2), obesity (BMI 45), ELDON (on CPAP), CKD (Cr 1.3-1.5) who presented with an NSTEMI and was found to have severe multivessel coronary artery disease. I recommend surgical revascularization with coronary artery bypass grafting.     I described the technical details, as well as the expected postoperative course and recovery to the patient. I also discussed the risks and benefits of the procedure. The risks include but are not limited to bleeding, infection, stroke, heart or graft failure with myocardial infarction, dysrhythmia, respiratory failure, kidney or liver injury, bowel or limb ischemia, and death. The calculated STS risk of mortality is 5.75%, and the calculated STS risk of major morbidity or mortality is 16.7%. The patient understands these risks and wishes to undergo the operation.     Surgery will be scheduled for tomorrow 4/19/24.    After carotid US the preoperative evaluation will be complete.         History of Present Illness:   Nirav Sorto is a 79-year-old man with a history of HTN, HLD, DM2 (A1c 7.2), obesity (BMI 45), ELDON (on CPAP), CKD (Cr 1.3-1.5) who presented with an NSTEMI and was found to have severe multivessel coronary artery disease. He initially presented to [facility] on Tuesday with chest pain. He was found to have an NSTEMI and ultimately transferred to [facility]. He denies having chest pain since he has been here.                Past Medical History:     Past Medical History:   Diagnosis Date    Claustrophobia     Diabetes (H)     Hiatal hernia     Hypertension     Motion sickness     NSTEMI (non-ST elevated myocardial infarction) (H) 4/17/2024     Orthopnea     Personal history of fall     Twice    Sleep apnea     Walking troubles              Past Surgical History:     Past Surgical History:   Procedure Laterality Date    APPENDECTOMY      BACK SURGERY      C5    CHOLECYSTECTOMY      COLONOSCOPY      COMBINED CYSTOSCOPY, INSERT STENT URETER(S) Left 9/9/2022    Procedure: CYSTOURETEROSCOPY, WITH BLADDER NECK DILATION WITH BLADDER NECK CONTRACTURE,  BLADDER CALCULUS REMOVAL;  Surgeon: Harvey Madden MD;  Location: [facility] OR    CV CORONARY ANGIOGRAM N/A 4/17/2024    Procedure: Coronary Angiogram;  Surgeon: Nils Ceballos MD;  Location: [facility] CV    CV PCI N/A 4/17/2024    Procedure: Percutaneous Coronary Intervention;  Surgeon: Nils Ceballos MD;  Location: [facility] CV    CYSTOSCOPY, BLADDER NECK CUTS, COMBINED N/A 4/14/2023    Procedure: CYSTOSCOPY, WITH MULTIPLE INCISIONS OF BLADDER NECK. Injection of Mitomycin to stricture;  Surgeon: Gunner Pang MD;  Location: [facility] OR    DENTAL SURGERY      HEMORRHOIDECTOMY      HERNIA REPAIR      PROSTATECTOMY                 Family History:     Family History   Problem Relation Age of Onset    Anesthesia Reaction No family hx of     Bleeding Disorder No family hx of     Venous thrombosis No family hx of               Social History:     Social History     Socioeconomic History    Marital status:      Spouse name: Not on file    Number of children: Not on file    Years of education: Not on file    Highest education level: Not on file   Occupational History    Not on file   Tobacco Use    Smoking status: Never     Passive exposure: Past    Smokeless tobacco: Never   Substance and Sexual Activity    Alcohol use: Yes     Alcohol/week: 1.0 standard drink of alcohol     Types: 1 Standard drinks or equivalent per week     Comment: very occasional    Drug use: No    Sexual activity: Not on file   Other Topics Concern    Not on file   Social History Narrative    Not on file  "    Social Determinants of Health     Financial Resource Strain: Not on file   Food Insecurity: Not on file   Transportation Needs: Not on file   Physical Activity: Not on file   Stress: Not on file   Social Connections: Not on file   Interpersonal Safety: Not on file   Housing Stability: Not on file              Allergies:      Allergies   Allergen Reactions    Amoxicillin Hives     Per Dr. Barrientos, patient has tolerated Keflex.    Atorvastatin Diarrhea    Benzalkonium Chloride Other (See Comments)    Fenofibrate Muscle Pain (Myalgia)    Gemfibrozil     Hydrocodone-Acetaminophen Other (See Comments)     Patient reports \"going white as a sheet\" and cold sweats   OK with Tylenol #3 No problems    Lisinopril Cough    Neosporin (Sst-Kvj-Krlko) [Neomycin-Bacitracin Zn-Polymyx] Unknown    Penicillins Hives     1992 had pcn and had hives. Pt. States allergic to all cillin's      Pravastatin Sodium [Pravastatin] Muscle Pain (Myalgia)    Trulicity [Dulaglutide] Unknown    Gluten [Gluten Meal] Other (See Comments)     Reports feeling worse with gluten; tested below threshold for Celiac but daughter is positive    Insulin Detemir Rash              Medications:     Current Facility-Administered Medications   Medication Dose Route Frequency Provider Last Rate Last Admin    acetaminophen (TYLENOL) tablet 650 mg  650 mg Oral Q4H PRN Nils Ceballos MD        aspirin EC tablet 81 mg  81 mg Oral QPM Nils Ceballos MD        aspirin-acetaminophen-caffeine (EXCEDRIN MIGRAINE) per tablet 2 tablet  2 tablet Oral Daily PRN Nils Ceballos MD        calcium carbonate (TUMS) chewable tablet 1,000 mg  1,000 mg Oral 4x Daily PRN Lennie Ortiz MD        chlorthalidone (HYGROTON) tablet 25 mg  25 mg Oral Every Other Day Yuliana Mason MD   25 mg at 04/17/24 2042    glucose gel 15-30 g  15-30 g Oral Q15 Min PRN Lennie Ortiz MD        Or    dextrose 50 % injection 25-50 mL  25-50 mL Intravenous Q15 Min PRN Lennie Ortiz MD     "    Or    glucagon injection 1 mg  1 mg Subcutaneous Q15 Min PRN Lennie Ortiz MD        ezetimibe (ZETIA) tablet 10 mg  10 mg Oral At Bedtime Nils Ceballos MD   10 mg at 04/17/24 2038    heparin 25,000 units in 0.45% NaCl 250 mL ANTICOAGULANT infusion  0-5,000 Units/hr Intravenous Continuous Lennie Ortiz MD 15 mL/hr at 04/18/24 1041 1,500 Units/hr at 04/18/24 1041    HOLD:  Metformin and metformin containing medications if patient received IV contrast with acute kidney injury or severe chronic kidney disease (stage IV or stage V; i.e., eGFR less than 30)   Does not apply HOLD Nils Ceballos MD        insulin aspart (NovoLOG) injection (RAPID ACTING)  20 Units Subcutaneous Daily with breakfast Nils Ceballos MD   20 Units at 04/18/24 0854    insulin aspart (NovoLOG) injection (RAPID ACTING)  30 Units Subcutaneous Daily with supper Nils Ceballos MD        insulin aspart (NovoLOG) injection (RAPID ACTING)  1-7 Units Subcutaneous TID AC Yuliana Mason MD        insulin aspart (NovoLOG) injection (RAPID ACTING)  1-5 Units Subcutaneous At Bedtime Yuliana Mason MD        insulin glargine (LANTUS PEN) injection 60 Units  60 Units Subcutaneous QAM Nils Ceballos MD   60 Units at 04/18/24 0815    insulin glargine (LANTUS PEN) injection 80 Units  80 Units Subcutaneous At Bedtime Nils Ceballos MD   80 Units at 04/17/24 2043    irbesartan (AVAPRO) tablet 300 mg  300 mg Oral At Bedtime Lennie Ortiz MD        lidocaine (LMX4) cream   Topical Q1H PRN Lennie Ortiz MD        lidocaine 1 % 0.1-1 mL  0.1-1 mL Other Q1H PRN Lennie Ortiz MD        magnesium oxide (MAG-OX) tablet 400 mg  400 mg Oral At Bedtime Nils Ceballos MD        melatonin tablet 3 mg  3 mg Oral At Bedtime PRN Yuliana Mason MD        metoprolol tartrate (LOPRESSOR) tablet 25 mg  25 mg Oral BID Lennie Ortiz MD   25 mg at 04/18/24 3914    nitroGLYcerin (NITROSTAT) sublingual tablet 0.4 mg  0.4 mg Sublingual Q5  "Min PRN Lennie Ortiz MD        oxyCODONE (ROXICODONE) tablet 5 mg  5 mg Oral Q4H PRN Nils Ceballos MD        Or    oxyCODONE (ROXICODONE) tablet 10 mg  10 mg Oral Q4H PRN Nils Ceballos MD        pantoprazole (PROTONIX) EC tablet 40 mg  40 mg Oral At Bedtime Nils Ceballos MD   40 mg at 04/17/24 2038    polyethylene glycol (MIRALAX) Packet 17 g  17 g Oral At Bedtime Nils Ceballos MD   17 g at 04/17/24 2043    [Held by provider] riboflavin CAPS 100 mg  100 mg Oral At Bedtime Nils Ceballos MD        rosuvastatin (CRESTOR) tablet 40 mg  40 mg Oral At Bedtime Nils Ceballos MD   40 mg at 04/17/24 2038    senna-docusate (SENOKOT-S/PERICOLACE) 8.6-50 MG per tablet 1 tablet  1 tablet Oral BID PRN Lennie Ortiz MD        Or    senna-docusate (SENOKOT-S/PERICOLACE) 8.6-50 MG per tablet 2 tablet  2 tablet Oral BID PRN Lennie Ortiz MD        sodium chloride (PF) 0.9% PF flush 3 mL  3 mL Intracatheter Q8H Lennie Ortiz MD   3 mL at 04/18/24 1149    sodium chloride (PF) 0.9% PF flush 3 mL  3 mL Intracatheter q1 min prn Lennie Ortiz MD        Vitamin D3 (CHOLECALCIFEROL) tablet 5,000 Units  5,000 Units Oral At Bedtime Nils Ceballos MD   5,000 Units at 04/17/24 2038            Review of Systems:   ROS: 10 point review of systems was performed and negative except as described above.         Physical Exam:   BP (!) 162/70 (BP Location: Right arm)   Pulse 65   Temp 98.7  F (37.1  C) (Oral)   Resp 18   Ht 1.803 m (5' 11\")   Wt 146.4 kg (322 lb 11.2 oz)   SpO2 95%   BMI 45.01 kg/m    Alert, pleasant, in no acute distress  Sclera anicteric  Neck supple JVD flat  Trachea midline  No prior chest incisions  Breathing unlabored on room air  Regular rate and rhythm  Abdomen obese, soft, non-tender  Extremities warm, no edema          Labs:   CBC RESULTS:   Recent Labs   Lab Test 04/17/24  0548   WBC 7.2   RBC 4.87   HGB 14.6   HCT 42.0   MCV 86   MCH 30.0   MCHC 34.8   RDW 13.7       "   Last Comprehensive Metabolic Panel:  Lab Results   Component Value Date     04/18/2024    POTASSIUM 3.7 04/18/2024    CHLORIDE 103 04/18/2024    CO2 25 04/18/2024    ANIONGAP 10 04/18/2024    GLC 91 04/18/2024    BUN 25.9 (H) 04/18/2024    CR 1.30 (H) 04/18/2024    GFRESTIMATED 56 (L) 04/18/2024    AURORA 9.2 04/18/2024        Ha1c 7.2         Imaging:   All imaging studies were reviewed and interpreted by me.    TTE 4/17/24:  1. Normal left ventricular size and systolic performance with a visually  estimated ejection fraction of 65%.  2. No significant valvular heart disease is identified on this study.  3. Normal right ventricular size and systolic performance.    Cardiac catheterization 4/17/24:  LM:no obstruction  LAD:50% mid-, 90% mid-distal narrowing  RIL large branch w/ long area of up to 95-99% narrowing ostial/proximal  Lcx:50-60% proximal narrowing  RCA:dominant, rPLV branch  w/ 80% narrowing    Thank you for this consult,    Cathy Edwards MD

## 2024-04-18 NOTE — PROGRESS NOTES
Care Management Initial Consult    General Information  Assessment completed with: Patient, Family, Patient, Afshan (spouse)  Type of CM/SW Visit: Initial Assessment    Primary Care Provider verified and updated as needed:     Readmission within the last 30 days:        Reason for Consult: discharge planning  Advance Care Planning:            Communication Assessment  Patient's communication style: spoken language (English or Bilingual)    Hearing Difficulty or Deaf: no   Wear Glasses or Blind: yes    Cognitive  Cognitive/Neuro/Behavioral: WDL                      Living Environment:   People in home: spouse     Current living Arrangements: house      Able to return to prior arrangements:         Family/Social Support:  Care provided by: self  Provides care for: no one  Marital Status:   Wife, Children  Afshan       Description of Support System: Supportive, Involved         Current Resources:   Patient receiving home care services:       Community Resources:    Equipment currently used at home: none  Supplies currently used at home:      Employment/Financial:  Employment Status:          Financial Concerns:             Does the patient's insurance plan have a 3 day qualifying hospital stay waiver?  No    Lifestyle & Psychosocial Needs:  Social Determinants of Health     Food Insecurity: Not on file   Depression: Not at risk (2/7/2024)    Received from [facility]    PHQ-2     PHQ-2 Score: 0   Housing Stability: Not on file   Tobacco Use: Low Risk  (2/21/2024)    Received from [facility]    Patient History     Smoking Tobacco Use: Never     Smokeless Tobacco Use: Never     Passive Exposure: Not on file   Financial Resource Strain: Not on file   Alcohol Use: Not on file   Transportation Needs: Not on file   Physical Activity: Not on file   Interpersonal Safety: Not on file   Stress: Not on file   Social Connections: Not on file   Health Literacy: Not on file       Functional Status:  Prior to  admission patient needed assistance:   Dependent ADLs:: Independent  Dependent IADLs:: Independent       Mental Health Status:  Mental Health Status: No Current Concerns       Chemical Dependency Status:  Chemical Dependency Status: No Current Concerns             Values/Beliefs:  Spiritual, Cultural Beliefs, Mosque Practices, Values that affect care:                 Additional Information:  Patient from home with wife. Lives in a townhouse. All needed areas of the home are on 1 level. They have a finished basement but do not have to go there. Patient independent at baseline. Unknown CM needs. CM following for recs.       BRODERICK Hess

## 2024-04-18 NOTE — CARE PLAN
Received pt from angio procedure. Air still being removed from TR band. Radial site is WDL.Pt. is AOx4, RA, NSR with 1st degree AV block. BP high, gave hydralazine before end of shift, family at bedside.

## 2024-04-18 NOTE — CONSULTS
"HEART CARE NOTE        Thank you, Dr. Faulkner, for asking the [facility] Heart Care team to see Nirav Baker to evaluate CAD.      Assessment/Recommendations     1.  Severe Multivessel CAD c/b NSTEMI  Assessment / Plan  Presented with NSTEMI; now s/p coronary angiogram significant for severe multi-vessel CAD; CTS consulted regarding CABG  Denies current chest pain or anginal equivalents  Continue ASA, high intensity rosuvastatin, metoprolol, ARB    2. SHELIA on CKD  Assessment / Plan  Resolving - continue to monitor UOP and renal function closely particularly in light of recent contrast dye load    3. DM2  Assessment / Plan  Management per primary team    4. HLP  Assessment / Plan  Continue high intensity rosuvastatin    5. HTN  Assessment / Plan  Adequately controlled on current regimen - no changes at this time    Clinically Significant Risk Factors Present on Admission                # Drug Induced Platelet Defect: home medication list includes an antiplatelet medication   # Hypertension: Noted on problem list   # Non-Invasive mechanical ventilation: current O2 Device: BiPAP/CPAP  # Acute hypoxic respiratory failure: continue supplemental O2 as needed    # DMII: A1C = 7.2 % (Ref range: <5.7 %) within past 6 months    # Severe Obesity: Estimated body mass index is 45.01 kg/m  as calculated from the following:    Height as of this encounter: 1.803 m (5' 11\").    Weight as of this encounter: 146.4 kg (322 lb 11.2 oz).           Acute kidney failure, unspecified  CKD POA List: Stage 3 (unspecified if a or b) (GFR 30 - 59)      85 minutes spent reviewing prior records (including documentation, laboratory studies, cardiac testing/imaging), history and physical exam, planning, and subsequent documentation.    History of Present Illness/Subjective    Mr. Nirav Baker is a 79 year old male with a PMHx significant for (per Epic notation)  type 2 diabetes, CKD3, HLD, hypertension, and NSTEMI who is admitted " "for concern for NSTEMI.       Today, Mr. Baker denies acute cardiac events or complaints including chest pain/angina; Management plan as detailed above    ECG: Personally reviewed. normal sinus rhythm, left axis deviation, 1st degree AV block.    ECHO (personnaly Reviewed on 4/18/24):   1. Normal left ventricular size and systolic performance with a visually  estimated ejection fraction of 65%.  2. No significant valvular heart disease is identified on this study.  3. Normal right ventricular size and systolic performance.    Telemetry: personally reviewed April 18, 2024; notable for sinus rhythm     Lab results: personally reviewed April 18, 2024; notable for resolving SHELIA    Medical history and pertinent documents reviewed in Care Everywhere please where applicable see details above          Physical Examination Review of Systems   /65   Pulse 63   Temp 98  F (36.7  C) (Oral)   Resp 18   Ht 1.803 m (5' 11\")   Wt 146.4 kg (322 lb 11.2 oz)   SpO2 99%   BMI 45.01 kg/m    Body mass index is 45.01 kg/m .  Wt Readings from Last 3 Encounters:   04/18/24 146.4 kg (322 lb 11.2 oz)   04/17/24 148.5 kg (327 lb 4.8 oz)   11/13/23 (!) 151 kg (333 lb)     General Appearance:   no distress, normal body habitus   ENT/Mouth: membranes moist, no oral lesions or bleeding gums.      EYES:  no scleral icterus, normal conjunctivae   Neck: no carotid bruits or thyromegaly   Chest/Lungs:   lungs are clear to auscultation, no rales or wheezing, equal chest wall expansion    Cardiovascular:   Regular. Normal first and second heart sounds with no murmurs, rubs, or gallops; the carotid, radial and posterior tibial pulses are intact, no JVD or LE edema bilaterally    Abdomen:  no organomegaly, masses, bruits, or tenderness; bowel sounds are present   Extremities: no cyanosis or clubbing   Skin: no xanthelasma, warm.    Neurologic: NAD     Psychiatric: alert and oriented x3, calm     A complete 10 systems ROS was reviewed  And " is negative except what is listed in the HPI.          Medical History  Surgical History Family History Social History   Past Medical History:   Diagnosis Date     Claustrophobia      Diabetes (H)      Hiatal hernia      Hypertension      Motion sickness      NSTEMI (non-ST elevated myocardial infarction) (H) 4/17/2024     Orthopnea      Personal history of fall     Twice     Sleep apnea      Walking troubles     Past Surgical History:   Procedure Laterality Date     APPENDECTOMY       BACK SURGERY      C5     CHOLECYSTECTOMY       COLONOSCOPY       COMBINED CYSTOSCOPY, INSERT STENT URETER(S) Left 9/9/2022    Procedure: CYSTOURETEROSCOPY, WITH BLADDER NECK DILATION WITH BLADDER NECK CONTRACTURE,  BLADDER CALCULUS REMOVAL;  Surgeon: Harvey Madden MD;  Location: [facility] OR     CYSTOSCOPY, BLADDER NECK CUTS, COMBINED N/A 4/14/2023    Procedure: CYSTOSCOPY, WITH MULTIPLE INCISIONS OF BLADDER NECK. Injection of Mitomycin to stricture;  Surgeon: Gunner Pang MD;  Location: [facility] OR     DENTAL SURGERY       HEMORRHOIDECTOMY       HERNIA REPAIR       PROSTATECTOMY      no family history of premature coronary artery disease Social History     Socioeconomic History     Marital status:      Spouse name: Not on file     Number of children: Not on file     Years of education: Not on file     Highest education level: Not on file   Occupational History     Not on file   Tobacco Use     Smoking status: Never     Passive exposure: Past     Smokeless tobacco: Never   Substance and Sexual Activity     Alcohol use: Yes     Alcohol/week: 1.0 standard drink of alcohol     Types: 1 Standard drinks or equivalent per week     Comment: very occasional     Drug use: No     Sexual activity: Not on file   Other Topics Concern     Not on file   Social History Narrative     Not on file     Social Determinants of Health     Financial Resource Strain: Not on file   Food Insecurity: Not on file   Transportation Needs: Not  "on file   Physical Activity: Not on file   Stress: Not on file   Social Connections: Not on file   Interpersonal Safety: Not on file   Housing Stability: Not on file           Lab Results    Chemistry/lipid CBC Cardiac Enzymes/BNP/TSH/INR   Lab Results   Component Value Date    CHOL 129 09/12/2012    HDL 12 (L) 09/12/2012    TRIG 166 (H) 09/12/2012    BUN 25.9 (H) 04/18/2024     04/18/2024    CO2 25 04/18/2024    Lab Results   Component Value Date    WBC 7.2 04/17/2024    HGB 14.6 04/17/2024    HCT 42.0 04/17/2024    MCV 86 04/17/2024     04/17/2024    Lab Results   Component Value Date    TROPONINI <0.01 02/19/2022    BNP 34 06/09/2018    INR 0.95 04/16/2024     Lab Results   Component Value Date    TROPONINI <0.01 02/19/2022          Weight:    Wt Readings from Last 3 Encounters:   04/18/24 146.4 kg (322 lb 11.2 oz)   04/17/24 148.5 kg (327 lb 4.8 oz)   11/13/23 (!) 151 kg (333 lb)       Allergies  Allergies   Allergen Reactions     Amoxicillin Hives     Per Dr. Barrientos, patient has tolerated Keflex.     Atorvastatin Diarrhea     Benzalkonium Chloride Other (See Comments)     Fenofibrate Muscle Pain (Myalgia)     Gemfibrozil      Hydrocodone-Acetaminophen Other (See Comments)     Patient reports \"going white as a sheet\" and cold sweats   OK with Tylenol #3 No problems     Lisinopril Cough     Neosporin ([id number]) [Neomycin-Bacitracin Zn-Polymyx] Unknown     Penicillins Hives     1992 had pcn and had hives. Pt. States allergic to all cillin's       Pravastatin Sodium [Pravastatin] Muscle Pain (Myalgia)     Trulicity [Dulaglutide] Unknown     Gluten [Gluten Meal] Other (See Comments)     Reports feeling worse with gluten; tested below threshold for Celiac but daughter is positive     Insulin Detemir Rash         Surgical History  Past Surgical History:   Procedure Laterality Date     APPENDECTOMY       BACK SURGERY      C5     CHOLECYSTECTOMY       COLONOSCOPY       COMBINED CYSTOSCOPY, INSERT STENT " URETER(S) Left 9/9/2022    Procedure: CYSTOURETEROSCOPY, WITH BLADDER NECK DILATION WITH BLADDER NECK CONTRACTURE,  BLADDER CALCULUS REMOVAL;  Surgeon: Harvey Madden MD;  Location: [facility] OR     CYSTOSCOPY, BLADDER NECK CUTS, COMBINED N/A 4/14/2023    Procedure: CYSTOSCOPY, WITH MULTIPLE INCISIONS OF BLADDER NECK. Injection of Mitomycin to stricture;  Surgeon: Gunner Pang MD;  Location: [facility] OR     DENTAL SURGERY       HEMORRHOIDECTOMY       HERNIA REPAIR       PROSTATECTOMY         Social History  Tobacco:   History   Smoking Status     Never   Smokeless Tobacco     Never    Alcohol:   Social History    Substance and Sexual Activity      Alcohol use: Yes        Alcohol/week: 1.0 standard drink of alcohol        Types: 1 Standard drinks or equivalent per week        Comment: very occasional   Illicit Drugs:   History   Drug Use No       Family History  Family History   Problem Relation Age of Onset     Anesthesia Reaction No family hx of      Bleeding Disorder No family hx of      Venous thrombosis No family hx of           Landry Vela MD on 4/18/2024      cc: [facility], [facility],

## 2024-04-18 NOTE — PLAN OF CARE
Goal Outcome Evaluation:      Plan of Care Reviewed With: patient    Overall Patient Progress: improvingOverall Patient Progress: improving     Doing well, denied pain, nausea , dyspnea, or lightheadedness. VSS.Wrist angio site, clean,dry, and intact with good pulse. Continue to monitor.

## 2024-04-18 NOTE — PROGRESS NOTES
"[facility]    Medicine Progress Note - Hospitalist Service    Date of Admission:  4/17/2024    Assessment & Plan   Nirav Baker is a 79 year old old male with HTN, HL, DM, obesity with BMI 45, ELDON, CKD who presented to [facility] with acute chest pain, diagnosed with NSTEMI, transferred to our hospital for coronary angiogram.     #CAD  #NSTEMI  -Coronary angiogram 4/17/2024 showed severe multivessel CAD  -CTS consulted to evaluate for CABG. Awaiting input  -Continue ASA, BB, statin and ARB  -Continue with heparin for at least 48 hrs in setting of NSTEMI  -Cardiology following     #DM 2, with high insulin resistance.  A1c 7.2.  -Continue PTA regimen of NovoLog 20 units with breakfast and 30 units with supper, Lantus 80 units twice daily.  -SSI NovoLog  -Diabetic diet     #GERD-on PPI.  #ELDON-on CPAP.          Diet: Gluten Free Diet (and additional linked orders)    DVT Prophylaxis: Heparin infusion  Campos Catheter: Not present  Lines: None     Cardiac Monitoring: ACTIVE order. Indication: Chest pain/ ACS rule out (24 hours)  Code Status: Full Code      Clinically Significant Risk Factors                  # Hypertension: Noted on problem list       # DMII: A1C = 7.2 % (Ref range: <5.7 %) within past 6 months, PRESENT ON ADMISSION  # Severe Obesity: Estimated body mass index is 45.01 kg/m  as calculated from the following:    Height as of this encounter: 1.803 m (5' 11\").    Weight as of this encounter: 146.4 kg (322 lb 11.2 oz)., PRESENT ON ADMISSION            Disposition Plan     Medically Ready for Discharge: Anticipated in 2-4 Days             OTONIEL Nichole  Hospitalist Service  [facility]  Securely message with [employer] (more info)  Text page via [employer] Paging/Directory   ______________________________________________________________________    Interval History   Patient is new to me today. Doing fairly well with no chest pain or shortness of " breath. Patient waiting to talk to the cardiothoracic team. Daughter at bedside. Updated her and answered her questions.     Physical Exam   Vital Signs: Temp: 98.3  F (36.8  C) Temp src: Oral BP: 131/57 Pulse: 75   Resp: 18 SpO2: 96 % O2 Device: BiPAP/CPAP Oxygen Delivery: 2 LPM  Weight: 322 lbs 11.2 oz      General: Not in obvious distress.  HEENT: NC, AT   Chest: Clear to auscultation bilaterally  Heart: S1S2 normal, regular. No M/R/G  Abdomen: Soft. NT, ND. Bowel sounds- active.  Extremities: No legs swelling  Neuro: Alert and awake, grossly non-focal      Medical Decision Making             Data     I have personally reviewed the following data over the past 24 hrs:    N/A  \   N/A   / N/A     138 103 25.9 (H) /  91   3.7 25 1.30 (H) \     TSH: N/A T4: N/A A1C: 7.2 (H)     INR:  1.02 PTT:  N/A   D-dimer:  N/A Fibrinogen:  N/A       Imaging results reviewed over the past 24 hrs:   Recent Results (from the past 24 hour(s))   Cardiac Catheterization    Narrative    Images from the original result were not included.  Nirav Baker is a 79 year old old male with HTN, HL, DM, high BMI   45, ELDON, mild CKD who is here for NSTEMI.    - multi-vessel native CADl mildly elevated L-sided filling pressures  - will stop to obtain CTS consult for CABG evaluation  - continue ASA 81mg daily indefinitely, re-start heparin for at least 48   hrs in setting of NSTEMI  - add rosuva 40, consider PCSK9 inhibitors  - continue aggressive risk factor modification          Findings:  LM:no obstruction  LAD:50% mid-, 90% mid-distal narrowing  RIL large branch w/ long area of up to 95-99% narrowing ostial/proximal  Lcx:50-60% proximal narrowing  RCA:dominant, rPLV branch  w/ 80% narrowing    LVEDP:20    Access:  R Radial artery    Closure:   Vasc Band

## 2024-04-18 NOTE — SIGNIFICANT EVENT
Pt. Had an 18- beat run of V.Tach versus aberrant A. Fib at 0040.He was sleeping at the time, and denies any symptoms or sensations experienced with this. Notified on-call hospitalist. Will continue to monitor.

## 2024-04-18 NOTE — PLAN OF CARE
Goal Outcome Evaluation:      Plan of Care Reviewed With: patient, child    Overall Patient Progress: improving  Problem: Adult Inpatient Plan of Care  Goal: Plan of Care Review  Description: The Plan of Care Review/Shift note should be completed every shift.  The Outcome Evaluation is a brief statement about your assessment that the patient is improving, declining, or no change.  This information will be displayed automatically on your shift  note.  Outcome: Progressing  Flowsheets (Taken 4/17/2024 2200)  Plan of Care Reviewed With:   patient   child  Overall Patient Progress: improving     Problem: Chest Pain  Goal: Resolution of Chest Pain Symptoms  Outcome: Progressing     Problem: Cardiac Catheterization (Diagnostic/Interventional)  Goal: Absence of Bleeding  Outcome: Progressing     Problem: Comorbidity Management  Goal: Blood Pressure in Desired Range  Outcome: Progressing  Rishi came to the floor after angio today. No intervention after finding multivessel disease. Alert and oriented and no chest pain noted. Has been ambulated out of bed without issue. Reviewed plan of care with pt and family. Waiting to be seen by CV surgery. R wrist angio clean, dry and intact, no complications. Tele is NSR. BG in range at HS for pt. Urine output adequate. Knows that he will restart heparin drip this evening.

## 2024-04-19 ENCOUNTER — APPOINTMENT (OUTPATIENT)
Dept: RADIOLOGY | Facility: HOSPITAL | Age: 79
DRG: 234 | End: 2024-04-19
Payer: COMMERCIAL

## 2024-04-19 ENCOUNTER — ANESTHESIA (OUTPATIENT)
Dept: SURGERY | Facility: HOSPITAL | Age: 79
DRG: 234 | End: 2024-04-19
Payer: COMMERCIAL

## 2024-04-19 DIAGNOSIS — Z95.1 S/P CABG (CORONARY ARTERY BYPASS GRAFT): Primary | ICD-10-CM

## 2024-04-19 PROBLEM — I25.10 CORONARY ARTERY DISEASE INVOLVING NATIVE CORONARY ARTERY OF NATIVE HEART, UNSPECIFIED WHETHER ANGINA PRESENT: Status: ACTIVE | Noted: 2024-04-19

## 2024-04-19 LAB
ALBUMIN SERPL BCG-MCNC: 3.3 G/DL (ref 3.5–5.2)
ALP SERPL-CCNC: 69 U/L (ref 40–150)
ALT SERPL W P-5'-P-CCNC: 36 U/L (ref 0–70)
ANION GAP SERPL CALCULATED.3IONS-SCNC: 11 MMOL/L (ref 7–15)
APTT PPP: 29 SECONDS (ref 22–38)
APTT PPP: 31 SECONDS (ref 22–38)
APTT PPP: 32 SECONDS (ref 22–38)
AST SERPL W P-5'-P-CCNC: 66 U/L (ref 0–45)
BASE EXCESS BLDA CALC-SCNC: -0.1 MMOL/L (ref -3–3)
BASE EXCESS BLDA CALC-SCNC: -2.7 MMOL/L (ref -3–3)
BASE EXCESS BLDA CALC-SCNC: -2.8 MMOL/L (ref -3–3)
BASE EXCESS BLDA CALC-SCNC: -3 MMOL/L (ref -3–3)
BASE EXCESS BLDA CALC-SCNC: -3.4 MMOL/L (ref -3–3)
BASE EXCESS BLDV CALC-SCNC: -1.4 MMOL/L (ref -3–3)
BILIRUB SERPL-MCNC: 0.8 MG/DL
BUN SERPL-MCNC: 22.4 MG/DL (ref 8–23)
CA-I BLD-MCNC: 4.5 MG/DL (ref 4.4–5.2)
CA-I BLD-MCNC: 4.6 MG/DL (ref 4.4–5.2)
CA-I BLD-MCNC: 4.7 MG/DL (ref 4.4–5.2)
CA-I BLD-MCNC: 4.8 MG/DL (ref 4.4–5.2)
CA-I BLD-MCNC: 5.1 MG/DL (ref 4.4–5.2)
CALCIUM SERPL-MCNC: 8.8 MG/DL (ref 8.8–10.2)
CHLORIDE SERPL-SCNC: 106 MMOL/L (ref 98–107)
CHOLEST SERPL-MCNC: 209 MG/DL
COHGB MFR BLD: 97.1 % (ref 95–96)
CPB POCT: NO
CREAT SERPL-MCNC: 1.48 MG/DL (ref 0.67–1.17)
DEPRECATED HCO3 PLAS-SCNC: 23 MMOL/L (ref 22–29)
EGFRCR SERPLBLD CKD-EPI 2021: 48 ML/MIN/1.73M2
ERYTHROCYTE [DISTWIDTH] IN BLOOD BY AUTOMATED COUNT: 13.5 % (ref 10–15)
ERYTHROCYTE [DISTWIDTH] IN BLOOD BY AUTOMATED COUNT: 13.5 % (ref 10–15)
ERYTHROCYTE [DISTWIDTH] IN BLOOD BY AUTOMATED COUNT: 13.6 % (ref 10–15)
ERYTHROCYTE [DISTWIDTH] IN BLOOD BY AUTOMATED COUNT: 13.6 % (ref 10–15)
FIBRINOGEN PPP-MCNC: 297 MG/DL (ref 170–490)
FIBRINOGEN PPP-MCNC: 385 MG/DL (ref 170–490)
GLUCOSE BLD-MCNC: 125 MG/DL (ref 70–99)
GLUCOSE BLD-MCNC: 170 MG/DL (ref 70–99)
GLUCOSE BLD-MCNC: 178 MG/DL (ref 70–99)
GLUCOSE BLD-MCNC: 188 MG/DL (ref 70–99)
GLUCOSE BLDC GLUCOMTR-MCNC: 134 MG/DL (ref 70–99)
GLUCOSE BLDC GLUCOMTR-MCNC: 146 MG/DL (ref 70–99)
GLUCOSE BLDC GLUCOMTR-MCNC: 147 MG/DL (ref 70–99)
GLUCOSE BLDC GLUCOMTR-MCNC: 155 MG/DL (ref 70–99)
GLUCOSE BLDC GLUCOMTR-MCNC: 158 MG/DL (ref 70–99)
GLUCOSE BLDC GLUCOMTR-MCNC: 158 MG/DL (ref 70–99)
GLUCOSE BLDC GLUCOMTR-MCNC: 167 MG/DL (ref 70–99)
GLUCOSE BLDC GLUCOMTR-MCNC: 172 MG/DL (ref 70–99)
GLUCOSE SERPL-MCNC: 178 MG/DL (ref 70–99)
HCO3 BLD-SCNC: 22 MMOL/L (ref 21–28)
HCO3 BLDA-SCNC: 22 MMOL/L (ref 21–28)
HCO3 BLDA-SCNC: 24 MMOL/L (ref 21–28)
HCO3 BLDV-SCNC: 22 MMOL/L (ref 21–28)
HCT VFR BLD AUTO: 36.8 % (ref 40–53)
HCT VFR BLD AUTO: 37.2 % (ref 40–53)
HCT VFR BLD AUTO: 42 % (ref 40–53)
HCT VFR BLD AUTO: 42.9 % (ref 40–53)
HCT VFR BLD CALC: 35 % (ref 40–54)
HDLC SERPL-MCNC: 33 MG/DL
HGB BLD-MCNC: 11.9 G/DL (ref 13.3–17.7)
HGB BLD-MCNC: 12.3 G/DL (ref 13.3–17.7)
HGB BLD-MCNC: 12.4 G/DL (ref 13.3–17.7)
HGB BLD-MCNC: 12.6 G/DL (ref 13.3–17.7)
HGB BLD-MCNC: 12.9 G/DL (ref 13.3–17.7)
HGB BLD-MCNC: 13 G/DL (ref 13.3–17.7)
HGB BLD-MCNC: 14.5 G/DL (ref 13.3–17.7)
HGB BLD-MCNC: 14.6 G/DL (ref 13.3–17.7)
HGB BLD-MCNC: 14.7 G/DL (ref 13.3–17.7)
HOLD SPECIMEN: NORMAL
INR PPP: 1 (ref 0.85–1.15)
INR PPP: 1.21 (ref 0.85–1.15)
INR PPP: 1.31 (ref 0.85–1.15)
LACTATE BLD-SCNC: 0.8 MMOL/L (ref 0.7–2)
LACTATE BLD-SCNC: 0.8 MMOL/L (ref 0.7–2)
LACTATE BLD-SCNC: 0.9 MMOL/L (ref 0.7–2)
LACTATE BLD-SCNC: 1.2 MMOL/L (ref 0.7–2)
LACTATE SERPL-SCNC: 1.4 MMOL/L (ref 0.7–2)
LDLC SERPL CALC-MCNC: 124 MG/DL
MAGNESIUM SERPL-MCNC: 2.6 MG/DL (ref 1.7–2.3)
MCH RBC QN AUTO: 29.4 PG (ref 26.5–33)
MCH RBC QN AUTO: 29.9 PG (ref 26.5–33)
MCH RBC QN AUTO: 29.9 PG (ref 26.5–33)
MCH RBC QN AUTO: 30.1 PG (ref 26.5–33)
MCHC RBC AUTO-ENTMCNC: 33.4 G/DL (ref 31.5–36.5)
MCHC RBC AUTO-ENTMCNC: 33.8 G/DL (ref 31.5–36.5)
MCHC RBC AUTO-ENTMCNC: 34.7 G/DL (ref 31.5–36.5)
MCHC RBC AUTO-ENTMCNC: 34.8 G/DL (ref 31.5–36.5)
MCV RBC AUTO: 86 FL (ref 78–100)
MCV RBC AUTO: 87 FL (ref 78–100)
MCV RBC AUTO: 87 FL (ref 78–100)
MCV RBC AUTO: 89 FL (ref 78–100)
NONHDLC SERPL-MCNC: 176 MG/DL
O2/TOTAL GAS SETTING VFR VENT: 40 %
OXYHGB MFR BLDA: 98 % (ref 92–100)
OXYHGB MFR BLDA: 99 % (ref 92–100)
OXYHGB MFR BLDA: 99 % (ref 92–100)
OXYHGB MFR BLDV: 81 % (ref 70–75)
PCO2 BLD: 38 MM HG (ref 35–45)
PCO2 BLDA: 37 MM HG (ref 35–45)
PCO2 BLDA: 43 MM HG (ref 35–45)
PCO2 BLDA: 46 MM HG (ref 35–45)
PCO2 BLDA: 48 MM HG (ref 35–45)
PCO2 BLDV: 52 MM HG (ref 40–50)
PEEP: 5 CM H2O
PH BLD: 7.38 [PH] (ref 7.35–7.45)
PH BLDA: 7.3 [PH] (ref 7.35–7.45)
PH BLDA: 7.33 [PH] (ref 7.35–7.45)
PH BLDA: 7.36 [PH] (ref 7.35–7.45)
PH BLDA: 7.37 [PH] (ref 7.35–7.45)
PH BLDV: 7.29 [PH] (ref 7.32–7.43)
PHOSPHATE SERPL-MCNC: 3.3 MG/DL (ref 2.5–4.5)
PLATELET # BLD AUTO: 121 10E3/UL (ref 150–450)
PLATELET # BLD AUTO: 129 10E3/UL (ref 150–450)
PLATELET # BLD AUTO: 159 10E3/UL (ref 150–450)
PLATELET # BLD AUTO: 163 10E3/UL (ref 150–450)
PO2 BLD: 82 MM HG (ref 80–105)
PO2 BLDA: 115 MM HG (ref 80–105)
PO2 BLDA: 141 MM HG (ref 80–105)
PO2 BLDA: 223 MM HG (ref 80–105)
PO2 BLDA: 307 MM HG (ref 80–105)
PO2 BLDV: 50 MM HG (ref 25–47)
POTASSIUM BLD-SCNC: 3.8 MMOL/L (ref 3.4–5.3)
POTASSIUM BLD-SCNC: 4 MMOL/L (ref 3.4–5.3)
POTASSIUM BLD-SCNC: 4.6 MMOL/L (ref 3.4–5.3)
POTASSIUM BLD-SCNC: 5.4 MMOL/L (ref 3.4–5.3)
POTASSIUM BLD-SCNC: 5.5 MMOL/L (ref 3.4–5.3)
POTASSIUM SERPL-SCNC: 4.5 MMOL/L (ref 3.4–5.3)
POTASSIUM SERPL-SCNC: 4.5 MMOL/L (ref 3.4–5.3)
PROT SERPL-MCNC: 5.6 G/DL (ref 6.4–8.3)
RBC # BLD AUTO: 4.12 10E6/UL (ref 4.4–5.9)
RBC # BLD AUTO: 4.32 10E6/UL (ref 4.4–5.9)
RBC # BLD AUTO: 4.85 10E6/UL (ref 4.4–5.9)
RBC # BLD AUTO: 4.94 10E6/UL (ref 4.4–5.9)
SAO2 % BLDA: 100 % (ref 95–96)
SAO2 % BLDA: 100 % (ref 95–96)
SAO2 % BLDA: 101 % (ref 95–96)
SAO2 % BLDA: 96 % (ref 92–100)
SAO2 % BLDA: 98 % (ref 92–100)
SAO2 % BLDV: 83 % (ref 70–75)
SODIUM BLD-SCNC: 139 MMOL/L (ref 135–145)
SODIUM BLD-SCNC: 140 MMOL/L (ref 135–145)
SODIUM BLD-SCNC: 141 MMOL/L (ref 135–145)
SODIUM BLD-SCNC: 141 MMOL/L (ref 135–145)
SODIUM BLD-SCNC: 142 MMOL/L (ref 135–145)
SODIUM SERPL-SCNC: 140 MMOL/L (ref 135–145)
TRIGL SERPL-MCNC: 261 MG/DL
WBC # BLD AUTO: 13.4 10E3/UL (ref 4–11)
WBC # BLD AUTO: 14.2 10E3/UL (ref 4–11)
WBC # BLD AUTO: 5.6 10E3/UL (ref 4–11)
WBC # BLD AUTO: 5.8 10E3/UL (ref 4–11)

## 2024-04-19 PROCEDURE — 85610 PROTHROMBIN TIME: CPT

## 2024-04-19 PROCEDURE — C1898 LEAD, PMKR, OTHER THAN TRANS: HCPCS | Performed by: STUDENT IN AN ORGANIZED HEALTH CARE EDUCATION/TRAINING PROGRAM

## 2024-04-19 PROCEDURE — 85027 COMPLETE CBC AUTOMATED: CPT | Performed by: INTERNAL MEDICINE

## 2024-04-19 PROCEDURE — 82330 ASSAY OF CALCIUM: CPT

## 2024-04-19 PROCEDURE — P9045 ALBUMIN (HUMAN), 5%, 250 ML: HCPCS | Mod: JZ | Performed by: ANESTHESIOLOGY

## 2024-04-19 PROCEDURE — 250N000013 HC RX MED GY IP 250 OP 250 PS 637: Performed by: STUDENT IN AN ORGANIZED HEALTH CARE EDUCATION/TRAINING PROGRAM

## 2024-04-19 PROCEDURE — 250N000011 HC RX IP 250 OP 636: Performed by: STUDENT IN AN ORGANIZED HEALTH CARE EDUCATION/TRAINING PROGRAM

## 2024-04-19 PROCEDURE — 5A09357 ASSISTANCE WITH RESPIRATORY VENTILATION, LESS THAN 24 CONSECUTIVE HOURS, CONTINUOUS POSITIVE AIRWAY PRESSURE: ICD-10-PCS | Performed by: STUDENT IN AN ORGANIZED HEALTH CARE EDUCATION/TRAINING PROGRAM

## 2024-04-19 PROCEDURE — 93005 ELECTROCARDIOGRAM TRACING: CPT

## 2024-04-19 PROCEDURE — 06BQ4ZZ EXCISION OF LEFT SAPHENOUS VEIN, PERCUTANEOUS ENDOSCOPIC APPROACH: ICD-10-PCS | Performed by: STUDENT IN AN ORGANIZED HEALTH CARE EDUCATION/TRAINING PROGRAM

## 2024-04-19 PROCEDURE — P9016 RBC LEUKOCYTES REDUCED: HCPCS | Performed by: STUDENT IN AN ORGANIZED HEALTH CARE EDUCATION/TRAINING PROGRAM

## 2024-04-19 PROCEDURE — 5A1221Z PERFORMANCE OF CARDIAC OUTPUT, CONTINUOUS: ICD-10-PCS | Performed by: STUDENT IN AN ORGANIZED HEALTH CARE EDUCATION/TRAINING PROGRAM

## 2024-04-19 PROCEDURE — 258N000003 HC RX IP 258 OP 636

## 2024-04-19 PROCEDURE — 999N000157 HC STATISTIC RCP TIME EA 10 MIN

## 2024-04-19 PROCEDURE — 99207 PR NO BILLABLE SERVICE THIS VISIT: CPT | Performed by: INTERNAL MEDICINE

## 2024-04-19 PROCEDURE — 36415 COLL VENOUS BLD VENIPUNCTURE: CPT | Performed by: INTERNAL MEDICINE

## 2024-04-19 PROCEDURE — 410N000003 HC PER-PERFUSION 1ST 30 MIN: Performed by: STUDENT IN AN ORGANIZED HEALTH CARE EDUCATION/TRAINING PROGRAM

## 2024-04-19 PROCEDURE — 250N000011 HC RX IP 250 OP 636: Performed by: ANESTHESIOLOGY

## 2024-04-19 PROCEDURE — 85014 HEMATOCRIT: CPT | Performed by: INTERNAL MEDICINE

## 2024-04-19 PROCEDURE — 370N000017 HC ANESTHESIA TECHNICAL FEE, PER MIN: Performed by: STUDENT IN AN ORGANIZED HEALTH CARE EDUCATION/TRAINING PROGRAM

## 2024-04-19 PROCEDURE — 250N000013 HC RX MED GY IP 250 OP 250 PS 637: Performed by: ANESTHESIOLOGY

## 2024-04-19 PROCEDURE — 0T9B70Z DRAINAGE OF BLADDER WITH DRAINAGE DEVICE, VIA NATURAL OR ARTIFICIAL OPENING: ICD-10-PCS | Performed by: STUDENT IN AN ORGANIZED HEALTH CARE EDUCATION/TRAINING PROGRAM

## 2024-04-19 PROCEDURE — 999N000141 HC STATISTIC PRE-PROCEDURE NURSING ASSESSMENT: Performed by: STUDENT IN AN ORGANIZED HEALTH CARE EDUCATION/TRAINING PROGRAM

## 2024-04-19 PROCEDURE — 410N000004: Performed by: STUDENT IN AN ORGANIZED HEALTH CARE EDUCATION/TRAINING PROGRAM

## 2024-04-19 PROCEDURE — 258N000003 HC RX IP 258 OP 636: Performed by: STUDENT IN AN ORGANIZED HEALTH CARE EDUCATION/TRAINING PROGRAM

## 2024-04-19 PROCEDURE — 999N000259 HC STATISTIC EXTUBATION

## 2024-04-19 PROCEDURE — 85610 PROTHROMBIN TIME: CPT | Performed by: INTERNAL MEDICINE

## 2024-04-19 PROCEDURE — 84100 ASSAY OF PHOSPHORUS: CPT

## 2024-04-19 PROCEDURE — 3E043XZ INTRODUCTION OF VASOPRESSOR INTO CENTRAL VEIN, PERCUTANEOUS APPROACH: ICD-10-PCS | Performed by: STUDENT IN AN ORGANIZED HEALTH CARE EDUCATION/TRAINING PROGRAM

## 2024-04-19 PROCEDURE — 84132 ASSAY OF SERUM POTASSIUM: CPT

## 2024-04-19 PROCEDURE — 250N000009 HC RX 250: Performed by: ANESTHESIOLOGY

## 2024-04-19 PROCEDURE — 250N000011 HC RX IP 250 OP 636

## 2024-04-19 PROCEDURE — 999N000253 HC STATISTIC WEANING TRIALS

## 2024-04-19 PROCEDURE — 85730 THROMBOPLASTIN TIME PARTIAL: CPT

## 2024-04-19 PROCEDURE — 94002 VENT MGMT INPAT INIT DAY: CPT

## 2024-04-19 PROCEDURE — 272N000001 HC OR GENERAL SUPPLY STERILE: Performed by: STUDENT IN AN ORGANIZED HEALTH CARE EDUCATION/TRAINING PROGRAM

## 2024-04-19 PROCEDURE — 250N000009 HC RX 250: Performed by: STUDENT IN AN ORGANIZED HEALTH CARE EDUCATION/TRAINING PROGRAM

## 2024-04-19 PROCEDURE — 250N000012 HC RX MED GY IP 250 OP 636 PS 637: Mod: JZ | Performed by: STUDENT IN AN ORGANIZED HEALTH CARE EDUCATION/TRAINING PROGRAM

## 2024-04-19 PROCEDURE — 85730 THROMBOPLASTIN TIME PARTIAL: CPT | Performed by: INTERNAL MEDICINE

## 2024-04-19 PROCEDURE — 02100Z9 BYPASS CORONARY ARTERY, ONE ARTERY FROM LEFT INTERNAL MAMMARY, OPEN APPROACH: ICD-10-PCS | Performed by: STUDENT IN AN ORGANIZED HEALTH CARE EDUCATION/TRAINING PROGRAM

## 2024-04-19 PROCEDURE — 99291 CRITICAL CARE FIRST HOUR: CPT | Mod: 24 | Performed by: INTERNAL MEDICINE

## 2024-04-19 PROCEDURE — 93010 ELECTROCARDIOGRAM REPORT: CPT | Performed by: INTERNAL MEDICINE

## 2024-04-19 PROCEDURE — 33517 CABG ARTERY-VEIN SINGLE: CPT | Performed by: STUDENT IN AN ORGANIZED HEALTH CARE EDUCATION/TRAINING PROGRAM

## 2024-04-19 PROCEDURE — 85384 FIBRINOGEN ACTIVITY: CPT | Performed by: INTERNAL MEDICINE

## 2024-04-19 PROCEDURE — 250N000013 HC RX MED GY IP 250 OP 250 PS 637

## 2024-04-19 PROCEDURE — 33508 ENDOSCOPIC VEIN HARVEST: CPT | Mod: XU | Performed by: STUDENT IN AN ORGANIZED HEALTH CARE EDUCATION/TRAINING PROGRAM

## 2024-04-19 PROCEDURE — 83605 ASSAY OF LACTIC ACID: CPT

## 2024-04-19 PROCEDURE — 999N000065 XR CHEST PORT 1 VIEW

## 2024-04-19 PROCEDURE — 200N000001 HC R&B ICU

## 2024-04-19 PROCEDURE — 85027 COMPLETE CBC AUTOMATED: CPT

## 2024-04-19 PROCEDURE — C1760 CLOSURE DEV, VASC: HCPCS | Performed by: STUDENT IN AN ORGANIZED HEALTH CARE EDUCATION/TRAINING PROGRAM

## 2024-04-19 PROCEDURE — 250N000013 HC RX MED GY IP 250 OP 250 PS 637: Performed by: INTERNAL MEDICINE

## 2024-04-19 PROCEDURE — 76984 DX INTRAOP THORACIC AORTA US: CPT | Mod: 26 | Performed by: STUDENT IN AN ORGANIZED HEALTH CARE EDUCATION/TRAINING PROGRAM

## 2024-04-19 PROCEDURE — 250N000025 HC SEVOFLURANE, PER MIN: Performed by: STUDENT IN AN ORGANIZED HEALTH CARE EDUCATION/TRAINING PROGRAM

## 2024-04-19 PROCEDURE — 83735 ASSAY OF MAGNESIUM: CPT

## 2024-04-19 PROCEDURE — 82805 BLOOD GASES W/O2 SATURATION: CPT | Performed by: STUDENT IN AN ORGANIZED HEALTH CARE EDUCATION/TRAINING PROGRAM

## 2024-04-19 PROCEDURE — 258N000003 HC RX IP 258 OP 636: Performed by: ANESTHESIOLOGY

## 2024-04-19 PROCEDURE — 82803 BLOOD GASES ANY COMBINATION: CPT

## 2024-04-19 PROCEDURE — 84295 ASSAY OF SERUM SODIUM: CPT

## 2024-04-19 PROCEDURE — 021009W BYPASS CORONARY ARTERY, ONE ARTERY FROM AORTA WITH AUTOLOGOUS VENOUS TISSUE, OPEN APPROACH: ICD-10-PCS | Performed by: STUDENT IN AN ORGANIZED HEALTH CARE EDUCATION/TRAINING PROGRAM

## 2024-04-19 PROCEDURE — 360N000079 HC SURGERY LEVEL 6, PER MIN: Performed by: STUDENT IN AN ORGANIZED HEALTH CARE EDUCATION/TRAINING PROGRAM

## 2024-04-19 PROCEDURE — 272N000004 HC RX 272: Performed by: STUDENT IN AN ORGANIZED HEALTH CARE EDUCATION/TRAINING PROGRAM

## 2024-04-19 PROCEDURE — 33533 CABG ARTERIAL SINGLE: CPT | Performed by: STUDENT IN AN ORGANIZED HEALTH CARE EDUCATION/TRAINING PROGRAM

## 2024-04-19 RX ORDER — HYDROMORPHONE HCL IN WATER/PF 6 MG/30 ML
0.2 PATIENT CONTROLLED ANALGESIA SYRINGE INTRAVENOUS
Status: DISCONTINUED | OUTPATIENT
Start: 2024-04-19 | End: 2024-04-20

## 2024-04-19 RX ORDER — ACETAMINOPHEN 325 MG/1
975 TABLET ORAL ONCE
Status: COMPLETED | OUTPATIENT
Start: 2024-04-19 | End: 2024-04-19

## 2024-04-19 RX ORDER — NITROGLYCERIN 10 MG/100ML
INJECTION INTRAVENOUS PRN
Status: DISCONTINUED | OUTPATIENT
Start: 2024-04-19 | End: 2024-04-19

## 2024-04-19 RX ORDER — SODIUM CHLORIDE 9 MG/ML
INJECTION, SOLUTION INTRAVENOUS CONTINUOUS PRN
Status: DISCONTINUED | OUTPATIENT
Start: 2024-04-19 | End: 2024-04-19

## 2024-04-19 RX ORDER — ACETAMINOPHEN 325 MG/1
650 TABLET ORAL EVERY 4 HOURS PRN
Status: DISCONTINUED | OUTPATIENT
Start: 2024-04-22 | End: 2024-04-27 | Stop reason: HOSPADM

## 2024-04-19 RX ORDER — BISACODYL 10 MG
10 SUPPOSITORY, RECTAL RECTAL DAILY PRN
Status: DISCONTINUED | OUTPATIENT
Start: 2024-04-22 | End: 2024-04-27 | Stop reason: HOSPADM

## 2024-04-19 RX ORDER — NOREPINEPHRINE BITARTRATE 0.02 MG/ML
.01-.1 INJECTION, SOLUTION INTRAVENOUS CONTINUOUS
Status: DISCONTINUED | OUTPATIENT
Start: 2024-04-19 | End: 2024-04-19 | Stop reason: HOSPADM

## 2024-04-19 RX ORDER — DEXTROSE MONOHYDRATE 25 G/50ML
25-50 INJECTION, SOLUTION INTRAVENOUS
Status: DISCONTINUED | OUTPATIENT
Start: 2024-04-19 | End: 2024-04-20

## 2024-04-19 RX ORDER — SODIUM CHLORIDE, SODIUM GLUCONATE, SODIUM ACETATE, POTASSIUM CHLORIDE AND MAGNESIUM CHLORIDE 526; 502; 368; 37; 30 MG/100ML; MG/100ML; MG/100ML; MG/100ML; MG/100ML
1000 INJECTION, SOLUTION INTRAVENOUS
Status: DISCONTINUED | OUTPATIENT
Start: 2024-04-19 | End: 2024-04-19

## 2024-04-19 RX ORDER — OXYCODONE HYDROCHLORIDE 5 MG/1
5 TABLET ORAL EVERY 4 HOURS PRN
Status: DISCONTINUED | OUTPATIENT
Start: 2024-04-19 | End: 2024-04-24

## 2024-04-19 RX ORDER — EZETIMIBE 10 MG/1
10 TABLET ORAL AT BEDTIME
Status: DISCONTINUED | OUTPATIENT
Start: 2024-04-19 | End: 2024-04-23

## 2024-04-19 RX ORDER — CALCIUM GLUCONATE 20 MG/ML
1 INJECTION, SOLUTION INTRAVENOUS
Status: DISCONTINUED | OUTPATIENT
Start: 2024-04-19 | End: 2024-04-24

## 2024-04-19 RX ORDER — VANCOMYCIN HYDROCHLORIDE 1 G/20ML
INJECTION, POWDER, LYOPHILIZED, FOR SOLUTION INTRAVENOUS PRN
Status: DISCONTINUED | OUTPATIENT
Start: 2024-04-19 | End: 2024-04-19 | Stop reason: HOSPADM

## 2024-04-19 RX ORDER — PHENYLEPHRINE HCL IN 0.9% NACL 50MG/250ML
.1-4 PLASTIC BAG, INJECTION (ML) INTRAVENOUS CONTINUOUS PRN
Status: DISCONTINUED | OUTPATIENT
Start: 2024-04-19 | End: 2024-04-20

## 2024-04-19 RX ORDER — EPHEDRINE SULFATE 50 MG/ML
INJECTION, SOLUTION INTRAMUSCULAR; INTRAVENOUS; SUBCUTANEOUS PRN
Status: DISCONTINUED | OUTPATIENT
Start: 2024-04-19 | End: 2024-04-19

## 2024-04-19 RX ORDER — NALOXONE HYDROCHLORIDE 0.4 MG/ML
0.2 INJECTION, SOLUTION INTRAMUSCULAR; INTRAVENOUS; SUBCUTANEOUS
Status: DISCONTINUED | OUTPATIENT
Start: 2024-04-19 | End: 2024-04-24

## 2024-04-19 RX ORDER — CALCIUM GLUCONATE 20 MG/ML
2 INJECTION, SOLUTION INTRAVENOUS
Status: DISCONTINUED | OUTPATIENT
Start: 2024-04-19 | End: 2024-04-24

## 2024-04-19 RX ORDER — ASPIRIN 300 MG/1
300 SUPPOSITORY RECTAL ONCE
Qty: 1 SUPPOSITORY | Refills: 0 | Status: COMPLETED | OUTPATIENT
Start: 2024-04-19 | End: 2024-04-19

## 2024-04-19 RX ORDER — ASPIRIN 81 MG/1
162 TABLET, CHEWABLE ORAL ONCE
Qty: 2 TABLET | Refills: 0 | Status: COMPLETED | OUTPATIENT
Start: 2024-04-19 | End: 2024-04-19

## 2024-04-19 RX ORDER — ACETAMINOPHEN 325 MG/1
975 TABLET ORAL EVERY 8 HOURS
Qty: 27 TABLET | Refills: 0 | Status: COMPLETED | OUTPATIENT
Start: 2024-04-19 | End: 2024-04-22

## 2024-04-19 RX ORDER — CEFAZOLIN SODIUM 2 G/100ML
2 INJECTION, SOLUTION INTRAVENOUS EVERY 8 HOURS
Qty: 300 ML | Refills: 0 | Status: COMPLETED | OUTPATIENT
Start: 2024-04-19 | End: 2024-04-20

## 2024-04-19 RX ORDER — METHADONE HYDROCHLORIDE 10 MG/ML
INJECTION, SOLUTION INTRAMUSCULAR; INTRAVENOUS; SUBCUTANEOUS
Status: DISCONTINUED
Start: 2024-04-19 | End: 2024-04-19 | Stop reason: HOSPADM

## 2024-04-19 RX ORDER — PANTOPRAZOLE SODIUM 40 MG/1
40 TABLET, DELAYED RELEASE ORAL DAILY
Status: DISCONTINUED | OUTPATIENT
Start: 2024-04-19 | End: 2024-04-27 | Stop reason: HOSPADM

## 2024-04-19 RX ORDER — GLIPIZIDE 10 MG/1
20 TABLET, FILM COATED, EXTENDED RELEASE ORAL AT BEDTIME
Status: DISCONTINUED | OUTPATIENT
Start: 2024-04-19 | End: 2024-04-25

## 2024-04-19 RX ORDER — CEFAZOLIN SODIUM/WATER 3 G/30 ML
3 SYRINGE (ML) INTRAVENOUS
Status: COMPLETED | OUTPATIENT
Start: 2024-04-19 | End: 2024-04-19

## 2024-04-19 RX ORDER — ONDANSETRON 4 MG/1
4 TABLET, ORALLY DISINTEGRATING ORAL EVERY 6 HOURS PRN
Status: DISCONTINUED | OUTPATIENT
Start: 2024-04-19 | End: 2024-04-27 | Stop reason: HOSPADM

## 2024-04-19 RX ORDER — NICOTINE POLACRILEX 4 MG
15-30 LOZENGE BUCCAL
Status: DISCONTINUED | OUTPATIENT
Start: 2024-04-19 | End: 2024-04-20

## 2024-04-19 RX ORDER — LIDOCAINE 4 G/G
1-2 PATCH TOPICAL EVERY 24 HOURS
Status: DISCONTINUED | OUTPATIENT
Start: 2024-04-19 | End: 2024-04-27 | Stop reason: HOSPADM

## 2024-04-19 RX ORDER — POLYETHYLENE GLYCOL 3350 17 G/17G
17 POWDER, FOR SOLUTION ORAL DAILY
Status: DISCONTINUED | OUTPATIENT
Start: 2024-04-20 | End: 2024-04-27 | Stop reason: HOSPADM

## 2024-04-19 RX ORDER — NALOXONE HYDROCHLORIDE 0.4 MG/ML
0.4 INJECTION, SOLUTION INTRAMUSCULAR; INTRAVENOUS; SUBCUTANEOUS
Status: DISCONTINUED | OUTPATIENT
Start: 2024-04-19 | End: 2024-04-24

## 2024-04-19 RX ORDER — LIDOCAINE HYDROCHLORIDE 10 MG/ML
INJECTION, SOLUTION INFILTRATION; PERINEURAL PRN
Status: DISCONTINUED | OUTPATIENT
Start: 2024-04-19 | End: 2024-04-19

## 2024-04-19 RX ORDER — PROPOFOL 10 MG/ML
INJECTION, EMULSION INTRAVENOUS PRN
Status: DISCONTINUED | OUTPATIENT
Start: 2024-04-19 | End: 2024-04-19

## 2024-04-19 RX ORDER — ROSUVASTATIN CALCIUM 10 MG/1
20 TABLET, COATED ORAL AT BEDTIME
Status: DISCONTINUED | OUTPATIENT
Start: 2024-04-19 | End: 2024-04-23

## 2024-04-19 RX ORDER — ONDANSETRON 2 MG/ML
INJECTION INTRAMUSCULAR; INTRAVENOUS PRN
Status: DISCONTINUED | OUTPATIENT
Start: 2024-04-19 | End: 2024-04-19

## 2024-04-19 RX ORDER — LIDOCAINE 40 MG/G
CREAM TOPICAL
Status: DISCONTINUED | OUTPATIENT
Start: 2024-04-19 | End: 2024-04-19 | Stop reason: HOSPADM

## 2024-04-19 RX ORDER — AMOXICILLIN 250 MG
1 CAPSULE ORAL 2 TIMES DAILY
Status: DISCONTINUED | OUTPATIENT
Start: 2024-04-19 | End: 2024-04-27 | Stop reason: HOSPADM

## 2024-04-19 RX ORDER — LANOLIN ALCOHOL/MO/W.PET/CERES
6 CREAM (GRAM) TOPICAL AT BEDTIME
Status: DISCONTINUED | OUTPATIENT
Start: 2024-04-19 | End: 2024-04-27 | Stop reason: HOSPADM

## 2024-04-19 RX ORDER — PROCHLORPERAZINE MALEATE 5 MG
5 TABLET ORAL EVERY 6 HOURS PRN
Status: DISCONTINUED | OUTPATIENT
Start: 2024-04-19 | End: 2024-04-27 | Stop reason: HOSPADM

## 2024-04-19 RX ORDER — OXYCODONE HYDROCHLORIDE 5 MG/1
10 TABLET ORAL EVERY 4 HOURS PRN
Status: DISCONTINUED | OUTPATIENT
Start: 2024-04-19 | End: 2024-04-22

## 2024-04-19 RX ORDER — HYDROMORPHONE HCL IN WATER/PF 6 MG/30 ML
0.4 PATIENT CONTROLLED ANALGESIA SYRINGE INTRAVENOUS
Status: DISCONTINUED | OUTPATIENT
Start: 2024-04-19 | End: 2024-04-20

## 2024-04-19 RX ORDER — DEXMEDETOMIDINE HYDROCHLORIDE 4 UG/ML
.2-.7 INJECTION, SOLUTION INTRAVENOUS CONTINUOUS
Status: DISCONTINUED | OUTPATIENT
Start: 2024-04-19 | End: 2024-04-20

## 2024-04-19 RX ORDER — ASPIRIN 81 MG/1
162 TABLET, CHEWABLE ORAL DAILY
Status: DISCONTINUED | OUTPATIENT
Start: 2024-04-20 | End: 2024-04-20

## 2024-04-19 RX ORDER — CEFAZOLIN SODIUM/WATER 2 G/20 ML
SYRINGE (ML) INTRAVENOUS
Status: DISCONTINUED
Start: 2024-04-19 | End: 2024-04-19 | Stop reason: HOSPADM

## 2024-04-19 RX ORDER — CEFAZOLIN SODIUM/WATER 3 G/30 ML
3 SYRINGE (ML) INTRAVENOUS SEE ADMIN INSTRUCTIONS
Status: DISCONTINUED | OUTPATIENT
Start: 2024-04-19 | End: 2024-04-19 | Stop reason: HOSPADM

## 2024-04-19 RX ORDER — ONDANSETRON 2 MG/ML
4 INJECTION INTRAMUSCULAR; INTRAVENOUS EVERY 6 HOURS PRN
Status: DISCONTINUED | OUTPATIENT
Start: 2024-04-19 | End: 2024-04-24

## 2024-04-19 RX ORDER — PHENYLEPHRINE HCL IN 0.9% NACL 50MG/250ML
.1-6 PLASTIC BAG, INJECTION (ML) INTRAVENOUS CONTINUOUS
Status: DISCONTINUED | OUTPATIENT
Start: 2024-04-19 | End: 2024-04-19 | Stop reason: HOSPADM

## 2024-04-19 RX ORDER — SODIUM CHLORIDE, SODIUM LACTATE, POTASSIUM CHLORIDE, CALCIUM CHLORIDE 600; 310; 30; 20 MG/100ML; MG/100ML; MG/100ML; MG/100ML
INJECTION, SOLUTION INTRAVENOUS CONTINUOUS
Status: DISCONTINUED | OUTPATIENT
Start: 2024-04-19 | End: 2024-04-19 | Stop reason: HOSPADM

## 2024-04-19 RX ORDER — HEPARIN SODIUM 5000 [USP'U]/.5ML
5000 INJECTION, SOLUTION INTRAVENOUS; SUBCUTANEOUS EVERY 8 HOURS
Status: DISCONTINUED | OUTPATIENT
Start: 2024-04-20 | End: 2024-04-27 | Stop reason: HOSPADM

## 2024-04-19 RX ORDER — HEPARIN SODIUM 1000 [USP'U]/ML
INJECTION, SOLUTION INTRAVENOUS; SUBCUTANEOUS PRN
Status: DISCONTINUED | OUTPATIENT
Start: 2024-04-19 | End: 2024-04-19

## 2024-04-19 RX ORDER — PROTAMINE SULFATE 10 MG/ML
INJECTION, SOLUTION INTRAVENOUS PRN
Status: DISCONTINUED | OUTPATIENT
Start: 2024-04-19 | End: 2024-04-19

## 2024-04-19 RX ORDER — MULTIVITAMIN,THERAPEUTIC
1 TABLET ORAL AT BEDTIME
Status: DISCONTINUED | OUTPATIENT
Start: 2024-04-19 | End: 2024-04-27 | Stop reason: HOSPADM

## 2024-04-19 RX ORDER — ASPIRIN 300 MG/1
300 SUPPOSITORY RECTAL DAILY
Status: DISCONTINUED | OUTPATIENT
Start: 2024-04-20 | End: 2024-04-20

## 2024-04-19 RX ORDER — HYDRALAZINE HYDROCHLORIDE 20 MG/ML
10 INJECTION INTRAMUSCULAR; INTRAVENOUS EVERY 30 MIN PRN
Status: DISCONTINUED | OUTPATIENT
Start: 2024-04-19 | End: 2024-04-24

## 2024-04-19 RX ORDER — KETAMINE HYDROCHLORIDE 10 MG/ML
INJECTION INTRAMUSCULAR; INTRAVENOUS PRN
Status: DISCONTINUED | OUTPATIENT
Start: 2024-04-19 | End: 2024-04-19

## 2024-04-19 RX ORDER — MAGNESIUM SULFATE 4 G/50ML
4 INJECTION INTRAVENOUS ONCE
Status: COMPLETED | OUTPATIENT
Start: 2024-04-19 | End: 2024-04-19

## 2024-04-19 RX ORDER — DEXMEDETOMIDINE HYDROCHLORIDE 4 UG/ML
.2-1.2 INJECTION, SOLUTION INTRAVENOUS CONTINUOUS
Status: DISCONTINUED | OUTPATIENT
Start: 2024-04-19 | End: 2024-04-19 | Stop reason: HOSPADM

## 2024-04-19 RX ORDER — FENTANYL CITRATE 50 UG/ML
INJECTION, SOLUTION INTRAMUSCULAR; INTRAVENOUS PRN
Status: DISCONTINUED | OUTPATIENT
Start: 2024-04-19 | End: 2024-04-19

## 2024-04-19 RX ADMIN — SODIUM CHLORIDE, POTASSIUM CHLORIDE, SODIUM LACTATE AND CALCIUM CHLORIDE 250 ML: 600; 310; 30; 20 INJECTION, SOLUTION INTRAVENOUS at 17:36

## 2024-04-19 RX ADMIN — ROCURONIUM BROMIDE 50 MG: 50 INJECTION, SOLUTION INTRAVENOUS at 14:07

## 2024-04-19 RX ADMIN — Medication 20 MG: at 09:58

## 2024-04-19 RX ADMIN — SODIUM CHLORIDE, POTASSIUM CHLORIDE, SODIUM LACTATE AND CALCIUM CHLORIDE 250 ML: 600; 310; 30; 20 INJECTION, SOLUTION INTRAVENOUS at 21:17

## 2024-04-19 RX ADMIN — PHENYLEPHRINE HYDROCHLORIDE 100 MCG: 10 INJECTION INTRAVENOUS at 16:12

## 2024-04-19 RX ADMIN — NITROGLYCERIN 40 MCG: 10 INJECTION INTRAVENOUS at 13:47

## 2024-04-19 RX ADMIN — DEXMEDETOMIDINE HYDROCHLORIDE 0.5 MCG/KG/HR: 400 INJECTION INTRAVENOUS at 10:27

## 2024-04-19 RX ADMIN — NITROGLYCERIN 40 MCG: 10 INJECTION INTRAVENOUS at 15:15

## 2024-04-19 RX ADMIN — PHENYLEPHRINE HYDROCHLORIDE 0.5 MCG/KG/MIN: 10 INJECTION INTRAVENOUS at 10:46

## 2024-04-19 RX ADMIN — AMINOCAPROIC ACID 1.25 G/HR: 250 INJECTION, SOLUTION INTRAVENOUS at 11:40

## 2024-04-19 RX ADMIN — NITROGLYCERIN 40 MCG: 10 INJECTION INTRAVENOUS at 15:13

## 2024-04-19 RX ADMIN — AMINOCAPROIC ACID 7.5 G: 250 INJECTION, SOLUTION INTRAVENOUS at 10:43

## 2024-04-19 RX ADMIN — SODIUM CHLORIDE, POTASSIUM CHLORIDE, SODIUM LACTATE AND CALCIUM CHLORIDE: 600; 310; 30; 20 INJECTION, SOLUTION INTRAVENOUS at 07:29

## 2024-04-19 RX ADMIN — SENNOSIDES AND DOCUSATE SODIUM 1 TABLET: 8.6; 5 TABLET ORAL at 22:39

## 2024-04-19 RX ADMIN — SUGAMMADEX 200 MG: 100 INJECTION, SOLUTION INTRAVENOUS at 17:08

## 2024-04-19 RX ADMIN — SODIUM CHLORIDE: 9 INJECTION, SOLUTION INTRAVENOUS at 10:10

## 2024-04-19 RX ADMIN — NITROGLYCERIN 40 MCG: 10 INJECTION INTRAVENOUS at 15:16

## 2024-04-19 RX ADMIN — PHENYLEPHRINE HYDROCHLORIDE 100 MCG: 10 INJECTION INTRAVENOUS at 16:18

## 2024-04-19 RX ADMIN — LIDOCAINE 2 PATCH: 4 PATCH TOPICAL at 18:44

## 2024-04-19 RX ADMIN — PROPOFOL 100 MG: 10 INJECTION, EMULSION INTRAVENOUS at 15:13

## 2024-04-19 RX ADMIN — ROCURONIUM BROMIDE 30 MG: 50 INJECTION, SOLUTION INTRAVENOUS at 15:55

## 2024-04-19 RX ADMIN — MAGNESIUM SULFATE HEPTAHYDRATE 4 G: 80 INJECTION, SOLUTION INTRAVENOUS at 07:29

## 2024-04-19 RX ADMIN — FENTANYL CITRATE 25 MCG: 50 INJECTION INTRAMUSCULAR; INTRAVENOUS at 09:47

## 2024-04-19 RX ADMIN — Medication 2 G: at 14:07

## 2024-04-19 RX ADMIN — SODIUM BICARBONATE 50 MEQ: 84 INJECTION, SOLUTION INTRAVENOUS at 16:26

## 2024-04-19 RX ADMIN — ALBUMIN HUMAN: 0.05 INJECTION, SOLUTION INTRAVENOUS at 12:42

## 2024-04-19 RX ADMIN — Medication 5 MG: at 10:16

## 2024-04-19 RX ADMIN — CHLORHEXIDINE GLUCONATE 10 ML: 1.2 RINSE ORAL at 06:12

## 2024-04-19 RX ADMIN — ASPIRIN 300 MG: 300 SUPPOSITORY RECTAL at 18:43

## 2024-04-19 RX ADMIN — Medication 15 MG: at 15:11

## 2024-04-19 RX ADMIN — HYDROMORPHONE HYDROCHLORIDE 0.2 MG: 0.2 INJECTION, SOLUTION INTRAMUSCULAR; INTRAVENOUS; SUBCUTANEOUS at 22:17

## 2024-04-19 RX ADMIN — FENTANYL CITRATE 50 MCG: 50 INJECTION INTRAMUSCULAR; INTRAVENOUS at 09:49

## 2024-04-19 RX ADMIN — PANTOPRAZOLE SODIUM 40 MG: 40 TABLET, DELAYED RELEASE ORAL at 22:38

## 2024-04-19 RX ADMIN — FENTANYL CITRATE 25 MCG: 50 INJECTION INTRAMUSCULAR; INTRAVENOUS at 09:45

## 2024-04-19 RX ADMIN — ACETAMINOPHEN 975 MG: 325 TABLET ORAL at 07:28

## 2024-04-19 RX ADMIN — CEFAZOLIN SODIUM 2 G: 2 INJECTION, SOLUTION INTRAVENOUS at 21:18

## 2024-04-19 RX ADMIN — Medication 5 MG: at 10:22

## 2024-04-19 RX ADMIN — ONDANSETRON 4 MG: 2 INJECTION INTRAMUSCULAR; INTRAVENOUS at 17:08

## 2024-04-19 RX ADMIN — NITROGLYCERIN 40 MCG: 10 INJECTION INTRAVENOUS at 15:14

## 2024-04-19 RX ADMIN — PHENYLEPHRINE HYDROCHLORIDE 50 MCG: 10 INJECTION INTRAVENOUS at 10:50

## 2024-04-19 RX ADMIN — KETAMINE HYDROCHLORIDE 50 MG: 10 INJECTION INTRAMUSCULAR; INTRAVENOUS at 09:57

## 2024-04-19 RX ADMIN — HYDROMORPHONE HYDROCHLORIDE 0.2 MG: 0.2 INJECTION, SOLUTION INTRAMUSCULAR; INTRAVENOUS; SUBCUTANEOUS at 17:59

## 2024-04-19 RX ADMIN — ROSUVASTATIN CALCIUM 20 MG: 10 TABLET, FILM COATED ORAL at 22:39

## 2024-04-19 RX ADMIN — Medication 3 G: at 10:07

## 2024-04-19 RX ADMIN — NITROGLYCERIN 40 MCG: 10 INJECTION INTRAVENOUS at 15:17

## 2024-04-19 RX ADMIN — INSULIN HUMAN 5 UNITS/HR: 1 INJECTION, SOLUTION INTRAVENOUS at 14:24

## 2024-04-19 RX ADMIN — PHENYLEPHRINE HYDROCHLORIDE 50 MCG: 10 INJECTION INTRAVENOUS at 11:17

## 2024-04-19 RX ADMIN — ROCURONIUM BROMIDE 50 MG: 50 INJECTION, SOLUTION INTRAVENOUS at 12:05

## 2024-04-19 RX ADMIN — SODIUM CHLORIDE, POTASSIUM CHLORIDE, SODIUM LACTATE AND CALCIUM CHLORIDE: 600; 310; 30; 20 INJECTION, SOLUTION INTRAVENOUS at 10:48

## 2024-04-19 RX ADMIN — PHENYLEPHRINE HYDROCHLORIDE 100 MCG: 10 INJECTION INTRAVENOUS at 15:24

## 2024-04-19 RX ADMIN — METOPROLOL TARTRATE 25 MG: 25 TABLET, FILM COATED ORAL at 06:11

## 2024-04-19 RX ADMIN — ACETAMINOPHEN 975 MG: 325 TABLET ORAL at 22:39

## 2024-04-19 RX ADMIN — PROPOFOL 70 MG: 10 INJECTION, EMULSION INTRAVENOUS at 09:58

## 2024-04-19 RX ADMIN — ASPIRIN 81 MG CHEWABLE TABLET 162 MG: 81 TABLET CHEWABLE at 06:11

## 2024-04-19 RX ADMIN — HYDROMORPHONE HYDROCHLORIDE 0.4 MG: 0.2 INJECTION, SOLUTION INTRAMUSCULAR; INTRAVENOUS; SUBCUTANEOUS at 20:07

## 2024-04-19 RX ADMIN — LIDOCAINE HYDROCHLORIDE 5 ML: 10 INJECTION, SOLUTION INFILTRATION; PERINEURAL at 09:57

## 2024-04-19 RX ADMIN — ROCURONIUM BROMIDE 100 MG: 50 INJECTION, SOLUTION INTRAVENOUS at 09:59

## 2024-04-19 RX ADMIN — OXYCODONE HYDROCHLORIDE 10 MG: 5 TABLET ORAL at 22:39

## 2024-04-19 RX ADMIN — EZETIMIBE 10 MG: 10 TABLET ORAL at 22:40

## 2024-04-19 RX ADMIN — PROTAMINE SULFATE 400 MG: 10 INJECTION, SOLUTION INTRAVENOUS at 15:16

## 2024-04-19 RX ADMIN — HEPARIN SODIUM 40000 UNITS: 1000 INJECTION INTRAVENOUS; SUBCUTANEOUS at 13:31

## 2024-04-19 RX ADMIN — PHENYLEPHRINE HYDROCHLORIDE 100 MCG: 10 INJECTION INTRAVENOUS at 15:31

## 2024-04-19 RX ADMIN — DESMOPRESSIN ACETATE 43.92 MCG: 4 INJECTION, SOLUTION INTRAVENOUS; SUBCUTANEOUS at 15:17

## 2024-04-19 ASSESSMENT — ACTIVITIES OF DAILY LIVING (ADL)
ADLS_ACUITY_SCORE: 28
ADLS_ACUITY_SCORE: 38
ADLS_ACUITY_SCORE: 29
ADLS_ACUITY_SCORE: 28
ADLS_ACUITY_SCORE: 29
ADLS_ACUITY_SCORE: 28
ADLS_ACUITY_SCORE: 29
ADLS_ACUITY_SCORE: 28
ADLS_ACUITY_SCORE: 29
ADLS_ACUITY_SCORE: 38
ADLS_ACUITY_SCORE: 39
ADLS_ACUITY_SCORE: 28

## 2024-04-19 NOTE — PLAN OF CARE
Goal Outcome Evaluation:      Plan of Care Reviewed With: patient, child    Overall Patient Progress: improvingOverall Patient Progress: improving    Outcome Evaluation: Pt reports no pain overnight, VSS on room air/CPAP. Heparin gtt stopped at 0230 per order. Showered this AM with CHG and new linen and gown. BG done 147. Sacral mepilex on. NPO since midnight. Tele is NSR 1*AVB. Pt had bowel movement this morning and last night, uritating without problems. SBA up to shower. Nasal swabs done.

## 2024-04-19 NOTE — OP NOTE
Preoperative diagnosis   Bladder neck contracture    Postoperative diagnosis   Bladder neck contracture    Procedure performed   1. Catheter placement    Attending surgeon   Savanna Kent MD    Anesthesia   General         Findings   16F temperature catheter was in place with blood in tubing. Unable to be aspirated. When it was re-advanced, a tight area was appreciated that was able to be gently dilated with the catheter. Clear urine drained after and was flushed for removal of a few clots.     Indications   79 year old male with history of radical prostatectomy and recurrent bladder neck contracture s/p dilation underwent lazo placement prior to cardiac surgery. Per discussion with OR team, catheter passed easily but was unable to be advanced to the hub. Some drainage of clear yellow urine was seen, so the balloon was inflated with 10cc. After the patient was positioned, it was noted that the catheter was not draining and could not be easily flushed. Urology consulted intraoperatively.     Procedure   The indwelling catheter could flush but not be drawn back. It could not be readily advanced. I deflated the balloon and gently advanced the catheter. Slight resistance was met at the bladder neck that was able to be gently traversed. Then the catheter was hubbed and clear light pink urine drained. 10 ml placed in the balloon. The catheter was flushed for removal of several clots and then was draining clear yellow urine.     Savanna Kent MD was present in the procedure room for this portion of the procedure.     -Please keep the lazo in place for 4 days and then can be removed if urine is clear.   -Would be expected to see some hematuria after the dilation and considering the need for anticoagulation for his cardiac procedure. -I would avoid CBI if possible and start with routine flushing of the catheter if needed.   -Patient should follow-up outpatient with his urologist, Gunner Figueroa

## 2024-04-19 NOTE — PROGRESS NOTES
"Received pt from CV surgery and placed on mechanical ventilation RR 18, Vt 600, PEEP 5, FiO2 .80. # 7.5 ETT taped to Lt side of mouth at 24 cm at the teeth. BS clear and equal bilat, diminished bases. RT to monitor    Vent Mode: CMV/AC  (Continuous Mandatory Ventilation/ Assist Control)  FiO2 (%): 80 %  Resp Rate (Set): 18 breaths/min  Tidal Volume (Set, mL): 600 mL  PEEP (cm H2O): 5 cmH2O  Resp: 18    /56   Pulse 66   Temp 98.3  F (36.8  C) (Oral)   Resp 18   Ht 1.803 m (5' 11\")   Wt 143.4 kg (316 lb 3.2 oz)   SpO2 98%   BMI 44.10 kg/m      Will titrate FiO2 as tolerated.   "

## 2024-04-19 NOTE — ANESTHESIA POSTPROCEDURE EVALUATION
Patient: Nirav Baker    Procedure: Procedure(s):  CORONARY ARTERY BYPASS GRAFT TIMES  TWO  WITH LEFT INTERNAL MAMMARY ARTERY HARVEST, LEFT ENDOSCOPIC VESSEL PROCUREMENT, ANESTHESIA TRANSESOPHAGEAL ECHOCARDIOGRAM, EPI AORTIC ULTRASOUND       Anesthesia Type:  General    Note:  Disposition: ICU            ICU Sign Out: Anesthesiologist/ICU physician sign out WAS performed   Postop Pain Control: Uneventful            Sign Out: Well controlled pain   PONV: No   Neuro/Psych: Uneventful            Sign Out: PLANNED postop sedation   Airway/Respiratory:             Sign Out: AIRWAY IN SITU/Resp. Support               Airway in situ/Resp. Support: ETT                 Reason: Planned Pre-op   CV/Hemodynamics: Uneventful            Sign Out: Detailed CV status               Blood Pressure: Normal               Rate/Rhythm: Normal HR               Perfusion:  Adequate perfusion indices   Other NRE:    DID A NON-ROUTINE EVENT OCCUR?            Last vitals:  Vitals:    04/19/24 0318 04/19/24 0543 04/19/24 1700   BP: (!) 149/65 (!) 158/66 114/56   Pulse: 69 68 69   Resp: 16 18    Temp: 36.6  C (97.9  F) 36.8  C (98.3  F)    SpO2: 97%  97%       Electronically Signed By: Vincent Aguirre MD  April 19, 2024  5:16 PM

## 2024-04-19 NOTE — ANESTHESIA PROCEDURE NOTES
Central Line/PA Catheter Placement    Pre-Procedure   Staff -        Anesthesiologist:  Vincent Aguirre MD       Performed By: anesthesiologist       Location: OR       Pre-Anesthestic Checklist: patient identified, IV checked, site marked, risks and benefits discussed, informed consent, monitors and equipment checked, pre-op evaluation and at physician/surgeon's request  Timeout:       Correct Patient: Yes        Correct Procedure: Yes        Correct Site: Yes        Correct Position: Yes        Correct Laterality: Yes   Line Placement:   This line was placed Post Induction starting at 4/19/2024 10:04 AM and ending at 4/19/2024 10:14 AM    Procedure   Procedure: central line and elective       Laterality: right       Insertion Site: internal jugular.       Patient Position: Trendelenburg  Sterile Prep        All elements of maximal sterile barrier technique followed       Patient Prep/Sterile Barriers: draped, hand hygiene, gloves , hat , mask , draped, gown, sterile gel and probe cover       Skin prep: Chloraprep  Insertion/Injection        Technique: ultrasound guided and Seldinger Technique        1. Ultrasound was used to evaluate the access site.       2. Vein evaluated via ultrasound for patency/adequacy.       3. Using real-time ultrasound the needle/catheter was observed entering the artery/vein.       4. Permanent image was captured and entered into the patient's record.       5. The visualized structures were anatomically normal.       6. There were no apparent abnormal pathologic findings.       Type: CVC       Catheter Size: 9 Fr       Catheter Length: 10       Number of Lumens: double lumen  Narrative         Secured by: suture       Biopatch and Tegaderm dressing used.       Complications: None apparent,        blood aspirated from all lumens,        All lumens flushed: Yes       Verification method: Placement to be verified post-op, BELLO and Ultrasound   Comments:  Fluid column manometry and US  used to verify placement in internal jugular vein.   The catheter tip was placed under ultrasound and resided in the superior vena cava and subsequently confirmed via chest x-ray

## 2024-04-19 NOTE — ANESTHESIA PREPROCEDURE EVALUATION
"                Anesthesia Pre-Procedure Evaluation    Patient: Nirav Baker   MRN: [id number] : 1945        Procedure : Procedure(s):  cabg          Past Medical History:   Diagnosis Date    Claustrophobia     Diabetes (H)     Hiatal hernia     Hypertension     Motion sickness     NSTEMI (non-ST elevated myocardial infarction) (H) 2024    Orthopnea     Personal history of fall     Twice    Sleep apnea     Walking troubles       Past Surgical History:   Procedure Laterality Date    APPENDECTOMY      BACK SURGERY      C5    CHOLECYSTECTOMY      COLONOSCOPY      COMBINED CYSTOSCOPY, INSERT STENT URETER(S) Left 2022    Procedure: CYSTOURETEROSCOPY, WITH BLADDER NECK DILATION WITH BLADDER NECK CONTRACTURE,  BLADDER CALCULUS REMOVAL;  Surgeon: Harvey Madden MD;  Location: [facility]    CV CORONARY ANGIOGRAM N/A 2024    Procedure: Coronary Angiogram;  Surgeon: Nils Ceballos MD;  Location: [facility] CV    CV PCI N/A 2024    Procedure: Percutaneous Coronary Intervention;  Surgeon: Nils Ceballos MD;  Location: [facility] CV    CYSTOSCOPY, BLADDER NECK CUTS, COMBINED N/A 2023    Procedure: CYSTOSCOPY, WITH MULTIPLE INCISIONS OF BLADDER NECK. Injection of Mitomycin to stricture;  Surgeon: Gunner Pang MD;  Location: [facility]    DENTAL SURGERY      HEMORRHOIDECTOMY      HERNIA REPAIR      PROSTATECTOMY        Allergies   Allergen Reactions    Amoxicillin Hives     Per Dr. Barrientos, patient has tolerated Keflex.    Atorvastatin Diarrhea    Benzalkonium Chloride Other (See Comments)    Fenofibrate Muscle Pain (Myalgia)    Gemfibrozil     Hydrocodone-Acetaminophen Other (See Comments)     Patient reports \"going white as a sheet\" and cold sweats   OK with Tylenol #3 No problems    Lisinopril Cough    Neosporin (Dro-Fnr-Zwcup) [Neomycin-Bacitracin Zn-Polymyx] Unknown    Penicillins Hives      had pcn and had hives. Pt. States allergic to all " cillin's      Pravastatin Sodium [Pravastatin] Muscle Pain (Myalgia)    Trulicity [Dulaglutide] Unknown    Gluten [Gluten Meal] Other (See Comments)     Reports feeling worse with gluten; tested below threshold for Celiac but daughter is positive    Insulin Detemir Rash      Social History     Tobacco Use    Smoking status: Never     Passive exposure: Past    Smokeless tobacco: Never   Substance Use Topics    Alcohol use: Yes     Alcohol/week: 1.0 standard drink of alcohol     Types: 1 Standard drinks or equivalent per week     Comment: very occasional      Wt Readings from Last 1 Encounters:   04/18/24 146.4 kg (322 lb 11.2 oz)        Anesthesia Evaluation   Pt has had prior anesthetic.         ROS/MED HX  ENT/Pulmonary:     (+) sleep apnea,                                       Neurologic: Comment: History of C7 cervical fusion. States he still gets bilateral hand numbness.    Carotid US:  IMPRESSION:  1.  Mild plaque formation, velocities consistent with less than 50% stenosis in the right internal carotid artery.  2.  Mild plaque formation, velocities consistent with less than 50% stenosis in the left internal carotid artery.  3.  Flow within the vertebral arteries is antegrade.   - neg neurologic ROS     Cardiovascular:     (+)  hypertension- -  CAD -  - -            orthopnea/PND,                     Previous cardiac testing   Echo: Date: 4/17/24 Results:  ______________________________________________________________________________  Procedure  Complete Portable Echo Adult. Definity (NDC #85463-219) given intravenously.  ______________________________________________________________________________  Interpretation Summary     1. Normal left ventricular size and systolic performance with a visually  estimated ejection fraction of 65%.  2. No significant valvular heart disease is identified on this study.  3. Normal right ventricular size and systolic  performance.  ______________________________________________________________________________  Left ventricle:  Normal left ventricular size and systolic performance with a visually  estimated ejection fraction of 55%. There is normal regional wall motion.  There is mild concentric increase in left ventricular wall thickness.     Assessment of LV Diastolic Function: The cumulative findings suggest normal  diastolic filling [The septal e' velocity is < 7 cm/s & lateral e' velocity  is  < 10 cm/s. The average E/e' is < 14. TR velocity is < 2.8 m/s. Left atrial  volume index is less than 34 mL/mÂ ].     Right ventricle:  Normal right ventricular size and systolic performance.     Left atrium:  The left atrium is of normal size.     Right atrium:  The right atrium is of probable normal size (not well-visualized).     IVC:  The IVC is not well-visualized.     Aortic valve:  The aortic valve is not well visualized, but suspected to be comprised of  three cusps. No significant aortic stenosis or aortic insufficiency is  detected on this study.     Mitral valve:  The mitral valve appears morphologically normal. There is trace mitral  insufficiency.     Tricuspid valve:  The tricuspid valve is grossly morphologically normal. There is trace  tricuspid insufficiency.     Pulmonic valve:  The pulmonic valve is grossly morphologically normal.     Thoracic aorta:  The aortic root and proximal ascending aorta are of normal dimension.     Pericardium:  There is no significant pericardial effusion.    Stress Test:  Date: Results:    ECG Reviewed:  Date: Results:    Cath:  Date: 4/17/24 Results:    Conclusion    Nirav Baker is a 79 year old old male with HTN, HL, DM, high BMI 45, ELDON, mild CKD who is here for NSTEMI.     - multi-vessel native CADl mildly elevated L-sided filling pressures  - will stop to obtain CTS consult for CABG evaluation  - continue ASA 81mg daily indefinitely, re-start heparin for at least 48 hrs in  setting of NSTEMI  - add rosuva 40, consider PCSK9 inhibitors  - continue aggressive risk factor modification              Findings:  LM:no obstruction  LAD:50% mid-, 90% mid-distal narrowing  RIL large branch w/ long area of up to 95-99% narrowing ostial/proximal  Lcx:50-60% proximal narrowing  RCA:dominant, rPLV branch  w/ 80% narrowing     LVEDP:20         METS/Exercise Tolerance:     Hematologic:  - neg hematologic  ROS     Musculoskeletal:  - neg musculoskeletal ROS     GI/Hepatic:     (+) GERD,     hiatal hernia,              Renal/Genitourinary:     (+) renal disease, type: CRI,            Endo:     (+)  type II DM,   Not using insulin, - not using insulin pump.  not previously admitted for DM/DKA.       Obesity,       Psychiatric/Substance Use:  - neg psychiatric ROS     Infectious Disease:       Malignancy:       Other:            Physical Exam    Airway        Mallampati: III   TM distance: > 3 FB   Neck ROM: full   Mouth opening: > 3 cm    Respiratory Devices and Support         Dental       (+) Minor Abnormalities - some fillings, tiny chips      Cardiovascular          Rhythm and rate: regular and normal     Pulmonary           breath sounds clear to auscultation       Other findings: Multiple pustules over neck, possibly hidradenitis suppurativa? Says he sees dermatologist for this and takes topical antibiotic.     OUTSIDE LABS:  CBC:   Lab Results   Component Value Date    WBC 7.2 04/17/2024    WBC 8.4 04/16/2024    HGB 14.6 04/17/2024    HGB 14.6 04/16/2024    HCT 42.0 04/17/2024    HCT 41.3 04/16/2024     04/17/2024     04/16/2024     BMP:   Lab Results   Component Value Date     04/18/2024     04/17/2024    POTASSIUM 3.7 04/18/2024    POTASSIUM 4.0 04/17/2024    CHLORIDE 103 04/18/2024    CHLORIDE 106 04/17/2024    CO2 25 04/18/2024    CO2 21 (L) 04/17/2024    BUN 25.9 (H) 04/18/2024    BUN 31.5 (H) 04/17/2024    CR 1.30 (H) 04/18/2024    CR 1.36 (H) 04/17/2024      "(H) 04/18/2024    GLC 91 04/18/2024     COAGS:   Lab Results   Component Value Date    PTT 54 (H) 04/18/2024    INR 1.02 04/18/2024     POC: No results found for: \"BGM\", \"HCG\", \"HCGS\"  HEPATIC:   Lab Results   Component Value Date    ALBUMIN 3.9 04/17/2024    PROTTOTAL 6.7 04/17/2024    ALT 32 04/17/2024    AST 65 (H) 04/17/2024    ALKPHOS 84 04/17/2024    BILITOTAL 0.6 04/17/2024     OTHER:   Lab Results   Component Value Date    LACT 1.4 06/11/2018    A1C 7.2 (H) 04/18/2024    AURROA 9.2 04/18/2024    PHOS 3.5 03/30/2022    MAG 1.9 04/18/2024    LIPASE 19 02/19/2022    CRP 2.8 (H) 08/24/2022    SED 19 (H) 07/02/2018       Anesthesia Plan    ASA Status:  3    NPO Status:  NPO Appropriate    Anesthesia Type: General.     - Airway: ETT   Induction: Intravenous.   Maintenance: Balanced.   Techniques and Equipment:     - Airway: Video-Laryngoscope     - Lines/Monitors: 2nd IV, Arterial Line, Central Line, CVP, BELLO            BELLO Absolute Contra-indication: NONE            BELLO Relative Contra-indication: NONE     - Blood: Blood in Room, PRBC, Cell Saver, T&C     - Drips/Meds: Ketamine, Phenylephrine, Epinephrine, Nicardipine, Tranexamic acid, Dexmed. infusion     Consents    Anesthesia Plan(s) and associated risks, benefits, and realistic alternatives discussed. Questions answered and patient/representative(s) expressed understanding.     - Discussed: Risks, Benefits and Alternatives for BOTH SEDATION and the PROCEDURE were discussed     - Discussed with:  Patient, Other (See Comment) (and daughter)      - Extended Intubation/Ventilatory Support Discussed: No.      - Patient is DNR/DNI Status: No     Use of blood products discussed: Yes.     - Discussed with: Patient.     - Consented: consented to blood products     Postoperative Care    Pain management: IV analgesics.     - Plan for long acting post-op opioid use   PONV prophylaxis: Ondansetron (or other 5HT-3), Dexamethasone or Solumedrol     Comments:    Other Comments: " Discussed risks and benefits of GETA, prolonged intubation, BELLO, elif, CVC.   Known history of cervical fusion. Will use glidescope with neutral neck positioning.   Multiple pustules in neck. Will avoid when placing CVC.            Jorge Singh MD

## 2024-04-19 NOTE — PLAN OF CARE
Goal Outcome Evaluation:      Plan of Care Reviewed With: patient    Overall Patient Progress: improving  Problem: Adult Inpatient Plan of Care  Goal: Plan of Care Review  Description: The Plan of Care Review/Shift note should be completed every shift.  The Outcome Evaluation is a brief statement about your assessment that the patient is improving, declining, or no change.  This information will be displayed automatically on your shift  note.  Outcome: Progressing  Flowsheets (Taken 4/18/2024 2207)  Plan of Care Reviewed With: patient  Overall Patient Progress: improving     Problem: Skin Injury Risk Increased  Goal: Skin Health and Integrity  Outcome: Progressing  Intervention: Optimize Skin Protection  Recent Flowsheet Documentation  Taken 4/18/2024 1942 by Yuliana Case, RN  Activity Management:   activity adjusted per tolerance   ambulated to bathroom   ambulated in room   up ad henrry     Problem: Chest Pain  Goal: Resolution of Chest Pain Symptoms  Outcome: Progressing     Problem: Cardiac Catheterization (Diagnostic/Interventional)  Goal: Stable Heart Rate and Rhythm  Outcome: Progressing     Problem: Comorbidity Management  Goal: Blood Glucose Levels Within Targeted Range  Outcome: Progressing  Intervention: Monitor and Manage Glycemia  Recent Flowsheet Documentation  Taken 4/18/2024 1942 by Yuliana Case, RN  Glycemic Management: blood glucose monitored  Medication Review/Management: medications reviewed   Rishi had a good shift. CAB prep done. No chest pain. BG under good control. Tele is NSR. Adequate urine output and did have a BM. Family also aware of the plan

## 2024-04-19 NOTE — PROGRESS NOTES
Started Prep for cab. He has taken a shower, clipped for procedure. Did pre-op education videos. Has IS and pillow at bedside and is familiar with both. Family took most belongings home. He kept his phone, book, cpap and glasses here.

## 2024-04-19 NOTE — ANESTHESIA PROCEDURE NOTES
Arterial Line Procedure Note    Pre-Procedure   Staff -        Anesthesiologist:  Vincent Aguirre MD       Performed By: anesthesiologist       Location: OR       Pre-Anesthestic Checklist: patient identified, IV checked, risks and benefits discussed, informed consent, monitors and equipment checked, pre-op evaluation and at physician/surgeon's request  Timeout:       Correct Patient: Yes        Correct Procedure: Yes        Correct Site: Yes        Correct Position: Yes   Line Placement:   This line was placed Pre Induction starting at 4/19/2024 9:50 AM and ending at 4/19/2024 9:55 AM  Procedure   Procedure: arterial line       Laterality: right       Insertion Site: radial.  Sterile Prep        Standard elements of sterile barrier followed       Skin prep: Chloraprep  Insertion/Injection        Technique: ultrasound guided        1. Ultrasound was used to evaluate the access site.       2. Artery evaluated via ultrasound for patency/adequacy.       3. Using real-time ultrasound the needle/catheter was observed entering the artery/vein.       4. Permanent image was captured and entered into the patient's record.       5. The visualized structures were anatomically normal.       6. There were no apparent abnormal pathologic findings.       Catheter Type/Size: 20 G, 12 cm  Narrative         Secured by: suture       Tegaderm and Biopatch dressing used.       Complications: None apparent,        Arterial waveform: Yes    Comments:  3cc 1% lidocaine used for skin.

## 2024-04-19 NOTE — BRIEF OP NOTE
[facility]    Brief Operative Note    Pre-operative diagnosis: CAD (coronary artery disease) [I25.10]  Post-operative diagnosis Same as pre-operative diagnosis    Procedure: Coronary artery bypass grafting x2 with LIMA to LAD and rSVG to ramus intermedius, endoscopic harvest of left saphenous vein.    Surgeon: Surgeons and Role:     * Cathy Edwards MD - Primary     * Ana Rosa Quiñones PA-C - Assisting     * Savanna Kent MD - Assisting  Anesthesia: General   Estimated Blood Loss: 250cc    Drains: 2 mediastinal chest tubes and left pleural marky drain  Specimens: * No specimens in log *  Findings:  Obtuse marginal and right posterolateral arteries were too small to bypass.  .  Complications: Initially had no lazo output and noticed blood clot with irrigation, unable to get good urine return. Called intraoperative Urology consult who further interrogated and flushed the lazo and determined it was in correct position, but recommended leaving lazo in place for 3-4 days and void trial after removal. Of note he does have a history of bladder strictures and difficulty with self-catheterization .  Implants:   Implant Name Type Inv. Item Serial No.  Lot No. LRB No. Used Action   [place] Radiopaque marker    JUDIT [id number] N/A 1 Implanted

## 2024-04-19 NOTE — CONSULTS
"    ICU Consults Note (04/19/2024)     Date of Hospital Admission: 4/17/2024  Date of ICU Admission: 4/17/2024  Code Status: Full Code    Reason for Visit  NSTEMI status post CABGx2    HPI  79 year old old male with HTN, HL, DM, obesity with BMI 45, ELDON, CKD who presented to [facility] with acute chest pain, diagnosed with NSTEMI, transferred to our hospital for coronary angiogram. This showed severe multivessel coronary artery disease.    He underwent CABG x 2 by Dr. Edwards earlier today.  He had a difficulty with Campos insertion perioperatively.  However, no complications during the surgery.  He did not receive any blood products.  His echo showed preserved LV ejection fraction pre and after surgery.  He arrives to the ICU postoperatively for post CABG care.    Subjective  4/19 -the patient is seen and examined shortly after arrival to the intensive care unit.  He is intubated and sedated.  He is off vasopressors.    Objective   Vital Signs  Blood pressure 114/56, pulse 69, temperature 98.3  F (36.8  C), temperature source Oral, resp. rate 18, height 1.803 m (5' 11\"), weight 143.4 kg (316 lb 3.2 oz), SpO2 97%.  I/O last 3 completed shifts:  In: 2424 [I.V.:2024; IV Piggyback:150]  Out: 2878 [Urine:2878]  Resp: 18    Physical Exam   General:  Ill looking, intubated  Head:  normocephalic, atraumatic    Eyes: conjunctiva clear, PERRL.   Throat:  Orally intubated. No erythema, exudates or lesions.   Neck:  Supple, no masses  Heart:  RRR, no murmur   Lungs:  Coarse bilaterally. .   Abdomen:  Soft, NT/ND, no HSM, no masses.   Extremities: No deformities, clubbing, cyanosis, or edema.   Neurologic: Intubated, and sedated     Diagnostic Data  The following portions of the patient's chart were reviewed: current medications, problem list, laboratory workup, and diagnostic data.    Most Recent Pertinent Labs  Lab Results   Component Value Date    WBC 14.2 (H) 04/19/2024    HGB 12.6 (L) 04/19/2024    HCT 36.8 (L) " 04/19/2024     (L) 04/19/2024     04/19/2024    POTASSIUM 4.6 04/19/2024    CHLORIDE 103 04/18/2024    CO2 25 04/18/2024    BUN 25.9 (H) 04/18/2024    CR 1.30 (H) 04/18/2024     (H) 04/19/2024    SED 19 (H) 07/02/2018    NTBNPI 87 04/16/2024    AST 65 (H) 04/17/2024    ALT 32 04/17/2024    ALKPHOS 84 04/17/2024    INR 1.31 (H) 04/19/2024     Infusion Medications  Current Facility-Administered Medications   Medication Dose Route Frequency Provider Last Rate Last Admin    dexmedeTOMIDine (PRECEDEX) 4 mcg/mL in sodium chloride 0.9 % 100 mL infusion  0.2-0.7 mcg/kg/hr Intravenous Continuous Ana Rosa Quiñones PA-C 17.9 mL/hr at 04/19/24 1705 0.5 mcg/kg/hr at 04/19/24 1705    EPINEPHrine (ADRENALIN) 5 mg in sodium chloride 0.9 % 250 mL infusion CENTRAL  0.01-0.1 mcg/kg/min Intravenous Continuous PRN Ana Rosa Quiñones PA-C        insulin regular (MYXREDLIN) 1 unit/mL infusion  0-24 Units/hr Intravenous Continuous Ana Rosa Quiñones PA-C 5 mL/hr at 04/19/24 1706 5 Units/hr at 04/19/24 1706    niCARdipine 40 mg in 200 mL NS (CARDENE) infusion  0.5-15 mg/hr Intravenous Continuous PRN Ana Rosa Quiñones PA-C        phenylephrine (RADHA-SYNEPHRINE) 50 mg in NaCl 0.9 % 250 mL infusion  0.1-4 mcg/kg/min Intravenous Continuous PRN Ana Rosa Quioñnes PA-C         Scheduled Medications  Current Facility-Administered Medications   Medication Dose Route Frequency Provider Last Rate Last Admin    acetaminophen (TYLENOL) tablet 975 mg  975 mg Oral Q8H Ana Rosa Quiñones PA-C        aspirin (ASA) chewable tablet 162 mg  162 mg Oral or NG Tube Once Ana Rosa Quiñones PA-C        Or    aspirin (ASA) Suppository 300 mg  300 mg Rectal Once Ana Rosa Quiñones PA-C        [START ON 4/20/2024] aspirin (ASA) chewable tablet 162 mg  162 mg Oral or NG Tube Daily Ana Rosa Quiñones PA-C        Or    [START ON 4/20/2024] aspirin (ASA) Suppository 300 mg  300 mg Rectal Daily Ana Rosa Quiñones PA-C         ceFAZolin (ANCEF) 2 g in 100 mL D5W intermittent infusion  2 g Intravenous Q8H Ana Rosa Quiñones PA-C        ezetimibe (ZETIA) tablet 10 mg  10 mg Oral or NG Tube At Bedtime Ana Rosa Quiñones PA-C        [Held by provider] glipiZIDE (GLUCOTROL XL) 24 hr tablet 20 mg  20 mg Oral At Bedtime Ana Rosa Quiñones PA-C        [START ON 4/20/2024] heparin ANTICOAGULANT injection 5,000 Units  5,000 Units Subcutaneous Q8H Ana Rosa Quiñones PA-C        Lidocaine (LIDOCARE) 4 % Patch 1-2 patch  1-2 patch Transdermal Q24H Ana Rosa Quiñones PA-C        [Held by provider] linagliptin (TRADJENTA) tablet 5 mg  5 mg Oral At Bedtime Ana Rosa Quiñones PA-C        [Held by provider] melatonin tablet 6 mg  6 mg Oral At Bedtime Ana Rosa Quiñones PA-C        [Held by provider] metFORMIN (GLUCOPHAGE) tablet 500 mg  500 mg Oral Daily with supper Ana Rosa Quiñones PA-C        [COMPLETED] methadone (DOLOPHINE) injection 20 mg  20 mg Intravenous Once Vincent Aguirre MD        [Held by provider] multivitamin, therapeutic (THERA-VIT) tablet 1 tablet  1 tablet Oral At Bedtime Ana Rosa Quiñones PA-C        pantoprazole (PROTONIX) 2 mg/mL suspension 40 mg  40 mg Oral or NG Tube Daily Ana Rosa Quiñones PA-C        Or    pantoprazole (PROTONIX) EC tablet 40 mg  40 mg Oral Daily Ana Rosa Quiñones PA-C        [START ON 4/20/2024] polyethylene glycol (MIRALAX) Packet 17 g  17 g Oral Daily Ana Rosa Quiñones PA-C        rosuvastatin (CRESTOR) tablet 20 mg  20 mg Oral or NG Tube At Bedtime Ana Rosa Quiñones PA-C        senna-docusate (SENOKOT-S/PERICOLACE) 8.6-50 MG per tablet 1 tablet  1 tablet Oral BID Ana Rosa Quiñones PA-C         Restraint Application  I recognize that restraints are physical and/or chemical interventions intended to restrict a person's movements. Restraints are currently needed to ensure the safety of this patient and/or others. My clinical rationale appears  below:  Category/Type of Restraint: Non Violent:  Soft limb restraint x 2  Behavior: Pulling at tubes/lines  Root Cause of the Behavior: Sedation/intubation  Less-Restrictive Measures that Failed: Non Violent Measures:  Close Observation  Response to the Restraint: Patient unable to pull at tubes/lines  Criteria for Release from the Restraint: Patient calm and off sedation    Assessment & Plan   Neurology  # Sedation and Analgesia  - dexmedetomidine infusion, wean for neurologic examination   - Per CTS, including Precedex & PRNs.  - plan for extubation within six hours of arrival in the ICU as able      Cardiovascular:  # Multivessel coronary artery disease status post CABG  - antiplatelet & statins per CV surgery     - plan to administer aspirin today, and Plavix to resume tomorrow    # Post-cardiopulmonary bypass vasoplegia    # Temporary epicardial pacing wires  - Preserved pre & post-biventricular systolic function   - Has V wires, currently in NSR with HR in the 70s  - hemodynamic management per discussion with CV surgery  - MAP 60-90, SBP     Respiratory, Airway:  # Post-operative mechanical ventilation   - lung protective ventilation (TV 6-8 mL/kg IBW, Pplat <30, driving pressure <15)   - Goal SpO2 >/= 92%, wean supplemental oxygen as tolerated.   - HOB elevation > 30 degrees to minimize aspiration risk.   - Continuous EtCO2 while intubated.  - enhanced recovery after cardiac surgery / fast-track may extubate within six hours   - pulmonary hygiene: acapella, IS, PT/OT as able, OOB as able      # Post-operative chest tubes  - Chest tubes present to drain pleural & mediastinal fluid placed to wall suction       Gastrointestinal:  - NPO until extubated & fully awake.    - No active issues   - On pantoprazole for GI prophylaxis    Renal  # Difficulty with Campos insertion  -Has difficulty with Campos insertion due to previous prostate surgery.  Campos catheter inserted by urology in the OR.  Plan to keep the  Campos for 3 to 4 days.  Monitor urine output.  Consult urology if we have issues with the Campos catheter  - Monitor kidney function, urine output, and electrolytes.     Infectious Disease:  Sujata-operative antibiotics per CV surgery      Hematology, Oncology:  # Post-operative anemia  # Risk for post-cardiac bypass coagulopathy   # Risk for post-cardiac bypass thrombocytopenia   - Close CT output monitoring, CVS to be notified w/ excessive or abruptly absent output  - monitor for bleeding: Hgb goal >7-8  - Anticipate fall in platelet count after cardiopulmonary bypass in most patients, typically to levels approximately half of baseline, haile between postoperative days 2-4, & persists for 4 - 6 days following surgery.     Endocrine:  # MICU insulin/glucose protocol   - maintain BG of 140 to 180 mg/dL with a continuous IV insulin infusion       Critical care time excluding any procedural time is 35 minutes.    Geovani Baig MD  Pager [phone]  [facility] ([employer])   Vocera Web Console ([employer])

## 2024-04-19 NOTE — PLAN OF CARE
Problem: Cardiac Catheterization (Diagnostic/Interventional)  Goal: Absence of Bleeding  Outcome: Progressing  Goal: Absence of Contrast-Induced Injury  Outcome: Progressing  Goal: Stable Heart Rate and Rhythm  Outcome: Progressing  Goal: Absence of Embolism Signs and Symptoms  Outcome: Progressing  Intervention: Prevent or Manage Embolism  Recent Flowsheet Documentation  Taken 4/18/2024 0800 by Julisa Reardon RN  VTE Prevention/Management: (ambulation) --  Goal: Anesthesia/Sedation Recovery  Outcome: Progressing  Intervention: Optimize Anesthesia Recovery  Recent Flowsheet Documentation  Taken 4/18/2024 1600 by Julisa Reardon RN  Safety Promotion/Fall Prevention:   activity supervised   clutter free environment maintained   nonskid shoes/slippers when out of bed   patient and family education  Taken 4/18/2024 1200 by Julisa Reardon RN  Safety Promotion/Fall Prevention:   activity supervised   clutter free environment maintained   nonskid shoes/slippers when out of bed   patient and family education  Taken 4/18/2024 0800 by Julisa Reardon RN  Safety Promotion/Fall Prevention:   activity supervised   clutter free environment maintained   nonskid shoes/slippers when out of bed   patient and family education  Reorientation Measures: clock in view  Goal: Optimal Pain Control and Function  Outcome: Progressing  Goal: Absence of Vascular Access Complication  Outcome: Progressing  Intervention: Prevent and Manage Access Complications  Recent Flowsheet Documentation  Taken 4/18/2024 1600 by Julisa Reardon RN  Activity Management:   activity adjusted per tolerance   ambulated to bathroom  Head of Bed (HOB) Positioning: HOB at 20 degrees  Taken 4/18/2024 1200 by Julisa Reardon RN  Activity Management:   activity adjusted per tolerance   ambulated to bathroom  Head of Bed (HOB) Positioning: HOB at 20 degrees  Taken 4/18/2024 0800 by Julisa Reardon RN  Bleeding Precautions: monitored for  signs of bleeding  Activity Management:   activity adjusted per tolerance   ambulated to bathroom  Head of Bed (HOB) Positioning: HOB at 20 degrees     Problem: Comorbidity Management  Goal: Blood Glucose Levels Within Targeted Range  Outcome: Progressing  Intervention: Monitor and Manage Glycemia  Recent Flowsheet Documentation  Taken 4/18/2024 1600 by Julisa Reardon RN  Glycemic Management: blood glucose monitored  Medication Review/Management: medications reviewed  Taken 4/18/2024 0800 by Julisa Reardon RN  Medication Review/Management: medications reviewed   Goal Outcome Evaluation:    No acute changes during shift, denies chest pain, numbness or tingling in lower extremities. Pt is A0x4. RA. Uses Cpap while taking naps. Good urine output, uses the restroom fine. Standby assist. Daughters at bedside.

## 2024-04-19 NOTE — ANESTHESIA PROCEDURE NOTES
Perioperative BELLO Procedure Note    Staff -        Anesthesiologist:  Vincent Aguirre MD       Performed By: anesthesiologist  Preanesthesia Checklist:  Patient identified, IV assessed, risks and benefits discussed, monitors and equipment assessed, procedure being performed at surgeon's request and anesthesia consent obtained.    BELLO Probe Insertion    Probe Status PRE Insertion: NO obvious damage  Probe type:  Adult 3D  Bite block used:   Oral Airway  Insertion Technique: Jaw Lift  Insertion complications: None obvious  Billing Report:BELLO report by Anesthesiologist (See Separate Report note)  Probe Status POST Removal: NO obvious damage    BELLO Report  General Procedure Information  Images for this study have been archived.  Modalities: 2D, CW Doppler, PW Doppler and Color flow mapping  Echocardiographic and Doppler Measurements  Right Ventricle:  Cavity size normal.    Hypertrophy not present.   Thrombus not present.    Global function normal.     Left Ventricle:  Cavity size normal.    Hypertrophy not present.   Thrombus not present.   Global Function normal.       Ventricular Regional Function:  1- Basal Anteroseptal:  normal  2- Basal Anterior:  normal  3- Basal Anterolateral:  normal  4- Basal Inferolateral:  normal  5- Basal Inferior:  normal  6- Basal Inferoseptal:  normal  7- Mid Anteroseptal:  normal  8- Mid Anterior:  normal  9- Mid Anterolateral:  normal  10- Mid Inferolateral:  normal  11- Mid Inferior:  normal  12- Mid Inferoseptal:  normal  13- Apical Anterior:  normal  14- Apical Lateral:  normal  15- Apical Inferior:  normal  16- Apical Septal:  normal  17- [place]:  normal    Valves  Aortic Valve: Annulus normal.  Stenosis not present.  Regurgitation absent.  Leaflets normal.  Leaflet motions normal.    Mitral Valve: Annulus normal.  Stenosis not present.  Regurgitation +1.  Leaflets normal.  Leaflet motions normal.    Tricuspid Valve: Annulus normal.  Stenosis not present.  Regurgitation absent.   Leaflets normal.  Leaflet motions normal.    Pulmonic Valve: Annulus normal.  Stenosis not present.  Regurgitation absent.      Aorta: Ascending Aorta: Size normal.  Dissection not present.  Plaque thickness less than 3 mm.  Mobile plaque not present.    Aortic Arch: Size normal.    Dissection not present.   Plaque thickness less than 3 mm.   Mobile plaque not present.    Descending Aorta: Size normal.    Dissection not present.   Plaque thickness less than 3 mm.   Mobile plaque not present.      Right Atrium:  Size normal.   Spontaneous echo contrast not present.   Thrombus not present.   Tumor not present.   Device not present.     Left Atrium: Size normal.  Spontaneous echo contrast not present.  Thrombus not present.  Tumor not present.  Device not present.    Left atrial appendage normal.     Atrial Septum: Intra-atrial septal morphology normal.       Ventricular Septum: Intra-ventricular septum morphology normal.          Post Intervention Findings  Procedure(s) performed:  CABG. Global function:  Unchanged. Regional wall motion: Unchanged. Surgeon(s) notified of all postintervention findings: Yes.                 Echocardiogram Comments

## 2024-04-19 NOTE — PROGRESS NOTES
"10/1/18 appt is for lab.   She then has 10/8/18 appt for visit.    The note for lab says \"A1C\" but there is not a future order for A1C in Epic.    Lab Results   Component Value Date    A1C 6.9 04/18/2018    A1C 7.6 11/28/2016    A1C 7.6 07/11/2016    A1C 7.3 01/15/2016    A1C 8.0 07/24/2015     Routed to Dr. Pena to advise if he would like patient to do pre-visit labs, and, if so, to place orders.    Geno Barajas RN  [facility]      " Progress note:    Patient in OR all day today. Unable to see the patient.    Trilobed Flap Text: The defect edges were debeveled with a #15 scalpel blade.  Given the location of the defect and the proximity to free margins a trilobed flap was deemed most appropriate.  Using a sterile surgical marker, an appropriate trilobed flap drawn around the defect.    The area thus outlined was incised deep to adipose tissue with a #15 scalpel blade.  The skin margins were undermined to an appropriate distance in all directions utilizing iris scissors.

## 2024-04-19 NOTE — ANESTHESIA CARE TRANSFER NOTE
Patient: Nirav Baker    Procedure: Procedure(s):  CORONARY ARTERY BYPASS GRAFT TIMES  TWO  WITH LEFT INTERNAL MAMMARY ARTERY HARVEST, LEFT ENDOSCOPIC VESSEL PROCUREMENT, ANESTHESIA TRANSESOPHAGEAL ECHOCARDIOGRAM, EPI AORTIC ULTRASOUND       Diagnosis: CAD (coronary artery disease) [I25.10]  Diagnosis Additional Information: No value filed.    Anesthesia Type:   General     Note:    Oropharynx: ventilatory support  Level of Consciousness: iatrogenic sedation  Patient oxygen source: AMBU and intubated.  Level of Supplemental Oxygen (L/min / FiO2): 10  Independent Airway: airway patency not satisfactory and stable  Dentition: dentition unchanged  Vital Signs Stable: post-procedure vital signs reviewed and stable  Report to RN Given: handoff report given  Patient transferred to: ICU  Comments: Vent: PIP 24; RR18; ; fio2 80  VC/AC  ICU Handoff: Call for PAUSE to initiate/utilize ICU HANDOFF, Identified Responsible Provider, Reviewed the Pertinent Medical History, Discussed Surgical Course, Reviewed Intra-OP Anesthesia Management and Issues during Anesthesia, Set Expectations for Post Procedure Period, Allowed Opportunity for Questions and Acknowledgement of Understanding and Identified Patient      Vitals:  Vitals Value Taken Time   /56 04/19/24 1704   Temp     Pulse 67 04/19/24 1713   Resp 18 04/19/2024 1713   SpO2 97 % 04/19/24 1713   Vitals shown include unfiled device data.    Electronically Signed By: TIFFANY Collado CRNA  April 19, 2024  5:15 PM

## 2024-04-19 NOTE — OP NOTE
DATE OF PROCEDURE: 4/19/24    PREOPERATIVE DIAGNOSIS: Severe, multivessel coronary artery disease    POSTOPERATIVE DIAGNOSIS: Same    ATTENDING SURGEON:Karyn Edwards MD    SURGICAL ASSISTANTS: NEMESIO Malcolm, Ana Rosa Quiñones PA-C, Ernestina Ceron PA-C    PROCEDURES PERFORMED:    1) Median sternotomy  2) Left internal mammary artery harvest  3) Endoscopic harvest of left saphenous vein   4) Epiaortic ultrasound of the ascending aorta  5) Placement on central cardiopulmonary bypass  6) Coronary artery bypass grafting x2 (in-situ left internal mammary artery to left anterior descending and reverse saphenous vein graft to ramus intermedius)   7) Placement of temporary ventricular pacing wires  8) Transesophageal echocardiography    INDICATION:   The patient is a 79-year-old man with history of HTN, HLD, DM2 (A1c 7.2), obesity (BMI 45), ELDON (on CPAP), CKD (Cr 1.3-1.5), prostate cancer s/p prostatectomy, and multiple bladder strictures who presented with an NSTEMI and was found to have severe multivessel coronary artery disease. Transthoracic echocardiogram shows normal biventricular function and no major valvular abnormalities. Cardiac catheterization confirms severe, multivessel coronary artery disease. The remainder of the preoperative workup was performed without contraindication to surgery. The patient was counseled on and understood the potential risks, alternatives, and benefits associated with surgical revascularization and elected to proceed.     FINDINGS:  Obtuse marginal and right posterolateral arteries were too small to bypass. Also, lazo was initially placed without issue but was noticed to have no output. After the nurse attempted irrigation there was clot in the catheter and we were unable to get good urine return. An intraoperative Urology consult was called and Dr. Kent kindly came to further interrogate the lazo. After additional flushing and manipulation she was able to get good urine  return and ultimately recommended leaving the lazo catheter in place for 3-4 days. Postoperative echo showed normal biventricular function and no new valvular abnormalities.    DESCRIPTION OF OPERATION:  A timeout procedure was performed confirming the correct patient, surgical site, and procedure. The patient underwent uneventful general endotracheal anesthesia and invasive hemodynamic monitoring lines were placed. The neck, chest, abdomen, groins, and legs were prepped and draped in the usual sterile fashion.  Pre-procedure transesophageal echocardiography revealed normal biventricular function with no major valvular abnormalities.    A median sternotomy was performed and the left internal mammary artery was harvested in a pedicled fashion while the left greater saphenous vein was harvested endoscopically. Before heparin was administered, Dr. Aguirre notified us that there had been no urine output from the lazo catheter. After the nurse attempted to flush the lazo multiple times, there was some blood clot noted and no good return of urine. An intraoperative Urology consult was called and Dr. Kent kindly came to further interrogate the lazo. After additional flushing and manipulation she was able to get good urine return and ultimately recommended leaving the lazo catheter in place for 3-4 days.     300 units per kilogram of sodium heparin was then administered. A sternal retractor was inserted and a pericardial well was created. Epiaortic ultrasound was utilized to evaluate the aorta for calcification and guide placement of the aortic cannulation site and cross-clamp. The patient was cannulated for cardiopulmonary bypass using the high ascending aorta for infusion and venous drainage was accomplished via a two-stage cannula in the right atrial appendage. A retrograde cardioplegic catheter was placed in the coronary sinus. An antegrade cardioplegic catheter was placed in the mid ascending aorta. When a  satisfactory activating clotting time was confirmed, cardiopulmonary bypass was initiated, and the patient was cooled to 34 degrees. The aortic cross-clamp was applied and cardioplegic arrest was initiated with 1 liter of cold blood cardioplegia delivered antegrade and 300 milliliters delivered retrograde. Satisfactory diastolic arrest was achieved. Ice slush was used for topical hypothermia. Cardioplegia was re-dosed throughout the case with both antegrade and retrograde cardioplegia.    The right posterolateral coronary artery was identified but was extremely small and was not suitable for bypass. The obtuse marginal arteries were similarly very small and not suitable for bypass. The ramus intermedius coronary artery was then identified and incised. The vessel was approximately 1.6 millimeters in size and of good quality. A reverse saphenous vein graft was anastomosed in a running, end-to-side fashion with 7-0 Prolene suture. The graft was gently distended and found to be widely patent and hemostatic. Cold blood cardioplegia was then delivered in an antegrade fashion and the graft was cut to size. A 4 millimeter punch was created in the ascending aorta and the proximal anastomosis performed in a running fashion with 6-0 Prolene suture while delivering continuous retrograde cardioplegia.      The left anterior descending coronary artery was then identified in the middle third and was approximately 1.5 millimeters in size and of fair quality. It was slightly calcified. The left internal mammary artery was brought into the field and spatulated. It had excellent flow. The anastomosis was performed in a running, end-to-side fashion with 7-0 Prolene suture. The temporary clamp on the internal mammary artery was released and excellent flow could be seen distally and the anastomosis appeared hemostatic.     A terminal dose of warm cardioplegia was delivered in retrograde fashion, de-airing maneurvers were performed, and  the aortic cross-clamp was removed. Total aortic cross-clamp time was 56 minutes. The heart resumed normal sinus rhythm after one defibrillation. A ventricular pacing wire was placed on the diaphragmatic surface of the heart. Ventilation was resumed, the patient was systemically warmed and he weaned from cardiopulmonary bypass on the first attempt without inotropic support. Post-procedure transesophageal echocardiography revealed preserved biventricular function and no new valvular abnormalities. Protamine was administered to reverse the anticoagulation and all cannulae for bypass were removed. Hemostasis was satisfactory. Total cardiopulmonary bypass time was 70 minutes. The fat within the superior mediastinum was closed over the aorta. Two mediastinal chest tubes and a left pleural marky drain was placed. The sternum was closed with stainless steel wires and the remainder of the closure was routine.    The needles, instruments, and sponges were counted and correct at the end of the case. Patient was transferred back to ICU in stable condition.    Cathy Edwards MD

## 2024-04-19 NOTE — ANESTHESIA PROCEDURE NOTES
Airway       Patient location during procedure: OR       Procedure Start/Stop Times: 4/19/2024 10:02 AM  Staff -        CRNA: Modesta Amos APRN CRNA       Performed By: CRNA  Consent for Airway        Urgency: elective  Indications and Patient Condition       Indications for airway management: jarrett-procedural       Induction type:intravenous       Mask difficulty assessment: 3 - difficult mask (inadequate, unstable, or two providers) +/- NMBA    Final Airway Details       Final airway type: endotracheal airway       Successful airway: Oral and Single subglottic suction  Endotracheal Airway Details        ETT size (mm): 7.5       Cuffed: yes       Successful intubation technique: video laryngoscopy       VL Blade Size: Glidescope 4       Grade View of Cords: 1       Adjucts: stylet       Position: Left       Measured from: gums/teeth       Secured at (cm): 24       Bite block used: None    Post intubation assessment        Placement verified by: capnometry, equal breath sounds and chest rise        Number of attempts at approach: 1       Number of other approaches attempted: 0       Secured with: tape       Ease of procedure: easy       Dentition: Intact and Unchanged    Medication(s) Administered   Medication Administration Time: 4/19/2024 10:02 AM

## 2024-04-20 ENCOUNTER — APPOINTMENT (OUTPATIENT)
Dept: RADIOLOGY | Facility: HOSPITAL | Age: 79
DRG: 234 | End: 2024-04-20
Payer: COMMERCIAL

## 2024-04-20 ENCOUNTER — APPOINTMENT (OUTPATIENT)
Dept: OCCUPATIONAL THERAPY | Facility: HOSPITAL | Age: 79
DRG: 234 | End: 2024-04-20
Payer: COMMERCIAL

## 2024-04-20 LAB
ALBUMIN SERPL BCG-MCNC: 3.2 G/DL (ref 3.5–5.2)
ALP SERPL-CCNC: 66 U/L (ref 40–150)
ALT SERPL W P-5'-P-CCNC: 28 U/L (ref 0–70)
ANION GAP SERPL CALCULATED.3IONS-SCNC: 10 MMOL/L (ref 7–15)
AST SERPL W P-5'-P-CCNC: 56 U/L (ref 0–45)
BILIRUB SERPL-MCNC: 0.6 MG/DL
BUN SERPL-MCNC: 25.9 MG/DL (ref 8–23)
CA-I BLD-MCNC: 4.7 MG/DL (ref 4.4–5.2)
CALCIUM SERPL-MCNC: 8.4 MG/DL (ref 8.8–10.2)
CHLORIDE SERPL-SCNC: 106 MMOL/L (ref 98–107)
CREAT SERPL-MCNC: 1.56 MG/DL (ref 0.67–1.17)
DEPRECATED HCO3 PLAS-SCNC: 23 MMOL/L (ref 22–29)
EGFRCR SERPLBLD CKD-EPI 2021: 45 ML/MIN/1.73M2
ERYTHROCYTE [DISTWIDTH] IN BLOOD BY AUTOMATED COUNT: 13.6 % (ref 10–15)
GLUCOSE BLDC GLUCOMTR-MCNC: 114 MG/DL (ref 70–99)
GLUCOSE BLDC GLUCOMTR-MCNC: 115 MG/DL (ref 70–99)
GLUCOSE BLDC GLUCOMTR-MCNC: 121 MG/DL (ref 70–99)
GLUCOSE BLDC GLUCOMTR-MCNC: 124 MG/DL (ref 70–99)
GLUCOSE BLDC GLUCOMTR-MCNC: 124 MG/DL (ref 70–99)
GLUCOSE BLDC GLUCOMTR-MCNC: 128 MG/DL (ref 70–99)
GLUCOSE BLDC GLUCOMTR-MCNC: 128 MG/DL (ref 70–99)
GLUCOSE BLDC GLUCOMTR-MCNC: 132 MG/DL (ref 70–99)
GLUCOSE BLDC GLUCOMTR-MCNC: 135 MG/DL (ref 70–99)
GLUCOSE BLDC GLUCOMTR-MCNC: 136 MG/DL (ref 70–99)
GLUCOSE BLDC GLUCOMTR-MCNC: 139 MG/DL (ref 70–99)
GLUCOSE BLDC GLUCOMTR-MCNC: 145 MG/DL (ref 70–99)
GLUCOSE BLDC GLUCOMTR-MCNC: 146 MG/DL (ref 70–99)
GLUCOSE BLDC GLUCOMTR-MCNC: 151 MG/DL (ref 70–99)
GLUCOSE BLDC GLUCOMTR-MCNC: 153 MG/DL (ref 70–99)
GLUCOSE BLDC GLUCOMTR-MCNC: 156 MG/DL (ref 70–99)
GLUCOSE SERPL-MCNC: 125 MG/DL (ref 70–99)
HCT VFR BLD AUTO: 36.6 % (ref 40–53)
HGB BLD-MCNC: 12.6 G/DL (ref 13.3–17.7)
MAGNESIUM SERPL-MCNC: 2.4 MG/DL (ref 1.7–2.3)
MCH RBC QN AUTO: 30.1 PG (ref 26.5–33)
MCHC RBC AUTO-ENTMCNC: 34.4 G/DL (ref 31.5–36.5)
MCV RBC AUTO: 87 FL (ref 78–100)
PHOSPHATE SERPL-MCNC: 3.9 MG/DL (ref 2.5–4.5)
PLATELET # BLD AUTO: 163 10E3/UL (ref 150–450)
POTASSIUM SERPL-SCNC: 4.1 MMOL/L (ref 3.4–5.3)
PROT SERPL-MCNC: 5.7 G/DL (ref 6.4–8.3)
RBC # BLD AUTO: 4.19 10E6/UL (ref 4.4–5.9)
SODIUM SERPL-SCNC: 139 MMOL/L (ref 135–145)
WBC # BLD AUTO: 17.1 10E3/UL (ref 4–11)

## 2024-04-20 PROCEDURE — 93010 ELECTROCARDIOGRAM REPORT: CPT | Performed by: INTERNAL MEDICINE

## 2024-04-20 PROCEDURE — 250N000011 HC RX IP 250 OP 636

## 2024-04-20 PROCEDURE — 83735 ASSAY OF MAGNESIUM: CPT

## 2024-04-20 PROCEDURE — 99233 SBSQ HOSP IP/OBS HIGH 50: CPT | Mod: 24 | Performed by: INTERNAL MEDICINE

## 2024-04-20 PROCEDURE — 272N000202 HC AEROBIKA WITH MANOMETER

## 2024-04-20 PROCEDURE — 999N000157 HC STATISTIC RCP TIME EA 10 MIN

## 2024-04-20 PROCEDURE — 250N000013 HC RX MED GY IP 250 OP 250 PS 637: Performed by: INTERNAL MEDICINE

## 2024-04-20 PROCEDURE — 71045 X-RAY EXAM CHEST 1 VIEW: CPT

## 2024-04-20 PROCEDURE — 99233 SBSQ HOSP IP/OBS HIGH 50: CPT | Performed by: INTERNAL MEDICINE

## 2024-04-20 PROCEDURE — 84100 ASSAY OF PHOSPHORUS: CPT

## 2024-04-20 PROCEDURE — 97535 SELF CARE MNGMENT TRAINING: CPT | Mod: GO

## 2024-04-20 PROCEDURE — 120N000013 HC R&B IMCU

## 2024-04-20 PROCEDURE — 250N000012 HC RX MED GY IP 250 OP 636 PS 637

## 2024-04-20 PROCEDURE — 250N000013 HC RX MED GY IP 250 OP 250 PS 637

## 2024-04-20 PROCEDURE — 80053 COMPREHEN METABOLIC PANEL: CPT

## 2024-04-20 PROCEDURE — 82330 ASSAY OF CALCIUM: CPT

## 2024-04-20 PROCEDURE — 85048 AUTOMATED LEUKOCYTE COUNT: CPT

## 2024-04-20 PROCEDURE — 97166 OT EVAL MOD COMPLEX 45 MIN: CPT | Mod: GO

## 2024-04-20 PROCEDURE — 93005 ELECTROCARDIOGRAM TRACING: CPT

## 2024-04-20 PROCEDURE — 97110 THERAPEUTIC EXERCISES: CPT | Mod: GO

## 2024-04-20 PROCEDURE — 94799 UNLISTED PULMONARY SVC/PX: CPT

## 2024-04-20 PROCEDURE — 250N000009 HC RX 250

## 2024-04-20 RX ORDER — ASPIRIN 81 MG/1
81 TABLET, CHEWABLE ORAL DAILY
Status: DISCONTINUED | OUTPATIENT
Start: 2024-04-21 | End: 2024-04-23

## 2024-04-20 RX ORDER — NICOTINE POLACRILEX 4 MG
15-30 LOZENGE BUCCAL
Status: DISCONTINUED | OUTPATIENT
Start: 2024-04-20 | End: 2024-04-27 | Stop reason: HOSPADM

## 2024-04-20 RX ORDER — TAMSULOSIN HYDROCHLORIDE 0.4 MG/1
0.4 CAPSULE ORAL DAILY
Status: DISCONTINUED | OUTPATIENT
Start: 2024-04-20 | End: 2024-04-27 | Stop reason: HOSPADM

## 2024-04-20 RX ORDER — FUROSEMIDE 10 MG/ML
40 INJECTION INTRAMUSCULAR; INTRAVENOUS EVERY 6 HOURS
Status: COMPLETED | OUTPATIENT
Start: 2024-04-20 | End: 2024-04-20

## 2024-04-20 RX ORDER — CLOPIDOGREL BISULFATE 75 MG/1
75 TABLET ORAL DAILY
Status: DISCONTINUED | OUTPATIENT
Start: 2024-04-20 | End: 2024-04-27 | Stop reason: HOSPADM

## 2024-04-20 RX ORDER — DEXTROSE MONOHYDRATE 25 G/50ML
25-50 INJECTION, SOLUTION INTRAVENOUS
Status: DISCONTINUED | OUTPATIENT
Start: 2024-04-20 | End: 2024-04-24

## 2024-04-20 RX ORDER — HYDROMORPHONE HCL IN WATER/PF 6 MG/30 ML
0.2 PATIENT CONTROLLED ANALGESIA SYRINGE INTRAVENOUS EVERY 6 HOURS PRN
Status: DISCONTINUED | OUTPATIENT
Start: 2024-04-20 | End: 2024-04-21

## 2024-04-20 RX ADMIN — PANTOPRAZOLE SODIUM 40 MG: 40 TABLET, DELAYED RELEASE ORAL at 09:04

## 2024-04-20 RX ADMIN — OXYCODONE HYDROCHLORIDE 10 MG: 5 TABLET ORAL at 03:19

## 2024-04-20 RX ADMIN — METOPROLOL TARTRATE 12.5 MG: 25 TABLET, FILM COATED ORAL at 13:57

## 2024-04-20 RX ADMIN — OXYCODONE HYDROCHLORIDE 10 MG: 5 TABLET ORAL at 17:39

## 2024-04-20 RX ADMIN — ACETAMINOPHEN 975 MG: 325 TABLET ORAL at 20:10

## 2024-04-20 RX ADMIN — ACETAMINOPHEN 975 MG: 325 TABLET ORAL at 06:02

## 2024-04-20 RX ADMIN — SENNOSIDES AND DOCUSATE SODIUM 1 TABLET: 8.6; 5 TABLET ORAL at 20:10

## 2024-04-20 RX ADMIN — CEFAZOLIN SODIUM 2 G: 2 INJECTION, SOLUTION INTRAVENOUS at 15:24

## 2024-04-20 RX ADMIN — ROSUVASTATIN CALCIUM 20 MG: 10 TABLET, FILM COATED ORAL at 20:10

## 2024-04-20 RX ADMIN — OXYCODONE HYDROCHLORIDE 10 MG: 5 TABLET ORAL at 23:04

## 2024-04-20 RX ADMIN — CLOPIDOGREL BISULFATE 75 MG: 75 TABLET ORAL at 09:04

## 2024-04-20 RX ADMIN — OXYCODONE HYDROCHLORIDE 5 MG: 5 TABLET ORAL at 13:06

## 2024-04-20 RX ADMIN — Medication 6 MG: at 20:11

## 2024-04-20 RX ADMIN — HYDROMORPHONE HYDROCHLORIDE 0.2 MG: 0.2 INJECTION, SOLUTION INTRAMUSCULAR; INTRAVENOUS; SUBCUTANEOUS at 16:38

## 2024-04-20 RX ADMIN — FUROSEMIDE 40 MG: 10 INJECTION, SOLUTION INTRAMUSCULAR; INTRAVENOUS at 15:50

## 2024-04-20 RX ADMIN — FUROSEMIDE 40 MG: 10 INJECTION, SOLUTION INTRAMUSCULAR; INTRAVENOUS at 21:12

## 2024-04-20 RX ADMIN — HEPARIN SODIUM 5000 UNITS: 10000 INJECTION, SOLUTION INTRAVENOUS; SUBCUTANEOUS at 13:57

## 2024-04-20 RX ADMIN — INSULIN GLARGINE 15 UNITS: 100 INJECTION, SOLUTION SUBCUTANEOUS at 19:57

## 2024-04-20 RX ADMIN — TAMSULOSIN HYDROCHLORIDE 0.4 MG: 0.4 CAPSULE ORAL at 10:07

## 2024-04-20 RX ADMIN — HYDROMORPHONE HYDROCHLORIDE 0.2 MG: 0.2 INJECTION, SOLUTION INTRAMUSCULAR; INTRAVENOUS; SUBCUTANEOUS at 20:16

## 2024-04-20 RX ADMIN — POLYETHYLENE GLYCOL 3350 17 G: 17 POWDER, FOR SOLUTION ORAL at 09:04

## 2024-04-20 RX ADMIN — SENNOSIDES AND DOCUSATE SODIUM 1 TABLET: 8.6; 5 TABLET ORAL at 09:04

## 2024-04-20 RX ADMIN — HYDROMORPHONE HYDROCHLORIDE 0.4 MG: 0.2 INJECTION, SOLUTION INTRAMUSCULAR; INTRAVENOUS; SUBCUTANEOUS at 06:00

## 2024-04-20 RX ADMIN — HEPARIN SODIUM 5000 UNITS: 10000 INJECTION, SOLUTION INTRAVENOUS; SUBCUTANEOUS at 21:12

## 2024-04-20 RX ADMIN — OXYCODONE HYDROCHLORIDE 10 MG: 5 TABLET ORAL at 09:04

## 2024-04-20 RX ADMIN — INSULIN HUMAN 4 UNITS/HR: 1 INJECTION, SOLUTION INTRAVENOUS at 12:41

## 2024-04-20 RX ADMIN — EZETIMIBE 10 MG: 10 TABLET ORAL at 20:09

## 2024-04-20 RX ADMIN — HYDROMORPHONE HYDROCHLORIDE 0.4 MG: 0.2 INJECTION, SOLUTION INTRAMUSCULAR; INTRAVENOUS; SUBCUTANEOUS at 01:01

## 2024-04-20 RX ADMIN — CEFAZOLIN SODIUM 2 G: 2 INJECTION, SOLUTION INTRAVENOUS at 06:03

## 2024-04-20 RX ADMIN — ACETAMINOPHEN 975 MG: 325 TABLET ORAL at 13:05

## 2024-04-20 RX ADMIN — ASPIRIN 81 MG CHEWABLE TABLET 162 MG: 81 TABLET CHEWABLE at 09:06

## 2024-04-20 RX ADMIN — THERA TABS 1 TABLET: TAB at 20:11

## 2024-04-20 ASSESSMENT — ACTIVITIES OF DAILY LIVING (ADL)
ADLS_ACUITY_SCORE: 38
ADLS_ACUITY_SCORE: 44
ADLS_ACUITY_SCORE: 38
ADLS_ACUITY_SCORE: 44
ADLS_ACUITY_SCORE: 44
ADLS_ACUITY_SCORE: 38
ADLS_ACUITY_SCORE: 44
ADLS_ACUITY_SCORE: 38
ADLS_ACUITY_SCORE: 44
ADLS_ACUITY_SCORE: 44
ADLS_ACUITY_SCORE: 38
ADLS_ACUITY_SCORE: 37
ADLS_ACUITY_SCORE: 44
ADLS_ACUITY_SCORE: 38
ADLS_ACUITY_SCORE: 37
ADLS_ACUITY_SCORE: 44
ADLS_ACUITY_SCORE: 38
ADLS_ACUITY_SCORE: 44
ADLS_ACUITY_SCORE: 37

## 2024-04-20 NOTE — PROGRESS NOTES
"    Place of Service:  [facility]     Reason for follow up: Bladder neck contracture    SUBJECTIVE:  Events: no acute events overnight    Patient reports feeling tired today. He is tolerating catheter well. Good urine output. Afebrile.     OBJECTIVE:  PHYSICAL EXAM:  /54   Pulse 87   Temp 97.6  F (36.4  C)   Resp 19   Ht 1.803 m (5' 11\")   Wt 146.1 kg (322 lb)   SpO2 96%   BMI 44.91 kg/m     General: NAD, alert, cooperative  Head: normocephalic, without abnormality / atraumatic  Abdomen: soft, non tender, non distended. No suprapubic fullness, no suprapubic tenderness. No CVA tenderness bilaterally  Genitourinary: Circumcised penis. Lazo catheter in place. Dried blood noted on glans. Urine yellow without sediment or clots.   Psychological: alert and oriented, answers questions appropriately    LABS:  Lab Results   Component Value Date    WBC 17.1 (H) 04/20/2024    HGB 12.6 (L) 04/20/2024    HCT 36.6 (L) 04/20/2024     04/20/2024    CHOL 209 (H) 04/17/2024    TRIG 261 (H) 04/17/2024    HDL 33 (L) 04/17/2024    ALT 28 04/20/2024    AST 56 (H) 04/20/2024     04/20/2024    BUN 25.9 (H) 04/20/2024    CO2 23 04/20/2024    INR 1.21 (H) 04/19/2024        Lab Results: personally reviewed.     ASSESSMENT/PLAN:  Nirav Baker is being seen by [facility] for bladder neck contracture    - Bladder neck contracture dilated in OR. He is tolerating lazo catheter well. Yellow urine. Maintain catheter for 4 days. TOV per primary service after 4 days. Follow up with primary urology team as an outpatient.     [facility] to sign off. Please page with questions or concerns.       Kostas Carballo PA-C   [facility]               "

## 2024-04-20 NOTE — PROVIDER NOTIFICATION
"/56   Pulse 86   Temp 97.6  F (36.4  C) (Oral)   Resp 23   Ht 1.803 m (5' 11\")   Wt 146.1 kg (322 lb)   SpO2 94%   BMI 44.91 kg/m          Allergies   Allergen Reactions    Amoxicillin Hives     Per Dr. Barrientos, patient has tolerated Keflex.    Atorvastatin Diarrhea    Benzalkonium Chloride Other (See Comments)    Fenofibrate Muscle Pain (Myalgia)    Gemfibrozil     Hydrocodone-Acetaminophen Other (See Comments)     Patient reports \"going white as a sheet\" and cold sweats   OK with Tylenol #3 No problems    Lisinopril Cough    Neosporin (Soc-Ass-Pmtbp) [Neomycin-Bacitracin Zn-Polymyx] Unknown    Penicillins Hives     1992 had pcn and had hives. Pt. States allergic to all cillin's      Pravastatin Sodium [Pravastatin] Muscle Pain (Myalgia)    Trulicity [Dulaglutide] Unknown    Gluten [Gluten Meal] Other (See Comments)     Reports feeling worse with gluten; tested below threshold for Celiac but daughter is positive    Insulin Detemir Rash        Intake/Output Summary (Last 24 hours) at 4/20/2024 0848  Last data filed at 4/20/2024 0700  Gross per 24 hour   Intake 5888.34 ml   Output 2645 ml   Net 3243.34 ml        RCAT Treatment Plan    Patient Score: 8  Patient Acuity: 4    Clinical Indication for Therapy: prevent atelectasis    Therapy Ordered: IS QID- pt able to achieve 1.0L.  Aerobika QID - able to use on own post instruction. Pt has no questions regarding use of either device.     Assessment Summary: Good voicing. O2 3 lpm via NC.  Pt speaks in complete sentences. No accessory muscle use. No nasal flaring. No retractions. Pt denies SOB. Pt alert and oriented. Breath sounds decreased bilaterally - post CABG 4/20.     Pt has home cpap at bedside. Pt is complaint with cpap use,  has used last 20 years. Pt does not smoker. Pt does not have home O2.  Pt does not have home nebulizer. Pt does not use MDI.  Pt has not been to pulmonary rehab.     Will continue to assess and adjust therapy as necessary via RCAT " protocol.     Pt has no questions or concerns at this time.             04/20/24 0800   RCAT Assessment   Reason for Assessment CVTS   Pulmonary Status 0   Surgical Status 3   Chest X-ray 2  (Sternotomy, CABG, . Strands of platelike atelectasis in both   lungs, less evident on current study. Tiny left pleural effusion, interval improvement. No appreciable effusion on the right. .)   Respiratory Pattern 0   Mental Status 0   Breath Sounds 2   Cough Effectiveness 0   Level of Activity 0   O2 Required for SpO2>=92% 1   Acuity Level (points) 8   Acuity Level  4   Re-eval Interval Guideline Daily   Re-evaluation Date 04/21/24   Clinical Indications/Symptoms   Aerosol Therapy   (no indication)   Broncho-pulmonary Hygiene Patient unable to deep breath and cough spontaneously  (pt able to use on own)   Volume Expansion Prevent atelectasis   Broncho-Pulmonary Hygiene Plan   Broncho-Pulmonary Hygiene Treatment OPEP therapy (no MD order required)  (pt given instruction - able to use on own.)   Broncho-Pulm Hygiene Frequency Acuity Level 2 : QID & PRN @noc-Moderate amounts of secretions   Volume Expansion Plan   Volume Expansion Treatment CPAP/OPEP/Flutter Valve;Incentive Spirometer   Volume Expansion Frequency Acuity Level 2 : QID-High risk for/with persistent atelectasis   Breath Sounds   Breath Sounds All Fields   All Lung Fields Breath Sounds clear;equal bilaterally;diminished   JENNIFER Breath Sounds diminished           Marcus Ang, RT  4/20/2024

## 2024-04-20 NOTE — PROGRESS NOTES
"    ICU Daily Note (04/20/2024)     Date of Hospital Admission: 4/17/2024  Date of ICU Admission: 4/19/2024  Code Status: Full Code    Reason for Visit  NSTEMI status post CABGx2    Summary  79 year old old male with HTN, HL, DM, obesity with BMI 45, ELDON, CKD who presented to [facility] with acute chest pain, diagnosed with NSTEMI, transferred to our hospital for coronary angiogram. This showed severe multivessel coronary artery disease.     He underwent CABG x 2 by Dr. Edwards on 4/19.  He had a difficulty with Campos insertion perioperatively, and it was inserted by urology in the OR.  However, no complications during the surgery.  He did not receive any blood products.  His echo showed preserved LV ejection fraction pre and after surgery.  He arrived to the ICU postoperatively for post CABG care.    Subjective  4/20 - He was extubated around 10 PM last evening. No major events last night. Awake, in a chair this morning. Appears comfortable    Objective   Vital Signs  Blood pressure 114/56, pulse 83, temperature 97.6  F (36.4  C), temperature source Oral, resp. rate 18, height 1.803 m (5' 11\"), weight 143.4 kg (316 lb 3.2 oz), SpO2 96%.  I/O last 3 completed shifts:  In: 5888.34 [P.O.:450; I.V.:4108.34; Other:180; IV Piggyback:900]  Out: 2520 [Urine:1950; Other:250; Chest Tube:320]    Physical Exam   Comfortable in no acute distress  Lungs are CTAB  Heart RRR  Ext mild edema bilaterally     Diagnostic Data  The following portions of the patient's chart were reviewed: current medications, problem list, laboratory workup, and diagnostic data.    Most Recent Pertinent Labs  Lab Results   Component Value Date    WBC 17.1 (H) 04/20/2024    HGB 12.6 (L) 04/20/2024    HCT 36.6 (L) 04/20/2024     04/20/2024     04/20/2024    POTASSIUM 4.1 04/20/2024    CHLORIDE 106 04/20/2024    CO2 23 04/20/2024    BUN 25.9 (H) 04/20/2024    CR 1.56 (H) 04/20/2024     (H) 04/20/2024    SED 19 (H) 07/02/2018    " NTBNPI 87 04/16/2024    AST 56 (H) 04/20/2024    ALT 28 04/20/2024    ALKPHOS 66 04/20/2024    INR 1.21 (H) 04/19/2024     Infusion Medications  Current Facility-Administered Medications   Medication Dose Route Frequency Provider Last Rate Last Admin    dexmedeTOMIDine (PRECEDEX) 4 mcg/mL in sodium chloride 0.9 % 100 mL infusion  0.2-0.7 mcg/kg/hr Intravenous Continuous Ana Rosa Quiñones PA-C   Held at 04/19/24 1803    EPINEPHrine (ADRENALIN) 5 mg in sodium chloride 0.9 % 250 mL infusion CENTRAL  0.01-0.1 mcg/kg/min Intravenous Continuous PRN Ana Rosa Quiñones PA-C        insulin regular (MYXREDLIN) 1 unit/mL infusion  0-24 Units/hr Intravenous Continuous Ana Rosa Quiñones PA-C 6 mL/hr at 04/20/24 0659 6 Units/hr at 04/20/24 0659    niCARdipine 40 mg in 200 mL NS (CARDENE) infusion  0.5-15 mg/hr Intravenous Continuous PRN Ana Rosa Quiñones PA-C        phenylephrine (RADHA-SYNEPHRINE) 50 mg in NaCl 0.9 % 250 mL infusion  0.1-4 mcg/kg/min Intravenous Continuous PRN Ana Rosa Quiñones PA-C   Stopped at 04/20/24 0650     Scheduled Medications  Current Facility-Administered Medications   Medication Dose Route Frequency Provider Last Rate Last Admin    acetaminophen (TYLENOL) tablet 975 mg  975 mg Oral Q8H Ana Rosa Quiñones PA-C   975 mg at 04/20/24 0602    aspirin (ASA) chewable tablet 162 mg  162 mg Oral or NG Tube Daily Ana Rosa Quiñones PA-C        Or    aspirin (ASA) Suppository 300 mg  300 mg Rectal Daily Ana Rosa Quiñones PA-C        ceFAZolin (ANCEF) 2 g in 100 mL D5W intermittent infusion  2 g Intravenous Q8H Ana Rosa Quiñones PA-C 200 mL/hr at 04/20/24 0603 2 g at 04/20/24 0603    ezetimibe (ZETIA) tablet 10 mg  10 mg Oral or NG Tube At Bedtime Ana Rosa Quiñones PA-C   10 mg at 04/19/24 3770    [Held by provider] glipiZIDE (GLUCOTROL XL) 24 hr tablet 20 mg  20 mg Oral At Bedtime Ana Rosa Quiñones PA-C        heparin ANTICOAGULANT injection 5,000 Units  5,000 Units  Subcutaneous Q8H Ana Rosa Quiñones PA-C        Lidocaine (LIDOCARE) 4 % Patch 1-2 patch  1-2 patch Transdermal Q24H Ana Rosa Quiñones PA-C   2 patch at 04/19/24 1844    [Held by provider] melatonin tablet 6 mg  6 mg Oral At Bedtime Ana Rosa Quiñones PA-C        [Held by provider] metFORMIN (GLUCOPHAGE) tablet 500 mg  500 mg Oral Daily with supper Ana Rosa Quiñones PA-C        [COMPLETED] methadone (DOLOPHINE) injection 20 mg  20 mg Intravenous Once Vincent Aguirre MD        [Held by provider] multivitamin, therapeutic (THERA-VIT) tablet 1 tablet  1 tablet Oral At Bedtime Ana Rosa Quiñones PA-C        pantoprazole (PROTONIX) 2 mg/mL suspension 40 mg  40 mg Oral or NG Tube Daily Ana Rosa Quiñones PA-C        Or    pantoprazole (PROTONIX) EC tablet 40 mg  40 mg Oral Daily Ana Rosa Quiñones PA-C   40 mg at 04/19/24 2238    polyethylene glycol (MIRALAX) Packet 17 g  17 g Oral Daily Ana Rosa Quiñones PA-C        rosuvastatin (CRESTOR) tablet 20 mg  20 mg Oral or NG Tube At Bedtime Ana Rosa Quiñones PA-C   20 mg at 04/19/24 2239    senna-docusate (SENOKOT-S/PERICOLACE) 8.6-50 MG per tablet 1 tablet  1 tablet Oral BID Ana Rosa Quiñones PA-C   1 tablet at 04/19/24 2239    [Held by provider] sitagliptin (JANUVIA) tablet 100 mg  100 mg Oral At Bedtime Ana Rosa Quiñones PA-C            Assessment & Plan   Neurology  # Analgesia  - multi modal analgesia per CV protocol     Cardiovascular:  # Multivessel coronary artery disease status post CABG  - On aspirin  - start Plavix today     # Post-cardiopulmonary bypass vasoplegia    # Temporary epicardial pacing wires  - Preserved pre & post-biventricular systolic function   - Has V wires, currently in NSR with HR in the 70s  - off phenylephrine     Respiratory, Airway:  # Post-operative mechanical ventilation   - Extubated last evening around 10 PM  - Home nasal CPAP at night  - pulmonary hygiene: acapella, IS, PT/OT as able, OOB as  able      # Post-operative chest tubes  - Chest tubes present to drain pleural & mediastinal fluid placed to wall suction       Gastrointestinal:  - No active issues   - On pantoprazole for GI prophylaxis     Renal  # Difficulty with Campos insertion  # CKD stage 3  -Has difficulty with Campos insertion due to previous prostate surgery.  Campos catheter inserted by urology in the OR.  Plan to keep the Campos for 3 to 4 days.  Monitor urine output.    - Creatinine 1.5 which is around his baseline  - Monitor kidney function, urine output, and electrolytes.     Infectious Disease:  Sujata-operative antibiotics per CV surgery      Hematology, Oncology:  - No acute issues  - Continue to monitor CBS    Endocrine:  # MICU insulin/glucose protocol   - Remains on insulin drip  - maintain BG of 140 to 180 mg/dL with a continuous IV insulin infusion     Total time excluding any procedural time is 35 minutes. Will sign off at this point.     Geovani Baig MD  Pager [phone]  [employer] ([employer])   [employer] Web Console ([employer])

## 2024-04-20 NOTE — PROGRESS NOTES
CVTS Daily Progress Note   POD#1 s/p CABG x2 (LIMA to LAD, rSVG to ramus), RLE EVH, Campos catheter placement per urology  Attending:  Grace  LOS: 3    SUBJECTIVE/INTERVAL EVENTS:    Patient arrived to ICU from OR yesterday afternoon. He was subsequently extubated and is weaning from pressors. No acute events overnight. Patient progressing well. Maintaining oxygen saturations on 3L NC. Normotensive on 0.1 claire. Ambulating with therapy to chair. Pain well controlled. - BM / - flatus. Tolerating diet w/o nausea thus far. UOP adequate. Chest tube output appropriate. Hgb 12.6. Patient denies new chest pain, shortness of breath, abdominal pain, calf pain, nausea. Patient has no questions today.     OBJECTIVE:  Temp:  [97.6  F (36.4  C)-98.4  F (36.9  C)] 97.6  F (36.4  C)  Pulse:  [] 87  Resp:  [13-33] 19  BP: (104-114)/(54-56) 104/54  MAP:  [49 mmHg-89 mmHg] 73 mmHg  Arterial Line BP: ()/(37-62) 130/49  FiO2 (%):  [40 %-80 %] 40 %  SpO2:  [89 %-99 %] 96 %  Vitals:    04/17/24 1349 04/18/24 0100 04/19/24 0320 04/20/24 0621   Weight: 148.3 kg (327 lb) 146.4 kg (322 lb 11.2 oz) 143.4 kg (316 lb 3.2 oz) 146.1 kg (322 lb)       Clinically Significant Risk Factors        # Hyperkalemia: Highest K = 5.5 mmol/L in last 2 days, will monitor as appropriate   # Hypocalcemia: Lowest Ca = 8.4 mg/dL in last 2 days, will monitor and replace as appropriate     # Hypoalbuminemia: Lowest albumin = 3.2 g/dL at 4/20/2024  4:11 AM, will monitor as appropriate  # Coagulation Defect: INR = 1.21 (Ref range: 0.85 - 1.15) and/or PTT = 31 Seconds (Ref range: 22 - 38 Seconds), will monitor for bleeding  # Thrombocytopenia: Lowest platelets = 121 in last 2 days, will monitor for bleeding   # Hypertension: Noted on problem list       # DMII: A1C = 7.2 % (Ref range: <5.7 %) within past 6 months, PRESENT ON ADMISSION  # Severe Obesity: Estimated body mass index is 44.91 kg/m  as calculated from the following:    Height as of this encounter:  This may have been miscommunication to the patient however Parkinson's and dementia would be managed by the neurologist.  If the current neurologist does not manage this then we may have to place an additional referral.  No specific need for follow-up with me.  Does have follow-up scheduled already with neurologist early next month.   "1.803 m (5' 11\").    Weight as of this encounter: 146.1 kg (322 lb)., PRESENT ON ADMISSION           Clinically Significant Risk Factors        # Hyperkalemia: Highest K = 5.5 mmol/L in last 2 days, will monitor as appropriate   # Hypocalcemia: Lowest Ca = 8.4 mg/dL in last 2 days, will monitor and replace as appropriate     # Hypoalbuminemia: Lowest albumin = 3.2 g/dL at 4/20/2024  4:11 AM, will monitor as appropriate  # Coagulation Defect: INR = 1.21 (Ref range: 0.85 - 1.15) and/or PTT = 31 Seconds (Ref range: 22 - 38 Seconds), will monitor for bleeding  # Thrombocytopenia: Lowest platelets = 121 in last 2 days, will monitor for bleeding   # Hypertension: Noted on problem list       # DMII: A1C = 7.2 % (Ref range: <5.7 %) within past 6 months, PRESENT ON ADMISSION  # Severe Obesity: Estimated body mass index is 44.91 kg/m  as calculated from the following:    Height as of this encounter: 1.803 m (5' 11\").    Weight as of this encounter: 146.1 kg (322 lb)., PRESENT ON ADMISSION                    Current Medications:    Scheduled Meds:  Current Facility-Administered Medications   Medication Dose Route Frequency Provider Last Rate Last Admin    acetaminophen (TYLENOL) tablet 975 mg  975 mg Oral Q8H Ana Rosa Quiñones PA-C   975 mg at 04/20/24 0602    aspirin (ASA) chewable tablet 162 mg  162 mg Oral or NG Tube Daily Ana Rosa Quiñones PA-C        Or    aspirin (ASA) Suppository 300 mg  300 mg Rectal Daily Ana Rosa Quiñones PA-C        ceFAZolin (ANCEF) 2 g in 100 mL D5W intermittent infusion  2 g Intravenous Q8H Ana Rosa Quiñones PA-C 200 mL/hr at 04/20/24 0603 2 g at 04/20/24 0603    clopidogrel (PLAVIX) tablet 75 mg  75 mg Oral Daily Geovani Baig MD   75 mg at 04/20/24 0904    ezetimibe (ZETIA) tablet 10 mg  10 mg Oral or NG Tube At Bedtime Ana Rosa Quiñones PA-C   10 mg at 04/19/24 2240    [Held by provider] glipiZIDE (GLUCOTROL XL) 24 hr tablet 20 mg  20 mg Oral At Bedtime Ana Rosa Quiñones " GEORGE ANDERSON        heparin ANTICOAGULANT injection 5,000 Units  5,000 Units Subcutaneous Q8H Ana Rosa Quiñones PA-C        Lidocaine (LIDOCARE) 4 % Patch 1-2 patch  1-2 patch Transdermal Q24H Ana Rosa Quiñones PA-C   2 patch at 04/19/24 1844    [Held by provider] melatonin tablet 6 mg  6 mg Oral At Bedtime Ana Rosa Quiñones PA-C        [Held by provider] metFORMIN (GLUCOPHAGE) tablet 500 mg  500 mg Oral Daily with supper Ana Rosa Quiñones PA-C        [COMPLETED] methadone (DOLOPHINE) injection 20 mg  20 mg Intravenous Once Vincent Aguirre MD        [Held by provider] multivitamin, therapeutic (THERA-VIT) tablet 1 tablet  1 tablet Oral At Bedtime Ana Rosa Quiñones PA-C        pantoprazole (PROTONIX) 2 mg/mL suspension 40 mg  40 mg Oral or NG Tube Daily Ana Rosa Quiñones PA-C        Or    pantoprazole (PROTONIX) EC tablet 40 mg  40 mg Oral Daily Ana Rosa Quiñones PA-C   40 mg at 04/20/24 0904    polyethylene glycol (MIRALAX) Packet 17 g  17 g Oral Daily Ana Rosa Quiñones PA-C   17 g at 04/20/24 0904    rosuvastatin (CRESTOR) tablet 20 mg  20 mg Oral or NG Tube At Bedtime Ana Rosa Quiñones PA-C   20 mg at 04/19/24 2239    senna-docusate (SENOKOT-S/PERICOLACE) 8.6-50 MG per tablet 1 tablet  1 tablet Oral BID Ana Rosa Quiñones PA-C   1 tablet at 04/20/24 0904    [Held by provider] sitagliptin (JANUVIA) tablet 100 mg  100 mg Oral At Bedtime Ana Rosa Quiñones PA-C         Continuous Infusions:  Current Facility-Administered Medications   Medication Dose Route Frequency Provider Last Rate Last Admin    dexmedeTOMIDine (PRECEDEX) 4 mcg/mL in sodium chloride 0.9 % 100 mL infusion  0.2-0.7 mcg/kg/hr Intravenous Continuous Ana Rosa Quiñones PA-C   Held at 04/19/24 1803    EPINEPHrine (ADRENALIN) 5 mg in sodium chloride 0.9 % 250 mL infusion CENTRAL  0.01-0.1 mcg/kg/min Intravenous Continuous PRN Ana Rosa Quiñones PA-C        insulin regular (MYXREDLIN) 1 unit/mL infusion   0-24 Units/hr Intravenous Continuous Ana Rosa Quiñones PA-C 4 mL/hr at 04/20/24 0806 4 Units/hr at 04/20/24 0806    niCARdipine 40 mg in 200 mL NS (CARDENE) infusion  0.5-15 mg/hr Intravenous Continuous PRN Ana Rosa Quiñones PA-C        phenylephrine (RADHA-SYNEPHRINE) 50 mg in NaCl 0.9 % 250 mL infusion  0.1-4 mcg/kg/min Intravenous Continuous PRN Ana Rosa Quiñones PA-C 8.6 mL/hr at 04/20/24 0832 0.2 mcg/kg/min at 04/20/24 0832     PRN Meds:.  Current Facility-Administered Medications   Medication Dose Route Frequency Provider Last Rate Last Admin    [START ON 4/22/2024] acetaminophen (TYLENOL) tablet 650 mg  650 mg Oral Q4H PRN Ana Rosa Quiñones PA-C        [START ON 4/22/2024] bisacodyl (DULCOLAX) suppository 10 mg  10 mg Rectal Daily PRN Ana Rosa Quiñones PA-C        calcium gluconate 1 g in 50 mL in sodium chloride intermittent infusion  1 g Intravenous Once PRN Ana Rosa Quiñones PA-C        calcium gluconate 2 g in  mL intermittent infusion  2 g Intravenous Once PRN Ana Rosa Quiñones PA-C        calcium gluconate 3 g in sodium chloride 0.9 % 100 mL intermittent infusion  3 g Intravenous Once PRN Ana Rosa Quiñones PA-C        glucose gel 15-30 g  15-30 g Oral Q15 Min PRN Ana Rosa Quiñones PA-C        Or    dextrose 50 % injection 25-50 mL  25-50 mL Intravenous Q15 Min PRN Ana Rosa Quiñones PA-C        Or    glucagon injection 1 mg  1 mg Subcutaneous Q15 Min PRN Ana Rosa Quiñones PA-C        EPINEPHrine (ADRENALIN) 5 mg in sodium chloride 0.9 % 250 mL infusion CENTRAL  0.01-0.1 mcg/kg/min Intravenous Continuous PRN Ana Rosa Quiñones PA-C        hydrALAZINE (APRESOLINE) injection 10 mg  10 mg Intravenous Q30 Min PRN Ana Rosa Quiñones PA-C        HYDROmorphone (DILAUDID) injection 0.2 mg  0.2 mg Intravenous Q2H PRN Ana Rosa Quiñones PA-C   0.2 mg at 04/19/24 2217    Or    HYDROmorphone (DILAUDID) injection 0.4 mg  0.4 mg Intravenous Q2H PRN Ishmael,  Ana Rosa ANDERSON PA-C   0.4 mg at 04/20/24 0600    lactated ringers BOLUS 250 mL  250 mL Intravenous Q15 Min PRN Ana Rosa Quiñones PA-C   250 mL at 04/19/24 2117    [START ON 4/21/2024] magnesium hydroxide (MILK OF MAGNESIA) suspension 30 mL  30 mL Oral Daily PRN Ana Rosa Quiñones PA-C        naloxone (NARCAN) injection 0.2 mg  0.2 mg Intravenous Q2 Min PRN Ana Rosa Quiñones PA-C        Or    naloxone (NARCAN) injection 0.4 mg  0.4 mg Intravenous Q2 Min PRN Ana Rosa Quiñones PA-C        Or    naloxone (NARCAN) injection 0.2 mg  0.2 mg Intramuscular Q2 Min PRN Ana Rosa Quiñones PA-C        Or    naloxone (NARCAN) injection 0.4 mg  0.4 mg Intramuscular Q2 Min PRN Ana Rosa Quiñones PA-C        niCARdipine 40 mg in 200 mL NS (CARDENE) infusion  0.5-15 mg/hr Intravenous Continuous PRN Ana Rosa Quiñones PA-C        ondansetron (ZOFRAN ODT) ODT tab 4 mg  4 mg Oral Q6H PRN Ana Rosa Quiñones PA-C        Or    ondansetron (ZOFRAN) injection 4 mg  4 mg Intravenous Q6H PRN Ana Rosa Quiñones PA-C        oxyCODONE (ROXICODONE) tablet 5 mg  5 mg Oral Q4H PRN Ana Rosa Quiñones PA-C        Or    oxyCODONE (ROXICODONE) tablet 10 mg  10 mg Oral Q4H PRN Ana Rosa Quiñones PA-C   10 mg at 04/20/24 0904    phenylephrine (RADHA-SYNEPHRINE) 50 mg in NaCl 0.9 % 250 mL infusion  0.1-4 mcg/kg/min Intravenous Continuous PRN Ana Rosa Quiñones PA-C 8.6 mL/hr at 04/20/24 0832 0.2 mcg/kg/min at 04/20/24 0832    prochlorperazine (COMPAZINE) injection 5 mg  5 mg Intravenous Q6H PRN Ana Rosa Quiñones PA-C        Or    prochlorperazine (COMPAZINE) tablet 5 mg  5 mg Oral Q6H PRN Ana Rosa Quiñones PA-C           Cardiographics:    Telemetry monitoring demonstrates NSR with rates in the 80s per my personal review.    Imaging:  Results for orders placed or performed during the hospital encounter of 04/17/24   US Carotid Bilateral    Impression    IMPRESSION:  1.  Mild plaque formation, velocities consistent  with less than 50% stenosis in the right internal carotid artery.  2.  Mild plaque formation, velocities consistent with less than 50% stenosis in the left internal carotid artery.  3.  Flow within the vertebral arteries is antegrade.   XR Chest Port 1 View    Impression    IMPRESSION:     Endotracheal tube tip is 5.3 cm above the piedad. Right internal jugular approach central venous catheter tip is in the upper SVC. Mediastinal and left pleural drains. Multiple median sternotomy wires and mediastinal clips.    Lung volumes are low. Left greater than right bibasilar airspace opacities are favored to reflect atelectasis, although airspace disease is not excluded. Small left pleural effusion. No right pleural effusion. No pneumothorax.    Stable cardiomegaly.   XR Chest Port 1 View    Impression    IMPRESSION: Sternotomy, CABG, mediastinal drain and left chest tube. Right IJ catheter tip in the SVC. The patient has been extubated. Cardiac size approaches upper limits of normal, improved since previous. Strands of platelike atelectasis in both   lungs, less evident on current study. Tiny left pleural effusion, interval improvement. No appreciable effusion on the right. Mild degenerative changes thoracic spine. Monitoring leads overlying the chest.         Labs, personally reviewed.  Hemoglobin   Date Value Ref Range Status   04/20/2024 12.6 (L) 13.3 - 17.7 g/dL Final   04/19/2024 12.9 (L) 13.3 - 17.7 g/dL Final   04/19/2024 12.3 (L) 13.3 - 17.7 g/dL Final     Hemoglobin POCT   Date Value Ref Range Status   04/19/2024 11.9 (L) 13.3 - 17.7 g/dL Final   04/19/2024 12.6 (L) 13.3 - 17.7 g/dL Final   04/19/2024 13.0 (L) 13.3 - 17.7 g/dL Final     WBC Count   Date Value Ref Range Status   04/20/2024 17.1 (H) 4.0 - 11.0 10e3/uL Final   04/19/2024 13.4 (H) 4.0 - 11.0 10e3/uL Final   04/19/2024 14.2 (H) 4.0 - 11.0 10e3/uL Final     Platelet Count   Date Value Ref Range Status   04/20/2024 163 150 - 450 10e3/uL Final    04/19/2024 129 (L) 150 - 450 10e3/uL Final   04/19/2024 121 (L) 150 - 450 10e3/uL Final     Creatinine   Date Value Ref Range Status   04/20/2024 1.56 (H) 0.67 - 1.17 mg/dL Final   04/19/2024 1.48 (H) 0.67 - 1.17 mg/dL Final   04/18/2024 1.30 (H) 0.67 - 1.17 mg/dL Final     Potassium   Date Value Ref Range Status   04/20/2024 4.1 3.4 - 5.3 mmol/L Final   04/19/2024 4.5 3.4 - 5.3 mmol/L Final   04/19/2024 4.5 3.4 - 5.3 mmol/L Final   08/24/2022 4.8 3.5 - 5.0 mmol/L Final   03/30/2022 3.9 3.5 - 5.0 mmol/L Final   03/17/2022 4.9 3.5 - 5.0 mmol/L Final     Potassium POCT   Date Value Ref Range Status   04/19/2024 4.0 3.4 - 5.3 mmol/L Final   04/19/2024 4.6 3.4 - 5.3 mmol/L Final   04/19/2024 5.4 (H) 3.4 - 5.3 mmol/L Final     Magnesium   Date Value Ref Range Status   04/20/2024 2.4 (H) 1.7 - 2.3 mg/dL Final   04/19/2024 2.6 (H) 1.7 - 2.3 mg/dL Final   04/18/2024 1.9 1.7 - 2.3 mg/dL Final          I/O:  I/O last 3 completed shifts:  In: 5888.34 [P.O.:450; I.V.:4108.34; Other:180; IV Piggyback:900]  Out: 2520 [Urine:1950; Other:250; Chest Tube:320]       Physical Exam:    General: Patient seen up in chair. NAD. Conversant. Pleasant.   HEENT: GERDA, no sclera icterus, moist mucosa  CV: RRR on monitor. 2+ peripheral pulses in all extremities. Mild edema.   Pulm: Non-labored effort on 3L NC. Chest tubes in place, serosanguinous output, no air leak.  Incision C/D/I.  Abd: Soft, NT, ND  : Campos with marquis urine  Ext: Mild pedal edema, SCDs in place, warm, distal pulses intact  Neuro: CNs grossly intact.       ASSESSMENT/PLAN:    Nirav Baker is a 79 year old male with a history of HTN, HLD, DM, obesity, CKD, and prostatectomy w/ bladder strictures who is s/p CABG x2 & RLE EVH in s/o NSTEMI.    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)    Dyslipidemia    Benign essential HTN    Acute chest pain    NSTEMI (non-ST elevated myocardial infarction) (H)    Coronary artery disease involving native coronary artery of native heart, unspecified whether angina present        NEURO:   - Scheduled Tylenol/lidocaine patches and PRN Tylenol/oxycodone/dilaudid for pain  - PRN melatonin    CV:   - Pre-op EF 65%  - Normotensive  - Weaning pressors as hemodynamics allow, currently on 0.1 claire  - Beta blocker pending weaning from pressors  - ASA 81mg (plavix)  - Rosuvastatin 20mg daily and zetia 10 mg daily  - Chest tubes/TPW to remain today  - Plavix to start today per surgeon preference for graft patency in s/o NSTEMI (ASA)    PULM:   - Extubated POD#0  - Maintaining oxygen saturations on 3L NC - wean as able  - Encourage pulmonary toilet    FEN/GI:  - Continue electrolyte replacement protocol  - Diet: Cardiac, ADAT   - Bowel regimen, LBM preop    RENAL:  - Adequate UOP/hr. Continue to monitor closely.  - Cr 1.56, baseline to 1.3  - 0.4 mg flomax qday  - Per urology, keep lazo in place x4 days and then remove for TOV if urine is clear. Expect hematuria after dilation. Avoid CBI if possible and start w/ routine catheter flushing if needed.   - Follow-up outpatient w/ urologist Dr. Figueroa (not yet scheduled)  - Diuresis PRN and pending weaning from pressors    HEME:  - Acute blood loss anemia post-op.   - Hgb stable, no bleeding concerns. Hep SQ, ASA    ID:  - Sujata op ppx complete, afebrile. No concerns for infection  - Leukocytosis, likely reactive, will trend w/ CBC.     ENDO:   - HbA1c 7.2%  - BG goal < 180 to promote optimal healing  - Continue Insulin gtt while on pressors  - Holding PTA glipizide, metformin, and sitagliptin, plan to resume POD#3 pending renal function    PPx:   - DVT: SCDs, SQ heparin TID, ambulation   - GI: Protonix 40mg PO daily    DISPO:   - Continue critical care in ICU  - PT/OT recs at discharge pending  - Medically Ready  for Discharge: Anticipated in 5+ Days        Patient discussed with Dr. Kinsey and Dr. Edwards.        Bella Quiñones PA-C  [facility] Cardiothoracic Surgery  Pager: [phone]  April 20, 2024

## 2024-04-20 NOTE — SIGNIFICANT EVENT
- at 1330H, when the patient stood at the bedside while doing the transfers from recliner going to the bed, there was an approximately 2 cm of blood clot that came out from the patient's penis. Then, there was a moderate leak of bright red blood that came out from the inside of the patient's penis, it dripped from the side of the lazo catheter. Patient denied any pain or discomfort at the site. Urologist was paged (NAVEEN Kenyon). He said the clots are expected and not concern about the blood leaking from the penis. He would be concerned if the patient is not having any urine output from the lazo catheter bag.

## 2024-04-20 NOTE — PROGRESS NOTES
[facility] - ICU    RN Progress Note:            Pertinent Assessments:      Please refer to flowsheet rows for full assessment     Pt AxOx4 on 3L NC. SR w/ 1st degree AVB on tele. Wound vac in place at sternal incision to continuous suction with no drainage. Chest tubes to suction with sanguinous drainage and no airleak. 69 ml of total output from mediastinal chest tube and 120 ml of total output from pleural chest tubes. Campos catheter intact with scant bloody drainage from insertion site, urologist aware. Currently on insulin gtt at 6 unit(s)/hr with q2h checks.            Key Events - This Shift:     Started on plavix and subcutaneous heparin. R radial artline and R internal jugular discontinued.                Barriers to Discharge / Downgrade:     Insulin gtt.          Point of Contact Update YES-OR-NO: Yes  If No, reason: N/A  Name:Wife  Phone Number: In chart  Summary of Conversation: Updated

## 2024-04-20 NOTE — PROGRESS NOTES
Vent Mode: CPAP/PS  (Continuous positive airway pressure with Pressure Support)  FiO2 (%): 40 %  Resp Rate (Set): 18 breaths/min  Tidal Volume (Set, mL): 600 mL  PEEP (cm H2O): 5 cmH2O  Pressure Support (cm H2O): 8 cmH2O  Resp: 16      Vent day # 1    Pt weaned for 80 minutes on above settings, sats 96%. Breath sounds clear.\pt extubated @ 2210, placed on 3 lpm NC, sats 94%. Pt has brought in his own cpap.  Rt continue to monitor.    Colten Petty,  RT

## 2024-04-20 NOTE — PLAN OF CARE
CT EXTUBATION FAST TRACK:    [facility] - ICU     FastTrack Candidate: Yes, Extubation Goal Time ( 6 Hours ) 2254     Patient Status: Extubated        Extubated at 2210 on 3L O2, will go on CPAP later.

## 2024-04-20 NOTE — PROGRESS NOTES
04/20/24 0745   Appointment Info   Signing Clinician's Name / Credentials (OT) Nicole Nacho PORTILLO OTR/L CLT   Living Environment   People in Home spouse   Current Living Arrangements house   Home Accessibility stairs to enter home   Number of Stairs, Main Entrance 3   Stair Railings, Main Entrance railings safe and in good condition   Transportation Anticipated family or friend will provide   Living Environment Comments Able to live on main level, supportive family   Self-Care   Equipment Currently Used at Home none   Activity/Exercise/Self-Care Comment I at baseline   General Information   Onset of Illness/Injury or Date of Surgery 04/17/24   Referring Physician    Additional Occupational Profile Info/Pertinent History of Current Problem CABGx2   Existing Precautions/Restrictions cardiac;sternal   Cognitive Status Examination   Affect/Mental Status (Cognitive) WNL   Follows Commands WNL   Transfers   Transfers sit-stand transfer   Sit-Stand Transfer   Sit-Stand Burnett (Transfers) moderate assist (50% patient effort);2 person assist   Balance   Balance Comments Edge of chair sitting Sba   Activities of Daily Living   BADL Assessment/Intervention   (anticipate will need A with UBALDO vicente)   Clinical Impression   Criteria for Skilled Therapeutic Interventions Met (OT) Yes, treatment indicated   OT Diagnosis decreased activity tolerance   OT Problem List-Impairments impacting ADL problems related to;activity tolerance impaired;post-surgical precautions;mobility   Assessment of Occupational Performance 1-3 Performance Deficits   Identified Performance Deficits trsfs,ambulation, post op ambulation   Planned Therapy Interventions (OT) ADL retraining;transfer training;home program guidelines;progressive activity/exercise   Clinical Decision Making Complexity (OT) detailed assessment/moderate complexity   Risk & Benefits of therapy have been explained care plan/treatment goals reviewed   OT Total Evaluation  Time   OT Eval, Moderate Complexity Minutes (32616) 8   OT Goals   Therapy Frequency (OT) 2 times/day   OT Predicted Duration/Target Date for Goal Attainment 04/27/24   OT Goals Cardiac Phase 1   OT: Understanding of cardiac education to maximize quality of life, condition management, and health outcomes Patient;Caregiver   OT: Perform aerobic activity with stable cardiovascular response 10 minutes;ambulation   OT: Functional/aerobic ambulation tolerance with stable cardiovascular response in order to return to home and community environment Greater than 300 feet;Supervision/SBA   OT: Navigation of stairs simulating home set up with stable cardiovascular response in order to return to home and community environment Supervision/SBA;4 stairs   Interventions   Interventions Quick Adds Therapeutic Procedures/Exercise;Cardiac Rehab   Therapeutic Procedures/Exercise   Therapeutic Procedure: strength, endurance, ROM, flexibillity minutes (77827) 8   Treatment Detail/Skilled Intervention LE exercise while seated-4 sets x 10 reps.Tolerated well. STS from chair with mod A of 2. Stood~5 mintues with CGA of 2. initially noted dizziness but not as bad as this morning. BPs flucuated in SBP 80s. With time leveled in the SBP 110s. Attempted dynamic standing- limited abilities due to poor hip/knees   Cardiac Education   Education Provided Precautions   OT Discharge Planning   OT Plan ex, ed, standing- progress to amb   OT Discharge Recommendation (DC Rec) home with outpatient cardiac rehab   OT Rationale for DC Rec Limited evaluation due to multiple lines and tubes. Anticipate patient will return home with outpatient CR.   OT Brief overview of current status Mod A of 2   Total Session Time   Timed Code Treatment Minutes 8   Total Session Time (sum of timed and untimed services) 16

## 2024-04-20 NOTE — PROGRESS NOTES
[facility]    Medicine Progress Note - Hospitalist Service    Date of Admission:  4/17/2024    Assessment & Plan   Nirav Baker is a 79 year old old male with HTN, HL, DM, obesity with BMI 45, ELDON, CKD who presented to [facility] with acute chest pain, diagnosed with NSTEMI, transferred to our hospital for coronary angiogram.     #CAD  #NSTEMI  -Coronary angiogram 4/17/2024 showed severe multivessel CAD  -CTS consulted. S/p CABG x 2 4/19. Extubated around 10PM 4/19. Doing well on 3LNC. Still on low dose phenylephrine.  -Continue ASA, Heparin gtt discontinued. Plavix added 4/20. Cont BB, statin and ARB. Diurese per CVTS team  -Cardiology following.      #DM 2, with high insulin resistance.  A1c 7.2.  -PTA regimen of NovoLog 20 units with breakfast and 30 units with supper, Lantus 80 units twice daily. Currently on insulin gtt in ICU  -Diabetic diet     #Urinary retention  #Traumatic hematuria  -Patient with h/o BPH, s/p prostatectomy and bladder neck contraction. Difficult catheterization in OR. Urology assisted with lazo insertion. Hematuria has resolved now. Keep lazo in place for 4 days per urology.     #GERD-on PPI.  #ELDON-on CPAP.          Diet: Combination Diet Moderate Consistent Carb (60 g CHO per Meal) Diet; Low Saturated Fat Diet, Low Saturated Fat Na <2400mg Diet    DVT Prophylaxis: Heparin infusion  Lazo Catheter: PRESENT, indication: ICU only: hourly urine output needed for patient care  Lines: PRESENT      CVC Double Lumen Right Internal jugular-Site Assessment: WDL  Arterial Line 04/19/24 Radial-Site Assessment: WDL    Cardiac Monitoring: ACTIVE order. Indication: Open heart surgery (72 hours)  Code Status: Full Code      Clinically Significant Risk Factors        # Hyperkalemia: Highest K = 5.5 mmol/L in last 2 days, will monitor as appropriate   # Hypocalcemia: Lowest Ca = 8.4 mg/dL in last 2 days, will monitor and replace as appropriate     #  "Hypoalbuminemia: Lowest albumin = 3.2 g/dL at 4/20/2024  4:11 AM, will monitor as appropriate  # Coagulation Defect: INR = 1.21 (Ref range: 0.85 - 1.15) and/or PTT = 31 Seconds (Ref range: 22 - 38 Seconds), will monitor for bleeding  # Thrombocytopenia: Lowest platelets = 121 in last 2 days, will monitor for bleeding   # Hypertension: Noted on problem list       # DMII: A1C = 7.2 % (Ref range: <5.7 %) within past 6 months, PRESENT ON ADMISSION  # Severe Obesity: Estimated body mass index is 44.91 kg/m  as calculated from the following:    Height as of this encounter: 1.803 m (5' 11\").    Weight as of this encounter: 146.1 kg (322 lb)., PRESENT ON ADMISSION            Disposition Plan     Medically Ready for Discharge: Anticipated in 2-4 Days         OTONIEL Nichole  Hospitalist Service  [facility]  Securely message with [employer] (more info)  Text page via [employer] Paging/Directory   ______________________________________________________________________    Interval History   Patient seen and examined in ICU. Sitting up in a recliner. Pain is well controlled. No acute issues overnight    Physical Exam   Vital Signs: Temp: 97.6  F (36.4  C) Temp src: Oral BP: 110/47 Pulse: 91   Resp: 18 SpO2: 96 % O2 Device: BiPAP/CPAP Oxygen Delivery: 3 LPM  Weight: 322 lbs 0 oz      General: Not in obvious distress.  HEENT: NC, AT   Chest: Clear to auscultation bilaterally. Chest tubes in place  Heart: S1S2 normal, regular. No M/R/G  Abdomen: Soft. NT, ND. Bowel sounds- active.  Extremities: No legs swelling  Neuro: Alert and awake, grossly non-focal      Medical Decision Making             Data     I have personally reviewed the following data over the past 24 hrs:    17.1 (H)  \   12.6 (L)   / 163     139 106 25.9 (H) /  124 (H)   4.1 23 1.56 (H) \     ALT: 28 AST: 56 (H) AP: 66 TBILI: 0.6   ALB: 3.2 (L) TOT PROTEIN: 5.7 (L) LIPASE: N/A     Procal: N/A CRP: N/A Lactic Acid: 1.4       INR:  1.21 (H) PTT:  " 31   D-dimer:  N/A Fibrinogen:  297       Imaging results reviewed over the past 24 hrs:   Recent Results (from the past 24 hour(s))   BELLO with Report    Narrative    Vincent Aguirre MD     4/19/2024  5:22 PM  Perioperative BELLO Procedure Note    Staff -        Anesthesiologist:  Vincent Aguirre MD       Performed By: anesthesiologist  Preanesthesia Checklist:  Patient identified, IV assessed, risks and   benefits discussed, monitors and equipment assessed, procedure being   performed at surgeon's request and anesthesia consent obtained.    BELLO Probe Insertion    Probe Status PRE Insertion: NO obvious damage  Probe type:  Adult 3D  Bite block used:   Oral Airway  Insertion Technique: Jaw Lift  Insertion complications: None obvious  Billing Report:BELLO report by Anesthesiologist (See Separate Report note)  Probe Status POST Removal: NO obvious damage    BELLO Report  General Procedure Information  Images for this study have been archived.  Modalities: 2D, CW Doppler, PW Doppler and Color flow mapping  Echocardiographic and Doppler Measurements  Right Ventricle:  Cavity size normal.    Hypertrophy not present.     Thrombus not present.    Global function normal.     Left Ventricle:  Cavity size normal.    Hypertrophy not present.     Thrombus not present.   Global Function normal.       Ventricular Regional Function:  1- Basal Anteroseptal:  normal  2- Basal Anterior:  normal  3- Basal Anterolateral:  normal  4- Basal Inferolateral:  normal  5- Basal Inferior:  normal  6- Basal Inferoseptal:  normal  7- Mid Anteroseptal:  normal  8- Mid Anterior:  normal  9- Mid Anterolateral:  normal  10- Mid Inferolateral:  normal  11- Mid Inferior:  normal  12- Mid Inferoseptal:  normal  13- Apical Anterior:  normal  14- Apical Lateral:  normal  15- Apical Inferior:  normal  16- Apical Septal:  normal  17- [place]:  normal    Valves  Aortic Valve: Annulus normal.  Stenosis not present.  Regurgitation   absent.  Leaflets normal.   Leaflet motions normal.    Mitral Valve: Annulus normal.  Stenosis not present.  Regurgitation +1.    Leaflets normal.  Leaflet motions normal.    Tricuspid Valve: Annulus normal.  Stenosis not present.  Regurgitation   absent.  Leaflets normal.  Leaflet motions normal.    Pulmonic Valve: Annulus normal.  Stenosis not present.  Regurgitation   absent.      Aorta: Ascending Aorta: Size normal.  Dissection not present.  Plaque   thickness less than 3 mm.  Mobile plaque not present.    Aortic Arch: Size normal.    Dissection not present.   Plaque thickness   less than 3 mm.   Mobile plaque not present.    Descending Aorta: Size normal.    Dissection not present.   Plaque   thickness less than 3 mm.   Mobile plaque not present.      Right Atrium:  Size normal.   Spontaneous echo contrast not present.     Thrombus not present.   Tumor not present.   Device not present.     Left Atrium: Size normal.  Spontaneous echo contrast not present.    Thrombus not present.  Tumor not present.  Device not present.    Left atrial appendage normal.     Atrial Septum: Intra-atrial septal morphology normal.       Ventricular Septum: Intra-ventricular septum morphology normal.          Post Intervention Findings  Procedure(s) performed:  CABG. Global function:  Unchanged. Regional wall   motion: Unchanged. Surgeon(s) notified of all postintervention findings:   Yes.                 Echocardiogram Comments   XR Chest Port 1 View    Narrative    EXAM: XR CHEST PORT 1 VIEW  LOCATION: [facility]  DATE: 4/19/2024    INDICATION: Post Op CVTS Surgery  COMPARISON: Chest radiograph 04/16/2024.      Impression    IMPRESSION:     Endotracheal tube tip is 5.3 cm above the piedad. Right internal jugular approach central venous catheter tip is in the upper SVC. Mediastinal and left pleural drains. Multiple median sternotomy wires and mediastinal clips.    Lung volumes are low. Left greater than right bibasilar airspace  opacities are favored to reflect atelectasis, although airspace disease is not excluded. Small left pleural effusion. No right pleural effusion. No pneumothorax.    Stable cardiomegaly.   XR Chest Port 1 View    Narrative    EXAM: XR CHEST PORTABLE 1 VIEW  LOCATION: [facility]  DATE: 04/20/2024    INDICATION: Postop CVTS surgery.  COMPARISON: Portable chest single view 04/19/2024 at 1725 hours.      Impression    IMPRESSION: Sternotomy, CABG, mediastinal drain and left chest tube. Right IJ catheter tip in the SVC. The patient has been extubated. Cardiac size approaches upper limits of normal, improved since previous. Strands of platelike atelectasis in both   lungs, less evident on current study. Tiny left pleural effusion, interval improvement. No appreciable effusion on the right. Mild degenerative changes thoracic spine. Monitoring leads overlying the chest.

## 2024-04-20 NOTE — PLAN OF CARE
[facility] - ICU    RN Progress Note:            Pertinent Assessments:      Please refer to flowsheet rows for full assessment     A&O x4. Pupils equal and reactive. Moves all extremities. Some numbness and tingling in right fingers but improving.    SR with 1st degree AVB, HR 70s. PM wires capped. Pulses palpable.    Patient on 3L via NC, CPAP at night. Clear lung sounds. Strong cough. Chest tubes in place.    Active bowel sounds.    Campos in place. Urine is yellow with some sediment and old clots.    Incisions CDI, midline chest incision with wound vac in place.           Key Events - This Shift:       Extubated within goal time.    Insulin infusing at 6 (algorithm 4). Radu off at this time. Patient received at total of 750 of LR.    Pain in chest: oxy, tylenol, and dilaudid given.    Campos has about 50 out q1h.    Mediastinal chest tube has had 0-15 out q1h. Pleural chest tubes have had 10-20 out q1h. Sanguinous.Large dump from pleural chest tubes this AM.    Up with assist of 4 this AM, due to multiple lines and first attempt at standing. Pt did pretty well but BP did drop, took a couple minutes to recover.           Barriers to Discharge / Downgrade:     Lines and gtts         Point of Contact Update YES-OR-NO: Yes  Name:Karin  Phone Number:see chart  Summary of Conversation: Patient has been extubated and is doing very well. I gave her a brief overview of how the night and morning will go and told her I would call if anything has gone awry.         Problem: Cardiovascular Surgery  Goal: Effective Oxygenation and Ventilation  Outcome: Progressing  Intervention: Promote Airway Secretion Clearance  Recent Flowsheet Documentation  Taken 4/20/2024 0000 by Sara Avila, RN  Cough And Deep Breathing: done independently per patient  Airway/Ventilation Management: calming measures promoted  Intervention: Optimize Oxygenation and Ventilation  Recent Flowsheet Documentation  Taken  4/20/2024 0000 by Sara Avila RN  Chest Tube Safety:   all connections secured   petroleum gauze dressing with patient   rubber-tipped hemostat(s) with patient   suction checked     Problem: Cardiovascular Surgery  Goal: Effective Urinary Elimination  Outcome: Progressing  Intervention: Monitor and Manage Urinary Retention  Recent Flowsheet Documentation  Taken 4/20/2024 0000 by Sara Avila RN  Urinary Elimination Promotion: catheter patency maintained     Problem: Cardiovascular Surgery  Goal: Acceptable Pain Control  Outcome: Progressing  Intervention: Prevent or Manage Pain  Recent Flowsheet Documentation  Taken 4/20/2024 0115 by Sara Avila RN  Pain Management Interventions: declines  Taken 4/20/2024 0101 by Sara Avila RN  Pain Management Interventions: medication (see MAR)  Taken 4/20/2024 0000 by Sara Avila RN  Pain Management Interventions: declines  Taken 4/19/2024 2230 by Sara Avila RN  Pain Management Interventions: medication (see MAR)  Taken 4/19/2024 2217 by Sara Avila RN  Pain Management Interventions: medication (see MAR)     Problem: Cardiovascular Surgery  Goal: Blood Glucose Level Within Targeted Range  Outcome: Progressing  Intervention: Optimize Glycemic Control  Recent Flowsheet Documentation  Taken 4/20/2024 0000 by Sara Avila RN  Glycemic Management:   blood glucose monitored   insulin infusion adjusted  Taken 4/19/2024 2000 by Sara Avila RN  Glycemic Management:   blood glucose monitored   insulin infusion adjusted     Problem: Cardiovascular Surgery  Goal: Fluid and Electrolyte Balance  Outcome: Progressing  Intervention: Monitor and Manage Fluid and Electrolyte Balance  Recent Flowsheet Documentation  Taken 4/20/2024 0000 by Sara Avila RN  Fluid/Electrolyte Management:   electrolyte supplement adjusted   intravenous fluids adjusted  Taken 4/19/2024 2000 by Sara Avila RN  Fluid/Electrolyte Management:    electrolyte supplement adjusted   intravenous fluids adjusted     Problem: Cardiovascular Surgery  Goal: Optimal Cerebral Tissue Perfusion  Outcome: Progressing  Intervention: Protect and Optimize Cerebral Perfusion  Recent Flowsheet Documentation  Taken 4/20/2024 0000 by Sara Avila RN  Sensory Stimulation Regulation:   care clustered   quiet environment promoted   lighting decreased  Glycemic Management:   blood glucose monitored   insulin infusion adjusted  Head of Bed (HOB) Positioning: HOB at 30 degrees  Taken 4/19/2024 2000 by Sara Avila RN  Sensory Stimulation Regulation:   care clustered   quiet environment promoted  Glycemic Management:   blood glucose monitored   insulin infusion adjusted  Head of Bed (HOB) Positioning: HOB at 30 degrees     Problem: Cardiovascular Surgery  Goal: Effective Cardiac Function  Outcome: Progressing  Intervention: Optimize Cardiac Output and Blood Flow  Recent Flowsheet Documentation  Taken 4/20/2024 0000 by Sara Avila RN  Dysrhythmia Management: pacing wires maintained  Taken 4/19/2024 2000 by Sara Avila RN  Dysrhythmia Management: pacing wires maintained     Problem: Cardiovascular Surgery  Goal: Absence of Bleeding  Outcome: Progressing  Intervention: Monitor and Manage Bleeding  Recent Flowsheet Documentation  Taken 4/20/2024 0000 by Sara Avila RN  Bleeding Management: dressing monitored     Problem: Cardiovascular Surgery  Goal: Improved Activity Tolerance  Outcome: Progressing  Intervention: Optimize Tolerance for Activity  Recent Flowsheet Documentation  Taken 4/20/2024 0000 by Sara Avila RN  Self-Care Promotion: independence encouraged

## 2024-04-21 ENCOUNTER — APPOINTMENT (OUTPATIENT)
Dept: OCCUPATIONAL THERAPY | Facility: HOSPITAL | Age: 79
DRG: 234 | End: 2024-04-21
Attending: INTERNAL MEDICINE
Payer: COMMERCIAL

## 2024-04-21 LAB
ANION GAP SERPL CALCULATED.3IONS-SCNC: 12 MMOL/L (ref 7–15)
ATRIAL RATE - MUSE: 70 BPM
ATRIAL RATE - MUSE: 86 BPM
BUN SERPL-MCNC: 36.3 MG/DL (ref 8–23)
CA-I BLD-MCNC: 4.4 MG/DL (ref 4.4–5.2)
CA-I BLD-MCNC: 4.6 MG/DL (ref 4.4–5.2)
CALCIUM SERPL-MCNC: 8.2 MG/DL (ref 8.8–10.2)
CHLORIDE SERPL-SCNC: 100 MMOL/L (ref 98–107)
CREAT SERPL-MCNC: 1.89 MG/DL (ref 0.67–1.17)
DEPRECATED HCO3 PLAS-SCNC: 23 MMOL/L (ref 22–29)
DIASTOLIC BLOOD PRESSURE - MUSE: NORMAL MMHG
DIASTOLIC BLOOD PRESSURE - MUSE: NORMAL MMHG
EGFRCR SERPLBLD CKD-EPI 2021: 36 ML/MIN/1.73M2
ERYTHROCYTE [DISTWIDTH] IN BLOOD BY AUTOMATED COUNT: 14.2 % (ref 10–15)
GLUCOSE BLDC GLUCOMTR-MCNC: 223 MG/DL (ref 70–99)
GLUCOSE BLDC GLUCOMTR-MCNC: 228 MG/DL (ref 70–99)
GLUCOSE BLDC GLUCOMTR-MCNC: 279 MG/DL (ref 70–99)
GLUCOSE BLDC GLUCOMTR-MCNC: 283 MG/DL (ref 70–99)
GLUCOSE BLDC GLUCOMTR-MCNC: 289 MG/DL (ref 70–99)
GLUCOSE BLDC GLUCOMTR-MCNC: 297 MG/DL (ref 70–99)
GLUCOSE SERPL-MCNC: 237 MG/DL (ref 70–99)
HCT VFR BLD AUTO: 32.2 % (ref 40–53)
HGB BLD-MCNC: 10.8 G/DL (ref 13.3–17.7)
INTERPRETATION ECG - MUSE: NORMAL
INTERPRETATION ECG - MUSE: NORMAL
MAGNESIUM SERPL-MCNC: 2.1 MG/DL (ref 1.7–2.3)
MCH RBC QN AUTO: 29.8 PG (ref 26.5–33)
MCHC RBC AUTO-ENTMCNC: 33.5 G/DL (ref 31.5–36.5)
MCV RBC AUTO: 89 FL (ref 78–100)
P AXIS - MUSE: 28 DEGREES
P AXIS - MUSE: 35 DEGREES
PHOSPHATE SERPL-MCNC: 3.9 MG/DL (ref 2.5–4.5)
PLATELET # BLD AUTO: 128 10E3/UL (ref 150–450)
POTASSIUM SERPL-SCNC: 4 MMOL/L (ref 3.4–5.3)
POTASSIUM SERPL-SCNC: 4 MMOL/L (ref 3.4–5.3)
PR INTERVAL - MUSE: 208 MS
PR INTERVAL - MUSE: 222 MS
QRS DURATION - MUSE: 112 MS
QRS DURATION - MUSE: 114 MS
QT - MUSE: 388 MS
QT - MUSE: 438 MS
QTC - MUSE: 464 MS
QTC - MUSE: 473 MS
R AXIS - MUSE: -36 DEGREES
R AXIS - MUSE: -59 DEGREES
RBC # BLD AUTO: 3.62 10E6/UL (ref 4.4–5.9)
SODIUM SERPL-SCNC: 135 MMOL/L (ref 135–145)
SYSTOLIC BLOOD PRESSURE - MUSE: NORMAL MMHG
SYSTOLIC BLOOD PRESSURE - MUSE: NORMAL MMHG
T AXIS - MUSE: 46 DEGREES
T AXIS - MUSE: 54 DEGREES
VENTRICULAR RATE- MUSE: 70 BPM
VENTRICULAR RATE- MUSE: 86 BPM
WBC # BLD AUTO: 12.8 10E3/UL (ref 4–11)

## 2024-04-21 PROCEDURE — 250N000013 HC RX MED GY IP 250 OP 250 PS 637

## 2024-04-21 PROCEDURE — 36415 COLL VENOUS BLD VENIPUNCTURE: CPT

## 2024-04-21 PROCEDURE — 82330 ASSAY OF CALCIUM: CPT

## 2024-04-21 PROCEDURE — 83735 ASSAY OF MAGNESIUM: CPT

## 2024-04-21 PROCEDURE — 84132 ASSAY OF SERUM POTASSIUM: CPT

## 2024-04-21 PROCEDURE — 97535 SELF CARE MNGMENT TRAINING: CPT | Mod: GO

## 2024-04-21 PROCEDURE — 84100 ASSAY OF PHOSPHORUS: CPT

## 2024-04-21 PROCEDURE — 250N000011 HC RX IP 250 OP 636

## 2024-04-21 PROCEDURE — 80048 BASIC METABOLIC PNL TOTAL CA: CPT

## 2024-04-21 PROCEDURE — 250N000013 HC RX MED GY IP 250 OP 250 PS 637: Performed by: INTERNAL MEDICINE

## 2024-04-21 PROCEDURE — 85014 HEMATOCRIT: CPT

## 2024-04-21 PROCEDURE — 97110 THERAPEUTIC EXERCISES: CPT | Mod: GO

## 2024-04-21 PROCEDURE — 120N000013 HC R&B IMCU

## 2024-04-21 PROCEDURE — 99233 SBSQ HOSP IP/OBS HIGH 50: CPT | Performed by: INTERNAL MEDICINE

## 2024-04-21 PROCEDURE — P9047 ALBUMIN (HUMAN), 25%, 50ML: HCPCS

## 2024-04-21 PROCEDURE — 250N000012 HC RX MED GY IP 250 OP 636 PS 637

## 2024-04-21 RX ORDER — ALBUMIN (HUMAN) 12.5 G/50ML
12.5 SOLUTION INTRAVENOUS ONCE
Status: COMPLETED | OUTPATIENT
Start: 2024-04-21 | End: 2024-04-21

## 2024-04-21 RX ORDER — ACETAZOLAMIDE 250 MG/1
500 TABLET ORAL ONCE
Qty: 2 TABLET | Refills: 0 | Status: COMPLETED | OUTPATIENT
Start: 2024-04-21 | End: 2024-04-21

## 2024-04-21 RX ORDER — FUROSEMIDE 10 MG/ML
40 INJECTION INTRAMUSCULAR; INTRAVENOUS 3 TIMES DAILY
Status: DISCONTINUED | OUTPATIENT
Start: 2024-04-21 | End: 2024-04-22

## 2024-04-21 RX ORDER — HYDROMORPHONE HCL IN WATER/PF 6 MG/30 ML
0.2 PATIENT CONTROLLED ANALGESIA SYRINGE INTRAVENOUS EVERY 8 HOURS PRN
Status: DISCONTINUED | OUTPATIENT
Start: 2024-04-21 | End: 2024-04-22

## 2024-04-21 RX ADMIN — POLYETHYLENE GLYCOL 3350 17 G: 17 POWDER, FOR SOLUTION ORAL at 08:07

## 2024-04-21 RX ADMIN — METOPROLOL TARTRATE 37.5 MG: 25 TABLET, FILM COATED ORAL at 08:07

## 2024-04-21 RX ADMIN — ALBUMIN HUMAN 12.5 G: 0.25 SOLUTION INTRAVENOUS at 10:52

## 2024-04-21 RX ADMIN — EZETIMIBE 10 MG: 10 TABLET ORAL at 20:53

## 2024-04-21 RX ADMIN — LIDOCAINE 1 PATCH: 4 PATCH TOPICAL at 18:30

## 2024-04-21 RX ADMIN — FUROSEMIDE 40 MG: 10 INJECTION, SOLUTION INTRAMUSCULAR; INTRAVENOUS at 18:24

## 2024-04-21 RX ADMIN — Medication 6 MG: at 21:51

## 2024-04-21 RX ADMIN — CALCIUM GLUCONATE 1 G: 20 INJECTION, SOLUTION INTRAVENOUS at 06:27

## 2024-04-21 RX ADMIN — ROSUVASTATIN CALCIUM 20 MG: 10 TABLET, FILM COATED ORAL at 20:53

## 2024-04-21 RX ADMIN — THERA TABS 1 TABLET: TAB at 20:53

## 2024-04-21 RX ADMIN — SENNOSIDES AND DOCUSATE SODIUM 1 TABLET: 8.6; 5 TABLET ORAL at 08:08

## 2024-04-21 RX ADMIN — METOPROLOL TARTRATE 12.5 MG: 25 TABLET, FILM COATED ORAL at 13:09

## 2024-04-21 RX ADMIN — METOPROLOL TARTRATE 37.5 MG: 25 TABLET, FILM COATED ORAL at 21:50

## 2024-04-21 RX ADMIN — FUROSEMIDE 40 MG: 10 INJECTION, SOLUTION INTRAMUSCULAR; INTRAVENOUS at 11:16

## 2024-04-21 RX ADMIN — FUROSEMIDE 40 MG: 10 INJECTION, SOLUTION INTRAMUSCULAR; INTRAVENOUS at 06:45

## 2024-04-21 RX ADMIN — INSULIN ASPART 6 UNITS: 100 INJECTION, SOLUTION INTRAVENOUS; SUBCUTANEOUS at 19:21

## 2024-04-21 RX ADMIN — METOPROLOL TARTRATE 12.5 MG: 25 TABLET, FILM COATED ORAL at 18:28

## 2024-04-21 RX ADMIN — METOPROLOL TARTRATE 12.5 MG: 25 TABLET, FILM COATED ORAL at 04:01

## 2024-04-21 RX ADMIN — ACETAMINOPHEN 975 MG: 325 TABLET ORAL at 04:01

## 2024-04-21 RX ADMIN — SENNOSIDES AND DOCUSATE SODIUM 1 TABLET: 8.6; 5 TABLET ORAL at 20:53

## 2024-04-21 RX ADMIN — CLOPIDOGREL BISULFATE 75 MG: 75 TABLET ORAL at 08:06

## 2024-04-21 RX ADMIN — HEPARIN SODIUM 5000 UNITS: 10000 INJECTION, SOLUTION INTRAVENOUS; SUBCUTANEOUS at 06:27

## 2024-04-21 RX ADMIN — PANTOPRAZOLE SODIUM 40 MG: 40 TABLET, DELAYED RELEASE ORAL at 08:07

## 2024-04-21 RX ADMIN — TAMSULOSIN HYDROCHLORIDE 0.4 MG: 0.4 CAPSULE ORAL at 08:08

## 2024-04-21 RX ADMIN — OXYCODONE HYDROCHLORIDE 5 MG: 5 TABLET ORAL at 06:26

## 2024-04-21 RX ADMIN — ACETAMINOPHEN 975 MG: 325 TABLET ORAL at 13:09

## 2024-04-21 RX ADMIN — ANORECTAL OINTMENT: 15.7; .44; 24; 20.6 OINTMENT TOPICAL at 21:55

## 2024-04-21 RX ADMIN — ACETAMINOPHEN 975 MG: 325 TABLET ORAL at 20:53

## 2024-04-21 RX ADMIN — HEPARIN SODIUM 5000 UNITS: 10000 INJECTION, SOLUTION INTRAVENOUS; SUBCUTANEOUS at 21:52

## 2024-04-21 RX ADMIN — HYDROMORPHONE HYDROCHLORIDE 0.2 MG: 0.2 INJECTION, SOLUTION INTRAMUSCULAR; INTRAVENOUS; SUBCUTANEOUS at 08:19

## 2024-04-21 RX ADMIN — ACETAZOLAMIDE 500 MG: 250 TABLET ORAL at 11:19

## 2024-04-21 RX ADMIN — INSULIN ASPART 6 UNITS: 100 INJECTION, SOLUTION INTRAVENOUS; SUBCUTANEOUS at 13:04

## 2024-04-21 RX ADMIN — ASPIRIN 81 MG CHEWABLE TABLET 81 MG: 81 TABLET CHEWABLE at 08:06

## 2024-04-21 RX ADMIN — HEPARIN SODIUM 5000 UNITS: 10000 INJECTION, SOLUTION INTRAVENOUS; SUBCUTANEOUS at 13:10

## 2024-04-21 RX ADMIN — INSULIN ASPART 4 UNITS: 100 INJECTION, SOLUTION INTRAVENOUS; SUBCUTANEOUS at 08:16

## 2024-04-21 ASSESSMENT — ACTIVITIES OF DAILY LIVING (ADL)
ADLS_ACUITY_SCORE: 37
ADLS_ACUITY_SCORE: 38
ADLS_ACUITY_SCORE: 37
ADLS_ACUITY_SCORE: 37
ADLS_ACUITY_SCORE: 38
ADLS_ACUITY_SCORE: 37
ADLS_ACUITY_SCORE: 37
ADLS_ACUITY_SCORE: 38
ADLS_ACUITY_SCORE: 37
ADLS_ACUITY_SCORE: 38
ADLS_ACUITY_SCORE: 37
ADLS_ACUITY_SCORE: 38
ADLS_ACUITY_SCORE: 38
ADLS_ACUITY_SCORE: 37
ADLS_ACUITY_SCORE: 37
ADLS_ACUITY_SCORE: 38

## 2024-04-21 NOTE — PLAN OF CARE
[facility] - ICU    RN Progress Note:            Pertinent Assessments:      Please refer to flowsheet rows for full assessment     A&O x4. Neuro intact.    SR with 1st degree AVB. BP WNL. Pulses palpable.    Clear diminished lung sounds. On 2L NC, will go on BiPAP soon.    Chest tubes in place, serosanguineous output.    Active bowel sounds.    Campos in place, some blood around meatus.    Incisions CDI.           Key Events - This Shift:       Uneventful.    Pain meds given.             Barriers to Discharge / Downgrade:     Patient was downgraded to cardiac telemetry status.           Problem: Cardiovascular Surgery  Goal: Effective Oxygenation and Ventilation  Outcome: Progressing  Intervention: Promote Airway Secretion Clearance  Recent Flowsheet Documentation  Taken 4/20/2024 2000 by Sara Avila, RN  Cough And Deep Breathing: done independently per patient     Problem: Cardiovascular Surgery  Goal: Effective Urinary Elimination  Outcome: Progressing  Intervention: Monitor and Manage Urinary Retention  Recent Flowsheet Documentation  Taken 4/20/2024 2000 by Sara Avila, RN  Urinary Elimination Promotion: catheter patency maintained     Problem: Cardiovascular Surgery  Goal: Acceptable Pain Control  Outcome: Progressing  Intervention: Prevent or Manage Pain  Recent Flowsheet Documentation  Taken 4/20/2024 2010 by Sara Avila, RN  Pain Management Interventions: medication (see MAR)     Problem: Cardiovascular Surgery  Goal: Blood Glucose Level Within Targeted Range  Outcome: Progressing  Intervention: Optimize Glycemic Control  Recent Flowsheet Documentation  Taken 4/20/2024 2000 by Sara Avila, RN  Glycemic Management: blood glucose monitored     Problem: Cardiovascular Surgery  Goal: Fluid and Electrolyte Balance  Outcome: Progressing  Intervention: Monitor and Manage Fluid and Electrolyte Balance  Recent Flowsheet Documentation  Taken 4/20/2024 2000 by  Sara Avila RN  Fluid/Electrolyte Management:   electrolyte supplement adjusted   fluids provided     Problem: Cardiovascular Surgery  Goal: Optimal Cerebral Tissue Perfusion  Outcome: Progressing  Intervention: Protect and Optimize Cerebral Perfusion  Recent Flowsheet Documentation  Taken 4/20/2024 2000 by Sara Avila RN  Sensory Stimulation Regulation:   care clustered   lighting decreased   quiet environment promoted  Cerebral Perfusion Promotion:   blood pressure monitored   normothermia promoted  Glycemic Management: blood glucose monitored  Head of Bed (HOB) Positioning: HOB at 30 degrees     Problem: Cardiovascular Surgery  Goal: Effective Cardiac Function  Outcome: Progressing     Problem: Cardiovascular Surgery  Goal: Effective Bowel Elimination  Outcome: Progressing  Intervention: Enhance Bowel Motility and Elimination  Recent Flowsheet Documentation  Taken 4/20/2024 2000 by Sara Avila RN  Bowel Motility Enhancement:   ambulation promoted   fluid intake encouraged   oral intake encouraged  Bowel Elimination Management:   hygiene measures promoted   relaxation techniques promoted   sitting position facilitated   toileting offered     Problem: Cardiovascular Surgery  Goal: Absence of Bleeding  Outcome: Progressing     Problem: Cardiovascular Surgery  Goal: Improved Activity Tolerance  Outcome: Progressing  Intervention: Optimize Tolerance for Activity  Recent Flowsheet Documentation  Taken 4/20/2024 2000 by Sara Avila RN  Environmental Support: calm environment promoted

## 2024-04-21 NOTE — PROGRESS NOTES
"CVTS Daily Progress Note   POD#2 s/p CABG x2 (LIMA to LAD, rSVG to ramus), RLE EVH, Campos catheter placement per urology  Attending:  Grace  LOS: 4    SUBJECTIVE/INTERVAL EVENTS:    No acute events overnight. Patient progressing well. Maintaining oxygen saturations on 3L NC. Normo to hypertensive. Ambulating with therapy to chair. Pain well controlled. - BM / + flatus. Tolerating diet. UOP adequate. Chest tube output appropriate. Hgb stable. Patient denies new chest pain, shortness of breath, abdominal pain, calf pain, nausea. Patient has no questions today.     OBJECTIVE:  Temp:  [97.1  F (36.2  C)-98.6  F (37  C)] 97.1  F (36.2  C)  Pulse:  [] 87  Resp:  [13-33] 19  BP: ()/(47-65) 148/65  MAP:  [49 mmHg-82 mmHg] 82 mmHg  Arterial Line BP: ()/(37-56) 142/56  SpO2:  [89 %-99 %] 94 %  Vitals:    04/17/24 1349 04/18/24 0100 04/19/24 0320 04/20/24 0621   Weight: 148.3 kg (327 lb) 146.4 kg (322 lb 11.2 oz) 143.4 kg (316 lb 3.2 oz) 146.1 kg (322 lb)       Clinically Significant Risk Factors        # Hyperkalemia: Highest K = 5.5 mmol/L in last 2 days, will monitor as appropriate   # Hypocalcemia: Lowest Ca = 8.4 mg/dL in last 2 days, will monitor and replace as appropriate     # Hypoalbuminemia: Lowest albumin = 3.2 g/dL at 4/20/2024  4:11 AM, will monitor as appropriate    # Coagulation Defect: INR = 1.21 (Ref range: 0.85 - 1.15) and/or PTT = 31 Seconds (Ref range: 22 - 38 Seconds), will monitor for bleeding  # Thrombocytopenia: Lowest platelets = 121 in last 2 days, will monitor for bleeding   # Hypertension: Noted on problem list       # DMII: A1C = 7.2 % (Ref range: <5.7 %) within past 6 months, PRESENT ON ADMISSION  # Severe Obesity: Estimated body mass index is 44.91 kg/m  as calculated from the following:    Height as of this encounter: 1.803 m (5' 11\").    Weight as of this encounter: 146.1 kg (322 lb)., PRESENT ON ADMISSION           Clinically Significant Risk Factors        # Hyperkalemia: " "Highest K = 5.5 mmol/L in last 2 days, will monitor as appropriate   # Hypocalcemia: Lowest Ca = 8.4 mg/dL in last 2 days, will monitor and replace as appropriate     # Hypoalbuminemia: Lowest albumin = 3.2 g/dL at 4/20/2024  4:11 AM, will monitor as appropriate    # Coagulation Defect: INR = 1.21 (Ref range: 0.85 - 1.15) and/or PTT = 31 Seconds (Ref range: 22 - 38 Seconds), will monitor for bleeding  # Thrombocytopenia: Lowest platelets = 121 in last 2 days, will monitor for bleeding   # Hypertension: Noted on problem list       # DMII: A1C = 7.2 % (Ref range: <5.7 %) within past 6 months, PRESENT ON ADMISSION  # Severe Obesity: Estimated body mass index is 44.91 kg/m  as calculated from the following:    Height as of this encounter: 1.803 m (5' 11\").    Weight as of this encounter: 146.1 kg (322 lb)., PRESENT ON ADMISSION                    Current Medications:    Scheduled Meds:  Current Facility-Administered Medications   Medication Dose Route Frequency Provider Last Rate Last Admin    acetaminophen (TYLENOL) tablet 975 mg  975 mg Oral Q8H Ana Rosa Quiñones PA-C   975 mg at 04/21/24 0401    aspirin (ASA) chewable tablet 81 mg  81 mg Oral or NG Tube Daily Ana Rosa Quiñones PA-C        clopidogrel (PLAVIX) tablet 75 mg  75 mg Oral Daily Ana Rosa Quiñones PA-C   75 mg at 04/20/24 0904    ezetimibe (ZETIA) tablet 10 mg  10 mg Oral or NG Tube At Bedtime Ana Rosa Quiñones PA-C   10 mg at 04/20/24 2009    [Held by provider] glipiZIDE (GLUCOTROL XL) 24 hr tablet 20 mg  20 mg Oral At Bedtime Ana Rosa Quiñones PA-C        heparin ANTICOAGULANT injection 5,000 Units  5,000 Units Subcutaneous Q8H Ana Rosa Quiñones PA-C   5,000 Units at 04/20/24 2112    insulin aspart (NovoLOG) injection (RAPID ACTING)  1-10 Units Subcutaneous TID AC Ana Rosa Quiñones PA-C        insulin aspart (NovoLOG) injection (RAPID ACTING)  1-7 Units Subcutaneous At Bedtime Ana Rosa Quiñones PA-C        Lidocaine " (LIDOCARE) 4 % Patch 1-2 patch  1-2 patch Transdermal Q24H Ana Rosa Quiñones PA-C   2 patch at 04/19/24 1844    melatonin tablet 6 mg  6 mg Oral At Bedtime Ana Rosa Quiñones PA-C   6 mg at 04/20/24 2011    [Held by provider] metFORMIN (GLUCOPHAGE) tablet 500 mg  500 mg Oral Daily with supper Ana Rosa Quiñones PA-C        [COMPLETED] methadone (DOLOPHINE) injection 20 mg  20 mg Intravenous Once Vincent Aguirre MD        metoprolol tartrate (LOPRESSOR) half-tab 12.5 mg  12.5 mg Oral BID Ana Rosa Quiñones PA-C   12.5 mg at 04/20/24 1357    metoprolol tartrate (LOPRESSOR) half-tab 12.5 mg  12.5 mg Oral TID Ana Rosa Quiñones PA-C   12.5 mg at 04/21/24 0401    multivitamin, therapeutic (THERA-VIT) tablet 1 tablet  1 tablet Oral At Bedtime Ana Rosa Quiñones PA-C   1 tablet at 04/20/24 2011    pantoprazole (PROTONIX) 2 mg/mL suspension 40 mg  40 mg Oral or NG Tube Daily Ana Rosa Quiñones PA-C        Or    pantoprazole (PROTONIX) EC tablet 40 mg  40 mg Oral Daily Ana Rosa Quiñones PA-C   40 mg at 04/20/24 0904    polyethylene glycol (MIRALAX) Packet 17 g  17 g Oral Daily Ana Rosa Quiñones PA-C   17 g at 04/20/24 0904    rosuvastatin (CRESTOR) tablet 20 mg  20 mg Oral or NG Tube At Bedtime Ana Rosa Quiñones PA-C   20 mg at 04/20/24 2010    senna-docusate (SENOKOT-S/PERICOLACE) 8.6-50 MG per tablet 1 tablet  1 tablet Oral BID Ana Rosa Quiñones PA-C   1 tablet at 04/20/24 2010    [Held by provider] sitagliptin (JANUVIA) tablet 100 mg  100 mg Oral At Bedtime Ana Rosa Quiñones PA-C        tamsulosin (FLOMAX) capsule 0.4 mg  0.4 mg Oral Daily Ana Rosa Quiñones PA-C   0.4 mg at 04/20/24 1007     Continuous Infusions:  Current Facility-Administered Medications   Medication Dose Route Frequency Provider Last Rate Last Admin     PRN Meds:.  Current Facility-Administered Medications   Medication Dose Route Frequency Provider Last Rate Last Admin    [START ON 4/22/2024]  acetaminophen (TYLENOL) tablet 650 mg  650 mg Oral Q4H PRN Ana Rosa Quiñones PA-C        [START ON 4/22/2024] bisacodyl (DULCOLAX) suppository 10 mg  10 mg Rectal Daily PRN Ana Rosa Quiñones PA-C        calcium gluconate 1 g in 50 mL in sodium chloride intermittent infusion  1 g Intravenous Once PRN Ana Rosa Quiñones PA-C        calcium gluconate 2 g in  mL intermittent infusion  2 g Intravenous Once PRN Ana Rosa Quiñones PA-C        calcium gluconate 3 g in sodium chloride 0.9 % 100 mL intermittent infusion  3 g Intravenous Once PRN Ana Rosa Quiñones PA-C        glucose gel 15-30 g  15-30 g Oral Q15 Min PRN Ana Rosa Quiñones PA-C        Or    dextrose 50 % injection 25-50 mL  25-50 mL Intravenous Q15 Min PRN Ana Rosa Quiñones PA-C        Or    glucagon injection 1 mg  1 mg Subcutaneous Q15 Min PRN Ana Rosa Quiñones PA-C        hydrALAZINE (APRESOLINE) injection 10 mg  10 mg Intravenous Q30 Min PRN Ana Rosa Quiñones PA-C        HYDROmorphone (DILAUDID) injection 0.2 mg  0.2 mg Intravenous Q6H PRN Ana Rosa Quiñones PA-C   0.2 mg at 04/20/24 2016    lactated ringers BOLUS 250 mL  250 mL Intravenous Q15 Min PRN Ana Rosa Quiñones PA-C   250 mL at 04/19/24 2117    magnesium hydroxide (MILK OF MAGNESIA) suspension 30 mL  30 mL Oral Daily PRN Ana Rosa Quiñones PA-C        naloxone (NARCAN) injection 0.2 mg  0.2 mg Intravenous Q2 Min PRN Ana Rosa Quiñones PA-C        Or    naloxone (NARCAN) injection 0.4 mg  0.4 mg Intravenous Q2 Min PRN Ana Rosa Quiñones PA-C        Or    naloxone (NARCAN) injection 0.2 mg  0.2 mg Intramuscular Q2 Min PRN Ana Rosa Quiñones PA-C        Or    naloxone (NARCAN) injection 0.4 mg  0.4 mg Intramuscular Q2 Min PRN Ana Rosa Quiñones PA-C        ondansetron (ZOFRAN ODT) ODT tab 4 mg  4 mg Oral Q6H PRN Ana Rosa Quiñones PA-C        Or    ondansetron (ZOFRAN) injection 4 mg  4 mg Intravenous Q6H PRN Ana Rosa Quiñones PA-C         oxyCODONE (ROXICODONE) tablet 5 mg  5 mg Oral Q4H PRN Henshue, Ana Rosa K, PA-C   5 mg at 04/20/24 1306    Or    oxyCODONE (ROXICODONE) tablet 10 mg  10 mg Oral Q4H PRN Henshue, Ana Rosa K, PA-C   10 mg at 04/20/24 2304    prochlorperazine (COMPAZINE) injection 5 mg  5 mg Intravenous Q6H PRN Henshue, Ana Rosa K, PA-C        Or    prochlorperazine (COMPAZINE) tablet 5 mg  5 mg Oral Q6H PRN Henshue, Ana Rosa K, PA-C           Cardiographics:    Telemetry monitoring demonstrates NSR with rates in the 80s per my personal review.    Imaging:  Results for orders placed or performed during the hospital encounter of 04/17/24   US Carotid Bilateral    Impression    IMPRESSION:  1.  Mild plaque formation, velocities consistent with less than 50% stenosis in the right internal carotid artery.  2.  Mild plaque formation, velocities consistent with less than 50% stenosis in the left internal carotid artery.  3.  Flow within the vertebral arteries is antegrade.   XR Chest Port 1 View    Impression    IMPRESSION:     Endotracheal tube tip is 5.3 cm above the piedad. Right internal jugular approach central venous catheter tip is in the upper SVC. Mediastinal and left pleural drains. Multiple median sternotomy wires and mediastinal clips.    Lung volumes are low. Left greater than right bibasilar airspace opacities are favored to reflect atelectasis, although airspace disease is not excluded. Small left pleural effusion. No right pleural effusion. No pneumothorax.    Stable cardiomegaly.   XR Chest Port 1 View    Impression    IMPRESSION: Sternotomy, CABG, mediastinal drain and left chest tube. Right IJ catheter tip in the SVC. The patient has been extubated. Cardiac size approaches upper limits of normal, improved since previous. Strands of platelike atelectasis in both   lungs, less evident on current study. Tiny left pleural effusion, interval improvement. No appreciable effusion on the right. Mild degenerative changes  thoracic spine. Monitoring leads overlying the chest.         Labs, personally reviewed.  Hemoglobin   Date Value Ref Range Status   04/20/2024 12.6 (L) 13.3 - 17.7 g/dL Final   04/19/2024 12.9 (L) 13.3 - 17.7 g/dL Final   04/19/2024 12.3 (L) 13.3 - 17.7 g/dL Final     Hemoglobin POCT   Date Value Ref Range Status   04/19/2024 11.9 (L) 13.3 - 17.7 g/dL Final   04/19/2024 12.6 (L) 13.3 - 17.7 g/dL Final   04/19/2024 13.0 (L) 13.3 - 17.7 g/dL Final     WBC Count   Date Value Ref Range Status   04/20/2024 17.1 (H) 4.0 - 11.0 10e3/uL Final   04/19/2024 13.4 (H) 4.0 - 11.0 10e3/uL Final   04/19/2024 14.2 (H) 4.0 - 11.0 10e3/uL Final     Platelet Count   Date Value Ref Range Status   04/20/2024 163 150 - 450 10e3/uL Final   04/19/2024 129 (L) 150 - 450 10e3/uL Final   04/19/2024 121 (L) 150 - 450 10e3/uL Final     Creatinine   Date Value Ref Range Status   04/20/2024 1.56 (H) 0.67 - 1.17 mg/dL Final   04/19/2024 1.48 (H) 0.67 - 1.17 mg/dL Final   04/18/2024 1.30 (H) 0.67 - 1.17 mg/dL Final     Potassium   Date Value Ref Range Status   04/21/2024 4.0 3.4 - 5.3 mmol/L Final   04/20/2024 4.1 3.4 - 5.3 mmol/L Final   04/19/2024 4.5 3.4 - 5.3 mmol/L Final   04/19/2024 4.5 3.4 - 5.3 mmol/L Final   08/24/2022 4.8 3.5 - 5.0 mmol/L Final   03/30/2022 3.9 3.5 - 5.0 mmol/L Final   03/17/2022 4.9 3.5 - 5.0 mmol/L Final     Potassium POCT   Date Value Ref Range Status   04/19/2024 4.0 3.4 - 5.3 mmol/L Final   04/19/2024 4.6 3.4 - 5.3 mmol/L Final   04/19/2024 5.4 (H) 3.4 - 5.3 mmol/L Final     Magnesium   Date Value Ref Range Status   04/21/2024 2.1 1.7 - 2.3 mg/dL Final   04/20/2024 2.4 (H) 1.7 - 2.3 mg/dL Final   04/19/2024 2.6 (H) 1.7 - 2.3 mg/dL Final          I/O:  I/O last 3 completed shifts:  In: 1661.44 [P.O.:900; I.V.:761.44]  Out: 2390 [Urine:1905; Chest Tube:485]       Physical Exam:     General: Patient seen up in chair. NAD. Conversant. Pleasant.   HEENT: GERDA, no sclera icterus, moist mucosa  CV: RRR on monitor. 2+  peripheral pulses in all extremities. Mild edema.   Pulm: Non-labored effort on 3L NC. Chest tubes in place, serosanguinous output, no air leak.  Incision C/D/I.  Abd: Soft, NT, ND  : Lazo with marquis urine  Ext: Mild pedal edema, SCDs in place, warm, distal pulses intact  Neuro: CNs grossly intact.       ASSESSMENT/PLAN:    Nirav Baker is a 79 year old male with a history of HTN, HLD, DM, obesity, CKD, and prostatectomy w/ bladder strictures who is s/p CABG x2 & RLE EVH in s/o NSTEMI.    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)    Dyslipidemia    Benign essential HTN    Acute chest pain    NSTEMI (non-ST elevated myocardial infarction) (H)    Coronary artery disease involving native coronary artery of native heart, unspecified whether angina present        NEURO:   - Scheduled Tylenol/lidocaine patches and PRN Tylenol/oxycodone/dilaudid for pain  - Continuing low dose dilaudid for pain given patient cannot receive toradol w/ elevated Cr  - PRN melatonin    CV:   - Pre-op EF 65%  - Normo- to hypertensive  - Metoprolol 37.5 mg BID w/ PRN available  - ASA 81mg (plavix)  - Rosuvastatin 20mg daily and zetia 10 mg daily  - Chest tubes/TPW removed at bedside this AM w/o immediate complications  - Plavix per surgeon preference for graft patency in s/o NSTEMI (ASA)    PULM:   - Extubated POD#0  - Maintaining oxygen saturations on room air this AM  - Encourage pulmonary toilet    FEN/GI:  - Continue electrolyte replacement protocol  - Diet: Cardiac, ADAT   - Bowel regimen, LBM preop    RENAL:  - Adequate UOP/hr. Continue to monitor closely.  - Cr 1.89 this AM, baseline to 1.3  - 0.4 mg flomax qday  - Urology consult for bladder stricture, appreciate recs, signed off.  - Keep lazo in place x4 days and then remove for TOV  if urine is clear. Expect hematuria after dilation. Avoid CBI if possible and start w/ routine catheter flushing if needed.   - Follow-up outpatient w/ urologist Dr. Figueroa (not yet scheduled)  - Diuresis w/ 40 mg IV lasix TID + 12.5g 25% albumin  - Diamox as trending toward contraction alkalosis    HEME:  - Acute blood loss anemia post-op.   - Hgb stable, no bleeding concerns. Hep SQ, ASA    ID:  - Sujata op ppx complete, afebrile. No concerns for infection  - Leukocytosis, downtrending and likely reactive, will trend w/ CBC     ENDO:   - HbA1c 7.2%  - BG goal < 180 to promote optimal healing  - High dose SSI  - 10u lantus this AM, 20u lantus this PM  - Carb count 1:20u today given holding oral DM2 meds   - Holding PTA glipizide, metformin, and sitagliptin, plan to resume POD#3 pending renal function    PPx:   - DVT: SCDs, SQ heparin TID, ambulation   - GI: Protonix 40mg PO daily    DISPO:   - General tele status (Transferred POD#1)  - PT/OT recommending home w/ outpt cardiac rehab at discharge  - Medically Ready for Discharge: Anticipated in 2-4 Days          Patient discussed with Dr. Kinsey and Dr. Edwards.        Bella Quiñones PA-C  [facility]  Pager: [phone]  April 21, 2024

## 2024-04-21 NOTE — PROGRESS NOTES
[facility]    Medicine Progress Note - Hospitalist Service    Date of Admission:  4/17/2024    Assessment & Plan   Nirav Baker is a 79 year old old male with HTN, HL, DM, obesity with BMI 45, ELDON, CKD who presented to [facility] with acute chest pain, diagnosed with NSTEMI, transferred to our hospital for coronary angiogram.     #CAD  #NSTEMI  -Coronary angiogram 4/17/2024 showed severe multivessel CAD  -CTS consulted. S/p CABG x 2 4/19. Extubated around 10PM 4/19. Doing well on 3LNC. Off of phenylephrine gtt since 4/20  -Continue ASA, Heparin gtt discontinued. Plavix added 4/20. Cont BB, statin and ARB. Diurese per CVTS team  -Cardiology following.      #DM 2, with high insulin resistance.  A1c 7.2.  -PTA regimen of NovoLog 20 units with breakfast and 30 units with supper, Lantus 60 units in am and 80 units at HS.  -Insulin gtt titrated off 4/20. Start lantus as 40 units BID, titrated as needed  -Cont the sliding scale and mealtime novolog.  -Diabetic diet     #Urinary retention  #Traumatic hematuria  -Patient with h/o BPH, s/p prostatectomy and bladder neck contraction. Difficult catheterization in OR. Urology assisted with lazo insertion. Hematuria has resolved now. Keep lazo in place for 4 days per urology.     #GERD-on PPI.  #ELDON-on CPAP.          Diet: Combination Diet Moderate Consistent Carb (60 g CHO per Meal) Diet; Low Saturated Fat Diet, Low Saturated Fat Na <2400mg Diet    DVT Prophylaxis: Heparin infusion  Lazo Catheter: PRESENT, indication: Insertion difficulty  Lines: None     Cardiac Monitoring: ACTIVE order. Indication: Open heart surgery (72 hours)  Code Status: Full Code      Clinically Significant Risk Factors        # Hyperkalemia: Highest K = 5.5 mmol/L in last 2 days, will monitor as appropriate   # Hypocalcemia: Lowest Ca = 8.2 mg/dL in last 2 days, will monitor and replace as appropriate     # Hypoalbuminemia: Lowest albumin = 3.2 g/dL at 4/20/2024  " 4:11 AM, will monitor as appropriate  # Coagulation Defect: INR = 1.21 (Ref range: 0.85 - 1.15) and/or PTT = 31 Seconds (Ref range: 22 - 38 Seconds), will monitor for bleeding  # Thrombocytopenia: Lowest platelets = 121 in last 2 days, will monitor for bleeding  # Acute Kidney Injury, unspecified: based on a >150% or 0.3 mg/dL increase in last creatinine compared to past 90 day average, will monitor renal function  # Hypertension: Noted on problem list       # DMII: A1C = 7.2 % (Ref range: <5.7 %) within past 6 months, PRESENT ON ADMISSION  # Severe Obesity: Estimated body mass index is 45.24 kg/m  as calculated from the following:    Height as of this encounter: 1.803 m (5' 11\").    Weight as of this encounter: 147.1 kg (324 lb 6.4 oz)., PRESENT ON ADMISSION            Disposition Plan     Medically Ready for Discharge: Anticipated in 2-4 Days         OTONIEL Nichole  Hospitalist Service  [facility]  Securely message with [employer] (more info)  Text page via [facility] Paging/Directory   ______________________________________________________________________    Interval History   Doing fairly well. No new issues overnight.     Physical Exam   Vital Signs: Temp: 98.7  F (37.1  C) Temp src: Oral BP: (!) 148/65 Pulse: 88   Resp: 19 SpO2: 91 % O2 Device: None (Room air) Oxygen Delivery: 3 LPM  Weight: 324 lbs 6.4 oz      General: Not in obvious distress.  HEENT: NC, AT   Chest: Clear to auscultation bilaterally. Chest tubes in place  Heart: S1S2 normal, regular. No M/R/G  Abdomen: Soft. NT, ND. Bowel sounds- active.  Extremities: No legs swelling  Neuro: Alert and awake, grossly non-focal      Medical Decision Making             Data     I have personally reviewed the following data over the past 24 hrs:    12.8 (H)  \   10.8 (L)   / 128 (L)     135 100 36.3 (H) /  279 (H)   4.0; 4.0 23 1.89 (H) \       Imaging results reviewed over the past 24 hrs:   No results found for this or any previous " visit (from the past 24 hour(s)).

## 2024-04-21 NOTE — PROGRESS NOTES
Care Management Follow Up    Length of Stay (days): 4    Expected Discharge Date: 04/23/2024     Concerns to be Addressed: discharge planning     Patient plan of care discussed at interdisciplinary rounds: Yes    Anticipated Discharge Disposition:  TBD - home most likely     Anticipated Discharge Services:  TBD  Anticipated Discharge DME:  TBD    Patient/family educated on Medicare website which has current facility and service quality ratings:  NA YET  Education Provided on the Discharge Plan: Yes  Patient/Family in Agreement with the Plan: yes    Referrals Placed by CM/SW:    Private pay costs discussed: Not applicable    Additional Information:  Not medically ready for discharge. OT recommends Outpatient OT.     Tomy Albright RN

## 2024-04-22 ENCOUNTER — APPOINTMENT (OUTPATIENT)
Dept: OCCUPATIONAL THERAPY | Facility: HOSPITAL | Age: 79
DRG: 234 | End: 2024-04-22
Attending: INTERNAL MEDICINE
Payer: COMMERCIAL

## 2024-04-22 LAB
ANION GAP SERPL CALCULATED.3IONS-SCNC: 8 MMOL/L (ref 7–15)
BASE EXCESS BLDA CALC-SCNC: -1.9 MMOL/L (ref -3–3)
BUN SERPL-MCNC: 43.9 MG/DL (ref 8–23)
CA-I BLD-MCNC: 4.6 MG/DL (ref 4.4–5.2)
CA-I BLD-MCNC: 5.1 MG/DL (ref 4.4–5.2)
CALCIUM SERPL-MCNC: 8.6 MG/DL (ref 8.8–10.2)
CHLORIDE SERPL-SCNC: 96 MMOL/L (ref 98–107)
CREAT SERPL-MCNC: 1.97 MG/DL (ref 0.67–1.17)
DEPRECATED HCO3 PLAS-SCNC: 28 MMOL/L (ref 22–29)
EGFRCR SERPLBLD CKD-EPI 2021: 34 ML/MIN/1.73M2
ERYTHROCYTE [DISTWIDTH] IN BLOOD BY AUTOMATED COUNT: 13.8 % (ref 10–15)
GLUCOSE BLD-MCNC: 176 MG/DL (ref 70–99)
GLUCOSE BLDC GLUCOMTR-MCNC: 270 MG/DL (ref 70–99)
GLUCOSE BLDC GLUCOMTR-MCNC: 271 MG/DL (ref 70–99)
GLUCOSE BLDC GLUCOMTR-MCNC: 284 MG/DL (ref 70–99)
GLUCOSE BLDC GLUCOMTR-MCNC: 348 MG/DL (ref 70–99)
GLUCOSE SERPL-MCNC: 290 MG/DL (ref 70–99)
HCO3 BLDA-SCNC: 23 MMOL/L (ref 21–28)
HCT VFR BLD AUTO: 30.1 % (ref 40–53)
HGB BLD-MCNC: 10.1 G/DL (ref 13.3–17.7)
HGB BLD-MCNC: 12.6 G/DL (ref 13.3–17.7)
LACTATE BLD-SCNC: 1.3 MMOL/L (ref 0.7–2)
MAGNESIUM SERPL-MCNC: 2.1 MG/DL (ref 1.7–2.3)
MCH RBC QN AUTO: 29.6 PG (ref 26.5–33)
MCHC RBC AUTO-ENTMCNC: 33.6 G/DL (ref 31.5–36.5)
MCV RBC AUTO: 88 FL (ref 78–100)
OXYHGB MFR BLDA: 99 % (ref 92–100)
PCO2 BLDA: 51 MM HG (ref 35–45)
PH BLDA: 7.29 [PH] (ref 7.35–7.45)
PHOSPHATE SERPL-MCNC: 2.5 MG/DL (ref 2.5–4.5)
PLATELET # BLD AUTO: 132 10E3/UL (ref 150–450)
PO2 BLDA: 284 MM HG (ref 80–105)
POTASSIUM BLD-SCNC: 4.9 MMOL/L (ref 3.4–5.3)
POTASSIUM SERPL-SCNC: 3.4 MMOL/L (ref 3.4–5.3)
POTASSIUM SERPL-SCNC: 3.5 MMOL/L (ref 3.4–5.3)
RBC # BLD AUTO: 3.41 10E6/UL (ref 4.4–5.9)
SAO2 % BLDA: 100 % (ref 95–96)
SODIUM BLD-SCNC: 140 MMOL/L (ref 135–145)
SODIUM SERPL-SCNC: 132 MMOL/L (ref 135–145)
WBC # BLD AUTO: 10.6 10E3/UL (ref 4–11)

## 2024-04-22 PROCEDURE — 999N000157 HC STATISTIC RCP TIME EA 10 MIN

## 2024-04-22 PROCEDURE — G0463 HOSPITAL OUTPT CLINIC VISIT: HCPCS

## 2024-04-22 PROCEDURE — 84100 ASSAY OF PHOSPHORUS: CPT

## 2024-04-22 PROCEDURE — 36415 COLL VENOUS BLD VENIPUNCTURE: CPT

## 2024-04-22 PROCEDURE — 250N000013 HC RX MED GY IP 250 OP 250 PS 637

## 2024-04-22 PROCEDURE — 99233 SBSQ HOSP IP/OBS HIGH 50: CPT | Performed by: INTERNAL MEDICINE

## 2024-04-22 PROCEDURE — 85027 COMPLETE CBC AUTOMATED: CPT

## 2024-04-22 PROCEDURE — 82330 ASSAY OF CALCIUM: CPT

## 2024-04-22 PROCEDURE — 97535 SELF CARE MNGMENT TRAINING: CPT | Mod: GO

## 2024-04-22 PROCEDURE — 250N000013 HC RX MED GY IP 250 OP 250 PS 637: Performed by: INTERNAL MEDICINE

## 2024-04-22 PROCEDURE — 84132 ASSAY OF SERUM POTASSIUM: CPT | Performed by: INTERNAL MEDICINE

## 2024-04-22 PROCEDURE — 36415 COLL VENOUS BLD VENIPUNCTURE: CPT | Performed by: INTERNAL MEDICINE

## 2024-04-22 PROCEDURE — 250N000011 HC RX IP 250 OP 636

## 2024-04-22 PROCEDURE — 120N000013 HC R&B IMCU

## 2024-04-22 PROCEDURE — 83735 ASSAY OF MAGNESIUM: CPT

## 2024-04-22 PROCEDURE — 80048 BASIC METABOLIC PNL TOTAL CA: CPT

## 2024-04-22 PROCEDURE — 97110 THERAPEUTIC EXERCISES: CPT | Mod: GO

## 2024-04-22 RX ORDER — METOPROLOL TARTRATE 25 MG/1
75 TABLET, FILM COATED ORAL 2 TIMES DAILY
Status: DISCONTINUED | OUTPATIENT
Start: 2024-04-22 | End: 2024-04-27 | Stop reason: HOSPADM

## 2024-04-22 RX ORDER — POTASSIUM CHLORIDE 1500 MG/1
40 TABLET, EXTENDED RELEASE ORAL ONCE
Status: COMPLETED | OUTPATIENT
Start: 2024-04-22 | End: 2024-04-22

## 2024-04-22 RX ORDER — METOPROLOL TARTRATE 25 MG/1
25 TABLET, FILM COATED ORAL ONCE
Status: DISCONTINUED | OUTPATIENT
Start: 2024-04-22 | End: 2024-04-22

## 2024-04-22 RX ADMIN — HEPARIN SODIUM 5000 UNITS: 10000 INJECTION, SOLUTION INTRAVENOUS; SUBCUTANEOUS at 14:07

## 2024-04-22 RX ADMIN — METOPROLOL TARTRATE 62.5 MG: 25 TABLET, FILM COATED ORAL at 09:33

## 2024-04-22 RX ADMIN — CLOPIDOGREL BISULFATE 75 MG: 75 TABLET ORAL at 09:33

## 2024-04-22 RX ADMIN — TAMSULOSIN HYDROCHLORIDE 0.4 MG: 0.4 CAPSULE ORAL at 09:36

## 2024-04-22 RX ADMIN — HEPARIN SODIUM 5000 UNITS: 10000 INJECTION, SOLUTION INTRAVENOUS; SUBCUTANEOUS at 06:38

## 2024-04-22 RX ADMIN — SENNOSIDES AND DOCUSATE SODIUM 1 TABLET: 8.6; 5 TABLET ORAL at 20:52

## 2024-04-22 RX ADMIN — POTASSIUM CHLORIDE 40 MEQ: 1500 TABLET, EXTENDED RELEASE ORAL at 09:33

## 2024-04-22 RX ADMIN — HEPARIN SODIUM 5000 UNITS: 10000 INJECTION, SOLUTION INTRAVENOUS; SUBCUTANEOUS at 20:52

## 2024-04-22 RX ADMIN — FUROSEMIDE 40 MG: 10 INJECTION, SOLUTION INTRAMUSCULAR; INTRAVENOUS at 06:40

## 2024-04-22 RX ADMIN — METOPROLOL TARTRATE 75 MG: 25 TABLET, FILM COATED ORAL at 20:53

## 2024-04-22 RX ADMIN — INSULIN ASPART 8 UNITS: 100 INJECTION, SOLUTION INTRAVENOUS; SUBCUTANEOUS at 12:10

## 2024-04-22 RX ADMIN — POLYETHYLENE GLYCOL 3350 17 G: 17 POWDER, FOR SOLUTION ORAL at 09:32

## 2024-04-22 RX ADMIN — EZETIMIBE 10 MG: 10 TABLET ORAL at 20:51

## 2024-04-22 RX ADMIN — METOPROLOL TARTRATE 12.5 MG: 25 TABLET, FILM COATED ORAL at 04:03

## 2024-04-22 RX ADMIN — INSULIN ASPART 6 UNITS: 100 INJECTION, SOLUTION INTRAVENOUS; SUBCUTANEOUS at 16:53

## 2024-04-22 RX ADMIN — ACETAMINOPHEN 975 MG: 325 TABLET ORAL at 04:08

## 2024-04-22 RX ADMIN — PANTOPRAZOLE SODIUM 40 MG: 40 TABLET, DELAYED RELEASE ORAL at 09:35

## 2024-04-22 RX ADMIN — Medication 6 MG: at 20:53

## 2024-04-22 RX ADMIN — ASPIRIN 81 MG CHEWABLE TABLET 81 MG: 81 TABLET CHEWABLE at 09:36

## 2024-04-22 RX ADMIN — ANORECTAL OINTMENT: 15.7; .44; 24; 20.6 OINTMENT TOPICAL at 20:53

## 2024-04-22 RX ADMIN — ANORECTAL OINTMENT: 15.7; .44; 24; 20.6 OINTMENT TOPICAL at 14:08

## 2024-04-22 RX ADMIN — ACETAMINOPHEN 975 MG: 325 TABLET ORAL at 12:13

## 2024-04-22 RX ADMIN — POTASSIUM & SODIUM PHOSPHATES POWDER PACK 280-160-250 MG 1 PACKET: 280-160-250 PACK at 12:14

## 2024-04-22 RX ADMIN — POTASSIUM & SODIUM PHOSPHATES POWDER PACK 280-160-250 MG 1 PACKET: 280-160-250 PACK at 09:45

## 2024-04-22 RX ADMIN — INSULIN ASPART 6 UNITS: 100 INJECTION, SOLUTION INTRAVENOUS; SUBCUTANEOUS at 07:19

## 2024-04-22 RX ADMIN — ANORECTAL OINTMENT: 15.7; .44; 24; 20.6 OINTMENT TOPICAL at 09:51

## 2024-04-22 RX ADMIN — THERA TABS 1 TABLET: TAB at 20:52

## 2024-04-22 RX ADMIN — SENNOSIDES AND DOCUSATE SODIUM 1 TABLET: 8.6; 5 TABLET ORAL at 09:36

## 2024-04-22 RX ADMIN — ROSUVASTATIN CALCIUM 20 MG: 10 TABLET, FILM COATED ORAL at 20:52

## 2024-04-22 ASSESSMENT — ACTIVITIES OF DAILY LIVING (ADL)
ADLS_ACUITY_SCORE: 37
ADLS_ACUITY_SCORE: 36
ADLS_ACUITY_SCORE: 37
ADLS_ACUITY_SCORE: 36
ADLS_ACUITY_SCORE: 36
ADLS_ACUITY_SCORE: 37
ADLS_ACUITY_SCORE: 36
ADLS_ACUITY_SCORE: 37
ADLS_ACUITY_SCORE: 36
ADLS_ACUITY_SCORE: 36
ADLS_ACUITY_SCORE: 37

## 2024-04-22 NOTE — PLAN OF CARE
[facility] - ICU    RN Progress Note:            Pertinent Assessments:      Please refer to flowsheet rows for full assessment     Patient is 3 days s/p CABG x2, denies pain, wound vac is still in place over sternotomy incision site. Patient is working with therapy and was able to ambulate down the gray today. Appetite is good. Urine output is good, lazo catheter still in place d/t difficult placement.            Key Events - This Shift:       WOC nurse evaluated patient for a groin wound today & ordered dressing changes. Meatus was washed/cleansed as there was a bit of dried blood still present from the lazo insertion. After cleansing the area, there seems to be no wound there. RN sent a message to WOC nurse to re-evaluate or discontinue orders.            Barriers to Discharge / Downgrade:     None, patient has been downgraded to med/tele

## 2024-04-22 NOTE — DISCHARGE INSTRUCTIONS
"WO DISCHARGE INSTRUCTIONS:  Groin wound(s): Daily  and PRN if dressing soiled, saturated or falls off  Cleanse wound with soap and water and dry completely.  Cover with mepilex if needed.  Do not remove mepilex in between cares.     Buttocks: Daily and PRN as needed  Cleanse wound with soap and water and dry completely.  Apply thick layer of barrier cream.  If soiled, do not completely remove, clean off soiled bits and reapply paste.    Leg scab-  Cleanse gently with soap and water, dry.  Apply mepilex to leg.  Notify MD or homecare nurse if any signs of infection occur such as warmth, redness, pusy drainage.     Pressure Injury Prevention (PIP) Plan:  If patient is declining pressure injury prevention interventions: Explore reason why and address patient's concerns, Educate on pressure injury risk and prevention intervention(s), If patient is still declining, document \"informed refusal\" , and Ensure Care team is aware ( provider, charge nurse, etc)  Mattress: Follow bed algorithm, reassess daily and order specialty mattress, if indicated.  HOB: Maintain at or below 30 degrees, unless contraindicated  Repositioning in bed: Every 1-2 hours   Heels: Pillows under calves  Protective Dressing: Sacral Mepilex for prevention (#174150),  especially for the agitated patient   Positioning Equipment: None  Chair positioning: Assist patient to reposition hourly   If patient has a buttock pressure injury, or high risk for PI use chair cushion or SPS.  Moisture Management: Perineal cleansing /protection: Follow Incontinence Protocol, Avoid brief in bed, Clean and dry skin folds with bathing , Consider InterDry (#[id number]) between folds, and Moisturize dry skin  Under Devices: Inspect skin under all medical devices during skin inspection , Ensure tubes are stabilized without tension, and Ensure patient is not lying on medical devices or equipment when repositioned  Ask provider to discontinue device when no longer needed.  "

## 2024-04-22 NOTE — CONSULTS
NUTRITION EDUCATION    REASON FOR ASSESSMENT:  Provider Order- Nutrition Education post CV surgery     NUTRITION HISTORY:  Information obtained from patient    Met with pt at bedside this afternoon. Pt reports eats primarily food from home at baseline, lives with spouse. Pt does not count his cho at baseline. Pt does report gluten intolerance/allergy- per chart does not qualify for celiac disease but his dtr does. Pt familiar with nutrition labels and ingredient lists. Pt reports recently not using salt. Pt states rarely eating out, shares a meal with his spouse.     CURRENT DIET:  Moderate Consistent Carb, Low Saturated Fat,  Na <2400 mg     NUTRITION DIAGNOSIS:  Food- and nutrition-related knowledge deficit R/t CAD AEB need for diet education.     INTERVENTIONS:  Implementation:      *  Nutrition Education (Content):   A)  Provided handouts- heart Healthy Consistent Carbohydrate Nutrition Therapy, Heart Healthy Nutrition Label Reading, Sodium Free Flavoring Tips, Heart Healthy Shopping Tips.    B)  Discussed handouts, sources of trans fats and how to avoid, sources of saturated fats and how to limit, not adding/using salt, reading nutrition labels and ingredient lists, choosing whole foods, limiting processed foods, limiting eating out.       *  Nutrition Education (Application):   A)  Discussed current eating habits and recommended alternative food choices      *  Anticipate good compliance      *  Diet Education - refer to Education Flowsheet    Goals:      *  Patient will verbalize understanding of diet      *  All of the above goals met during the education session    Follow Up/Monitoring:      *  Provided RD contact information for future questions      *  Recommended Out-Patient Nutrition Referral, if further diet instructions are needed

## 2024-04-22 NOTE — PLAN OF CARE
Problem: Adult Inpatient Plan of Care  Goal: Optimal Comfort and Wellbeing  Intervention: Monitor Pain and Promote Comfort  Recent Flowsheet Documentation  Taken 4/22/2024 0400 by Dominga Alejandra RN  Pain Management Interventions: medication (see MAR)     Problem: Risk for Delirium  Goal: Improved Sleep  Intervention: Promote Sleep  Recent Flowsheet Documentation  Taken 4/22/2024 0400 by Dominga Alejandra RN  Sleep/Rest Enhancement:   awakenings minimized   noise level reduced   regular sleep/rest pattern promoted   room darkened  Taken 4/22/2024 0000 by Dominga Alejandra RN  Sleep/Rest Enhancement:   awakenings minimized   noise level reduced   regular sleep/rest pattern promoted   room darkened     Problem: Chest Pain  Goal: Resolution of Chest Pain Symptoms  Outcome: [facility] - ICU    RN Progress Note:            Pertinent Assessments:      Please refer to flowsheet rows for full assessment   Minimal incisional pain, Tylenol effective.  NSR, BP WNL.  Used home CPAP with no oxygen bled in.  Lungs sound dim, refused IS until up in chair.      Key Events - This Shift:     Uneventful shift.             Barriers to Discharge / Downgrade:

## 2024-04-22 NOTE — PROGRESS NOTES
[facility]    Medicine Progress Note - Hospitalist Service    Date of Admission:  4/17/2024    Assessment & Plan   Nirav Baker is a 79 year old old male with HTN, HL, DM, obesity with BMI 45, ELDON, CKD who presented to [facility] with acute chest pain, diagnosed with NSTEMI, transferred to our hospital for coronary angiogram.     #CAD  #NSTEMI  -Coronary angiogram 4/17/2024 showed severe multivessel CAD  -CTS consulted. S/p CABG x 2 4/19. Extubated around 10PM 4/19. Doing well on 3LNC. Off of phenylephrine gtt since 4/20  -Continue ASA, Heparin gtt discontinued. Plavix added 4/20. Cont BB, statin and ARB. Diurese per CVTS team     #DM 2, with high insulin resistance.  A1c 7.2.  -PTA regimen of NovoLog 20 units with breakfast and 30 units with supper, Lantus 60 units in am and 80 units at HS.  -Insulin gtt titrated off 4/20. Started lantus as 40 units BID, increased to 50 units BID today. Increase mealtine novolog from 1:20 to 1:7 ration  -Cont the sliding scale  -Diabetic diet     #Urinary retention  #Traumatic hematuria  -Patient with h/o BPH, s/p prostatectomy and bladder neck contraction. Difficult catheterization in OR. Urology assisted with lazo insertion. Hematuria has resolved now. Keep lazo in place for 4 days per urology.     #GERD-on PPI.  #ELDON-on CPAP.          Diet: Combination Diet Moderate Consistent Carb (60 g CHO per Meal) Diet; Low Saturated Fat Diet, Low Saturated Fat Na <2400mg Diet    DVT Prophylaxis: Heparin infusion  Lazo Catheter: PRESENT, indication: Insertion difficulty  Lines: None     Cardiac Monitoring: ACTIVE order. Indication: Open heart surgery (72 hours)  Code Status: Full Code      Clinically Significant Risk Factors              # Hypoalbuminemia: Lowest albumin = 3.2 g/dL at 4/20/2024  4:11 AM, will monitor as appropriate   # Thrombocytopenia: Lowest platelets = 128 in last 2 days, will monitor for bleeding  # Acute Kidney Injury,  "unspecified: based on a >150% or 0.3 mg/dL increase in last creatinine compared to past 90 day average, will monitor renal function  # Hypertension: Noted on problem list       # DMII: A1C = 7.2 % (Ref range: <5.7 %) within past 6 months   # Severe Obesity: Estimated body mass index is 44.64 kg/m  as calculated from the following:    Height as of this encounter: 1.803 m (5' 11\").    Weight as of this encounter: 145.2 kg (320 lb 1.6 oz).             Disposition Plan     Medically Ready for Discharge: Anticipated in 2-4 Days         OTONIEL Nichole  Hospitalist Service  [facility]  Securely message with [employer] (more info)  Text page via [employer] Paging/Directory   ______________________________________________________________________    Interval History   Doing well. No new issues overnight. Working with therapy.     Physical Exam   Vital Signs: Temp: 97.9  F (36.6  C) Temp src: Oral BP: 118/58 Pulse: 72   Resp: 20 SpO2: 95 % O2 Device: BiPAP/CPAP    Weight: 320 lbs 1.6 oz      General: Not in obvious distress.  HEENT: NC, AT   Chest: Clear to auscultation bilaterally. Chest tubes in place  Heart: S1S2 normal, regular. No M/R/G  Abdomen: Soft. NT, ND. Bowel sounds- active.  Extremities: No legs swelling  Neuro: Alert and awake, grossly non-focal      Medical Decision Making             Data     I have personally reviewed the following data over the past 24 hrs:    10.6  \   10.1 (L)   / 132 (L)     132 (L) 96 (L) 43.9 (H) /  348 (H)   3.5 28 1.97 (H) \       Imaging results reviewed over the past 24 hrs:   No results found for this or any previous visit (from the past 24 hour(s)).    "

## 2024-04-22 NOTE — CONSULTS
[facility] Nurse Inpatient Assessment     Consulted for: bilateral buttocks and right groin    Summary: 4/22 Patient working with PT, was able to stand for assessment, wound is left groin but per bedside RN just bloodied and no wound present upon cleaning    Patient History (according to provider note(s):      79 year old old male with HTN, HL, DM, obesity with BMI 45, ELDON, CKD who presented to [facility] with acute chest pain, diagnosed with NSTEMI, transferred to our hospital for coronary angiogram. This showed severe multivessel coronary artery disease.     Assessment:      Areas visualized during today's visit: Perineal area, Sacrum/coccyx, and groin    Skin Injury Location: left groin        Last photo: 4/22  Skin injury due to: Abrasion -per bedside RN upon cleaning, site was just bloodied and asked for orders to be d/c'd.  Skin history and plan of care:   patient unable to relate history, could have come from catheter insertion  Affected area:      Skin assessment: Skin stripping     Measurements (length x width x depth, in cm) 1.5 cm  x 2 cm      Color: normal and consistent with surrounding tissue     Temperature  normal      Drainage: scant .      Color: bloody      Odor: none  Pain: denies , none  Pain interventions prior to dressing change: N/A  Treatment goal: Heal   STATUS: initial assessment  Supplies ordered: supplies stored on unit     Skin Injury Location: buttocks        Last photo: 4/22  Skin injury due to: Friction and Incontinence associated dermatitis (IAD)  Skin history and plan of care:   see above  Affected area:      Skin assessment: Dry/flaky, Erythema, and Superficial scabbing     Measurements (length x width x depth, in cm) 10 cm  x 10 cm  area     Color: pale and pink     Temperature  normal      Drainage: none .      Color: none      Odor: none  Pain: denies , none  Pain interventions prior to dressing change: N/A  Treatment goal: Heal , Maintain  "(prevention of deterioration), and Protection  STATUS: initial assessment  Supplies ordered: supplies stored on unit    Treatment Plan:     Buttocks: Daily and PRN as needed  Cleanse wound with soap and water and dry completely.  Apply thick layer of barrier cream.  If soiled, do not completely remove, clean off soiled bits and reapply paste.    Pressure Injury Prevention (PIP) Plan:  If patient is declining pressure injury prevention interventions: Explore reason why and address patient's concerns, Educate on pressure injury risk and prevention intervention(s), If patient is still declining, document \"informed refusal\" , and Ensure Care team is aware ( provider, charge nurse, etc)  Mattress: Follow bed algorithm, reassess daily and order specialty mattress, if indicated.  HOB: Maintain at or below 30 degrees, unless contraindicated  Repositioning in bed: Every 1-2 hours   Heels: Pillows under calves  Protective Dressing: Sacral Mepilex for prevention (#429167),  especially for the agitated patient   Positioning Equipment: None  Chair positioning: Assist patient to reposition hourly   If patient has a buttock pressure injury, or high risk for PI use chair cushion or SPS.  Moisture Management: Perineal cleansing /protection: Follow Incontinence Protocol, Avoid brief in bed, Clean and dry skin folds with bathing , Consider InterDry (#374656) between folds, and Moisturize dry skin  Under Devices: Inspect skin under all medical devices during skin inspection , Ensure tubes are stabilized without tension, and Ensure patient is not lying on medical devices or equipment when repositioned  Ask provider to discontinue device when no longer needed.    Orders: Written    RECOMMEND PRIMARY TEAM ORDER: None, at this time  Education provided: importance of repositioning and plan of care  Discussed plan of care with: Patient  WOC nurse follow-up plan: weekly  Notify WOC if wound(s) deteriorate.  Nursing to notify the Provider(s) " and re-consult the [facility] Nurse if new skin concern.    DATA:     Current support surface: Standard  Standard gel/foam mattress (IsoFlex, Atmos air, etc)  Containment of urine/stool: Incontinent pad in bed and Indwelling catheter  BMI: Body mass index is 44.64 kg/m .   Active diet order: Orders Placed This Encounter      Combination Diet Moderate Consistent Carb (60 g CHO per Meal) Diet; Low Saturated Fat Diet, Low Saturated Fat Na <2400mg Diet     Output: I/O last 3 completed shifts:  In: 1620 [P.O.:1520; I.V.:100]  Out: 3500 [Urine:3500]     Labs:   Recent Labs   Lab 04/22/24  0402 04/21/24  0721 04/20/24  0411 04/19/24  1735 04/19/24  1722 04/19/24  0509 04/18/24  1518 04/18/24  1205 04/18/24  0439   ALBUMIN  --   --  3.2*   < >  --   --   --   --   --    PREALB  --   --   --   --   --   --  18.6*  --   --    HGB 10.1*   < >  --    < > 12.9*   < >  --   --   --    INR  --   --   --   --  1.21*   < >  --    < >  --    WBC 10.6   < >  --    < > 13.4*   < >  --   --   --    A1C  --   --   --   --   --   --   --   --  7.2*    < > = values in this interval not displayed.     Pressure injury risk assessment:   Sensory Perception: 4-->no impairment  Moisture: 4-->rarely moist  Activity: 3-->walks occasionally  Mobility: 3-->slightly limited  Nutrition: 3-->adequate  Friction and Shear: 2-->potential problem  Alverto Score: 19    LC Abraham RN [facility] services  Pager no longer in use, please contact through [employer] group: [facility]

## 2024-04-22 NOTE — PROGRESS NOTES
Care Management Follow Up    Length of Stay (days): 5    Expected Discharge Date: 04/24/2024     Concerns to be Addressed: post procedure care and monitoring,  status        Patient plan of care discussed at interdisciplinary rounds: Yes    Anticipated Discharge Disposition:  TBD     Anticipated Discharge Services:  TBD    Anticipated Discharge DME:  [facility]RISTOP[facility]       Additional Information:  Patient presented to [facility] with acute chest pain, diagnosed with NSTEMI, transferred to our hospital for coronary angiogram. S/p CABG x 2 on 4/19.  PMH of BPH, s/p prostatectomy and bladder neck contraction. Urology placed lazo catheter 4/19.  CV Surgery following.      Therapy: Patient currently is mod assist of 2 bed mobility, CGA 85 ft. with 4WW. Recommendation home vs TCU pending progression.          Social History:  Patient lives in his house with spouse. Independent with ADLs/IADLs and ambulates without devices.  Spouse is primary family contact.      4/22/24:  Final discharge plan pending progression and recommendations.        Mary Montenegro RN

## 2024-04-22 NOTE — PROGRESS NOTES
"CVTS Daily Progress Note   POD#3 s/p CABG x2 (LIMA to LAD, rSVG to ramus), RLE EVH, Campos catheter placement per urology  Attending:  Grace  LOS: 5    SUBJECTIVE/INTERVAL EVENTS:    No acute events overnight. Patient progressing well. Maintaining oxygen saturations on room air Normotensive. Ambulating with therapy. Pain well controlled. +BM. Tolerating diet. UOP adequate. Hgb stable. Patient denies new chest pain, shortness of breath, abdominal pain, calf pain, nausea. Patient has no questions today.     OBJECTIVE:  Temp:  [97.8  F (36.6  C)-99.4  F (37.4  C)] 98  F (36.7  C)  Pulse:  [] 86  Resp:  [18] 18  BP: (103-143)/(51-75) 132/75  SpO2:  [92 %-97 %] 96 %  Vitals:    04/18/24 0100 04/19/24 0320 04/20/24 0621 04/21/24 0634   Weight: 146.4 kg (322 lb 11.2 oz) 143.4 kg (316 lb 3.2 oz) 146.1 kg (322 lb) 147.1 kg (324 lb 6.4 oz)    04/22/24 0700   Weight: 145.2 kg (320 lb 1.6 oz)       Clinically Significant Risk Factors              # Hypoalbuminemia: Lowest albumin = 3.2 g/dL at 4/20/2024  4:11 AM, will monitor as appropriate     # Thrombocytopenia: Lowest platelets = 128 in last 2 days, will monitor for bleeding  # Acute Kidney Injury, unspecified: based on a >150% or 0.3 mg/dL increase in last creatinine compared to past 90 day average, will monitor renal function  # Hypertension: Noted on problem list       # DMII: A1C = 7.2 % (Ref range: <5.7 %) within past 6 months   # Severe Obesity: Estimated body mass index is 44.64 kg/m  as calculated from the following:    Height as of this encounter: 1.803 m (5' 11\").    Weight as of this encounter: 145.2 kg (320 lb 1.6 oz).            Clinically Significant Risk Factors              # Hypoalbuminemia: Lowest albumin = 3.2 g/dL at 4/20/2024  4:11 AM, will monitor as appropriate     # Thrombocytopenia: Lowest platelets = 128 in last 2 days, will monitor for bleeding  # Acute Kidney Injury, unspecified: based on a >150% or 0.3 mg/dL increase in last creatinine " "compared to past 90 day average, will monitor renal function  # Hypertension: Noted on problem list       # DMII: A1C = 7.2 % (Ref range: <5.7 %) within past 6 months   # Severe Obesity: Estimated body mass index is 44.64 kg/m  as calculated from the following:    Height as of this encounter: 1.803 m (5' 11\").    Weight as of this encounter: 145.2 kg (320 lb 1.6 oz).                     Current Medications:    Scheduled Meds:  Current Facility-Administered Medications   Medication Dose Route Frequency Provider Last Rate Last Admin    acetaminophen (TYLENOL) tablet 975 mg  975 mg Oral Q8H Ana Rosa Quiñones PA-C   975 mg at 04/22/24 0408    aspirin (ASA) chewable tablet 81 mg  81 mg Oral or NG Tube Daily Ana Rosa Quiñones PA-C   81 mg at 04/21/24 0806    clopidogrel (PLAVIX) tablet 75 mg  75 mg Oral Daily Ana Rosa Quiñones PA-C   75 mg at 04/21/24 0806    ezetimibe (ZETIA) tablet 10 mg  10 mg Oral or NG Tube At Bedtime Ana Rosa Quiñones PA-C   10 mg at 04/21/24 2053    [Held by provider] glipiZIDE (GLUCOTROL XL) 24 hr tablet 20 mg  20 mg Oral At Bedtime Ana Rosa Quiñones PA-C        heparin ANTICOAGULANT injection 5,000 Units  5,000 Units Subcutaneous Q8H Ana Rosa Quiñones PA-C   5,000 Units at 04/22/24 0638    insulin aspart (NovoLOG) injection (RAPID ACTING)   Subcutaneous TID AC Ana Rosa Quiñones PA-C   3 Units at 04/21/24 1921    insulin aspart (NovoLOG) injection (RAPID ACTING)  1-10 Units Subcutaneous TID AC Ana Rosa Quiñones PA-C   6 Units at 04/22/24 0719    insulin aspart (NovoLOG) injection (RAPID ACTING)  1-7 Units Subcutaneous At Bedtime Ana Rosa Quiñones PA-C   4 Units at 04/21/24 2159    insulin glargine (LANTUS PEN) injection 40 Units  40 Units Subcutaneous BID Rahat Faulkner MBBS   40 Units at 04/21/24 2056    Lidocaine (LIDOCARE) 4 % Patch 1-2 patch  1-2 patch Transdermal Q24H Ana Rosa Quiñones PA-C   1 patch at 04/21/24 1830    melatonin tablet 6 mg  6 mg " Oral At Bedtime Ana Rosa Quiñones PA-C   6 mg at 04/21/24 2151    menthol-zinc oxide (CALMOSEPTINE) 0.44-20.6 % ointment OINT   Topical TID Rahat Faulkner MBBS   Given at 04/21/24 2155    [Held by provider] metFORMIN (GLUCOPHAGE) tablet 500 mg  500 mg Oral Daily with supper Ana Rosa Quiñones PA-C        [COMPLETED] methadone (DOLOPHINE) injection 20 mg  20 mg Intravenous Once Vincent Aguirre MD        metoprolol tartrate (LOPRESSOR) half-tab 12.5 mg  12.5 mg Oral TID Ana Rosa Quiñones PA-C   12.5 mg at 04/22/24 0403    metoprolol tartrate (LOPRESSOR) half-tab 37.5 mg  37.5 mg Oral BID Ana Rosa Quiñones PA-C   37.5 mg at 04/21/24 2150    multivitamin, therapeutic (THERA-VIT) tablet 1 tablet  1 tablet Oral At Bedtime Ana Rosa Quiñones PA-C   1 tablet at 04/21/24 2053    pantoprazole (PROTONIX) 2 mg/mL suspension 40 mg  40 mg Oral or NG Tube Daily Ana Rosa Quiñones PA-C        Or    pantoprazole (PROTONIX) EC tablet 40 mg  40 mg Oral Daily Ana Rosa Quiñones PA-C   40 mg at 04/21/24 0807    polyethylene glycol (MIRALAX) Packet 17 g  17 g Oral Daily Ana Rosa Quiñones PA-C   17 g at 04/21/24 0807    potassium & sodium phosphates (NEUTRA-PHOS) Packet 1 packet  1 packet Oral or Feeding Tube Q4H Rahat Faulkner MBBS        potassium chloride shey ER (KLOR-CON M20) CR tablet 40 mEq  40 mEq Oral Once Rahat Faulkner MBBS        rosuvastatin (CRESTOR) tablet 20 mg  20 mg Oral or NG Tube At Bedtime Ana Rosa Quiñones PA-C   20 mg at 04/21/24 2053    senna-docusate (SENOKOT-S/PERICOLACE) 8.6-50 MG per tablet 1 tablet  1 tablet Oral BID Ana Rosa Quiñones PA-C   1 tablet at 04/21/24 2053    [Held by provider] sitagliptin (JANUVIA) tablet 100 mg  100 mg Oral At Bedtime Ana Rosa Quiñones PA-C        tamsulosin (FLOMAX) capsule 0.4 mg  0.4 mg Oral Daily Ana Rosa Quiñones PA-C   0.4 mg at 04/21/24 0808     Continuous Infusions:  Current Facility-Administered Medications    Medication Dose Route Frequency Provider Last Rate Last Admin     PRN Meds:.  Current Facility-Administered Medications   Medication Dose Route Frequency Provider Last Rate Last Admin    acetaminophen (TYLENOL) tablet 650 mg  650 mg Oral Q4H PRN Ana Rosa Quiñones PA-C        bisacodyl (DULCOLAX) suppository 10 mg  10 mg Rectal Daily PRN Ana Rosa Quiñones PA-C        calcium gluconate 1 g in 50 mL in sodium chloride intermittent infusion  1 g Intravenous Once PRN Ana Rosa Quiñones PA-C   1 g at 04/21/24 0627    calcium gluconate 2 g in  mL intermittent infusion  2 g Intravenous Once PRN Ana Rosa Quiñones PA-C        calcium gluconate 3 g in sodium chloride 0.9 % 100 mL intermittent infusion  3 g Intravenous Once PRN Ana Rosa Quiñones PA-C        glucose gel 15-30 g  15-30 g Oral Q15 Min PRN Ana Rosa Quiñones PA-C        Or    dextrose 50 % injection 25-50 mL  25-50 mL Intravenous Q15 Min PRN Ana Rosa Quiñones PA-C        Or    glucagon injection 1 mg  1 mg Subcutaneous Q15 Min PRN Ana Rosa Quiñones PA-C        hydrALAZINE (APRESOLINE) injection 10 mg  10 mg Intravenous Q30 Min PRN Ana Rosa Quiñones PA-C        HYDROmorphone (DILAUDID) injection 0.2 mg  0.2 mg Intravenous Q8H PRN Ana Rosa Quiñones PA-C        lactated ringers BOLUS 250 mL  250 mL Intravenous Q15 Min PRN Ana Rosa Quiñones PA-C   250 mL at 04/19/24 2117    magnesium hydroxide (MILK OF MAGNESIA) suspension 30 mL  30 mL Oral Daily PRN Ana Rosa Quiñones PA-C        naloxone (NARCAN) injection 0.2 mg  0.2 mg Intravenous Q2 Min PRN Ana Rosa Quiñones PA-C        Or    naloxone (NARCAN) injection 0.4 mg  0.4 mg Intravenous Q2 Min PRN Ana Rosa Quiñones PA-C        Or    naloxone (NARCAN) injection 0.2 mg  0.2 mg Intramuscular Q2 Min PRN Ana Rosa Quiñones PA-C        Or    naloxone (NARCAN) injection 0.4 mg  0.4 mg Intramuscular Q2 Min PRN Ana Rosa Quiñones PA-C        ondansetron (ZOFRAN  ODT) ODT tab 4 mg  4 mg Oral Q6H PRN Henshue, Ana Rosa K, PA-C        Or    ondansetron (ZOFRAN) injection 4 mg  4 mg Intravenous Q6H PRN Henshue, Ana Rosa K, PA-C        oxyCODONE (ROXICODONE) tablet 5 mg  5 mg Oral Q4H PRN Henshue, Ana Rosa K, PA-C   5 mg at 04/21/24 0626    Or    oxyCODONE (ROXICODONE) tablet 10 mg  10 mg Oral Q4H PRN Henshue, Ana Rosa K, PA-C   10 mg at 04/20/24 2304    prochlorperazine (COMPAZINE) injection 5 mg  5 mg Intravenous Q6H PRN Henshue, Ana Rosa K, PA-C        Or    prochlorperazine (COMPAZINE) tablet 5 mg  5 mg Oral Q6H PRN Henshue, Ana Rosa K, PA-C           Cardiographics:    Telemetry monitoring demonstrates NSR with rates in the 80s per my personal review.    Imaging:  Results for orders placed or performed during the hospital encounter of 04/17/24   US Carotid Bilateral    Impression    IMPRESSION:  1.  Mild plaque formation, velocities consistent with less than 50% stenosis in the right internal carotid artery.  2.  Mild plaque formation, velocities consistent with less than 50% stenosis in the left internal carotid artery.  3.  Flow within the vertebral arteries is antegrade.   XR Chest Port 1 View    Impression    IMPRESSION:     Endotracheal tube tip is 5.3 cm above the piedad. Right internal jugular approach central venous catheter tip is in the upper SVC. Mediastinal and left pleural drains. Multiple median sternotomy wires and mediastinal clips.    Lung volumes are low. Left greater than right bibasilar airspace opacities are favored to reflect atelectasis, although airspace disease is not excluded. Small left pleural effusion. No right pleural effusion. No pneumothorax.    Stable cardiomegaly.   XR Chest Port 1 View    Impression    IMPRESSION: Sternotomy, CABG, mediastinal drain and left chest tube. Right IJ catheter tip in the SVC. The patient has been extubated. Cardiac size approaches upper limits of normal, improved since previous. Strands of platelike  atelectasis in both   lungs, less evident on current study. Tiny left pleural effusion, interval improvement. No appreciable effusion on the right. Mild degenerative changes thoracic spine. Monitoring leads overlying the chest.         Labs, personally reviewed.  Hemoglobin   Date Value Ref Range Status   04/22/2024 10.1 (L) 13.3 - 17.7 g/dL Final   04/21/2024 10.8 (L) 13.3 - 17.7 g/dL Final   04/20/2024 12.6 (L) 13.3 - 17.7 g/dL Final     WBC Count   Date Value Ref Range Status   04/22/2024 10.6 4.0 - 11.0 10e3/uL Final   04/21/2024 12.8 (H) 4.0 - 11.0 10e3/uL Final   04/20/2024 17.1 (H) 4.0 - 11.0 10e3/uL Final     Platelet Count   Date Value Ref Range Status   04/22/2024 132 (L) 150 - 450 10e3/uL Final   04/21/2024 128 (L) 150 - 450 10e3/uL Final   04/20/2024 163 150 - 450 10e3/uL Final     Creatinine   Date Value Ref Range Status   04/22/2024 1.97 (H) 0.67 - 1.17 mg/dL Final   04/21/2024 1.89 (H) 0.67 - 1.17 mg/dL Final   04/20/2024 1.56 (H) 0.67 - 1.17 mg/dL Final     Potassium   Date Value Ref Range Status   04/22/2024 3.4 3.4 - 5.3 mmol/L Final   04/21/2024 4.0 3.4 - 5.3 mmol/L Final   04/21/2024 4.0 3.4 - 5.3 mmol/L Final   08/24/2022 4.8 3.5 - 5.0 mmol/L Final   03/30/2022 3.9 3.5 - 5.0 mmol/L Final   03/17/2022 4.9 3.5 - 5.0 mmol/L Final     Potassium POCT   Date Value Ref Range Status   04/19/2024 4.0 3.4 - 5.3 mmol/L Final   04/19/2024 4.6 3.4 - 5.3 mmol/L Final   04/19/2024 5.4 (H) 3.4 - 5.3 mmol/L Final     Magnesium   Date Value Ref Range Status   04/22/2024 2.1 1.7 - 2.3 mg/dL Final   04/21/2024 2.1 1.7 - 2.3 mg/dL Final   04/20/2024 2.4 (H) 1.7 - 2.3 mg/dL Final          I/O:  I/O last 3 completed shifts:  In: 1620 [P.O.:1520; I.V.:100]  Out: 3500 [Urine:3500]       Physical Exam:     General: Patient seen in bed this AM. NAD. Conversant. Pleasant.   HEENT: GERDA, no sclera icterus, moist mucosa  CV: RRR on monitor. 2+ peripheral pulses in all extremities. Mild edema.   Pulm: Non-labored effort on  NC. Incision C/D/I.  Abd: Soft, NT, ND  : Lazo with light yellow urine  Ext: Mild pedal edema, SCDs in place, warm, distal pulses intact  Neuro: CNs grossly intact.       ASSESSMENT/PLAN:    Nirav Baker is a 79 year old male with a history of HTN, HLD, DM, obesity, CKD, and prostatectomy w/ bladder strictures who is s/p CABG x2 & RLE EVH in s/o NSTEMI.    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)    Dyslipidemia    Benign essential HTN    Acute chest pain    NSTEMI (non-ST elevated myocardial infarction) (H)    Coronary artery disease involving native coronary artery of native heart, unspecified whether angina present      NEURO:   - Scheduled Tylenol/lidocaine patches and PRN Tylenol/oxycodone for pain  - PRN melatonin    CV:   - Pre-op EF 65%  - Normotensive  - Metoprolol 75mg BID   - ASA 81mg (plavix)  - Rosuvastatin 20mg daily and zetia 10 mg daily  - Chest tubes/TPW removed POD#2  - Plavix per surgeon preference for graft patency in s/o NSTEMI (ASA)    PULM:   - Extubated POD#0  - Maintaining oxygen saturations on room air this AM  - Encourage pulmonary toilet    FEN/GI:  - Continue electrolyte replacement protocol  - Diet: Cardiac, ADAT   - Bowel regimen, +BM    RENAL:  - Adequate UOP/hr. Continue to monitor closely.  - Cr 1.97 this AM, baseline to 1.3  - 0.4 mg flomax qday  - Urology consult for bladder stricture, appreciate recs, signed off.  - Keep lazo in place x4 days and then remove for TOV if urine is clear. Expect hematuria after dilation. Avoid CBI if possible and start w/ routine catheter flushing if needed.   - Follow-up outpatient w/ urologist Dr. Figueroa (not yet scheduled)  - Holding diuresis today given Cr increase    HEME:  - Acute blood loss anemia post-op.   - Hgb stable, no bleeding  concerns. Hep SQ, ASA    ID:  - Sujata op ppx complete, afebrile. No concerns for infection  - Leukocytosis, resolved.    ENDO:   - HbA1c 7.2%  - BG goal < 180 to promote optimal healing  - Hospitalists managing DM2, appreciate assistance.  - 40u lantus BID  - Carb count 1:20u and high dose SSI  - Holding PTA glipizide, metformin, and sitagliptin, plan to resume closer to discharge pending renal function    PPx:   - DVT: SCDs, SQ heparin TID, ambulation   - GI: Protonix 40mg PO daily    DISPO:   - General tele status (Transferred POD#1)  - PT/OT recommending home w/ outpt cardiac rehab at discharge  - Medically Ready for Discharge: Anticipated in 2-4 Days      Patient discussed with Dr. Edwards.      Bella Quiñones PA-C  [facility] Cardiothoracic Surgery  Pager: [phone]  April 22, 2024

## 2024-04-23 ENCOUNTER — APPOINTMENT (OUTPATIENT)
Dept: OCCUPATIONAL THERAPY | Facility: HOSPITAL | Age: 79
DRG: 234 | End: 2024-04-23
Attending: INTERNAL MEDICINE
Payer: COMMERCIAL

## 2024-04-23 ENCOUNTER — APPOINTMENT (OUTPATIENT)
Dept: PHYSICAL THERAPY | Facility: HOSPITAL | Age: 79
DRG: 234 | End: 2024-04-23
Payer: COMMERCIAL

## 2024-04-23 LAB
ANION GAP SERPL CALCULATED.3IONS-SCNC: 9 MMOL/L (ref 7–15)
BUN SERPL-MCNC: 50.2 MG/DL (ref 8–23)
CA-I BLD-MCNC: 4.4 MG/DL (ref 4.4–5.2)
CA-I BLD-MCNC: 4.5 MG/DL (ref 4.4–5.2)
CALCIUM SERPL-MCNC: 8.3 MG/DL (ref 8.8–10.2)
CHLORIDE SERPL-SCNC: 97 MMOL/L (ref 98–107)
CREAT SERPL-MCNC: 1.86 MG/DL (ref 0.67–1.17)
DEPRECATED HCO3 PLAS-SCNC: 26 MMOL/L (ref 22–29)
EGFRCR SERPLBLD CKD-EPI 2021: 37 ML/MIN/1.73M2
GLUCOSE BLDC GLUCOMTR-MCNC: 190 MG/DL (ref 70–99)
GLUCOSE BLDC GLUCOMTR-MCNC: 208 MG/DL (ref 70–99)
GLUCOSE BLDC GLUCOMTR-MCNC: 223 MG/DL (ref 70–99)
GLUCOSE BLDC GLUCOMTR-MCNC: 244 MG/DL (ref 70–99)
GLUCOSE BLDC GLUCOMTR-MCNC: 256 MG/DL (ref 70–99)
GLUCOSE SERPL-MCNC: 234 MG/DL (ref 70–99)
MAGNESIUM SERPL-MCNC: 2.2 MG/DL (ref 1.7–2.3)
PHOSPHATE SERPL-MCNC: 3 MG/DL (ref 2.5–4.5)
PLATELET # BLD AUTO: 156 10E3/UL (ref 150–450)
POTASSIUM SERPL-SCNC: 3.6 MMOL/L (ref 3.4–5.3)
SODIUM SERPL-SCNC: 132 MMOL/L (ref 135–145)

## 2024-04-23 PROCEDURE — 99233 SBSQ HOSP IP/OBS HIGH 50: CPT | Performed by: INTERNAL MEDICINE

## 2024-04-23 PROCEDURE — 36415 COLL VENOUS BLD VENIPUNCTURE: CPT | Performed by: INTERNAL MEDICINE

## 2024-04-23 PROCEDURE — 84100 ASSAY OF PHOSPHORUS: CPT | Performed by: INTERNAL MEDICINE

## 2024-04-23 PROCEDURE — 250N000013 HC RX MED GY IP 250 OP 250 PS 637: Performed by: INTERNAL MEDICINE

## 2024-04-23 PROCEDURE — 97116 GAIT TRAINING THERAPY: CPT | Mod: GP

## 2024-04-23 PROCEDURE — 82330 ASSAY OF CALCIUM: CPT

## 2024-04-23 PROCEDURE — 250N000013 HC RX MED GY IP 250 OP 250 PS 637

## 2024-04-23 PROCEDURE — 250N000011 HC RX IP 250 OP 636

## 2024-04-23 PROCEDURE — 80048 BASIC METABOLIC PNL TOTAL CA: CPT

## 2024-04-23 PROCEDURE — 120N000013 HC R&B IMCU

## 2024-04-23 PROCEDURE — 97110 THERAPEUTIC EXERCISES: CPT | Mod: GO

## 2024-04-23 PROCEDURE — 97162 PT EVAL MOD COMPLEX 30 MIN: CPT | Mod: GP

## 2024-04-23 PROCEDURE — 36415 COLL VENOUS BLD VENIPUNCTURE: CPT

## 2024-04-23 PROCEDURE — 85049 AUTOMATED PLATELET COUNT: CPT

## 2024-04-23 PROCEDURE — 82330 ASSAY OF CALCIUM: CPT | Performed by: INTERNAL MEDICINE

## 2024-04-23 PROCEDURE — 83735 ASSAY OF MAGNESIUM: CPT | Performed by: INTERNAL MEDICINE

## 2024-04-23 PROCEDURE — 999N000157 HC STATISTIC RCP TIME EA 10 MIN

## 2024-04-23 RX ORDER — POTASSIUM CHLORIDE 1.5 G/1.58G
20 POWDER, FOR SOLUTION ORAL ONCE
Qty: 1 PACKET | Refills: 0 | Status: COMPLETED | OUTPATIENT
Start: 2024-04-23 | End: 2024-04-23

## 2024-04-23 RX ORDER — ROSUVASTATIN CALCIUM 10 MG/1
20 TABLET, COATED ORAL AT BEDTIME
Status: DISCONTINUED | OUTPATIENT
Start: 2024-04-23 | End: 2024-04-27 | Stop reason: HOSPADM

## 2024-04-23 RX ORDER — EZETIMIBE 10 MG/1
10 TABLET ORAL AT BEDTIME
Status: DISCONTINUED | OUTPATIENT
Start: 2024-04-23 | End: 2024-04-27 | Stop reason: HOSPADM

## 2024-04-23 RX ORDER — ASPIRIN 81 MG/1
81 TABLET, CHEWABLE ORAL DAILY
Status: DISCONTINUED | OUTPATIENT
Start: 2024-04-24 | End: 2024-04-27 | Stop reason: HOSPADM

## 2024-04-23 RX ADMIN — TAMSULOSIN HYDROCHLORIDE 0.4 MG: 0.4 CAPSULE ORAL at 08:41

## 2024-04-23 RX ADMIN — HEPARIN SODIUM 5000 UNITS: 10000 INJECTION, SOLUTION INTRAVENOUS; SUBCUTANEOUS at 13:13

## 2024-04-23 RX ADMIN — SENNOSIDES AND DOCUSATE SODIUM 1 TABLET: 8.6; 5 TABLET ORAL at 08:40

## 2024-04-23 RX ADMIN — CALCIUM GLUCONATE 1 G: 20 INJECTION, SOLUTION INTRAVENOUS at 05:49

## 2024-04-23 RX ADMIN — INSULIN ASPART 3 UNITS: 100 INJECTION, SOLUTION INTRAVENOUS; SUBCUTANEOUS at 08:42

## 2024-04-23 RX ADMIN — HEPARIN SODIUM 5000 UNITS: 10000 INJECTION, SOLUTION INTRAVENOUS; SUBCUTANEOUS at 21:10

## 2024-04-23 RX ADMIN — POLYETHYLENE GLYCOL 3350 17 G: 17 POWDER, FOR SOLUTION ORAL at 08:39

## 2024-04-23 RX ADMIN — ASPIRIN 81 MG CHEWABLE TABLET 81 MG: 81 TABLET CHEWABLE at 08:40

## 2024-04-23 RX ADMIN — ANORECTAL OINTMENT: 15.7; .44; 24; 20.6 OINTMENT TOPICAL at 08:41

## 2024-04-23 RX ADMIN — INSULIN GLARGINE 60 UNITS: 100 INJECTION, SOLUTION SUBCUTANEOUS at 21:11

## 2024-04-23 RX ADMIN — INSULIN GLARGINE 60 UNITS: 100 INJECTION, SOLUTION SUBCUTANEOUS at 08:41

## 2024-04-23 RX ADMIN — ANORECTAL OINTMENT: 15.7; .44; 24; 20.6 OINTMENT TOPICAL at 13:13

## 2024-04-23 RX ADMIN — METOPROLOL TARTRATE 75 MG: 25 TABLET, FILM COATED ORAL at 08:41

## 2024-04-23 RX ADMIN — CLOPIDOGREL BISULFATE 75 MG: 75 TABLET ORAL at 08:40

## 2024-04-23 RX ADMIN — PANTOPRAZOLE SODIUM 40 MG: 40 TABLET, DELAYED RELEASE ORAL at 08:40

## 2024-04-23 RX ADMIN — HEPARIN SODIUM 5000 UNITS: 10000 INJECTION, SOLUTION INTRAVENOUS; SUBCUTANEOUS at 06:05

## 2024-04-23 RX ADMIN — LIDOCAINE 1 PATCH: 4 PATCH TOPICAL at 17:38

## 2024-04-23 RX ADMIN — THERA TABS 1 TABLET: TAB at 21:10

## 2024-04-23 RX ADMIN — Medication 6 MG: at 21:10

## 2024-04-23 RX ADMIN — SENNOSIDES AND DOCUSATE SODIUM 1 TABLET: 8.6; 5 TABLET ORAL at 21:09

## 2024-04-23 RX ADMIN — ANORECTAL OINTMENT: 15.7; .44; 24; 20.6 OINTMENT TOPICAL at 21:11

## 2024-04-23 RX ADMIN — METOPROLOL TARTRATE 75 MG: 25 TABLET, FILM COATED ORAL at 21:10

## 2024-04-23 RX ADMIN — POTASSIUM CHLORIDE 20 MEQ: 1.5 POWDER, FOR SOLUTION ORAL at 06:05

## 2024-04-23 RX ADMIN — INSULIN ASPART 3 UNITS: 100 INJECTION, SOLUTION INTRAVENOUS; SUBCUTANEOUS at 16:50

## 2024-04-23 RX ADMIN — EZETIMIBE 10 MG: 10 TABLET ORAL at 21:10

## 2024-04-23 RX ADMIN — ROSUVASTATIN CALCIUM 20 MG: 10 TABLET, FILM COATED ORAL at 21:10

## 2024-04-23 RX ADMIN — INSULIN ASPART 5 UNITS: 100 INJECTION, SOLUTION INTRAVENOUS; SUBCUTANEOUS at 13:14

## 2024-04-23 ASSESSMENT — ACTIVITIES OF DAILY LIVING (ADL)
ADLS_ACUITY_SCORE: 36

## 2024-04-23 NOTE — PLAN OF CARE
"Goal Outcome Evaluation:       [facility] - ICU    RN Progress Note:            Pertinent Assessments:      Please refer to flowsheet rows for full assessment     Patient AxOx4, GCS 15, able to move all extremities. Pt up once for this RN to restroom for BM. Pt has laoz in place still per order (to be removed today). Pt has bleeding still from meatus around lazo. Pt denies pain other than low back pain from \"being here\". Pt wound vac with no output. Some redness in groin/sacrum, barrier cream applied. Pt VSS, BG in 200s requiring correction dosing when due.            Key Events - This Shift:       Up to bathroom Ax2 for BM.              Barriers to Discharge / Downgrade:     Mobility status, pending voiding trial after lazo removed. Removal of wound vac.                           "

## 2024-04-23 NOTE — PROGRESS NOTES
"CVTS Daily Progress Note   POD#4 s/p CABG x2 (LIMA to LAD, rSVG to ramus), RLE EVH, Campos catheter placement per urology  Attending:  Grace  LOS: 6    SUBJECTIVE/INTERVAL EVENTS:    No acute events overnight. Patient progressing well. Maintaining oxygen saturations on room air Normotensive. Ambulating with therapy. Pain well controlled. +BM. Tolerating diet. UOP adequate. Hgb stable. Patient denies new chest pain, shortness of breath, abdominal pain, calf pain, nausea. Patient has no questions today.     OBJECTIVE:  Temp:  [97.9  F (36.6  C)-98.2  F (36.8  C)] 98.2  F (36.8  C)  Pulse:  [68-93] 79  Resp:  [18-20] 18  BP: (103-132)/(51-75) 112/56  SpO2:  [92 %-98 %] 95 %  Vitals:    04/19/24 0320 04/20/24 0621 04/21/24 0634 04/22/24 0700   Weight: 143.4 kg (316 lb 3.2 oz) 146.1 kg (322 lb) 147.1 kg (324 lb 6.4 oz) 145.2 kg (320 lb 1.6 oz)    04/23/24 0530   Weight: 144.4 kg (318 lb 4.8 oz)       Clinically Significant Risk Factors              # Hypoalbuminemia: Lowest albumin = 3.2 g/dL at 4/20/2024  4:11 AM, will monitor as appropriate       # Hypertension: Noted on problem list       # DMII: A1C = 7.2 % (Ref range: <5.7 %) within past 6 months   # Severe Obesity: Estimated body mass index is 44.39 kg/m  as calculated from the following:    Height as of this encounter: 1.803 m (5' 11\").    Weight as of this encounter: 144.4 kg (318 lb 4.8 oz).            Clinically Significant Risk Factors              # Hypoalbuminemia: Lowest albumin = 3.2 g/dL at 4/20/2024  4:11 AM, will monitor as appropriate       # Hypertension: Noted on problem list       # DMII: A1C = 7.2 % (Ref range: <5.7 %) within past 6 months   # Severe Obesity: Estimated body mass index is 44.39 kg/m  as calculated from the following:    Height as of this encounter: 1.803 m (5' 11\").    Weight as of this encounter: 144.4 kg (318 lb 4.8 oz).               Current Medications:    Scheduled Meds:  Current Facility-Administered Medications   Medication " Dose Route Frequency Provider Last Rate Last Admin    aspirin (ASA) chewable tablet 81 mg  81 mg Oral or NG Tube Daily Ana Rosa Quiñones PA-C   81 mg at 04/22/24 0936    clopidogrel (PLAVIX) tablet 75 mg  75 mg Oral Daily Ana Rosa Quiñones PA-C   75 mg at 04/22/24 0933    ezetimibe (ZETIA) tablet 10 mg  10 mg Oral or NG Tube At Bedtime Ana Rosa Quiñones PA-C   10 mg at 04/22/24 2051    [Held by provider] glipiZIDE (GLUCOTROL XL) 24 hr tablet 20 mg  20 mg Oral At Bedtime Ana Rosa Quiñones PA-C        heparin ANTICOAGULANT injection 5,000 Units  5,000 Units Subcutaneous Q8H Ana Rosa Quiñones PA-C   5,000 Units at 04/23/24 0605    insulin aspart (NovoLOG) injection (RAPID ACTING)   Subcutaneous TID AC Rahat Faulkner MBBS   1 Units at 04/22/24 1654    insulin aspart (NovoLOG) injection (RAPID ACTING)  1-10 Units Subcutaneous TID AC Ana Rosa Quiñones PA-C   6 Units at 04/22/24 1653    insulin aspart (NovoLOG) injection (RAPID ACTING)  1-7 Units Subcutaneous At Bedtime Ana Rosa Quiñones PA-C   3 Units at 04/22/24 2046    insulin glargine (LANTUS PEN) injection 60 Units  60 Units Subcutaneous BID Rahat Faulkner MBBS        Lidocaine (LIDOCARE) 4 % Patch 1-2 patch  1-2 patch Transdermal Q24H Ana Rosa Quiñones PA-C   1 patch at 04/21/24 1830    melatonin tablet 6 mg  6 mg Oral At Bedtime Ana Rosa Quiñones PA-C   6 mg at 04/22/24 2053    menthol-zinc oxide (CALMOSEPTINE) 0.44-20.6 % ointment OINT   Topical TID Rahat Faulkner MBBS   Given at 04/22/24 2053    [Held by provider] metFORMIN (GLUCOPHAGE) tablet 500 mg  500 mg Oral Daily with supper Ana Rosa uQiñones PA-C        [COMPLETED] methadone (DOLOPHINE) injection 20 mg  20 mg Intravenous Once Vincent Aguirre MD        metoprolol tartrate (LOPRESSOR) tablet 75 mg  75 mg Oral BID Ana Rosa Quiñones PA-C   75 mg at 04/22/24 2053    multivitamin, therapeutic (THERA-VIT) tablet 1 tablet  1 tablet Oral At Bedtime  Ana Rosa Quiñones PA-C   1 tablet at 04/22/24 2052    pantoprazole (PROTONIX) 2 mg/mL suspension 40 mg  40 mg Oral or NG Tube Daily Ana Rosa Quiñones PA-C        Or    pantoprazole (PROTONIX) EC tablet 40 mg  40 mg Oral Daily Ana Rosa Quiñones PA-C   40 mg at 04/22/24 0935    polyethylene glycol (MIRALAX) Packet 17 g  17 g Oral Daily Ana Rosa Quiñones PA-C   17 g at 04/22/24 0932    rosuvastatin (CRESTOR) tablet 20 mg  20 mg Oral or NG Tube At Bedtime Ana Rosa Quiñones PA-C   20 mg at 04/22/24 2052    senna-docusate (SENOKOT-S/PERICOLACE) 8.6-50 MG per tablet 1 tablet  1 tablet Oral BID Ana Rosa Quiñones PA-C   1 tablet at 04/22/24 2052    [Held by provider] sitagliptin (JANUVIA) tablet 100 mg  100 mg Oral At Bedtime Ana Rosa Quiñones PA-C        tamsulosin (FLOMAX) capsule 0.4 mg  0.4 mg Oral Daily Ana Rosa Quiñones PA-C   0.4 mg at 04/22/24 0936     Continuous Infusions:  Current Facility-Administered Medications   Medication Dose Route Frequency Provider Last Rate Last Admin     PRN Meds:.  Current Facility-Administered Medications   Medication Dose Route Frequency Provider Last Rate Last Admin    acetaminophen (TYLENOL) tablet 650 mg  650 mg Oral Q4H PRN Ana Rosa Quiñones PA-C        bisacodyl (DULCOLAX) suppository 10 mg  10 mg Rectal Daily PRN Ana Rosa Quiñones PA-C        calcium gluconate 1 g in 50 mL in sodium chloride intermittent infusion  1 g Intravenous Once PRN Ana Rosa Quiñones PA-C   1 g at 04/23/24 0549    calcium gluconate 2 g in  mL intermittent infusion  2 g Intravenous Once PRN Ana Rosa Quiñones PA-C        calcium gluconate 3 g in sodium chloride 0.9 % 100 mL intermittent infusion  3 g Intravenous Once PRN Ana Rosa Quiñones PA-C        glucose gel 15-30 g  15-30 g Oral Q15 Min PRN Ana Rosa Quiñones PA-C        Or    dextrose 50 % injection 25-50 mL  25-50 mL Intravenous Q15 Min PRN Ana Rosa Quiñones PA-C        Or    glucagon  injection 1 mg  1 mg Subcutaneous Q15 Min PRN Ana Rosa Quiñones PA-C        hydrALAZINE (APRESOLINE) injection 10 mg  10 mg Intravenous Q30 Min PRN Ana Rosa Quiñones PA-C        lactated ringers BOLUS 250 mL  250 mL Intravenous Q15 Min PRN Ana Rosa Quiñones PA-C   250 mL at 04/19/24 2117    magnesium hydroxide (MILK OF MAGNESIA) suspension 30 mL  30 mL Oral Daily PRN Ana Rosa Quiñones PA-C        naloxone (NARCAN) injection 0.2 mg  0.2 mg Intravenous Q2 Min PRN Ana Rosa Quiñones PA-C        Or    naloxone (NARCAN) injection 0.4 mg  0.4 mg Intravenous Q2 Min PRN Ana Rosa Quiñones PA-C        Or    naloxone (NARCAN) injection 0.2 mg  0.2 mg Intramuscular Q2 Min PRN Ana Rosa Quiñones PA-C        Or    naloxone (NARCAN) injection 0.4 mg  0.4 mg Intramuscular Q2 Min PRN Ana Rosa Quiñones PA-C        ondansetron (ZOFRAN ODT) ODT tab 4 mg  4 mg Oral Q6H PRN Ana Rosa Quiñones PA-C        Or    ondansetron (ZOFRAN) injection 4 mg  4 mg Intravenous Q6H PRN Ana Rosa Quiñones PA-C        oxyCODONE (ROXICODONE) tablet 5 mg  5 mg Oral Q4H PRN Ana Rosa Quiñones PA-C   5 mg at 04/21/24 0626    prochlorperazine (COMPAZINE) injection 5 mg  5 mg Intravenous Q6H PRN Ana Rosa Quiñones PA-C        Or    prochlorperazine (COMPAZINE) tablet 5 mg  5 mg Oral Q6H PRN Ana Rosa Quiñones PA-C           Cardiographics:    Telemetry monitoring demonstrates NSR with rates in the 70s per my personal review.    Imaging:  Results for orders placed or performed during the hospital encounter of 04/17/24   US Carotid Bilateral    Impression    IMPRESSION:  1.  Mild plaque formation, velocities consistent with less than 50% stenosis in the right internal carotid artery.  2.  Mild plaque formation, velocities consistent with less than 50% stenosis in the left internal carotid artery.  3.  Flow within the vertebral arteries is antegrade.   XR Chest Port 1 View    Impression    IMPRESSION:     Endotracheal  tube tip is 5.3 cm above the piedad. Right internal jugular approach central venous catheter tip is in the upper SVC. Mediastinal and left pleural drains. Multiple median sternotomy wires and mediastinal clips.    Lung volumes are low. Left greater than right bibasilar airspace opacities are favored to reflect atelectasis, although airspace disease is not excluded. Small left pleural effusion. No right pleural effusion. No pneumothorax.    Stable cardiomegaly.   XR Chest Port 1 View    Impression    IMPRESSION: Sternotomy, CABG, mediastinal drain and left chest tube. Right IJ catheter tip in the SVC. The patient has been extubated. Cardiac size approaches upper limits of normal, improved since previous. Strands of platelike atelectasis in both   lungs, less evident on current study. Tiny left pleural effusion, interval improvement. No appreciable effusion on the right. Mild degenerative changes thoracic spine. Monitoring leads overlying the chest.         Labs, personally reviewed.  Hemoglobin   Date Value Ref Range Status   04/22/2024 10.1 (L) 13.3 - 17.7 g/dL Final   04/21/2024 10.8 (L) 13.3 - 17.7 g/dL Final   04/20/2024 12.6 (L) 13.3 - 17.7 g/dL Final     WBC Count   Date Value Ref Range Status   04/22/2024 10.6 4.0 - 11.0 10e3/uL Final   04/21/2024 12.8 (H) 4.0 - 11.0 10e3/uL Final   04/20/2024 17.1 (H) 4.0 - 11.0 10e3/uL Final     Platelet Count   Date Value Ref Range Status   04/23/2024 156 150 - 450 10e3/uL Final   04/22/2024 132 (L) 150 - 450 10e3/uL Final   04/21/2024 128 (L) 150 - 450 10e3/uL Final     Creatinine   Date Value Ref Range Status   04/23/2024 1.86 (H) 0.67 - 1.17 mg/dL Final   04/22/2024 1.97 (H) 0.67 - 1.17 mg/dL Final   04/21/2024 1.89 (H) 0.67 - 1.17 mg/dL Final     Potassium   Date Value Ref Range Status   04/23/2024 3.6 3.4 - 5.3 mmol/L Final   04/22/2024 3.5 3.4 - 5.3 mmol/L Final   04/22/2024 3.4 3.4 - 5.3 mmol/L Final   08/24/2022 4.8 3.5 - 5.0 mmol/L Final   03/30/2022 3.9 3.5 - 5.0  mmol/L Final   03/17/2022 4.9 3.5 - 5.0 mmol/L Final     Potassium POCT   Date Value Ref Range Status   04/19/2024 4.0 3.4 - 5.3 mmol/L Final   04/19/2024 4.6 3.4 - 5.3 mmol/L Final   04/19/2024 4.9 3.4 - 5.3 mmol/L Final     Magnesium   Date Value Ref Range Status   04/23/2024 2.2 1.7 - 2.3 mg/dL Final   04/22/2024 2.1 1.7 - 2.3 mg/dL Final   04/21/2024 2.1 1.7 - 2.3 mg/dL Final          I/O:  I/O last 3 completed shifts:  In: 530 [P.O.:510; I.V.:20]  Out: 1975 [Urine:1975]       Physical Exam:     General: Patient seen in bed this AM. NAD. Conversant. Pleasant.   HEENT: GERDA, no sclera icterus, moist mucosa  CV: RRR on monitor. 2+ peripheral pulses in all extremities. Mild edema.   Pulm: Non-labored effort on RA. Incision C/D/I.  Abd: Soft, NT, ND  : Campos with light yellow urine  Ext: Mild pedal edema, SCDs in place, warm, distal pulses intact  Neuro: CNs grossly intact.       ASSESSMENT/PLAN:    Nirav Baker is a 79 year old male with a history of HTN, HLD, DM, obesity, CKD, and prostatectomy w/ bladder strictures who is s/p CABG x2 & RLE EVH in s/o NSTEMI.    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)    Dyslipidemia    Benign essential HTN    Acute chest pain    NSTEMI (non-ST elevated myocardial infarction) (H)    Coronary artery disease involving native coronary artery of native heart, unspecified whether angina present      NEURO:   - Scheduled Tylenol/lidocaine patches and PRN Tylenol/oxycodone for pain  - PRN melatonin    CV:   - Pre-op EF 65%  - Chest tubes/TPW removed POD#2  - Normotensive  - Metoprolol 75mg BID   - Rosuvastatin 20mg daily and zetia 10 mg daily  - ASA 81mg (plavix)  - Plavix per surgeon preference for graft patency in s/o NSTEMI (ASA)    PULM:   - Extubated POD#0  - Maintaining oxygen  saturations on room air this AM  - Encourage pulmonary toilet    FEN/GI:  - Continue electrolyte replacement protocol  - Diet: Cardiac, ADAT   - Bowel regimen, +BM    RENAL:  - Adequate UOP/hr. Continue to monitor closely.  - Cr 1.86 (1.97) this AM, baseline to 1.3  - 0.4 mg flomax qday  - Plan to remove lazo today for TOV as per urology recs below  - Urology consult for bladder stricture, appreciate recs, signed off.  - Keep lazo in place x4 days and then remove for TOV if urine is clear. Expect hematuria after dilation. Avoid CBI if possible and start w/ routine catheter flushing if needed.   - Follow-up outpatient w/ urologist Dr. Figueroa (not yet scheduled)  - Continuing to hold diuresis as weight is downtrending w/ good UOP + Cr improving off diuresis    HEME:  - Acute blood loss anemia post-op.   - Hgb stable, no bleeding concerns. Hep SQ, ASA    ID:  - Sujata op ppx complete, afebrile. No concerns for infection  - Leukocytosis, resolved.    ENDO:   - HbA1c 7.2%  - BG goal < 180 to promote optimal healing  - Hospitalists managing DM2, appreciate assistance.  - PTA regimen of NovoLog 20 units with breakfast and 30 units with supper, Lantus 60 units in am and 80 units at HS.  - Insulin gtt titrated off 4/20. Started lantus as 40 units BID, increased to 60 units BID today. Increase mealtine novolog from 1:20 to 1:7 ration  - Continue SSI  - Holding PTA glipizide, metformin, and sitagliptin, plan to resume closer to discharge pending renal function    PPx:   - DVT: SCDs, SQ heparin TID, ambulation   - GI: Protonix 40mg PO daily    DISPO:   - General tele status (Transferred POD#1)  - PT/OT recommending home w/ outpt cardiac rehab at discharge, may need TCU given need for assist of 2 w/ transfers, PT consult  Medically Ready for Discharge: Anticipated Tomorrow pending blood glucose control, renal function, and TOV      Patient discussed with Dr. Edwards.      Bella Quiñones PA-C  [facility] Cardiothoracic Surgery  Pager:  [phone]  April 23, 2024

## 2024-04-23 NOTE — PROGRESS NOTES
[facility]    Medicine Progress Note - Hospitalist Service    Date of Admission:  4/17/2024    Assessment & Plan   Nirav Baker is a 79 year old old male with HTN, HL, DM, obesity with BMI 45, ELDON, CKD who presented to [facility] with acute chest pain, diagnosed with NSTEMI, transferred to our hospital for coronary angiogram.     #CAD  #NSTEMI  -Coronary angiogram 4/17/2024 showed severe multivessel CAD  -CTS consulted. S/p CABG x 2 4/19. Extubated around 10PM 4/19. Off of phenylephrine gtt since 4/20  -Continue ASA, Heparin gtt discontinued. Plavix added 4/20. Cont BB, statin and ARB.      #DM 2, with high insulin resistance.  A1c 7.2.  -PTA regimen of NovoLog 20 units with breakfast and 30 units with supper, Lantus 60 units in am and 80 units at HS.  -Insulin gtt titrated off 4/20. Started lantus as 40 units BID. Titrate the dose up as needed. Cont mealtime novolog at 1:7  -Cont the sliding scale  -Diabetic diet     #Urinary retention  #Traumatic hematuria  -Patient with h/o BPH, s/p prostatectomy and bladder neck contraction. Difficult catheterization in OR. Urology assisted with lazo insertion. Hematuria has resolved now. Keep lazo in place for 4 days per urology.     #GERD-on PPI.  #ELDON-on CPAP.          Diet: Combination Diet Moderate Consistent Carb (60 g CHO per Meal) Diet; Low Saturated Fat Diet, Low Saturated Fat Na <2400mg Diet    DVT Prophylaxis: Heparin infusion  Lazo Catheter: Not present  Lines: None     Cardiac Monitoring: ACTIVE order. Indication: Open heart surgery (72 hours)  Code Status: Full Code      Clinically Significant Risk Factors              # Hypoalbuminemia: Lowest albumin = 3.2 g/dL at 4/20/2024  4:11 AM, will monitor as appropriate     # Hypertension: Noted on problem list       # DMII: A1C = 7.2 % (Ref range: <5.7 %) within past 6 months   # Severe Obesity: Estimated body mass index is 44.39 kg/m  as calculated from the following:    Height  "as of this encounter: 1.803 m (5' 11\").    Weight as of this encounter: 144.4 kg (318 lb 4.8 oz).             Disposition Plan     Medically Ready for Discharge: Anticipated in 2-4 Days         OTONIEL Nichole  Hospitalist Service  [facility]  Securely message with [email or web address] (more info)  Text page via [employer] Paging/Directory   ______________________________________________________________________    Interval History   Patient seen and examined. No new issues overnight. Reports doing fairly well. Walked in the hallway with PT.    Physical Exam   Vital Signs: Temp: 97.8  F (36.6  C) Temp src: Oral BP: 106/53 Pulse: 76   Resp: 18 SpO2: 96 % O2 Device: None (Room air)    Weight: 318 lbs 4.8 oz      General: Not in obvious distress.  HEENT: NC, AT   Chest: Clear to auscultation bilaterally.  Heart: S1S2 normal, regular. No M/R/G  Abdomen: Soft. NT, ND. Bowel sounds- active.  Extremities: No legs swelling  Neuro: Alert and awake, grossly non-focal      Medical Decision Making             Data     I have personally reviewed the following data over the past 24 hrs:    N/A  \   N/A   / 156     132 (L) 97 (L) 50.2 (H) /  256 (H)   3.6 26 1.86 (H) \       Imaging results reviewed over the past 24 hrs:   No results found for this or any previous visit (from the past 24 hour(s)).    "

## 2024-04-23 NOTE — CONSULTS
SPIRITUAL HEALTH SERVICES Progress Note  [facility], ICU    Saw pt Nirav Baker per consult order/length of stay. Introduced self and role of spiritual care. Rishi was being visited by a member from his [employer] in [place]. I left and allowed their visit to continue. Rishi does welcome follow up visits.     Plan of Care - No further plans to visit at this time, but  staff available if further needs arise.     Dewayne Figueroa MDiv, [facility]  /Manager Spiritual Health Services  [phone]       Spiritual Health Services is available 24/7 for emergent requests and consults, either by paging the on-call  or by entering an ASAP/STAT consult in [employer], which will also page the on-call .

## 2024-04-23 NOTE — PROGRESS NOTES
04/23/24 1336   Appointment Info   Signing Clinician's Name / Credentials (PT) Valencia Briceno,PT   Living Environment   People in Home spouse   Current Living Arrangements house  (can stay on one level)   Home Accessibility stairs to enter home   Number of Stairs, Main Entrance 3   Stair Railings, Main Entrance none   Living Environment Comments wife had foot surgery 2 weeks ago very limited time on feet   Self-Care   Equipment Currently Used at Home none  (wife has knee scooter, walker and cane)   Fall history within last six months no   Activity/Exercise/Self-Care Comment I ADls, IADLS, drives   General Information   Onset of Illness/Injury or Date of Surgery 04/17/24   Referring Physician Dr. Rahat Faulkner   Patient/Family Therapy Goals Statement (PT) none stated   Pertinent History of Current Problem (include personal factors and/or comorbidities that impact the POC) Nirav Baker is a 79 year old old male with HTN, HL, DM, obesity with BMI 45, ELDON, CKD who presented to [facility] with acute chest pain, diagnosed with NSTEMI, transferred to our hospital for coronary angiogram.     s/p CABG x2 4/19/24   Existing Precautions/Restrictions cardiac;sternal   General Observations pt in bed, awaoke easily agreeable toPT   Cognition   Affect/Mental Status (Cognition) WFL   Pain Assessment   Patient Currently in Pain Yes, see Vital Sign flowsheet   Range of Motion (ROM)   ROM Comment BLE WFL, limited due to swelling,pain   Strength (Manual Muscle Testing)   Strength Comments BLE WFL   Bed Mobility   Bed Mobility Limitations decreased ability to use arms for pushing/pulling   Impairments Contributing to Impaired Bed Mobility pain;decreased strength;decreased ROM   Assistive Device (Bed Mobility) draw sheet;other (see comments)  (HOB elevated)   Comment, (Bed Mobility) mod Ax2 due sternal precautions and strength   Transfers   Transfer Safety Concerns Noted decreased weight-shifting ability    Impairments Contributing to Impaired Transfers decreased strength;impaired balance   Comment, (Transfers) CGA from elevated bed with heart pillow and 4WW   Gait/Stairs (Locomotion)   [place] Level (Gait) contact guard   Assistive Device (Gait) walker, 4-wheeled   Distance in Feet (Gait) 75'   Pattern (Gait) step-through   Deviations/Abnormal Patterns (Gait) antalgic   Comment, (Gait/Stairs) pt reports fatigue in BLE with ambulation   Balance   Balance Comments CGA w/ 4WW   Clinical Impression   Criteria for Skilled Therapeutic Intervention Yes, treatment indicated   PT Diagnosis (PT) decreased independent mobility   Influenced by the following impairments pain, weakness, decreased balance   Functional limitations due to impairments bed mob, transfers ,gait , stairs   Clinical Presentation (PT Evaluation Complexity) evolving   Clinical Presentation Rationale presents as medically diagnosed   Clinical Decision Making (Complexity) moderate complexity   Planned Therapy Interventions (PT) balance training;strengthening;transfer training;gait training;stair training   Risk & Benefits of therapy have been explained evaluation/treatment results reviewed   PT Total Evaluation Time   PT Eval, Moderate Complexity Minutes (19235) 13   Physical Therapy Goals   PT Frequency Daily   PT Predicted Duration/Target Date for Goal Attainment 04/30/24   PT Goals Transfers;Gait;Stairs   PT: Transfers Supervision/stand-by assist;Sit to/from stand;Bed to/from chair;Within precautions;Assistive device   PT: Gait Supervision/stand-by assist;Greater than 200 feet;Within precautions  (4WW)   PT: Stairs Minimal assist;3 stairs;Rail on both sides   Interventions   Interventions Quick Adds Gait Training   Gait Training   Gait Training Minutes (98977) 8   Symptoms Noted During/After Treatment (Gait Training) fatigue;shortness of breath;increased pain   Treatment Detail/Skilled Intervention slow pace, pt reports BLE fatigue, additional  sit<>stand from lower surface minAx1   Distance in Feet 150'   [place] Level (Gait Training) contact guard   Physical Assistance Level (Gait Training) 1 person assist   Assistive Device (Gait Training) other (see comments)  (4WW)   Gait Analysis Deviations decreased loren;decreased step length   Impairments (Gait Analysis/Training) balance impaired;pain;strength decreased   PT Discharge Planning   PT Plan LE strengthening, balance ex, transfer and gait follow ing sternal precautions w/ trell 4WW   PT Discharge Recommendation (DC Rec) Acute Rehab Center-Motivated patient will benefit from intensive, interdisciplinary therapy.  Anticipate will be able to tolerate 3 hours of therapy per day   PT Rationale for DC Rec pt fully independent at prior to surgery with no Ad, pt Ax1-2 with sternal precautions and using 4WW, pt unable to care for self at this time would benefit from acute rehab   PT Brief overview of current status pt modAX1-2 for bed mo ,pt CGA/minAx1 with sit<>stand and sternal precautions, gait 150' w/ 4WW adn CGA fatigues easily   PT Equipment Needed at Discharge other (see comments)  (may need 4WW)   Total Session Time   Timed Code Treatment Minutes 8   Total Session Time (sum of timed and untimed services) 21

## 2024-04-23 NOTE — PROGRESS NOTES
Care Management Follow Up    Length of Stay (days): 6    Expected Discharge Date: 04/25/2024          Concerns to be Addressed: post procedure care and monitoring,  status, TCU vs home        Patient plan of care discussed at interdisciplinary rounds: Yes     Anticipated Discharge Disposition:  TCU vs home     Anticipated Discharge Services:  TCU vs outpatient cardiac rehab     Anticipated Discharge DME:  monster        Additional Information:  Patient presented to [facility] with acute chest pain, diagnosed with NSTEMI, transferred to our hospital for coronary angiogram. S/p CABG x 2 on 4/19.  PMH of BPH, s/p prostatectomy and bladder neck contraction. Urology placed lazo catheter 4/19.  CV Surgery following.        Therapy: Patient currently is mod assist of 2 bed mobility, CGA 85 ft. with 4WW. Recommendation home vs TCU pending progression.             Social History:  Patient lives in his house with spouse. Independent with ADLs/IADLs and ambulates without devices.  Spouse is primary family contact.        4/23/24:  Final discharge plan pending progression and recommendations. If TCU needed, will need to have PT eval and rec for placement and insurance auth.    3:32 PM  OT cardiac recommending AR. PT eval pending.  Referral made to [facility]. Patient will need PT recs for AR and [facility] auth. CM to discuss AR option with patient/family once AR reviews and if accepted.         Mary Montenegro RN

## 2024-04-24 ENCOUNTER — HOSPITAL ENCOUNTER (INPATIENT)
Facility: CLINIC | Age: 79
End: 2024-04-24
Admitting: PHYSICAL MEDICINE & REHABILITATION
Payer: COMMERCIAL

## 2024-04-24 ENCOUNTER — APPOINTMENT (OUTPATIENT)
Dept: OCCUPATIONAL THERAPY | Facility: HOSPITAL | Age: 79
DRG: 234 | End: 2024-04-24
Attending: INTERNAL MEDICINE
Payer: COMMERCIAL

## 2024-04-24 LAB
ANION GAP SERPL CALCULATED.3IONS-SCNC: 11 MMOL/L (ref 7–15)
BUN SERPL-MCNC: 48 MG/DL (ref 8–23)
CA-I BLD-MCNC: 4.5 MG/DL (ref 4.4–5.2)
CALCIUM SERPL-MCNC: 8.3 MG/DL (ref 8.8–10.2)
CHLORIDE SERPL-SCNC: 102 MMOL/L (ref 98–107)
CREAT SERPL-MCNC: 1.83 MG/DL (ref 0.67–1.17)
DEPRECATED HCO3 PLAS-SCNC: 25 MMOL/L (ref 22–29)
EGFRCR SERPLBLD CKD-EPI 2021: 37 ML/MIN/1.73M2
GLUCOSE BLDC GLUCOMTR-MCNC: 145 MG/DL (ref 70–99)
GLUCOSE BLDC GLUCOMTR-MCNC: 147 MG/DL (ref 70–99)
GLUCOSE BLDC GLUCOMTR-MCNC: 190 MG/DL (ref 70–99)
GLUCOSE BLDC GLUCOMTR-MCNC: 197 MG/DL (ref 70–99)
GLUCOSE SERPL-MCNC: 161 MG/DL (ref 70–99)
MAGNESIUM SERPL-MCNC: 2.5 MG/DL (ref 1.7–2.3)
PHOSPHATE SERPL-MCNC: 3.2 MG/DL (ref 2.5–4.5)
POTASSIUM SERPL-SCNC: 3.3 MMOL/L (ref 3.4–5.3)
POTASSIUM SERPL-SCNC: 3.7 MMOL/L (ref 3.4–5.3)
SODIUM SERPL-SCNC: 138 MMOL/L (ref 135–145)

## 2024-04-24 PROCEDURE — 99233 SBSQ HOSP IP/OBS HIGH 50: CPT | Performed by: INTERNAL MEDICINE

## 2024-04-24 PROCEDURE — 82330 ASSAY OF CALCIUM: CPT

## 2024-04-24 PROCEDURE — 84132 ASSAY OF SERUM POTASSIUM: CPT | Performed by: INTERNAL MEDICINE

## 2024-04-24 PROCEDURE — 97110 THERAPEUTIC EXERCISES: CPT | Mod: GO

## 2024-04-24 PROCEDURE — 36415 COLL VENOUS BLD VENIPUNCTURE: CPT | Performed by: INTERNAL MEDICINE

## 2024-04-24 PROCEDURE — 80048 BASIC METABOLIC PNL TOTAL CA: CPT

## 2024-04-24 PROCEDURE — 36415 COLL VENOUS BLD VENIPUNCTURE: CPT

## 2024-04-24 PROCEDURE — 250N000013 HC RX MED GY IP 250 OP 250 PS 637

## 2024-04-24 PROCEDURE — 97535 SELF CARE MNGMENT TRAINING: CPT | Mod: GO

## 2024-04-24 PROCEDURE — 83735 ASSAY OF MAGNESIUM: CPT | Performed by: INTERNAL MEDICINE

## 2024-04-24 PROCEDURE — 84100 ASSAY OF PHOSPHORUS: CPT | Performed by: INTERNAL MEDICINE

## 2024-04-24 PROCEDURE — 250N000011 HC RX IP 250 OP 636

## 2024-04-24 PROCEDURE — 210N000001 HC R&B IMCU HEART CARE

## 2024-04-24 PROCEDURE — 250N000013 HC RX MED GY IP 250 OP 250 PS 637: Performed by: INTERNAL MEDICINE

## 2024-04-24 RX ORDER — NALOXONE HYDROCHLORIDE 0.4 MG/ML
0.2 INJECTION, SOLUTION INTRAMUSCULAR; INTRAVENOUS; SUBCUTANEOUS
Status: DISCONTINUED | OUTPATIENT
Start: 2024-04-24 | End: 2024-04-27 | Stop reason: HOSPADM

## 2024-04-24 RX ORDER — POTASSIUM CHLORIDE 1500 MG/1
40 TABLET, EXTENDED RELEASE ORAL ONCE
Status: COMPLETED | OUTPATIENT
Start: 2024-04-24 | End: 2024-04-24

## 2024-04-24 RX ORDER — NALOXONE HYDROCHLORIDE 0.4 MG/ML
0.4 INJECTION, SOLUTION INTRAMUSCULAR; INTRAVENOUS; SUBCUTANEOUS
Status: DISCONTINUED | OUTPATIENT
Start: 2024-04-24 | End: 2024-04-27 | Stop reason: HOSPADM

## 2024-04-24 RX ORDER — OXYCODONE HYDROCHLORIDE 5 MG/1
5 TABLET ORAL EVERY 4 HOURS PRN
Status: DISCONTINUED | OUTPATIENT
Start: 2024-04-24 | End: 2024-04-27 | Stop reason: HOSPADM

## 2024-04-24 RX ORDER — POTASSIUM CHLORIDE 1500 MG/1
20 TABLET, EXTENDED RELEASE ORAL ONCE
Status: COMPLETED | OUTPATIENT
Start: 2024-04-24 | End: 2024-04-24

## 2024-04-24 RX ADMIN — ASPIRIN 81 MG CHEWABLE TABLET 81 MG: 81 TABLET CHEWABLE at 08:47

## 2024-04-24 RX ADMIN — LIDOCAINE 1 PATCH: 4 PATCH TOPICAL at 18:03

## 2024-04-24 RX ADMIN — EZETIMIBE 10 MG: 10 TABLET ORAL at 21:48

## 2024-04-24 RX ADMIN — ANORECTAL OINTMENT: 15.7; .44; 24; 20.6 OINTMENT TOPICAL at 08:48

## 2024-04-24 RX ADMIN — POTASSIUM CHLORIDE 20 MEQ: 1500 TABLET, EXTENDED RELEASE ORAL at 15:46

## 2024-04-24 RX ADMIN — HEPARIN SODIUM 5000 UNITS: 10000 INJECTION, SOLUTION INTRAVENOUS; SUBCUTANEOUS at 21:49

## 2024-04-24 RX ADMIN — HEPARIN SODIUM 5000 UNITS: 10000 INJECTION, SOLUTION INTRAVENOUS; SUBCUTANEOUS at 15:45

## 2024-04-24 RX ADMIN — ANORECTAL OINTMENT: 15.7; .44; 24; 20.6 OINTMENT TOPICAL at 21:50

## 2024-04-24 RX ADMIN — INSULIN ASPART 1 UNITS: 100 INJECTION, SOLUTION INTRAVENOUS; SUBCUTANEOUS at 18:03

## 2024-04-24 RX ADMIN — POTASSIUM CHLORIDE 40 MEQ: 1500 TABLET, EXTENDED RELEASE ORAL at 05:15

## 2024-04-24 RX ADMIN — OXYCODONE HYDROCHLORIDE 5 MG: 5 TABLET ORAL at 21:48

## 2024-04-24 RX ADMIN — METOPROLOL TARTRATE 75 MG: 25 TABLET, FILM COATED ORAL at 21:49

## 2024-04-24 RX ADMIN — METOPROLOL TARTRATE 75 MG: 25 TABLET, FILM COATED ORAL at 08:47

## 2024-04-24 RX ADMIN — HEPARIN SODIUM 5000 UNITS: 10000 INJECTION, SOLUTION INTRAVENOUS; SUBCUTANEOUS at 05:15

## 2024-04-24 RX ADMIN — SENNOSIDES AND DOCUSATE SODIUM 1 TABLET: 8.6; 5 TABLET ORAL at 21:49

## 2024-04-24 RX ADMIN — INSULIN ASPART 3 UNITS: 100 INJECTION, SOLUTION INTRAVENOUS; SUBCUTANEOUS at 13:38

## 2024-04-24 RX ADMIN — TAMSULOSIN HYDROCHLORIDE 0.4 MG: 0.4 CAPSULE ORAL at 08:47

## 2024-04-24 RX ADMIN — INSULIN ASPART 1 UNITS: 100 INJECTION, SOLUTION INTRAVENOUS; SUBCUTANEOUS at 08:59

## 2024-04-24 RX ADMIN — THERA TABS 1 TABLET: TAB at 21:49

## 2024-04-24 RX ADMIN — CLOPIDOGREL BISULFATE 75 MG: 75 TABLET ORAL at 08:50

## 2024-04-24 RX ADMIN — INSULIN GLARGINE 60 UNITS: 100 INJECTION, SOLUTION SUBCUTANEOUS at 08:59

## 2024-04-24 RX ADMIN — Medication 6 MG: at 21:48

## 2024-04-24 RX ADMIN — PANTOPRAZOLE SODIUM 40 MG: 40 TABLET, DELAYED RELEASE ORAL at 08:47

## 2024-04-24 RX ADMIN — ROSUVASTATIN CALCIUM 20 MG: 10 TABLET, FILM COATED ORAL at 21:48

## 2024-04-24 RX ADMIN — SENNOSIDES AND DOCUSATE SODIUM 1 TABLET: 8.6; 5 TABLET ORAL at 08:56

## 2024-04-24 RX ADMIN — ANORECTAL OINTMENT: 15.7; .44; 24; 20.6 OINTMENT TOPICAL at 13:39

## 2024-04-24 RX ADMIN — INSULIN GLARGINE 60 UNITS: 100 INJECTION, SOLUTION SUBCUTANEOUS at 21:59

## 2024-04-24 RX ADMIN — POLYETHYLENE GLYCOL 3350 17 G: 17 POWDER, FOR SOLUTION ORAL at 08:47

## 2024-04-24 ASSESSMENT — ACTIVITIES OF DAILY LIVING (ADL)
ADLS_ACUITY_SCORE: 36

## 2024-04-24 NOTE — PROGRESS NOTES
[facility]    Medicine Progress Note - Hospitalist Service    Date of Admission:  4/17/2024    Assessment & Plan   Nirav Baker is a 79 year old old male with HTN, HL, DM, obesity with BMI 45, ELDON, CKD who presented to [facility] with acute chest pain, diagnosed with NSTEMI, transferred to our hospital for coronary angiogram.     #CAD  #NSTEMI  -Coronary angiogram 4/17/2024 showed severe multivessel CAD  -CTS consulted. S/p CABG x 2 4/19. Extubated around 10PM 4/19. Off of phenylephrine gtt since 4/20  -Continue ASA, Heparin gtt discontinued. Plavix added 4/20. Cont BB, statin and ARB.      #DM 2, with high insulin resistance.  A1c 7.2.  -PTA regimen of NovoLog 20 units with breakfast and 30 units with supper, Lantus 60 units in am and 80 units at HS.  -Insulin gtt titrated off 4/20. Started lantus as 40 units BID. Titrated the dose up to 60 units BID. Cont mealtime novolog at 1:7. Glycemic control looks better today  -Cont the sliding scale  -Diabetic diet     #Urinary retention  #Traumatic hematuria  -Patient with h/o BPH, s/p prostatectomy and bladder neck contraction. Difficult catheterization in OR. Urology assisted with lazo insertion. Hematuria has resolved now. Follow up with urology (Dr Figueroa) in the clinic    #GERD-on PPI.  #ELDON-on CPAP.    #Disposition  -ARU recommended. Awaiting placement          Diet: Combination Diet Moderate Consistent Carb (60 g CHO per Meal) Diet; Low Saturated Fat Diet, Low Saturated Fat Na <2400mg Diet    DVT Prophylaxis: Heparin s/c  Lazo Catheter: Not present  Lines: None     Cardiac Monitoring: ACTIVE order. Indication: Open heart surgery (72 hours)  Code Status: Full Code      Clinically Significant Risk Factors        # Hypokalemia: Lowest K = 3.3 mmol/L in last 2 days, will replace as needed       # Hypoalbuminemia: Lowest albumin = 3.2 g/dL at 4/20/2024  4:11 AM, will monitor as appropriate     # Hypertension: Noted on problem  "list       # DMII: A1C = 7.2 % (Ref range: <5.7 %) within past 6 months   # Severe Obesity: Estimated body mass index is 44.2 kg/m  as calculated from the following:    Height as of this encounter: 1.803 m (5' 11\").    Weight as of this encounter: 143.7 kg (316 lb 14.4 oz).             Disposition Plan     Medically Ready for Discharge: Medically ready for ARU         OTONIEL Nichole  Hospitalist Service  [facility]  Securely message with [employer] (more info)  Text page via [facility] Paging/Directory   ______________________________________________________________________    Interval History   Patient seen and examined. Doing well. No acute issues overnight. Denies any chest pain or shortness of breath.     Physical Exam   Vital Signs: Temp: 98.1  F (36.7  C) Temp src: Oral BP: 116/56 Pulse: 72   Resp: 20 SpO2: 95 % O2 Device: None (Room air)    Weight: 316 lbs 14.4 oz      General: Not in obvious distress.  HEENT: NC, AT   Chest: Clear to auscultation bilaterally.  Heart: S1S2 normal, regular. No M/R/G  Abdomen: Soft. NT, ND. Bowel sounds- active.  Extremities: No legs swelling  Neuro: Alert and awake, grossly non-focal      Medical Decision Making             Data     I have personally reviewed the following data over the past 24 hrs:    N/A  \   N/A   / N/A     138 102 48.0 (H) /  147 (H)   3.7 25 1.83 (H) \       Imaging results reviewed over the past 24 hrs:   No results found for this or any previous visit (from the past 24 hour(s)).    "

## 2024-04-24 NOTE — INTERIM SUMMARY
[facility] Pre-Admission Screen    Referral Source:  [facility] ICU [facility] -02  Admit date to referring facility: 4/17/2024    Physical Medicine and Rehab Consult Completed: No    Rehab Diagnosis:    09 Cardiac: s/p CABG x2    Justification for Acute Inpatient Rehabilitation  Rishi Sorto is a 79 year old male who initially admitted to another hospital with acute chest pain, was diagnosed with NSTEMI and transferred to [facility] for a coronary angiogram. Angiogram was significant for severe multi-vessel CAD and surgical revascularization was recommended. On 4/19, patient underwent coronary bypass graft x2. Pt's course was complicated by a bladder neck contracture, requiring Urology consult intraoperatively, due to difficulty with lazo catheter placement prior to surgery. The difficulty was likely due to previous prostate surgery. Patient's post op course was also complicated by L groin abrasion and buttocks wound, hematuria, and elevated blood sugars. Patient is currently stable to transfer to inpatient acute rehab.     Patient requires an intensive inpatient rehab program, not able to be met in a lower level of care, to address the following acute impairments: edema management, impaired activity tolerance, impaired balance, impaired strength, and post op pain. The patient is normally I with all ADLs and IADLs and currently requires A of 2. He requires intense rehab, daily physical medicine and rehab physician oversight, and 24 hour rehab nursing care in order to achieve optimal outcomes post CABG and decrease likelihood of hospital readmission.     Current Active Medical Management Needs/Risks for Clinical Complications  The patient requires the high level of rehabilitation physician supervision that accompanies the provision of intensive rehabilitation therapy.  The patient needs the services of the rehabilitation physician to assess the patient medically and  functionally and to modify the course of treatment as needed to maximize the patient's capacity to benefit from the rehabilitation process. Patient requires ongoing medical management and assessment of the following:  Body Mass Index of 44.20: patient with morbid obesity; Increased body habitus places the patient at increased risk for complications and mortality. Obesity is clinically significant d/t increased RN cares, use of resources, and specialty equipment.   Diabetes Mellitus type II: will need ongoing assessment of blood sugars in patient with uncontrolled DM II with Hgb A1c 7.2%; BG goal < 180 to promote optimal healing post CABG. Insulin gtt titrated off 4/20. Started lantus as 40 units BID, increased to 60 units BID 4/23. Increased mealtine novolog from 1:20 to 1:7 ration; Continue sliding scale insulin. Holding PTA glipizide, metformin, and sitagliptin, plan to resume closer to discharge pending renal function.   CABG: patient will need ongoing assessment of sternal incision for signs of infection/healing as pt is at increased risk post op. Patient will need ongoing management of Metoprolol 75mg BID, rosuvastatin 20mg daily and zetia 10 mg daily, ASA 81mg (plavix) - Plavix per surgeon preference for graft patency in s/o NSTEMI (ASA).   Renal: Adequate UOP/hr. Continue to monitor closely. Cr continues to be up slightly at 1.86 (1.97) with baseline 1.3. Continuing to hold diuresis as weight is downtrending w/ good UOP + Cr improving off diuresis.   : 0.4 mg flomax qday; pt with difficult lazo placement requiring urology consult to place intraoperatively; removed lazo 4/23 for TOV as per urology recs - Urology consult for bladder stricture. Kept lazo in place x4 days and then removed for TOV as urine was clear. Pt will need ongoing assessment including bladder scans. Follow-up outpatient w/ urologist Dr. Figueroa (not yet scheduled).   Pain: patient will need ongoing management of post op pain medications  with adjustment as needed to ensure optimal therapy participation.   Wounds: patient with abrasion to L groin and B buttocks and will need ongoing assessment of these sites. Patient also reporting pain due to excoriated buttocks so sitting is uncomfortable. WOC RN will need to follow at Acute Rehab.     Patient is at risk for falls with injury, sternal wound dehiscence, post op infection, and hematuria.       Past Medical/Surgical History   Surgery in the past 100 days: Yes   Additional relevant past medical history: HTN, HLD, DM, obesity, CKD, and prostatectomy w/ bladder strictures     Level of Functioning Prior to Admission:    LIVING ENVIRONMENT   People in Home: spouse   Current Living Arrangements: house (can stay on one level)   Home Accessibility: stairs to enter home    Number of Stairs, Main Entrance: 3    Stair Railings, Main Entrance: none   Transportation Anticipated: family or friend will provide   Living Environment Comments: wife had foot surgery 2 weeks ago very limited time on feet    SELF-CARE   Equipment Currently Used at Home: none (wife has knee scooter, walker and cane)   Activity/Exercise/Self-Care Comment: I ADls, IADLS, drives    Level of Function: GG Scale (Section GG Functional Ability and Goals; CMS's CUEVAS Version 3.0 Manual effective 10.1.2019):  PT Current Function Goals for Rehab   Bed Rolling 1 Dependent (A of 2) 6 Independent   Supine to Sit 1 Dependent (A of 2) 6 Independent   Sit to Stand 1 Dependent (A of 2) 6 Independent   Transfer 3 Partial/moderate assistance 6 Independent   Ambulation 4 Supervision or touching assitance 6 Independent   Stairs 88 Not attempted due to safety 4 Supervision or touching assitance     OT Current Function Goals for Rehab   Feeding 5 Setup or clean-up assistance 6 Independent   Grooming 4 Supervision or touching assitance 6 Independent   Bathing Not completed 6 Independent   Upper Body Dressing Not completed 6 Independent   Lower Body Dressing Not  completed 6 Independent   Toileting Not completed 6 Independent   Toilet Transfer 1 Dependent (A of 2) 6 Independent   Tub/Shower Transfer 88 Not attempted due to safety 4 Supervision or touching assitance   Cognition Not Impaired Not applicable     SLP Current Function Goals for Rehab   Swallow Not Impaired Not applicable   Communication Not Impaired Not applicable       Current Diet:  0-Thin and Regular    Summary Statement:  Patient is participating in daily PT and OT and is making progress. Currently, he continues to require A of 2 for bed mobility and sit <> stand due to sternal precautions. Patient is able to ambulate up to 75ft with CGA and fww but reports fatigue in BLE with ambulation and antalgic gait.     Expected Therapies and Services Required During Inpatient Rehab Admission  Intensity of Therapy: Patient requires intensive therapies not available in a lesser level of care. Patient is motivated, making gains, and can tolerate 3 hours of therapy a day.  Physical Therapy: 90 minutes per day, 6 days a week for 8 days  Occupational Therapy: 90 minutes per day, 6 days a week for 8 days  Speech and Language Therapy: Not indicated at this time.   Rehabilitation Nursing Needs: Patient requires 24 hour Rehab Nursing to manage bladder program due hematuria with difficult lazo placement requiring urology, vitals, medication education, carryover of new rehab techniques including adherence to sternal precautions, care coordination, skin integrity including WOC consult for L groin and buttocks abrasions, blood sugar management, diabetes education, pain management, post-surgical sternal incision care to promote healing and prevent infection, and provide safe environment for patient at falls risk.    Precautions/restrictions/special needs:   Precautions: fall precautions and Sternal precautions   Restrictions: 10lb lifting restriction, no pushing/pulling   Special Needs: bariatric equipment - 316lbs    Expected Level  of Improvement: Mod I with mobility, transfers, ADLs, and stairs; anticipate pt will need assist with IADLs initially.   Expected Length of time to achieve: 8 days    Anticipated Discharge Needs:  Anticipated Discharge Destination: Home  Anticipated Discharge Support: Family member  24/7 support available : Yes  Identified caregiver(s):  Spouse  Anticipated Discharge Needs: Home with outpatient therapy and Outpatient Cardiac/Pulmonary Rehab    Identified challenges/barriers:  None    Liaison signature/date/time:    Physician statement of review and agreement:  I have reviewed and am in agreement of the need for IRF stay to address above functional and medical needs. In addition to above statements address, Patient requires intensive active and ongoing therapeutic intervention and multiple therapies; Patient requires medical supervision; Expected to actively participate in the intensive rehab program; Sufficiently stable to actively participate; Expectation for measurable improvement in functional capacity or adaption to impairments.    MD signature/date/time:

## 2024-04-24 NOTE — PLAN OF CARE
[facility] - ICU    RN Progress Note:            Pertinent Assessments:      Please refer to flowsheet rows for full assessment     Patient pleasant, oriented x 4, comfortable sleeping in bed with home CPAP on all shift, stable VS, sinus rhythm with 1st degree AVB, BBB. Diminished breath sounds. Voided in the bathroom once, ambulated steadily. Patient has denied pain. Excoriated buttocks noted. Refusing to sit on the recliner due to painful buttocks. Checked post void residual, was 97 ml.            Key Events - This Shift:     Please see above notes.            Barriers to Discharge / Downgrade:   Cardiac tele status

## 2024-04-24 NOTE — PLAN OF CARE
[facility] - ICU    RN Progress Note:            Pertinent Assessments:      Please refer to flowsheet rows for full assessment     VSS  Afebrile  RASS goal met.             Key Events - This Shift:       Family bedside, received update from Provider.   During cares/repositioning patient opened eyes and can answer simple yes no questions.  Followed simple commands.        ZACHARY SAT (Sedation Awakening Trial): For use ONLY if intubated    SAT Safety Screen Not Applicable   If FAILED why? Done on days, provider indicated rest until morning.   SAT Performed Not Applicable   If FAILED why?               Barriers to Discharge / Downgrade:     Mechanical ventilation          Point of Contact Update YES-OR-NO: Yes  If No, reason:   Name:Gentry  Phone Number:Bedside  Summary of Conversation: Daily update.         Problem: Adult Inpatient Plan of Care  Goal: Plan of Care Review  Description: The Plan of Care Review/Shift note should be completed every shift.  The Outcome Evaluation is a brief statement about your assessment that the patient is improving, declining, or no change.  This information will be displayed automatically on your shift  note.  Outcome: Progressing  Goal: Absence of Hospital-Acquired Illness or Injury  Intervention: Identify and Manage Fall Risk  Recent Flowsheet Documentation  Taken 4/23/2024 1600 by Miguel Angel Munguia, RN  Safety Promotion/Fall Prevention:   clutter free environment maintained   increased rounding and observation   increase visualization of patient   lighting adjusted   mobility aid in reach   nonskid shoes/slippers when out of bed   patient and family education   room door open   room near nurse's station   room organization consistent   safety round/check completed   supervised activity   toileting scheduled  Intervention: Prevent Skin Injury  Recent Flowsheet Documentation  Taken 4/23/2024 2034 by Miguel Angel Munguia, RN  Body Position: right  Taken 4/23/2024 1600  by Miguel Angel Munguia, RN  Body Position:   refuses positioning   supine  Skin Protection:   pectin skin barriers applied   silicone foam dressing in place   skin to device areas padded   skin to skin areas padded  Device Skin Pressure Protection:   skin-to-device areas padded   skin-to-skin areas padded   tubing/devices free from skin contact  Intervention: Prevent and Manage VTE (Venous Thromboembolism) Risk  Recent Flowsheet Documentation  Taken 4/23/2024 1600 by Miguel Angel Munguia, RN  VTE Prevention/Management: SCDs (sequential compression devices) on  Intervention: Prevent Infection  Recent Flowsheet Documentation  Taken 4/23/2024 1600 by Miguel Angel Munguia, RN  Infection Prevention:   equipment surfaces disinfected   hand hygiene promoted   personal protective equipment utilized   rest/sleep promoted   single patient room provided   visitors restricted/screened   environmental surveillance performed  Goal: Optimal Comfort and Wellbeing  Intervention: Provide Person-Centered Care  Recent Flowsheet Documentation  Taken 4/23/2024 1600 by Miguel Angel Munguia, RN  Trust Relationship/Rapport:   care explained   choices provided   emotional support provided   empathic listening provided   questions answered   questions encouraged   reassurance provided   thoughts/feelings acknowledged   Goal Outcome Evaluation:

## 2024-04-24 NOTE — PLAN OF CARE
sc  Patient Name: Nirav Baker   MRN: [id number]   Date of Admission: 4/17/2024    Procedure: Procedure(s):  CORONARY ARTERY BYPASS GRAFT TIMES  TWO  WITH LEFT INTERNAL MAMMARY ARTERY HARVEST, LEFT ENDOSCOPIC VESSEL PROCUREMENT, ANESTHESIA TRANSESOPHAGEAL ECHOCARDIOGRAM, EPI AORTIC ULTRASOUND    Post Op day #:5      Subjective (Patient focus/Primary Problem for shift): ambulate, increase acitivity and strength          Pain Goal0 Pain Rating0-2           Pain Medication/ Regime effective to reduce patient pain scheduled lidocaine and prn tylneol    Objective (Physical assessment):           Rhythm:  SR 1stAVB BBB            Bowel Activity: yes if Yes indicate when: today          Bowel Medications: yes            Incision: covered          Incentive Spirometry Q 1-2 hour when awake:  yes Volume: 1500          Epicardial Pacing Wires:  no            Patient Activity:           Up to chair for meals: yes          Ambulation with RN x2 (Not including CR): no            Is patient in home clothes:no             Chest Tubes   Pleural: no Draining: no               Suction: no              Mediastinal: no Draining: no               Suction: no   Dressing Change Daily:yes If No, why?cleansed per report dressing reapplied to chest tube insertion sites                     Urinary Catheter: no           Preventative WOC consult (need MD order): no       Assessment (Nursing primary shift focus): Increase strength and activity    Plan (Patient Care Plan/focus): Pt A&OX4 up with assist of one to two. Compliant with sternal precautions. Due to walk again this evening when he gets up again to void. Pt voiding without issues, denies retention, uses bathroom and hat in toilet. Had BM today. Pain minimal to chest incision. Wound vac in place and functioning. IS and flutter valve done hourly. SCDs on when in bed and chair. Appetite good. Plan of care ongoing.       Deysi Ambrose RN   4/24/2024   6:58 PM

## 2024-04-24 NOTE — PROGRESS NOTES
Care Management Follow Up    Length of Stay (days): 7    Expected Discharge Date: 04/25/2024       Concerns to be Addressed: insurance auth for AR        Patient plan of care discussed at interdisciplinary rounds: Yes     Anticipated Discharge Disposition:  AR     Anticipated Discharge Services:  AR     Anticipated Discharge DME:  [facility]ster        Additional Information:  Patient presented to [facility] with acute chest pain, diagnosed with NSTEMI, transferred to our hospital for coronary angiogram. S/p CABG x 2 on 4/19.  PMH of BPH, s/p prostatectomy and bladder neck contraction.   CV Surgery following.        Therapy:  Recommending AR  PT: pt modAX1-2 for bed mo ,pt CGA/minAx1 with sit<>stand and sternal precautions, gait 150' w/ 4WW adn CGA fatigues easily.  OT:  325 ft. and 210 ft., fatiques quickly, not ready for stairs today              Social History:  Patient lives in his house with spouse. Independent with ADLs/IADLs and ambulates without devices.  Spouse is primary family contact.      4/24/24:  [facility] [place] following. Patient accepted and AR will be sending for auth request. [place] meeting with patient to discuss AR at discharge. [facility] to transport.     3:47 PM  Met with patient for touch base.  He states knowledge and agreement for [facility]. AR requesting [facility] ride scheduled for tomorrow. Auth still pending.  [facility] w/c ride arranged for tomorrow with service window [phone].             Mary Montenegro RN

## 2024-04-24 NOTE — PROGRESS NOTES
"CVTS Daily Progress Note   POD#5 s/p CABG x2 (LIMA to LAD, rSVG to ramus), RLE EVH, Campos catheter placement per urology  Attending:  Grace  LOS: 7    SUBJECTIVE/INTERVAL EVENTS:    No acute events overnight. Patient progressing well. Maintaining oxygen saturations on room air Normotensive. Ambulating with therapy. Pain well controlled. +BM. Tolerating diet. UOP adequate. Hgb stable. Patient denies new chest pain, shortness of breath, abdominal pain, calf pain, nausea. Patient has no questions today.     OBJECTIVE:  Temp:  [97.7  F (36.5  C)-98  F (36.7  C)] 97.7  F (36.5  C)  Pulse:  [61-76] 62  Resp:  [18-20] 19  BP: (106-134)/(53-63) 115/57  SpO2:  [96 %-98 %] 96 %  Vitals:    04/20/24 0621 04/21/24 0634 04/22/24 0700 04/23/24 0530   Weight: 146.1 kg (322 lb) 147.1 kg (324 lb 6.4 oz) 145.2 kg (320 lb 1.6 oz) 144.4 kg (318 lb 4.8 oz)    04/24/24 0500   Weight: 143.7 kg (316 lb 14.4 oz)       Clinically Significant Risk Factors        # Hypokalemia: Lowest K = 3.3 mmol/L in last 2 days, will replace as needed       # Hypoalbuminemia: Lowest albumin = 3.2 g/dL at 4/20/2024  4:11 AM, will monitor as appropriate       # Hypertension: Noted on problem list       # DMII: A1C = 7.2 % (Ref range: <5.7 %) within past 6 months   # Severe Obesity: Estimated body mass index is 44.2 kg/m  as calculated from the following:    Height as of this encounter: 1.803 m (5' 11\").    Weight as of this encounter: 143.7 kg (316 lb 14.4 oz).            Clinically Significant Risk Factors        # Hypokalemia: Lowest K = 3.3 mmol/L in last 2 days, will replace as needed       # Hypoalbuminemia: Lowest albumin = 3.2 g/dL at 4/20/2024  4:11 AM, will monitor as appropriate       # Hypertension: Noted on problem list       # DMII: A1C = 7.2 % (Ref range: <5.7 %) within past 6 months   # Severe Obesity: Estimated body mass index is 44.2 kg/m  as calculated from the following:    Height as of this encounter: 1.803 m (5' 11\").    Weight as of this " encounter: 143.7 kg (316 lb 14.4 oz).               Current Medications:    Scheduled Meds:  Current Facility-Administered Medications   Medication Dose Route Frequency Provider Last Rate Last Admin    aspirin (ASA) chewable tablet 81 mg  81 mg Oral Daily Rahat Faulkner MBBS        clopidogrel (PLAVIX) tablet 75 mg  75 mg Oral Daily Ana Rosa Quiñones PA-C   75 mg at 04/23/24 0840    ezetimibe (ZETIA) tablet 10 mg  10 mg Oral At Bedtime Rahat Faulkner MBBS   10 mg at 04/23/24 2110    [Held by provider] glipiZIDE (GLUCOTROL XL) 24 hr tablet 20 mg  20 mg Oral At Bedtime Ana Rosa Quiñones PA-C        heparin ANTICOAGULANT injection 5,000 Units  5,000 Units Subcutaneous Q8H Ana Rosa Quiñones PA-C   5,000 Units at 04/24/24 0515    insulin aspart (NovoLOG) injection (RAPID ACTING)   Subcutaneous TID AC Rahat Faulkner MBBS   6 Units at 04/23/24 1649    insulin aspart (NovoLOG) injection (RAPID ACTING)  1-10 Units Subcutaneous TID AC Ana Rosa Quiñones PA-C   3 Units at 04/23/24 1650    insulin aspart (NovoLOG) injection (RAPID ACTING)  1-7 Units Subcutaneous At Bedtime Ana Rosa Quiñones PA-C   2 Units at 04/23/24 2111    insulin glargine (LANTUS PEN) injection 60 Units  60 Units Subcutaneous BID Rahat Faulkner MBBS   60 Units at 04/23/24 2111    Lidocaine (LIDOCARE) 4 % Patch 1-2 patch  1-2 patch Transdermal Q24H Ana Rosa Quiñones PA-C   1 patch at 04/23/24 1738    melatonin tablet 6 mg  6 mg Oral At Bedtime Ana Rosa Quiñones PA-C   6 mg at 04/23/24 2110    menthol-zinc oxide (CALMOSEPTINE) 0.44-20.6 % ointment OINT   Topical TID Rahat Faulkner MBBS   Given at 04/23/24 2111    [Held by provider] metFORMIN (GLUCOPHAGE) tablet 500 mg  500 mg Oral Daily with supper Ana Rosa Quiñones PA-C        [COMPLETED] methadone (DOLOPHINE) injection 20 mg  20 mg Intravenous Once Vincent Aguirre MD        metoprolol tartrate (LOPRESSOR) tablet 75 mg  75 mg Oral BID Ana Rosa Quiñones  GEORGE ANDERSON   75 mg at 04/23/24 2110    multivitamin, therapeutic (THERA-VIT) tablet 1 tablet  1 tablet Oral At Bedtime Ana Rosa Quiñones PA-C   1 tablet at 04/23/24 2110    pantoprazole (PROTONIX) EC tablet 40 mg  40 mg Oral Daily Ana Rosa Quiñones PA-C   40 mg at 04/23/24 0840    polyethylene glycol (MIRALAX) Packet 17 g  17 g Oral Daily Ana Rosa Quiñones PA-C   17 g at 04/23/24 0839    rosuvastatin (CRESTOR) tablet 20 mg  20 mg Oral At Bedtime Rahat Faulkner MBBS   20 mg at 04/23/24 2110    senna-docusate (SENOKOT-S/PERICOLACE) 8.6-50 MG per tablet 1 tablet  1 tablet Oral BID Ana Rosa Quiñones PA-C   1 tablet at 04/23/24 2109    [Held by provider] sitagliptin (JANUVIA) tablet 100 mg  100 mg Oral At Bedtime Ana Rosa Quiñones PA-C        tamsulosin (FLOMAX) capsule 0.4 mg  0.4 mg Oral Daily Ana Rosa Quiñones PA-C   0.4 mg at 04/23/24 0841     Continuous Infusions:  Current Facility-Administered Medications   Medication Dose Route Frequency Provider Last Rate Last Admin     PRN Meds:.  Current Facility-Administered Medications   Medication Dose Route Frequency Provider Last Rate Last Admin    acetaminophen (TYLENOL) tablet 650 mg  650 mg Oral Q4H PRN Ana Rosa Quiñones PA-C        bisacodyl (DULCOLAX) suppository 10 mg  10 mg Rectal Daily PRN Ana Rosa Quiñones PA-C        calcium gluconate 1 g in 50 mL in sodium chloride intermittent infusion  1 g Intravenous Once PRN Ana Rosa Quiñones PA-C   1 g at 04/23/24 0549    calcium gluconate 2 g in  mL intermittent infusion  2 g Intravenous Once PRN Ana Rosa Quiñones PA-C        calcium gluconate 3 g in sodium chloride 0.9 % 100 mL intermittent infusion  3 g Intravenous Once PRN Ana Rosa Quiñones PA-C        glucose gel 15-30 g  15-30 g Oral Q15 Min PRN Ana Rosa Quiñones PA-C        Or    dextrose 50 % injection 25-50 mL  25-50 mL Intravenous Q15 Min PRN Ana Rosa Quiñones PA-C        Or    glucagon injection 1 mg  1  mg Subcutaneous Q15 Min PRN Ana Rosa Quiñones PA-C        hydrALAZINE (APRESOLINE) injection 10 mg  10 mg Intravenous Q30 Min PRN Ana Rosa Quiñones PA-C        lactated ringers BOLUS 250 mL  250 mL Intravenous Q15 Min PRN Ana Rosa Quiñones PA-C   250 mL at 04/19/24 2117    magnesium hydroxide (MILK OF MAGNESIA) suspension 30 mL  30 mL Oral Daily PRN Ana Rosa Quiñones PA-C        naloxone (NARCAN) injection 0.2 mg  0.2 mg Intravenous Q2 Min PRN Ana Rosa Quiñones PA-C        Or    naloxone (NARCAN) injection 0.4 mg  0.4 mg Intravenous Q2 Min PRN Ana Rosa Quiñones PA-C        Or    naloxone (NARCAN) injection 0.2 mg  0.2 mg Intramuscular Q2 Min PRN Ana Rosa Quiñones PA-C        Or    naloxone (NARCAN) injection 0.4 mg  0.4 mg Intramuscular Q2 Min PRN Ana Rosa Quiñones PA-C        ondansetron (ZOFRAN ODT) ODT tab 4 mg  4 mg Oral Q6H PRN Ana Rosa Quiñones PA-C        Or    ondansetron (ZOFRAN) injection 4 mg  4 mg Intravenous Q6H PRN Ana Rosa Quiñones PA-C        oxyCODONE (ROXICODONE) tablet 5 mg  5 mg Oral Q4H PRN Ana Rosa Quiñones PA-C   5 mg at 04/21/24 0626    prochlorperazine (COMPAZINE) injection 5 mg  5 mg Intravenous Q6H PRN Ana Rosa Quiñones PA-C        Or    prochlorperazine (COMPAZINE) tablet 5 mg  5 mg Oral Q6H PRN Ana Rosa Quiñones PA-C           Cardiographics:    Telemetry monitoring demonstrates NSR with rates in the 60s per my personal review.    Imaging:  Results for orders placed or performed during the hospital encounter of 04/17/24   US Carotid Bilateral    Impression    IMPRESSION:  1.  Mild plaque formation, velocities consistent with less than 50% stenosis in the right internal carotid artery.  2.  Mild plaque formation, velocities consistent with less than 50% stenosis in the left internal carotid artery.  3.  Flow within the vertebral arteries is antegrade.   XR Chest Port 1 View    Impression    IMPRESSION:     Endotracheal tube tip is 5.3 cm  above the piedad. Right internal jugular approach central venous catheter tip is in the upper SVC. Mediastinal and left pleural drains. Multiple median sternotomy wires and mediastinal clips.    Lung volumes are low. Left greater than right bibasilar airspace opacities are favored to reflect atelectasis, although airspace disease is not excluded. Small left pleural effusion. No right pleural effusion. No pneumothorax.    Stable cardiomegaly.   XR Chest Port 1 View    Impression    IMPRESSION: Sternotomy, CABG, mediastinal drain and left chest tube. Right IJ catheter tip in the SVC. The patient has been extubated. Cardiac size approaches upper limits of normal, improved since previous. Strands of platelike atelectasis in both   lungs, less evident on current study. Tiny left pleural effusion, interval improvement. No appreciable effusion on the right. Mild degenerative changes thoracic spine. Monitoring leads overlying the chest.         Labs, personally reviewed.  Hemoglobin   Date Value Ref Range Status   04/22/2024 10.1 (L) 13.3 - 17.7 g/dL Final   04/21/2024 10.8 (L) 13.3 - 17.7 g/dL Final   04/20/2024 12.6 (L) 13.3 - 17.7 g/dL Final     WBC Count   Date Value Ref Range Status   04/22/2024 10.6 4.0 - 11.0 10e3/uL Final   04/21/2024 12.8 (H) 4.0 - 11.0 10e3/uL Final   04/20/2024 17.1 (H) 4.0 - 11.0 10e3/uL Final     Platelet Count   Date Value Ref Range Status   04/23/2024 156 150 - 450 10e3/uL Final   04/22/2024 132 (L) 150 - 450 10e3/uL Final   04/21/2024 128 (L) 150 - 450 10e3/uL Final     Creatinine   Date Value Ref Range Status   04/24/2024 1.83 (H) 0.67 - 1.17 mg/dL Final   04/23/2024 1.86 (H) 0.67 - 1.17 mg/dL Final   04/22/2024 1.97 (H) 0.67 - 1.17 mg/dL Final     Potassium   Date Value Ref Range Status   04/24/2024 3.3 (L) 3.4 - 5.3 mmol/L Final   04/23/2024 3.6 3.4 - 5.3 mmol/L Final   04/22/2024 3.5 3.4 - 5.3 mmol/L Final   08/24/2022 4.8 3.5 - 5.0 mmol/L Final   03/30/2022 3.9 3.5 - 5.0 mmol/L Final    03/17/2022 4.9 3.5 - 5.0 mmol/L Final     Potassium POCT   Date Value Ref Range Status   04/19/2024 4.0 3.4 - 5.3 mmol/L Final   04/19/2024 4.6 3.4 - 5.3 mmol/L Final   04/19/2024 4.9 3.4 - 5.3 mmol/L Final     Magnesium   Date Value Ref Range Status   04/24/2024 2.5 (H) 1.7 - 2.3 mg/dL Final   04/23/2024 2.2 1.7 - 2.3 mg/dL Final   04/22/2024 2.1 1.7 - 2.3 mg/dL Final          I/O:  I/O last 3 completed shifts:  In: 320 [P.O.:320]  Out: 300 [Urine:300]       Physical Exam:     General: Patient seen in bed this AM. NAD. Conversant. Pleasant.   HEENT: GERDA, no sclera icterus, moist mucosa  CV: RRR on monitor. 2+ peripheral pulses in all extremities. Mild edema.   Pulm: Non-labored effort on RA. Incision C/D/I.  Abd: Soft, NT, ND  : Voiding  Ext: Mild pedal edema, SCDs in place, warm, distal pulses intact  Neuro: CNs grossly intact.       ASSESSMENT/PLAN:    Nirav Baker is a 79 year old male with a history of HTN, HLD, DM, obesity, CKD, and prostatectomy w/ bladder strictures who is s/p CABG x2 & RLE EVH in s/o NSTEMI.    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)    Dyslipidemia    Benign essential HTN    Acute chest pain    NSTEMI (non-ST elevated myocardial infarction) (H)    Coronary artery disease involving native coronary artery of native heart, unspecified whether angina present      NEURO:   - Scheduled Tylenol/lidocaine patches and PRN Tylenol for pain  - PRN melatonin    CV:   - Pre-op EF 65%  - Chest tubes/TPW removed POD#2  - Normotensive  - Metoprolol 75mg BID   - Rosuvastatin 20mg daily and zetia 10 mg daily  - ASA 81mg (plavix)  - Plavix per surgeon preference for graft patency in s/o NSTEMI (ASA)    PULM:   - Extubated POD#0  - Maintaining oxygen saturations on room air this AM  - Encourage  pulmonary toilet    FEN/GI:  - Continue electrolyte replacement protocol  - Diet: Cardiac, ADAT   - Bowel regimen, +BM    RENAL:  - Adequate UOP/hr. Continue to monitor closely.  - Cr 1.83 (1.86) this AM, baseline to 1.3  - 0.4 mg flomax qday  - Campos placed per urology removed yesterday, voiding w/o issue currently.  - Urology consult for bladder stricture, appreciate recs, signed off.  - Follow-up outpatient w/ urologist Dr. Figueroa (not yet scheduled)  - Continuing to hold diuresis as patient is at preop weight w/ good UOP + Cr improving off diuresis    HEME:  - Acute blood loss anemia post-op.   - Hgb stable, no bleeding concerns. Hep SQ, ASA    ID:  - Sujata op ppx complete, afebrile. No concerns for infection  - Leukocytosis, resolved.    ENDO:   - HbA1c 7.2%  - BG goal < 180 to promote optimal healing  - Hospitalists managing DM2, appreciate assistance.  - PTA regimen of NovoLog 20 units with breakfast and 30 units with supper, Lantus 60 units in am and 80 units at HS.  - 60u lantus BID, high dose SSI, 1:7 carb count prandial dosing  - Continue SSI  - Holding PTA glipizide, metformin, and sitagliptin, plan to resume closer to discharge pending renal function    PPx:   - DVT: SCDs, SQ heparin TID, ambulation   - GI: Protonix 40mg PO daily    DISPO:   - General tele status (Transferred POD#1)  - PT/OT recommending ARU  - Medically Ready for Discharge: Ready Now for discharge to ARU. All IV medications discontinued.        Patient discussed with Dr. Edwards.      Bella Quiñones PA-C  [facility] Cardiothoracic Surgery  Pager: [phone]  April 24, 2024

## 2024-04-25 ENCOUNTER — APPOINTMENT (OUTPATIENT)
Dept: PHYSICAL THERAPY | Facility: HOSPITAL | Age: 79
DRG: 234 | End: 2024-04-25
Attending: INTERNAL MEDICINE
Payer: COMMERCIAL

## 2024-04-25 ENCOUNTER — APPOINTMENT (OUTPATIENT)
Dept: OCCUPATIONAL THERAPY | Facility: HOSPITAL | Age: 79
DRG: 234 | End: 2024-04-25
Attending: INTERNAL MEDICINE
Payer: COMMERCIAL

## 2024-04-25 LAB
ANION GAP SERPL CALCULATED.3IONS-SCNC: 12 MMOL/L (ref 7–15)
BUN SERPL-MCNC: 43.5 MG/DL (ref 8–23)
CA-I BLD-MCNC: 4.6 MG/DL (ref 4.4–5.2)
CALCIUM SERPL-MCNC: 8.4 MG/DL (ref 8.8–10.2)
CHLORIDE SERPL-SCNC: 103 MMOL/L (ref 98–107)
CREAT SERPL-MCNC: 1.64 MG/DL (ref 0.67–1.17)
DEPRECATED HCO3 PLAS-SCNC: 22 MMOL/L (ref 22–29)
EGFRCR SERPLBLD CKD-EPI 2021: 43 ML/MIN/1.73M2
GLUCOSE BLDC GLUCOMTR-MCNC: 109 MG/DL (ref 70–99)
GLUCOSE BLDC GLUCOMTR-MCNC: 161 MG/DL (ref 70–99)
GLUCOSE BLDC GLUCOMTR-MCNC: 184 MG/DL (ref 70–99)
GLUCOSE BLDC GLUCOMTR-MCNC: 86 MG/DL (ref 70–99)
GLUCOSE SERPL-MCNC: 95 MG/DL (ref 70–99)
HOLD SPECIMEN: NORMAL
MAGNESIUM SERPL-MCNC: 2.5 MG/DL (ref 1.7–2.3)
PHOSPHATE SERPL-MCNC: 3.3 MG/DL (ref 2.5–4.5)
POTASSIUM SERPL-SCNC: 3.6 MMOL/L (ref 3.4–5.3)
SODIUM SERPL-SCNC: 137 MMOL/L (ref 135–145)

## 2024-04-25 PROCEDURE — 250N000013 HC RX MED GY IP 250 OP 250 PS 637

## 2024-04-25 PROCEDURE — 250N000012 HC RX MED GY IP 250 OP 636 PS 637: Performed by: INTERNAL MEDICINE

## 2024-04-25 PROCEDURE — 97535 SELF CARE MNGMENT TRAINING: CPT | Mod: GO

## 2024-04-25 PROCEDURE — 84100 ASSAY OF PHOSPHORUS: CPT | Performed by: INTERNAL MEDICINE

## 2024-04-25 PROCEDURE — 99233 SBSQ HOSP IP/OBS HIGH 50: CPT | Performed by: INTERNAL MEDICINE

## 2024-04-25 PROCEDURE — 97116 GAIT TRAINING THERAPY: CPT | Mod: GP

## 2024-04-25 PROCEDURE — 80048 BASIC METABOLIC PNL TOTAL CA: CPT

## 2024-04-25 PROCEDURE — 82330 ASSAY OF CALCIUM: CPT

## 2024-04-25 PROCEDURE — 250N000011 HC RX IP 250 OP 636

## 2024-04-25 PROCEDURE — 97110 THERAPEUTIC EXERCISES: CPT | Mod: GO

## 2024-04-25 PROCEDURE — 250N000013 HC RX MED GY IP 250 OP 250 PS 637: Performed by: INTERNAL MEDICINE

## 2024-04-25 PROCEDURE — 999N000157 HC STATISTIC RCP TIME EA 10 MIN

## 2024-04-25 PROCEDURE — 36415 COLL VENOUS BLD VENIPUNCTURE: CPT

## 2024-04-25 PROCEDURE — 210N000001 HC R&B IMCU HEART CARE

## 2024-04-25 PROCEDURE — 83735 ASSAY OF MAGNESIUM: CPT | Performed by: INTERNAL MEDICINE

## 2024-04-25 RX ORDER — GLIPIZIDE 10 MG/1
20 TABLET, FILM COATED, EXTENDED RELEASE ORAL
Status: DISCONTINUED | OUTPATIENT
Start: 2024-04-25 | End: 2024-04-27

## 2024-04-25 RX ORDER — POTASSIUM CHLORIDE 1500 MG/1
20 TABLET, EXTENDED RELEASE ORAL ONCE
Status: COMPLETED | OUTPATIENT
Start: 2024-04-25 | End: 2024-04-25

## 2024-04-25 RX ADMIN — CLOPIDOGREL BISULFATE 75 MG: 75 TABLET ORAL at 08:00

## 2024-04-25 RX ADMIN — POTASSIUM CHLORIDE 20 MEQ: 1500 TABLET, EXTENDED RELEASE ORAL at 07:57

## 2024-04-25 RX ADMIN — ACETAMINOPHEN 650 MG: 325 TABLET ORAL at 21:18

## 2024-04-25 RX ADMIN — POLYETHYLENE GLYCOL 3350 17 G: 17 POWDER, FOR SOLUTION ORAL at 08:00

## 2024-04-25 RX ADMIN — LIDOCAINE 1 PATCH: 4 PATCH TOPICAL at 18:20

## 2024-04-25 RX ADMIN — METOPROLOL TARTRATE 75 MG: 25 TABLET, FILM COATED ORAL at 21:18

## 2024-04-25 RX ADMIN — ANORECTAL OINTMENT: 15.7; .44; 24; 20.6 OINTMENT TOPICAL at 08:07

## 2024-04-25 RX ADMIN — ANORECTAL OINTMENT: 15.7; .44; 24; 20.6 OINTMENT TOPICAL at 14:27

## 2024-04-25 RX ADMIN — ROSUVASTATIN CALCIUM 20 MG: 10 TABLET, FILM COATED ORAL at 21:18

## 2024-04-25 RX ADMIN — SENNOSIDES AND DOCUSATE SODIUM 1 TABLET: 8.6; 5 TABLET ORAL at 21:18

## 2024-04-25 RX ADMIN — THERA TABS 1 TABLET: TAB at 21:18

## 2024-04-25 RX ADMIN — Medication 6 MG: at 21:18

## 2024-04-25 RX ADMIN — HEPARIN SODIUM 5000 UNITS: 10000 INJECTION, SOLUTION INTRAVENOUS; SUBCUTANEOUS at 14:27

## 2024-04-25 RX ADMIN — PANTOPRAZOLE SODIUM 40 MG: 40 TABLET, DELAYED RELEASE ORAL at 08:00

## 2024-04-25 RX ADMIN — INSULIN ASPART 2 UNITS: 100 INJECTION, SOLUTION INTRAVENOUS; SUBCUTANEOUS at 16:40

## 2024-04-25 RX ADMIN — TAMSULOSIN HYDROCHLORIDE 0.4 MG: 0.4 CAPSULE ORAL at 08:01

## 2024-04-25 RX ADMIN — INSULIN GLARGINE 60 UNITS: 100 INJECTION, SOLUTION SUBCUTANEOUS at 08:01

## 2024-04-25 RX ADMIN — INSULIN ASPART 1 UNITS: 100 INJECTION, SOLUTION INTRAVENOUS; SUBCUTANEOUS at 12:43

## 2024-04-25 RX ADMIN — SENNOSIDES AND DOCUSATE SODIUM 1 TABLET: 8.6; 5 TABLET ORAL at 08:00

## 2024-04-25 RX ADMIN — ANORECTAL OINTMENT: 15.7; .44; 24; 20.6 OINTMENT TOPICAL at 21:19

## 2024-04-25 RX ADMIN — HEPARIN SODIUM 5000 UNITS: 10000 INJECTION, SOLUTION INTRAVENOUS; SUBCUTANEOUS at 05:46

## 2024-04-25 RX ADMIN — ASPIRIN 81 MG CHEWABLE TABLET 81 MG: 81 TABLET CHEWABLE at 08:00

## 2024-04-25 RX ADMIN — EZETIMIBE 10 MG: 10 TABLET ORAL at 21:18

## 2024-04-25 RX ADMIN — HEPARIN SODIUM 5000 UNITS: 10000 INJECTION, SOLUTION INTRAVENOUS; SUBCUTANEOUS at 21:18

## 2024-04-25 RX ADMIN — GLIPIZIDE 20 MG: 10 TABLET, FILM COATED, EXTENDED RELEASE ORAL at 12:24

## 2024-04-25 RX ADMIN — METOPROLOL TARTRATE 75 MG: 25 TABLET, FILM COATED ORAL at 08:01

## 2024-04-25 ASSESSMENT — ACTIVITIES OF DAILY LIVING (ADL)
ADLS_ACUITY_SCORE: 35
ADLS_ACUITY_SCORE: 36
ADLS_ACUITY_SCORE: 36
ADLS_ACUITY_SCORE: 35
ADLS_ACUITY_SCORE: 36
ADLS_ACUITY_SCORE: 35
ADLS_ACUITY_SCORE: 36
ADLS_ACUITY_SCORE: 35
ADLS_ACUITY_SCORE: 36
ADLS_ACUITY_SCORE: 35
ADLS_ACUITY_SCORE: 36

## 2024-04-25 NOTE — PROGRESS NOTES
[facility]    Medicine Progress Note - Hospitalist Service    Date of Admission:  4/17/2024    Assessment & Plan   Nirav Baker is a 79 year old old male with HTN, HL, DM, obesity with BMI 45, ELDON, CKD who presented to [facility] with acute chest pain, diagnosed with NSTEMI, transferred to our hospital for coronary angiogram.     #CAD  #NSTEMI  -Coronary angiogram 4/17/2024 showed severe multivessel CAD  -CTS consulted. S/p CABG x 2 4/19. Extubated around 10PM 4/19. Off of phenylephrine gtt since 4/20  -Continue ASA, Heparin gtt discontinued. Plavix added 4/20. Cont BB, statin and ARB.      #DM 2, with high insulin resistance.  A1c 7.2.  -PTA regimen of NovoLog 20 units with breakfast and 30 units with supper, Lantus 60 units in am and 80 units at HS.  -Insulin gtt titrated off 4/20. Started lantus as 40 units BID. Titrated the dose up to 60 units BID. Cont mealtime novolog at 1:7. Glycemic control looks better today  -Cont the sliding scale  -Diabetic diet     #Urinary retention  #Traumatic hematuria  -Patient with h/o BPH, s/p prostatectomy and bladder neck contraction. Difficult catheterization in OR. Urology assisted with lzao insertion. Hematuria has resolved now. Follow up with urology (Dr Figueroa) in the clinic    #GERD-on PPI.  #ELDON-on CPAP.    #Disposition  -ARU recommended. Awaiting auth per /care manager          Diet: Combination Diet Gluten Free Diet; Moderate Consistent Carb (60 g CHO per Meal) Diet; Low Saturated Fat Diet, Low Saturated Fat Na <2400mg Diet    DVT Prophylaxis: Heparin s/c  Lazo Catheter: Not present  Lines: None     Cardiac Monitoring: ACTIVE order. Indication: Open heart surgery (72 hours)  Code Status: Full Code      Clinically Significant Risk Factors        # Hypokalemia: Lowest K = 3.3 mmol/L in last 2 days, will replace as needed       # Hypoalbuminemia: Lowest albumin = 3.2 g/dL at 4/20/2024  4:11 AM, will monitor as  "appropriate     # Hypertension: Noted on problem list       # DMII: A1C = 7.2 % (Ref range: <5.7 %) within past 6 months   # Severe Obesity: Estimated body mass index is 44.62 kg/m  as calculated from the following:    Height as of this encounter: 1.803 m (5' 11\").    Weight as of this encounter: 145.1 kg (319 lb 14.4 oz).             Disposition Plan     Medically Ready for Discharge: Medically ready for ARU         OTONIEL Nichole  Hospitalist Service  [facility]  Securely message with [employer] (more info)  Text page via [employer] Paging/Directory   ______________________________________________________________________    Interval History   Patient seen and examined his morning. No new issues overnight. He feels ready to move to acute rehab.     Physical Exam   Vital Signs: Temp: 98  F (36.7  C) Temp src: Oral BP: 133/61 Pulse: 72   Resp: 18 SpO2: 97 % O2 Device: None (Room air)    Weight: 319 lbs 14.4 oz      General: Not in obvious distress.  HEENT: NC, AT   Chest: Clear to auscultation bilaterally.  Heart: S1S2 normal, regular. No M/R/G  Abdomen: Soft. NT, ND. Bowel sounds- active.  Extremities: No legs swelling  Neuro: Alert and awake, grossly non-focal      Medical Decision Making             Data     I have personally reviewed the following data over the past 24 hrs:    N/A  \   N/A   / N/A     137 103 43.5 (H) /  109 (H)   3.6 22 1.64 (H) \       Imaging results reviewed over the past 24 hrs:   No results found for this or any previous visit (from the past 24 hour(s)).    "

## 2024-04-25 NOTE — PROGRESS NOTES
Pt A&OX4, up sitting on side of bed for meals, walked in gray with therapy. Due to walk in gray X2 this evening. Had shower with antibacterial soap to all incisions. Incisions DOMINICK, no drainage. Appetite good. Good UOP. Had BM. IS and flutter valve done hourly. Pt denies pain or discomfort. Pt to discharge to [facility] tomorrow. Plan of care ongoing.

## 2024-04-25 NOTE — PLAN OF CARE
Goal Outcome Evaluation:       sc  Patient Name: Nirav Baker   MRN: [id number]   Date of Admission: 4/17/2024    Procedure: Procedure(s):  CORONARY ARTERY BYPASS GRAFT TIMES  TWO  WITH LEFT INTERNAL MAMMARY ARTERY HARVEST, LEFT ENDOSCOPIC VESSEL PROCUREMENT, ANESTHESIA TRANSESOPHAGEAL ECHOCARDIOGRAM, EPI AORTIC ULTRASOUND      Post Op day #:6    Subjective (Patient focus/Primary Problem for shift): Take shower, get in 4 walks            Pain Goal mild Pain Rating0-2           Pain Medication/ Regime effective to reduce patient pain prn tyelnol and scheduled lidocaine patches     Objective (Physical assessment):           Rhythm:  SR 1st AVB BBB             Bowel Activity: yes if Yes indicate when: today            Bowel Medications: yes            Incision: healing well          Incentive Spirometry Q 1-2 hour when awake:  yes Volume: 1500          Epicardial Pacing Wires:  not applicable            Patient Activity:           Up to chair for meals: yes          Ambulation with RN x2 (Not including CR): no            Is patient in home clothes:no             Chest Tubes   Pleural: not applicable Draining: not applicable               Suction: not applicable              Mediastinal: not applicable Draining: not applicable               Suction: not applicable   Dressing Change Daily:no If No, why?Pt had wound vac removed by CV surgery and due to shower                     Urinary Catheter: no           Preventative WOC consult (need MD order): no       Assessment (Nursing primary shift focus): Take shower, ambulate    Plan (Patient Care Plan/focus): Pt A&OX4, up with assist of one, uses walker for long distances in gray. Compliant with sternal precautions. Due to shower this afternoon. Discharging to acute rehab today pending insurance. Pain 0 to mild. Declines pain meds. Appetite good. Voiding well, denies urinary retention sx. Having Bms, doing IS and flutter valve hourly. In chair for meals. Plan of care  ongoing.       Deysi Ambrose RN   4/25/2024   10:43 AM

## 2024-04-25 NOTE — PLAN OF CARE
sc  Patient Name: Nirav Baker   MRN: [id number]   Date of Admission: 4/17/2024    Procedure: Procedure(s):  CORONARY ARTERY BYPASS GRAFT TIMES  TWO  WITH LEFT INTERNAL MAMMARY ARTERY HARVEST, LEFT ENDOSCOPIC VESSEL PROCUREMENT, ANESTHESIA TRANSESOPHAGEAL ECHOCARDIOGRAM, EPI AORTIC ULTRASOUND    Post Op day #: 6    Subjective (Patient focus/Primary Problem for shift): ambulating, gaining strength to be independent.          Pain Goal 0 Pain Rating 8           Pain Medication/ Regime effective to reduce patient pain oxycodone 5mg    Objective (Physical assessment):           Rhythm: sinus rhythm, first AV block BBB            Bowel Activity: yes if Yes indicate when:  04/25/24          Bowel Medications: no            Incision: healing well          Incentive Spirometry Q 1-2 hour when awake:  yes Volume: 1000          Epicardial Pacing Wires:  not applicable            Patient Activity:           Up to chair for meals: yes          Ambulation with RN x2 (Not including CR): yes            Is patient in home clothes:no             Chest Tubes   Pleural: not applicable Draining: not applicable               Suction: not applicable              Mediastinal: not applicable Draining: not applicable               Suction: not applicable   Dressing Change Daily:not applicable If No, why?  DURING DAY TIME                     Urinary Catheter: no           Preventative WOC consult (need MD order): not applicable       Assessment (Nursing primary shift focus): pain and SOB with ambulation  Plan (Patient Care Plan/focus): ambulating with nursing staff.   Pt alert and oriented x4, reported pain and PRN oxycodone was offered and effective.  No SOB reported through the shift, surgical site In chest and leg WDL,  was able to ambulate with nursing staff once through the night. Requested to sit on the side of bed instead of the recliner in the morning, reported recliner is uncomfortable for him.       Howie Douglas RN    4/25/2024   5:51 AM

## 2024-04-25 NOTE — CONSULTS
NUTRITION EDUCATION      REASON FOR ASSESSMENT:  Pt's wife requested some education on heart healthy/ low sodium diet    NUTRITION HISTORY:  Information obtained from pt's wife and pt.  Pt has been following a gluten free diet for ~ 16 yrs.  His wife, Afshan makes everything from scratch. They mostly use fresh or frozen vegetables and have ice cream occasionally.  They use country crock instead of butter.    CURRENT DIET:  Gluten free, moderate consistent CHO ( 60 g CHO per meal), low saturated fat <2400 mg Na    NUTRITION DIAGNOSIS:  Food- and nutrition-related knowledge deficit R/t NSTEMI/DM as evidenced by wife having questions about diet    INTERVENTIONS:    Nutrition Prescription:  Gluten free, low sat fat < 2400 mg Na, continue consistent CHO diet    Implementation:      *  Nutrition Education (Content):   A)  Provided handout heart healthy eating nutrition therapy, food choice guidelines for heart healthy eating with diabetes, food groups and serving sizes, CHO counting for people with diabetes   B)  Discussed low saturated fat, low sodium, getting adequate fiber, continue consistent CHO diet      *  Nutrition Education (Application):   A)  Discussed current eating habits and recommended alternative food choices      *  Anticipate good compliance      *  Diet Education - refer to Education Flowsheet    Goals:      *  Patient will verbalize understanding of diet ( Pt's wife)-met      *  All of the above goals met during the education session    Follow Up/Monitoring:      *  Provided RD contact information for future questions      *  Recommended Out-Patient Nutrition Referral, if further diet instructions are needed

## 2024-04-25 NOTE — PROGRESS NOTES
"Care Management Follow Up    Length of Stay (days): 8    Expected Discharge Date: 04/25/2024     Concerns to be Addressed:     Care progression  Patient plan of care discussed at interdisciplinary rounds: Yes    Anticipated Discharge Disposition:  [facility]     Anticipated Discharge Services:  [facility]  Anticipated Discharge DME:  NA    Patient/family educated on Medicare website which has current facility and service quality ratings:  NA  Education Provided on the Discharge Plan: Yes per team  Patient/Family in Agreement with the Plan: yes    Referrals Placed by CM/SW:  Yes  Private pay costs discussed: transportation costs    Additional Information:  0825 called [facility] and spoke with Jackson. He states Reg is following patient.  Sent a message to Reg regarding discharge and transport ride: [facility] w/c ride arranged for window [phone].     0854 Reg responded, \"He was just accepted to come in medically. However his insurance auth remains pending. I will be in touch with you throughout the day on this. We will likely have to cancel/reschedule his ride to tomorrow if we do not get auth by ~2pm.\"      Called  [facility] and spoke with Cesar to verify transport: today [phone].  Paged to update bedside nurse, Deysi GARCIA with cardiothoracic checked for [facility] auth, no auth yet, still pending.     Social Hx: \"[place] accepted. [facility] auth pending. Live w/spouse at home, independent.  Spouse is primary contact. [facility] scheduled [phone] w/c ride. Will need to cancel if auth still pending.\"     RNCM to follow for medical progression, recommendations, and final discharge plan.     Chitra Gill RN     Per provider, Dr. Faulkner, patient ready for discharge to [facility].     [id number] rec'd message from Reg with [facility], \"No auth yet. I called earlier and they have all the materials. It is \"in process\". Will check in right before 2pm.\"     Met with patient and his wife, " "Afshan at bedside to discuss the above. The wife request for a lift chair and also wants info regarding his new diet.    Message sent to provider Dr. Faulkner for the lift chair and dietician consult.    [phone] rec'd message from Reg, \"no auth yet, can you bump the ride to tomorrw?\"    Called [facility] to change ride to tomorrow [phone].    Sent message to update Abimbola Ulloa, patient and his spouse  "

## 2024-04-25 NOTE — PROGRESS NOTES
"CVTS Daily Progress Note   POD#6 s/p CABG x2 (LIMA to LAD, rSVG to ramus), RLE EVH, Campos catheter placement per urology  Attending:  Grace  LOS: 8    SUBJECTIVE/INTERVAL EVENTS:    No acute events overnight. Patient progressing well. Maintaining oxygen saturations on room air Normotensive. Ambulating with therapy. Pain well controlled. +BM. Tolerating diet. UOP adequate. Hgb stable. Patient denies new chest pain, shortness of breath, abdominal pain, calf pain, nausea. Patient has no questions today.     OBJECTIVE:  Temp:  [98.1  F (36.7  C)-98.9  F (37.2  C)] 98.1  F (36.7  C)  Pulse:  [64-88] 79  Resp:  [20] 20  BP: (112-125)/(56-68) 118/59  FiO2 (%):  [21 %] 21 %  SpO2:  [94 %-98 %] 98 %  Vitals:    04/21/24 0634 04/22/24 0700 04/23/24 0530 04/24/24 0500   Weight: 147.1 kg (324 lb 6.4 oz) 145.2 kg (320 lb 1.6 oz) 144.4 kg (318 lb 4.8 oz) 143.7 kg (316 lb 14.4 oz)    04/25/24 0429   Weight: 145.1 kg (319 lb 14.4 oz)       Clinically Significant Risk Factors        # Hypokalemia: Lowest K = 3.3 mmol/L in last 2 days, will replace as needed       # Hypoalbuminemia: Lowest albumin = 3.2 g/dL at 4/20/2024  4:11 AM, will monitor as appropriate       # Hypertension: Noted on problem list       # DMII: A1C = 7.2 % (Ref range: <5.7 %) within past 6 months   # Severe Obesity: Estimated body mass index is 44.62 kg/m  as calculated from the following:    Height as of this encounter: 1.803 m (5' 11\").    Weight as of this encounter: 145.1 kg (319 lb 14.4 oz).            Clinically Significant Risk Factors        # Hypokalemia: Lowest K = 3.3 mmol/L in last 2 days, will replace as needed       # Hypoalbuminemia: Lowest albumin = 3.2 g/dL at 4/20/2024  4:11 AM, will monitor as appropriate       # Hypertension: Noted on problem list       # DMII: A1C = 7.2 % (Ref range: <5.7 %) within past 6 months   # Severe Obesity: Estimated body mass index is 44.62 kg/m  as calculated from the following:    Height as of this encounter: 1.803 " "m (5' 11\").    Weight as of this encounter: 145.1 kg (319 lb 14.4 oz).               Current Medications:    Scheduled Meds:  Current Facility-Administered Medications   Medication Dose Route Frequency Provider Last Rate Last Admin    aspirin (ASA) chewable tablet 81 mg  81 mg Oral Daily Rahat Faulkner MBBS   81 mg at 04/25/24 0800    clopidogrel (PLAVIX) tablet 75 mg  75 mg Oral Daily Ana Rosa Quiñones PA-C   75 mg at 04/25/24 0800    ezetimibe (ZETIA) tablet 10 mg  10 mg Oral At Bedtime Rahat Faulkner MBBS   10 mg at 04/24/24 2148    glipiZIDE (GLUCOTROL XL) 24 hr tablet 20 mg  20 mg Oral Daily with lunch Rahat Faulkner MBBS        heparin ANTICOAGULANT injection 5,000 Units  5,000 Units Subcutaneous Q8H Ana Rosa Quiñnoes PA-C   5,000 Units at 04/25/24 0546    insulin aspart (NovoLOG) injection (RAPID ACTING)   Subcutaneous TID AC Rahat Faulkner MBBS   7 Units at 04/25/24 0809    insulin aspart (NovoLOG) injection (RAPID ACTING)  1-10 Units Subcutaneous TID AC Ana Rosa Quiñones PA-C   1 Units at 04/24/24 1803    insulin aspart (NovoLOG) injection (RAPID ACTING)  1-7 Units Subcutaneous At Bedtime Ana Rosa Quiñones PA-C   2 Units at 04/23/24 2111    insulin glargine (LANTUS PEN) injection 60 Units  60 Units Subcutaneous BID Rahat Faulkner MBBS   60 Units at 04/25/24 0801    Lidocaine (LIDOCARE) 4 % Patch 1-2 patch  1-2 patch Transdermal Q24H Ana Rosa Quiñones PA-C   1 patch at 04/24/24 1803    melatonin tablet 6 mg  6 mg Oral At Bedtime Ana Rosa Quiñones PA-C   6 mg at 04/24/24 2148    menthol-zinc oxide (CALMOSEPTINE) 0.44-20.6 % ointment OINT   Topical TID Rahat Faulkner MBBS   Given at 04/25/24 0807    [Held by provider] metFORMIN (GLUCOPHAGE) tablet 500 mg  500 mg Oral Daily with supper Ana Rosa Quiñones PA-C        [COMPLETED] methadone (DOLOPHINE) injection 20 mg  20 mg Intravenous Once Vincent Aguirre MD        metoprolol tartrate (LOPRESSOR) tablet 75 " mg  75 mg Oral BID Ana Rosa Quiñones PA-C   75 mg at 04/25/24 0801    multivitamin, therapeutic (THERA-VIT) tablet 1 tablet  1 tablet Oral At Bedtime Ana Rosa Quiñones PA-C   1 tablet at 04/24/24 2149    pantoprazole (PROTONIX) EC tablet 40 mg  40 mg Oral Daily Ana Rosa Quiñones PA-C   40 mg at 04/25/24 0800    polyethylene glycol (MIRALAX) Packet 17 g  17 g Oral Daily Ana Rosa Quiñones PA-C   17 g at 04/25/24 0800    rosuvastatin (CRESTOR) tablet 20 mg  20 mg Oral At Bedtime Rahat Faulkner MBBS   20 mg at 04/24/24 2148    senna-docusate (SENOKOT-S/PERICOLACE) 8.6-50 MG per tablet 1 tablet  1 tablet Oral BID Ana Rosa Quiñones PA-C   1 tablet at 04/25/24 0800    [Held by provider] sitagliptin (JANUVIA) tablet 100 mg  100 mg Oral At Bedtime Ana Rosa Quiñones PA-C        tamsulosin (FLOMAX) capsule 0.4 mg  0.4 mg Oral Daily Ana Rosa Quiñones PA-C   0.4 mg at 04/25/24 0801     Continuous Infusions:  Current Facility-Administered Medications   Medication Dose Route Frequency Provider Last Rate Last Admin     PRN Meds:.  Current Facility-Administered Medications   Medication Dose Route Frequency Provider Last Rate Last Admin    acetaminophen (TYLENOL) tablet 650 mg  650 mg Oral Q4H PRN Ana Rosa Quiñones PA-C        bisacodyl (DULCOLAX) suppository 10 mg  10 mg Rectal Daily PRN Ana Rosa Quiñones PA-C        glucose gel 15-30 g  15-30 g Oral Q15 Min PRN Ana Rosa Quiñones PA-C        Or    glucagon injection 1 mg  1 mg Subcutaneous Q15 Min PRN Ana Rosa Quiñones PA-C        magnesium hydroxide (MILK OF MAGNESIA) suspension 30 mL  30 mL Oral Daily PRN Ana Rosa Quiñones PA-C        naloxone (NARCAN) injection 0.2 mg  0.2 mg Intravenous Q2 Min PRN Ana Rosa Quiñones PA-C        Or    naloxone (NARCAN) injection 0.4 mg  0.4 mg Intravenous Q2 Min PRN Ana Rosa Quiñones PA-C        Or    naloxone (NARCAN) injection 0.2 mg  0.2 mg Intramuscular Q2 Min PRN Ana Rosa Quiñones  GEORGE ANDERSON        Or    naloxone (NARCAN) injection 0.4 mg  0.4 mg Intramuscular Q2 Min PRN Ana Rosa Quiñones PA-C        ondansetron (ZOFRAN ODT) ODT tab 4 mg  4 mg Oral Q6H PRN Ana Rosa Quiñones PA-C        oxyCODONE IR (ROXICODONE) half-tab 2.5 mg  2.5 mg Oral Q4H PRN Ana Rosa Quiñones PA-C        Or    oxyCODONE (ROXICODONE) tablet 5 mg  5 mg Oral Q4H PRN Ana Rosa Quiñones PA-C   5 mg at 04/24/24 2148    prochlorperazine (COMPAZINE) tablet 5 mg  5 mg Oral Q6H PRN Ana Rosa Quiñones PA-C           Cardiographics:    Telemetry monitoring demonstrates NSR with rates in the 60s per my personal review.    Imaging:  Results for orders placed or performed during the hospital encounter of 04/17/24   US Carotid Bilateral    Impression    IMPRESSION:  1.  Mild plaque formation, velocities consistent with less than 50% stenosis in the right internal carotid artery.  2.  Mild plaque formation, velocities consistent with less than 50% stenosis in the left internal carotid artery.  3.  Flow within the vertebral arteries is antegrade.   XR Chest Port 1 View    Impression    IMPRESSION:     Endotracheal tube tip is 5.3 cm above the piedad. Right internal jugular approach central venous catheter tip is in the upper SVC. Mediastinal and left pleural drains. Multiple median sternotomy wires and mediastinal clips.    Lung volumes are low. Left greater than right bibasilar airspace opacities are favored to reflect atelectasis, although airspace disease is not excluded. Small left pleural effusion. No right pleural effusion. No pneumothorax.    Stable cardiomegaly.   XR Chest Port 1 View    Impression    IMPRESSION: Sternotomy, CABG, mediastinal drain and left chest tube. Right IJ catheter tip in the SVC. The patient has been extubated. Cardiac size approaches upper limits of normal, improved since previous. Strands of platelike atelectasis in both   lungs, less evident on current study. Tiny left pleural effusion,  interval improvement. No appreciable effusion on the right. Mild degenerative changes thoracic spine. Monitoring leads overlying the chest.         Labs, personally reviewed.  Hemoglobin   Date Value Ref Range Status   04/22/2024 10.1 (L) 13.3 - 17.7 g/dL Final   04/21/2024 10.8 (L) 13.3 - 17.7 g/dL Final   04/20/2024 12.6 (L) 13.3 - 17.7 g/dL Final     WBC Count   Date Value Ref Range Status   04/22/2024 10.6 4.0 - 11.0 10e3/uL Final   04/21/2024 12.8 (H) 4.0 - 11.0 10e3/uL Final   04/20/2024 17.1 (H) 4.0 - 11.0 10e3/uL Final     Platelet Count   Date Value Ref Range Status   04/23/2024 156 150 - 450 10e3/uL Final   04/22/2024 132 (L) 150 - 450 10e3/uL Final   04/21/2024 128 (L) 150 - 450 10e3/uL Final     Creatinine   Date Value Ref Range Status   04/25/2024 1.64 (H) 0.67 - 1.17 mg/dL Final   04/24/2024 1.83 (H) 0.67 - 1.17 mg/dL Final   04/23/2024 1.86 (H) 0.67 - 1.17 mg/dL Final     Potassium   Date Value Ref Range Status   04/25/2024 3.6 3.4 - 5.3 mmol/L Final   04/24/2024 3.7 3.4 - 5.3 mmol/L Final   04/24/2024 3.3 (L) 3.4 - 5.3 mmol/L Final   08/24/2022 4.8 3.5 - 5.0 mmol/L Final   03/30/2022 3.9 3.5 - 5.0 mmol/L Final   03/17/2022 4.9 3.5 - 5.0 mmol/L Final     Potassium POCT   Date Value Ref Range Status   04/19/2024 4.0 3.4 - 5.3 mmol/L Final   04/19/2024 4.6 3.4 - 5.3 mmol/L Final   04/19/2024 4.9 3.4 - 5.3 mmol/L Final     Magnesium   Date Value Ref Range Status   04/25/2024 2.5 (H) 1.7 - 2.3 mg/dL Final   04/24/2024 2.5 (H) 1.7 - 2.3 mg/dL Final   04/23/2024 2.2 1.7 - 2.3 mg/dL Final          I/O:  I/O last 3 completed shifts:  In: 1260 [P.O.:1260]  Out: 700 [Urine:700]       Physical Exam:     General: Patient seen sitting on edge of bed this AM. NAD. Conversant. Pleasant.   HEENT: GERDA, no sclera icterus, moist mucosa  CV: RRR on monitor. 2+ peripheral pulses in all extremities. Mild edema.   Pulm: Non-labored effort on RA. Incision C/D/I.  Abd: Soft, NT, ND  : Voiding  Ext: Mild pedal edema, SCDs  in place, warm, distal pulses intact  Neuro: CNs grossly intact.       ASSESSMENT/PLAN:    Nirav Baker is a 79 year old male with a history of HTN, HLD, DM, obesity, CKD, and prostatectomy w/ bladder strictures who is s/p CABG x2 & RLE EVH in s/o NSTEMI.    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)    Dyslipidemia    Benign essential HTN    Acute chest pain    NSTEMI (non-ST elevated myocardial infarction) (H)    Coronary artery disease involving native coronary artery of native heart, unspecified whether angina present      NEURO:   - Scheduled Tylenol/lidocaine patches and PRN Tylenol for pain  - PRN melatonin    CV:   - Pre-op EF 65%  - Chest tubes/TPW removed POD#2  - Normotensive  - Metoprolol 75mg BID   - Rosuvastatin 20mg daily and zetia 10 mg daily  - ASA 81mg (plavix)  - Plavix per surgeon preference for graft patency in s/o NSTEMI (ASA)    PULM:   - Extubated POD#0  - Maintaining oxygen saturations on room air this AM  - Encourage pulmonary toilet    FEN/GI:  - Continue electrolyte replacement protocol  - Diet: Cardiac, ADAT   - Bowel regimen, +BM    RENAL:  - Adequate UOP/hr. Continue to monitor closely.  - Cr 1.6 (1.83) this AM, baseline to 1.3  - 0.4 mg flomax qday  - Campos placed per urology removed 4/23, voiding w/o issue currently.  - Urology consult for bladder stricture, appreciate recs, signed off.  - Follow-up outpatient w/ urologist Dr. Figueroa (not yet scheduled)  - Continuing to hold diuresis as patient is at preop weight w/ good UOP + Cr improving off diuresis    HEME:  - Acute blood loss anemia post-op.   - Hgb stable, no bleeding concerns. Hep SQ, ASA    ID:  - Sujata op ppx complete, afebrile. No concerns for infection  - Leukocytosis, resolved.  - Chest wound vac  removed. OK to shower    ENDO:   - HbA1c 7.2%  - BG goal < 180 to promote optimal healing  - Hospitalists managing DM2, appreciate assistance.  - PTA regimen of NovoLog 20 units with breakfast and 30 units with supper, Lantus 60 units in am and 80 units at HS.  - 60u lantus BID, high dose SSI, 1:7 carb count prandial dosing  - Continue SSI  - Holding PTA  metformin, and sitagliptin, plan to resume closer to discharge pending renal function  -Resume PTA glipizide at lunch time                  PPx:   - DVT: SCDs, SQ heparin TID, ambulation   - GI: Protonix 40mg PO daily    DISPO:   - General tele status (Transferred POD#1)  - PT/OT recommending ARU  - Medically Ready for Discharge today.  All IV medications discontinued.  - ARU today pending auth.    Patient discussed with Dr. Edwards.    Abimbola Uribe PA-C  [facility]  Vocera or pager [phone]

## 2024-04-25 NOTE — PROGRESS NOTES
CLINICAL NUTRITION SERVICES    Reviewed nutrition risk factors due to LOS. Pt is tolerating diet, eating well per nursing documentation. No nutrition issues identified at this time. RD will follow via rounds at this time, unless consulted.

## 2024-04-26 ENCOUNTER — APPOINTMENT (OUTPATIENT)
Dept: OCCUPATIONAL THERAPY | Facility: HOSPITAL | Age: 79
DRG: 234 | End: 2024-04-26
Attending: INTERNAL MEDICINE
Payer: COMMERCIAL

## 2024-04-26 ENCOUNTER — APPOINTMENT (OUTPATIENT)
Dept: PHYSICAL THERAPY | Facility: HOSPITAL | Age: 79
DRG: 234 | End: 2024-04-26
Attending: INTERNAL MEDICINE
Payer: COMMERCIAL

## 2024-04-26 LAB
ANION GAP SERPL CALCULATED.3IONS-SCNC: 12 MMOL/L (ref 7–15)
BUN SERPL-MCNC: 38.5 MG/DL (ref 8–23)
CALCIUM SERPL-MCNC: 8.7 MG/DL (ref 8.8–10.2)
CHLORIDE SERPL-SCNC: 104 MMOL/L (ref 98–107)
CREAT SERPL-MCNC: 1.89 MG/DL (ref 0.67–1.17)
DEPRECATED HCO3 PLAS-SCNC: 25 MMOL/L (ref 22–29)
EGFRCR SERPLBLD CKD-EPI 2021: 36 ML/MIN/1.73M2
GLUCOSE BLDC GLUCOMTR-MCNC: 102 MG/DL (ref 70–99)
GLUCOSE BLDC GLUCOMTR-MCNC: 118 MG/DL (ref 70–99)
GLUCOSE BLDC GLUCOMTR-MCNC: 121 MG/DL (ref 70–99)
GLUCOSE BLDC GLUCOMTR-MCNC: 126 MG/DL (ref 70–99)
GLUCOSE SERPL-MCNC: 69 MG/DL (ref 70–99)
MAGNESIUM SERPL-MCNC: 2.6 MG/DL (ref 1.7–2.3)
PHOSPHATE SERPL-MCNC: 4 MG/DL (ref 2.5–4.5)
PLATELET # BLD AUTO: 269 10E3/UL (ref 150–450)
POTASSIUM SERPL-SCNC: 3.5 MMOL/L (ref 3.4–5.3)
SODIUM SERPL-SCNC: 141 MMOL/L (ref 135–145)

## 2024-04-26 PROCEDURE — 250N000013 HC RX MED GY IP 250 OP 250 PS 637

## 2024-04-26 PROCEDURE — 97116 GAIT TRAINING THERAPY: CPT | Mod: GP

## 2024-04-26 PROCEDURE — 83735 ASSAY OF MAGNESIUM: CPT | Performed by: INTERNAL MEDICINE

## 2024-04-26 PROCEDURE — 36415 COLL VENOUS BLD VENIPUNCTURE: CPT

## 2024-04-26 PROCEDURE — 250N000013 HC RX MED GY IP 250 OP 250 PS 637: Performed by: INTERNAL MEDICINE

## 2024-04-26 PROCEDURE — 80048 BASIC METABOLIC PNL TOTAL CA: CPT

## 2024-04-26 PROCEDURE — 97535 SELF CARE MNGMENT TRAINING: CPT | Mod: GO

## 2024-04-26 PROCEDURE — 210N000001 HC R&B IMCU HEART CARE

## 2024-04-26 PROCEDURE — 84100 ASSAY OF PHOSPHORUS: CPT | Performed by: INTERNAL MEDICINE

## 2024-04-26 PROCEDURE — 99232 SBSQ HOSP IP/OBS MODERATE 35: CPT | Performed by: INTERNAL MEDICINE

## 2024-04-26 PROCEDURE — 97530 THERAPEUTIC ACTIVITIES: CPT | Mod: GP

## 2024-04-26 PROCEDURE — 97110 THERAPEUTIC EXERCISES: CPT | Mod: GO

## 2024-04-26 PROCEDURE — 250N000011 HC RX IP 250 OP 636

## 2024-04-26 PROCEDURE — 85049 AUTOMATED PLATELET COUNT: CPT

## 2024-04-26 RX ORDER — CLOPIDOGREL BISULFATE 75 MG/1
75 TABLET ORAL DAILY
DISCHARGE
Start: 2024-04-26 | End: 2024-04-27

## 2024-04-26 RX ORDER — POTASSIUM CHLORIDE 1500 MG/1
20 TABLET, EXTENDED RELEASE ORAL ONCE
Status: COMPLETED | OUTPATIENT
Start: 2024-04-26 | End: 2024-04-26

## 2024-04-26 RX ORDER — METOPROLOL TARTRATE 75 MG/1
75 TABLET, FILM COATED ORAL 2 TIMES DAILY
DISCHARGE
Start: 2024-04-26 | End: 2024-04-27

## 2024-04-26 RX ORDER — TAMSULOSIN HYDROCHLORIDE 0.4 MG/1
0.4 CAPSULE ORAL DAILY
DISCHARGE
Start: 2024-04-26 | End: 2024-04-27

## 2024-04-26 RX ORDER — GLIPIZIDE 10 MG/1
20 TABLET, FILM COATED, EXTENDED RELEASE ORAL
DISCHARGE
Start: 2024-04-26 | End: 2024-04-27

## 2024-04-26 RX ORDER — ROSUVASTATIN CALCIUM 20 MG/1
20 TABLET, COATED ORAL AT BEDTIME
DISCHARGE
Start: 2024-04-26 | End: 2024-04-27

## 2024-04-26 RX ORDER — ASPIRIN 81 MG/1
81 TABLET, CHEWABLE ORAL DAILY
DISCHARGE
Start: 2024-04-26 | End: 2024-04-27

## 2024-04-26 RX ADMIN — EZETIMIBE 10 MG: 10 TABLET ORAL at 21:56

## 2024-04-26 RX ADMIN — ANORECTAL OINTMENT: 15.7; .44; 24; 20.6 OINTMENT TOPICAL at 21:57

## 2024-04-26 RX ADMIN — ROSUVASTATIN CALCIUM 20 MG: 10 TABLET, FILM COATED ORAL at 21:55

## 2024-04-26 RX ADMIN — HEPARIN SODIUM 5000 UNITS: 10000 INJECTION, SOLUTION INTRAVENOUS; SUBCUTANEOUS at 14:17

## 2024-04-26 RX ADMIN — HEPARIN SODIUM 5000 UNITS: 10000 INJECTION, SOLUTION INTRAVENOUS; SUBCUTANEOUS at 06:05

## 2024-04-26 RX ADMIN — ASPIRIN 81 MG CHEWABLE TABLET 81 MG: 81 TABLET CHEWABLE at 08:13

## 2024-04-26 RX ADMIN — POLYETHYLENE GLYCOL 3350 17 G: 17 POWDER, FOR SOLUTION ORAL at 08:13

## 2024-04-26 RX ADMIN — ACETAMINOPHEN 650 MG: 325 TABLET ORAL at 17:07

## 2024-04-26 RX ADMIN — LIDOCAINE 2 PATCH: 4 PATCH TOPICAL at 17:07

## 2024-04-26 RX ADMIN — GLIPIZIDE 20 MG: 10 TABLET, FILM COATED, EXTENDED RELEASE ORAL at 12:29

## 2024-04-26 RX ADMIN — TAMSULOSIN HYDROCHLORIDE 0.4 MG: 0.4 CAPSULE ORAL at 08:13

## 2024-04-26 RX ADMIN — THERA TABS 1 TABLET: TAB at 21:56

## 2024-04-26 RX ADMIN — ANORECTAL OINTMENT: 15.7; .44; 24; 20.6 OINTMENT TOPICAL at 08:13

## 2024-04-26 RX ADMIN — POTASSIUM CHLORIDE 20 MEQ: 1500 TABLET, EXTENDED RELEASE ORAL at 06:05

## 2024-04-26 RX ADMIN — SENNOSIDES AND DOCUSATE SODIUM 1 TABLET: 8.6; 5 TABLET ORAL at 21:56

## 2024-04-26 RX ADMIN — Medication 6 MG: at 21:55

## 2024-04-26 RX ADMIN — METOPROLOL TARTRATE 75 MG: 25 TABLET, FILM COATED ORAL at 08:13

## 2024-04-26 RX ADMIN — SENNOSIDES AND DOCUSATE SODIUM 1 TABLET: 8.6; 5 TABLET ORAL at 08:13

## 2024-04-26 RX ADMIN — HEPARIN SODIUM 5000 UNITS: 10000 INJECTION, SOLUTION INTRAVENOUS; SUBCUTANEOUS at 21:55

## 2024-04-26 RX ADMIN — CLOPIDOGREL BISULFATE 75 MG: 75 TABLET ORAL at 08:13

## 2024-04-26 RX ADMIN — ANORECTAL OINTMENT: 15.7; .44; 24; 20.6 OINTMENT TOPICAL at 14:17

## 2024-04-26 RX ADMIN — PANTOPRAZOLE SODIUM 40 MG: 40 TABLET, DELAYED RELEASE ORAL at 08:13

## 2024-04-26 ASSESSMENT — ACTIVITIES OF DAILY LIVING (ADL)
ADLS_ACUITY_SCORE: 34
ADLS_ACUITY_SCORE: 34
ADLS_ACUITY_SCORE: 32
ADLS_ACUITY_SCORE: 34
ADLS_ACUITY_SCORE: 35
ADLS_ACUITY_SCORE: 32
ADLS_ACUITY_SCORE: 32
ADLS_ACUITY_SCORE: 34
ADLS_ACUITY_SCORE: 35
ADLS_ACUITY_SCORE: 34
ADLS_ACUITY_SCORE: 35
ADLS_ACUITY_SCORE: 34
ADLS_ACUITY_SCORE: 35
ADLS_ACUITY_SCORE: 32
ADLS_ACUITY_SCORE: 34
ADLS_ACUITY_SCORE: 35
ADLS_ACUITY_SCORE: 32
ADLS_ACUITY_SCORE: 35
ADLS_ACUITY_SCORE: 35

## 2024-04-26 NOTE — PROGRESS NOTES
Pt was found with a wound on his left lower leg, covered with Mepilex foam dated Apr 25.  Affected area is moist, edges blackened and whitish drainage in the middle. Pt reported it was from a wrap on his legs.   Hospitalist updated and requested for a WOC consult.  Dressings replaced after pt had a shower.

## 2024-04-26 NOTE — DISCHARGE SUMMARY
Cardiovascular Surgery Discharge Summary    Primary Care Physician:  Antelmo [facility]  Discharge Provider: Abimbola Uribe PA-C  Admission Date: 4/27/2024  Admission Diagnoses: NSTEMI (non-ST elevated myocardial infarction) (H) [I21.4]  Discharge Date: 4/26/2024  Disposition: Home with home care  Condition at Discharge: Good  Code Status: Prior     Principal Diagnosis:   Acute non-ST segment elevation myocardial infarction (H) s/p CABG x2 (LIMA to LAD, rSVG to ramus), RLE EVH, Campos catheter placement per urology     Discharge Diagnoses:    Active Problems:      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)    Cellulitis of right leg    Cardiovascular disease    Dyslipidemia    Benign essential HTN    Actinic keratosis    Dermatographic urticaria    Adverse effect of antihyperlipidemic and antiarteriosclerotic drugs, subsequent encounter    Benign neoplasm of colon    Bilateral hip pain    Chronic bilateral low back pain without sciatica    Chronic right-sided thoracic back pain    Diarrhea    Elevated prostate specific antigen (PSA)    Hernia of abdominal wall    Hypovitaminosis D    Incisional hernia    Retention of urine    Sepsis due to Escherichia coli (H)    Stool fat increased    Uncontrolled daytime somnolence    Calculus of ureter    Calculus of kidney    Bladder neck obstruction    Acute chest pain    NSTEMI (non-ST elevated myocardial infarction) (H)    Coronary artery disease  involving native coronary artery of native heart, unspecified whether angina present       Consult/s: Dietary, critical care medicine, cardiology, urology, hospitalist medicine services    Surgery:   4/19/2024 with Dr. Edwards and Dr. Kent (Urology)    PROCEDURES PERFORMED:    1) Median sternotomy  2) Left internal mammary artery harvest  3) Endoscopic harvest of left saphenous vein   4) Epiaortic ultrasound of the ascending aorta  5) Placement on central cardiopulmonary bypass  6) Coronary artery bypass grafting x2 (in-situ left internal mammary artery to left anterior descending and reverse saphenous vein graft to ramus intermedius)   7) Placement of temporary ventricular pacing wires  8) Transesophageal echocardiography  9) Lazo catheter placement per Dr. Kent (urology)    FINDINGS:  Obtuse marginal and right posterolateral arteries were too small to bypass. Also, lazo was initially placed without issue but was noticed to have no output. After the nurse attempted irrigation there was clot in the catheter and we were unable to get good urine return. An intraoperative Urology consult was called and Dr. Kent kindly came to further interrogate the lazo. After additional flushing and manipulation she was able to get good urine return and ultimately recommended leaving the lazo catheter in place for 3-4 days. Postoperative echo showed normal biventricular function and no new valvular abnormalities.      Discharge Medications:      Review of your medicines        START taking        Dose / Directions   aspirin 81 MG chewable tablet  Commonly known as: ASA  Used for: S/P CABG (coronary artery bypass graft)  Replaces: aspirin 81 MG EC tablet      Dose: 81 mg  Take 1 tablet (81 mg) by mouth daily  Quantity: 90 tablet  Refills: 0     clopidogrel 75 MG tablet  Commonly known as: PLAVIX  Used for: S/P CABG (coronary artery bypass graft)      Dose: 75 mg  Take 1 tablet (75 mg) by mouth daily  Quantity: 90  tablet  Refills: 3     * insulin glargine 100 UNIT/ML pen  Commonly known as: LANTUS PEN  Used for: S/P CABG (coronary artery bypass graft)  Replaces: insulin glargine 100 UNIT/ML vial      Dose: 40 Units  Inject 40 Units Subcutaneous at bedtime  Quantity: 15 mL  Refills: 0     * insulin glargine 100 UNIT/ML pen  Commonly known as: LANTUS PEN  Used for: S/P CABG (coronary artery bypass graft)  Replaces: insulin glargine 100 UNIT/ML vial      Dose: 60 Units  Inject 60 Units Subcutaneous every morning (before breakfast)  Quantity: 15 mL  Refills: 0     Metoprolol Tartrate 75 MG Tabs  Used for: S/P CABG (coronary artery bypass graft)      Dose: 75 mg  Take 75 mg by mouth 2 times daily  Quantity: 90 tablet  Refills: 0     rosuvastatin 20 MG tablet  Commonly known as: CRESTOR  Used for: S/P CABG (coronary artery bypass graft)      Dose: 20 mg  Take 1 tablet (20 mg) by mouth at bedtime  Quantity: 90 tablet  Refills: 0     senna-docusate 8.6-50 MG tablet  Commonly known as: SENOKOT-S/PERICOLACE  Used for: S/P CABG (coronary artery bypass graft)      Dose: 1 tablet  Take 1 tablet by mouth 2 times daily as needed for constipation  Quantity: 30 tablet  Refills: 0     tamsulosin 0.4 MG capsule  Commonly known as: FLOMAX  Used for: S/P CABG (coronary artery bypass graft)      Dose: 0.4 mg  Take 1 capsule (0.4 mg) by mouth daily  Quantity: 90 capsule  Refills: 0           * This list has 2 medication(s) that are the same as other medications prescribed for you. Read the directions carefully, and ask your doctor or other care provider to review them with you.                CONTINUE these medicines which may have CHANGED, or have new prescriptions. If we are uncertain of the size of tablets/capsules you have at home, strength may be listed as something that might have changed.        Dose / Directions   glipiZIDE 10 MG 24 hr tablet  Commonly known as: GLUCOTROL XL  This may have changed: when to take this  Used for: S/P CABG  (coronary artery bypass graft)      Dose: 20 mg  Take 2 tablets (20 mg) by mouth daily (with lunch)  Quantity: 180 tablet  Refills: 0            CONTINUE these medicines which have NOT CHANGED        Dose / Directions   acetaminophen 325 MG tablet  Commonly known as: TYLENOL      Dose: 2 tablet  [ACETAMINOPHEN (TYLENOL) 325 MG TABLET] Take 2 tablets by mouth every 4 (four) hours as needed.  Refills: 0     Calcium Carbonate Antacid 1000 MG Tabs      Dose: 2 tablet  Take 2 tablets by mouth as needed  Refills: 0     cholecalciferol 125 MCG (5000 UT) Caps      Dose: 5,000 Units  [CHOLECALCIFEROL, VITAMIN D3, 5,000 UNIT CAPSULE] Take 5,000 Units by mouth at bedtime.  Refills: 0     diphenhydrAMINE 25 MG tablet  Commonly known as: BENADRYL      Dose: 25 mg  [DIPHENHYDRAMINE (BENADRYL) 25 MG TABLET] Take 25 mg by mouth at bedtime as needed for itching.  Refills: 0     ezetimibe 10 MG tablet  Commonly known as: ZETIA      Dose: 1 tablet  [EZETIMIBE (ZETIA) 10 MG TABLET] Take 1 tablet by mouth at bedtime.  Refills: 0     fish oil-omega-3 fatty acids 1000 MG capsule      Dose: 6 g  [OMEGA-3/DHA/EPA/FISH OIL (FISH OIL-OMEGA-3 FATTY ACIDS) 300-1,000 MG CAPSULE] Take 6 g by mouth at bedtime.  Refills: 0     magnesium 500 MG Tabs      Dose: 500 mg  Take 500 mg by mouth at bedtime  Refills: 0     melatonin 3 MG Caps      Dose: 6 mg  Take 6 mg by mouth At Bedtime  Refills: 0     Multi Vitamin Tabs      Dose: 1 tablet  [MULTIVITAMIN (ONE A DAY) PER TABLET] Take 1 tablet by mouth at bedtime.  Refills: 0     omeprazole 20 MG DR capsule  Commonly known as: PriLOSEC      Dose: 1 capsule  Take 1 capsule by mouth at bedtime  Refills: 0     polyethylene glycol 17 g packet  Commonly known as: MIRALAX      Dose: 17 g  [POLYETHYLENE GLYCOL (MIRALAX) 17 GRAM PACKET] Take 17 g by mouth at bedtime.  Refills: 0     vitamin B-2 100 MG Tabs tablet  Commonly known as: RIBOFLAVIN      Dose: 1 tablet  [RIBOFLAVIN, VITAMIN B2, 100 MG TAB] Take 1 tablet  by mouth at bedtime.  Refills: 0            STOP taking      aspirin 81 MG EC tablet  Replaced by: aspirin 81 MG chewable tablet        aspirin-acetaminophen-caffeine 250-250-65 MG tablet  Commonly known as: EXCEDRIN MIGRAINE        chlorthalidone 25 MG tablet  Commonly known as: HYGROTON        insulin glargine 100 UNIT/ML vial  Commonly known as: LANTUS VIAL  Replaced by: insulin glargine 100 UNIT/ML pen        insulin glargine 100 UNIT/ML vial  Commonly known as: LANTUS VIAL  Replaced by: insulin glargine 100 UNIT/ML pen        insulin lispro 100 UNIT/ML (1 unit dial) KWIKPEN  Commonly known as: HumaLOG KWIKPEN        irbesartan 300 MG tablet  Commonly known as: AVAPRO        metFORMIN 500 MG tablet  Commonly known as: GLUCOPHAGE        pseudoePHEDrine 30 MG tablet  Commonly known as: SUDAFED        Tradjenta 5 MG Tabs tablet  Generic drug: linagliptin                  Where to get your medicines        These medications were sent to [facility] [address] [address]      Phone: [phone]   aspirin 81 MG chewable tablet  clopidogrel 75 MG tablet  glipiZIDE 10 MG 24 hr tablet  insulin glargine 100 UNIT/ML pen  insulin glargine 100 UNIT/ML pen  Metoprolol Tartrate 75 MG Tabs  rosuvastatin 20 MG tablet  senna-docusate 8.6-50 MG tablet  tamsulosin 0.4 MG capsule         Discharge Instructions:    Follow up appointment with Primary Care Physician: Antelmo [facility] within 7 days of discharge from [facility].  Follow up appointment with Specialist:    Follow with CV Surgery as scheduled. 5/14 at 12:00   Follow-up with cardiology as scheduled with Dr Nath 6/4 at 4:20 pm   Follow-up w/ your primary urologist (Dr. Pang)    Diet: Cardiac    Activity/Restrictions: As tolerated with sternal precautions in mind (see below). No driving for 4 weeks or while on pain medication.     - Shower and wash your incisions daily with  "soap and water. No tub baths/hot tubs for 4 weeks. An antibacterial soap such as Dial or Safeguard is recommended.    - Check your incisions every day. If you notice any redness, drainage, or anything unusual, please call the surgeons office.    - No driving for 4 weeks after surgery or while on pain medication     - Do not lift anything more than 10 pounds for 8 weeks after surgery. After 8 weeks, advance lifting gradually as tolerated.    - You may have watery drainage from your chest tube site for 2-3 weeks after surgery. Your may cover with a Band-Aid to protect your clothing. Remove the Band-Aid every day and wash the site.    - If you have a leg lesion, you may have swelling for 2-3 months. Elevate your leg any time you are not walking.    - If you feel any \"popping\" or \"clicking\" sensations in your chest, your arms are out too far or you are putting too much weight into arm movements. Do not reach over your head or out to the side to pull something. Do not do any arm exercises or use any exercise equipment that involves arm movement. If you feel your sternum moving, call the surgeon's office.    - Increase your daily activity as explained by Cardiac Rehab. You are encouraged to enroll in an Outpatient Cardiac Rehab Program.    - No active sports using your upper arms for 3 months. This includes fishing, hunting, bowling, swimming, tennis or golf.    - No physical activity such as cutting the grass, raking, vacuuming, changing sheets on your bed, snow shoveling, or using a  for 3 months.    - Use incentive spirometer 6-8 times per day for 2 weeks.       Hospital Summary:   Nirav Baker is a 79 year old male who was admitted to [facility] on 4/17/2024 w/ chest pain and found to have NSTEMI. He underwent coronary angiogram demonstrating severe multivessel CAD. He was referred to CV surgery for evaluation for possible coronary artery revascularization.     Patient was deemed a candidate " for coronary artery bypass surgery, and was taken to the operating room on 4/19/2024 where patient underwent double vessel coronary artery bypass and endoscopic vein harvest from the right leg. Surgery was uneventful and patient was brought to the ICU post-operatively. He was extubated on POD#0 and weaned from pressors. Patient was awake and alert, afebrile, and with stable vitals. Insulin drip was discontinued and he was transitioned to a sliding scale w/ lantus and carb count insulin. He was transferred to general telemetry status on POD#1 where patient has had return of bowel function, is maintaining oxygen saturations on room air, had his chest tubes removed, and has no complaints of chest pain or shortness of breath. On 04/27/24, patient was stable enough to be discharged to home.    - Campos remained in place until POD#4 as per urology recommendations after difficult placement in OR (see op note per Dr. Kent). After removal, voiding w/o hematuria or difficulty. He was started on 0.4 mg qday flomax. Patient should follow-up w/ his primary urologist (Dr. Figueroa).     - Patient had significant insulin requirements postop managed per [facility]. Oral DM2 meds were not resumed inpatient given elevated Cr. Defer to PCP.    - Elevated Cr to 1.9 postop w/ baseline to 1.3. Diuresis was held starting POD#4 given that he was nearing baseline weight w/ ongoing Cr increase. He will not be sent w/ additional diuresis given that he is below preop weight. Cr on discharge 1.65.    - Plavix 75mg daily to be maintained x1 year postop per surgeon preference for graft patency in due to NSTEMI. ASA should be 81mg for this duration. When Plavix is stopped, ASA should be increased to 162mg daily indefinitely s/p CABG.     - Although patient has experienced myalgias with statins in the past, he is tolerating Rrosuvastatin 20 mg and will be discharged with that.       Vital signs:  Temp: 98.9  F (37.2  C) Temp src: Oral BP: 127/64 Pulse:  "90   Resp: 20 SpO2: 97 % O2 Device: None (Room air) Oxygen Delivery: 3 LPM   Weight: 142.6 kg (314 lb 4.8 oz)  Estimated body mass index is 43.84 kg/m  as calculated from the following:    Height as of this encounter: 1.803 m (5' 11\").    Weight as of this encounter: 142.6 kg (314 lb 4.8 oz).      Physical Exam:    Pertinent exam findings on day of discharge include:  Gen: Seen up in chair. NAD. Pleasant and conversant.   CV: RRR on monitor. Mild edema.  Pulm: Non-labored breathing on room air.  Abd: Soft, non-tender, non-distended  Neuro: CNs grossly intact  Inc: C/D/I      _______  Ernestina Ceron PA-C  Cardiothoracic Surgery  [phone]        "

## 2024-04-26 NOTE — PLAN OF CARE
sc  Patient Name: Nirav Baker   MRN: [id number]   Date of Admission: 4/17/2024    Procedure: Procedure(s):  CORONARY ARTERY BYPASS GRAFT TIMES  TWO  WITH LEFT INTERNAL MAMMARY ARTERY HARVEST, LEFT ENDOSCOPIC VESSEL PROCUREMENT, ANESTHESIA TRANSESOPHAGEAL ECHOCARDIOGRAM, EPI AORTIC ULTRASOUND    Post Op day #:7    Subjective (Patient focus/Primary Problem for shift): Discharge plan and mobility          Pain Goal 0 Pain Rating 2- buttocks where wound is.           Pain Medication/ Regime effective to reduce patient pain Yes    Objective (Physical assessment):           Rhythm: normal sinus rhythm BBB 1AVB            Bowel Activity: no if Yes indicate when: 4/26          Bowel Medications: yes Pt requested to continue to have them            Incision: healing well          Incentive Spirometry Q 1-2 hour when awake:  yes Volume: 1500          Epicardial Pacing Wires:  no            Patient Activity:           Up to chair for meals: yes          Ambulation with RN x2 (Not including CR): yes            Is patient in home clothes:no                                Urinary Catheter: no           Preventative WOC consult (need MD order): Already ordered       Assessment (Nursing primary shift focus): Pt's buttock/sacral wounds are improving.  Pt up ambulating in hallway independently.  Pt feeling frustrated with his discharge plan.  Pt deciding to go home with home care tomorrow.     Plan (Patient Care Plan/focus): Home with homecare tomorrow.       Vilma Petty RN   4/26/2024   2:36 PM

## 2024-04-26 NOTE — PLAN OF CARE
Goal Outcome Evaluation:      Plan of Care Reviewed With: patient    Overall Patient Progress: improvingOverall Patient Progress: improving    Outcome Evaluation: Pt ambulated in hallway.  Pt's sacrum red and slightly excoriated, but improving, cream applied.  Pt with good appetite, using IS and on room air.  Pt eager to discharge later today.

## 2024-04-26 NOTE — PROGRESS NOTES
[facility]    Medicine Progress Note - Hospitalist Service    Date of Admission:  4/17/2024    Assessment & Plan   Nirav Baker is a 79 year old old male with HTN, HL, DM, obesity with BMI 45, ELDON, CKD who presented to [facility] with acute chest pain, diagnosed with NSTEMI, transferred to our hospital for coronary angiogram.     #CAD  #NSTEMI  -Coronary angiogram 4/17/2024 showed severe multivessel CAD  -CTS consulted. S/p CABG x 2 4/19. Extubated around 10PM 4/19. Off of phenylephrine gtt since 4/20  -Continue ASA, Heparin gtt discontinued. Plavix added 4/20. Cont BB, statin and ARB.      #DM 2, with high insulin resistance.  A1c 7.2.  -PTA regimen of NovoLog 20 units with breakfast and 30 units with supper, Lantus 60 units in am and 80 units at HS.  -Insulin gtt titrated off 4/20. Cont mealtime novolog at 1:7. Titrating lantus based on blood sugar  -Cont the sliding scale  -Diabetic diet     #Urinary retention  #Traumatic hematuria  -Patient with h/o BPH, s/p prostatectomy and bladder neck contraction. Difficult catheterization in OR. Urology assisted with lazo insertion. Hematuria has resolved now. Follow up with urology (Dr Figueroa) in the clinic    #GERD-on PPI.  #ELDON-on CPAP.    #Disposition  -ARU recommended. It seems, his insurance didn't approve ARU. /care manager trying TCU.           Diet: Combination Diet Gluten Free Diet; Moderate Consistent Carb (60 g CHO per Meal) Diet; Low Saturated Fat Diet, Low Saturated Fat Na <2400mg Diet  Diet    DVT Prophylaxis: Heparin s/c  Lazo Catheter: Not present  Lines: None     Cardiac Monitoring: ACTIVE order. Indication: Open heart surgery (72 hours)  Code Status: Full Code      Clinically Significant Risk Factors              # Hypoalbuminemia: Lowest albumin = 3.2 g/dL at 4/20/2024  4:11 AM, will monitor as appropriate     # Hypertension: Noted on problem list       # DMII: A1C = 7.2 % (Ref range: <5.7 %) within  "past 6 months   # Severe Obesity: Estimated body mass index is 44.11 kg/m  as calculated from the following:    Height as of this encounter: 1.803 m (5' 11\").    Weight as of this encounter: 143.5 kg (316 lb 4.8 oz).             Disposition Plan     Medically Ready for Discharge: Medically ready for ARU         OTONIEL Nichole  Hospitalist Service  [facility]  Securely message with [employer] (more info)  Text page via [employer] Paging/Directory   ______________________________________________________________________    Interval History   Patient seen and examined his morning. Doing fairly well. No new issues overnight.     Physical Exam   Vital Signs: Temp: 98.5  F (36.9  C) Temp src: Oral BP: 134/61 Pulse: 65   Resp: 18 SpO2: 97 % O2 Device: BiPAP/CPAP    Weight: 316 lbs 4.8 oz      General: Not in obvious distress.  HEENT: NC, AT   Chest: Clear to auscultation bilaterally.  Heart: S1S2 normal, regular. No M/R/G  Abdomen: Soft. NT, ND. Bowel sounds- active.  Extremities: No legs swelling  Neuro: Alert and awake, grossly non-focal      Medical Decision Making             Data     I have personally reviewed the following data over the past 24 hrs:    N/A  \   N/A   / 269     141 104 38.5 (H) /  126 (H)   3.5 25 1.89 (H) \       Imaging results reviewed over the past 24 hrs:   No results found for this or any previous visit (from the past 24 hour(s)).    "

## 2024-04-26 NOTE — PROGRESS NOTES
"CVTS Daily Progress Note   POD#7 s/p CABG x2 (LIMA to LAD, rSVG to ramus), RLE EVH, Campos catheter placement per urology  Attending:  Grace  LOS: 9    SUBJECTIVE/INTERVAL EVENTS:    No acute events overnight. Patient progressing well. Maintaining oxygen saturations on room air Normotensive. Ambulating with therapy. Pain well controlled. +BM. Tolerating diet. UOP adequate. Hgb stable. Patient denies new chest pain, shortness of breath, abdominal pain, calf pain, nausea. Patient has no questions today.     OBJECTIVE:  Temp:  [97.4  F (36.3  C)-98.8  F (37.1  C)] 98.6  F (37  C)  Pulse:  [58-81] 75  Resp:  [17-20] 20  BP: (115-137)/(56-69) 119/56  SpO2:  [97 %-99 %] 99 %  Vitals:    04/22/24 0700 04/23/24 0530 04/24/24 0500 04/25/24 0429   Weight: 145.2 kg (320 lb 1.6 oz) 144.4 kg (318 lb 4.8 oz) 143.7 kg (316 lb 14.4 oz) 145.1 kg (319 lb 14.4 oz)    04/26/24 0600   Weight: 143.5 kg (316 lb 4.8 oz)       Clinically Significant Risk Factors              # Hypoalbuminemia: Lowest albumin = 3.2 g/dL at 4/20/2024  4:11 AM, will monitor as appropriate       # Hypertension: Noted on problem list       # DMII: A1C = 7.2 % (Ref range: <5.7 %) within past 6 months   # Severe Obesity: Estimated body mass index is 44.11 kg/m  as calculated from the following:    Height as of this encounter: 1.803 m (5' 11\").    Weight as of this encounter: 143.5 kg (316 lb 4.8 oz).            Clinically Significant Risk Factors              # Hypoalbuminemia: Lowest albumin = 3.2 g/dL at 4/20/2024  4:11 AM, will monitor as appropriate       # Hypertension: Noted on problem list       # DMII: A1C = 7.2 % (Ref range: <5.7 %) within past 6 months   # Severe Obesity: Estimated body mass index is 44.11 kg/m  as calculated from the following:    Height as of this encounter: 1.803 m (5' 11\").    Weight as of this encounter: 143.5 kg (316 lb 4.8 oz).               Current Medications:    Scheduled Meds:  Current Facility-Administered Medications "   Medication Dose Route Frequency Provider Last Rate Last Admin    aspirin (ASA) chewable tablet 81 mg  81 mg Oral Daily Rahat Faulkner MBBS   81 mg at 04/26/24 0813    clopidogrel (PLAVIX) tablet 75 mg  75 mg Oral Daily Ana Rosa Quiñones PA-C   75 mg at 04/26/24 0813    ezetimibe (ZETIA) tablet 10 mg  10 mg Oral At Bedtime Rahat Faulkner MBBS   10 mg at 04/25/24 2118    glipiZIDE (GLUCOTROL XL) 24 hr tablet 20 mg  20 mg Oral Daily with lunch Raaht Faulkner MBBS   20 mg at 04/25/24 1224    heparin ANTICOAGULANT injection 5,000 Units  5,000 Units Subcutaneous Q8H Ana Rosa Quiñones PA-C   5,000 Units at 04/26/24 0605    insulin aspart (NovoLOG) injection (RAPID ACTING)   Subcutaneous TID  Rahat Faulkner MBBS   2 Units at 04/26/24 0809    insulin aspart (NovoLOG) injection (RAPID ACTING)  1-10 Units Subcutaneous TID AC Ana Rosa Quiñones PA-C   2 Units at 04/25/24 1640    insulin aspart (NovoLOG) injection (RAPID ACTING)  1-7 Units Subcutaneous At Bedtime Ana Rosa Quiñones PA-C   2 Units at 04/23/24 2111    insulin glargine (LANTUS PEN) injection 40 Units  40 Units Subcutaneous At Bedtime Karan Villalba MD   40 Units at 04/25/24 2124    insulin glargine (LANTUS PEN) injection 60 Units  60 Units Subcutaneous QAM  Karan Villalba MD   60 Units at 04/26/24 0810    Lidocaine (LIDOCARE) 4 % Patch 1-2 patch  1-2 patch Transdermal Q24H Ana Rosa Quiñones PA-C   1 patch at 04/25/24 1820    melatonin tablet 6 mg  6 mg Oral At Bedtime Ana Rosa Quiñones PA-C   6 mg at 04/25/24 2118    menthol-zinc oxide (CALMOSEPTINE) 0.44-20.6 % ointment OINT   Topical TID Rahat Faulkner MBBS   Given at 04/26/24 0813    [Held by provider] metFORMIN (GLUCOPHAGE) tablet 500 mg  500 mg Oral Daily with supper Ana Rosa Quiñones PA-C        [COMPLETED] methadone (DOLOPHINE) injection 20 mg  20 mg Intravenous Once Vincent Aguirre MD        metoprolol tartrate (LOPRESSOR) tablet 75 mg  75  mg Oral BID Ana Rosa Quiñones PA-C   75 mg at 04/26/24 0813    multivitamin, therapeutic (THERA-VIT) tablet 1 tablet  1 tablet Oral At Bedtime Ana Rosa Quiñones PA-C   1 tablet at 04/25/24 2118    pantoprazole (PROTONIX) EC tablet 40 mg  40 mg Oral Daily Ana Rosa Quiñones PA-C   40 mg at 04/26/24 0813    polyethylene glycol (MIRALAX) Packet 17 g  17 g Oral Daily Ana Rosa Quiñones PA-C   17 g at 04/26/24 0813    rosuvastatin (CRESTOR) tablet 20 mg  20 mg Oral At Bedtime Rahat Faulkner MBBS   20 mg at 04/25/24 2118    senna-docusate (SENOKOT-S/PERICOLACE) 8.6-50 MG per tablet 1 tablet  1 tablet Oral BID Ana Rosa Quiñones PA-C   1 tablet at 04/26/24 0813    [Held by provider] sitagliptin (JANUVIA) tablet 100 mg  100 mg Oral At Bedtime Ana Rosa Quiñones PA-C        tamsulosin (FLOMAX) capsule 0.4 mg  0.4 mg Oral Daily Ana Rosa Quiñones PA-C   0.4 mg at 04/26/24 0813     Continuous Infusions:  Current Facility-Administered Medications   Medication Dose Route Frequency Provider Last Rate Last Admin     PRN Meds:.  Current Facility-Administered Medications   Medication Dose Route Frequency Provider Last Rate Last Admin    acetaminophen (TYLENOL) tablet 650 mg  650 mg Oral Q4H PRN Ana Rosa Quiñones PA-C   650 mg at 04/25/24 2118    bisacodyl (DULCOLAX) suppository 10 mg  10 mg Rectal Daily PRN Ana Rosa Quiñones PA-C        glucose gel 15-30 g  15-30 g Oral Q15 Min PRN Ana Rosa Quiñones PA-C        Or    glucagon injection 1 mg  1 mg Subcutaneous Q15 Min PRN Ana Rosa Quiñones PA-C        magnesium hydroxide (MILK OF MAGNESIA) suspension 30 mL  30 mL Oral Daily PRN Ana Rosa Quiñones PA-C        naloxone (NARCAN) injection 0.2 mg  0.2 mg Intravenous Q2 Min PRN Ana Rosa Quiñones PA-C        Or    naloxone (NARCAN) injection 0.4 mg  0.4 mg Intravenous Q2 Min PRN Ana Rosa Quiñones PA-C        Or    naloxone (NARCAN) injection 0.2 mg  0.2 mg Intramuscular Q2 Min PRN  Ana Rosa Quiñones PA-C        Or    naloxone (NARCAN) injection 0.4 mg  0.4 mg Intramuscular Q2 Min PRN Ana Rosa Quiñones PA-C        ondansetron (ZOFRAN ODT) ODT tab 4 mg  4 mg Oral Q6H PRN Ana Rosa Quiñones PA-C        oxyCODONE IR (ROXICODONE) half-tab 2.5 mg  2.5 mg Oral Q4H PRN Ana Rosa Quiñones PA-C        Or    oxyCODONE (ROXICODONE) tablet 5 mg  5 mg Oral Q4H PRN Ana Rosa Quiñones PA-C   5 mg at 04/24/24 2148    prochlorperazine (COMPAZINE) tablet 5 mg  5 mg Oral Q6H PRN Ana Rosa Quiñones PA-C           Cardiographics:    Telemetry monitoring demonstrates NSR with rates in the 60s per my personal review.    Imaging:  Results for orders placed or performed during the hospital encounter of 04/17/24   US Carotid Bilateral    Impression    IMPRESSION:  1.  Mild plaque formation, velocities consistent with less than 50% stenosis in the right internal carotid artery.  2.  Mild plaque formation, velocities consistent with less than 50% stenosis in the left internal carotid artery.  3.  Flow within the vertebral arteries is antegrade.   XR Chest Port 1 View    Impression    IMPRESSION:     Endotracheal tube tip is 5.3 cm above the piedad. Right internal jugular approach central venous catheter tip is in the upper SVC. Mediastinal and left pleural drains. Multiple median sternotomy wires and mediastinal clips.    Lung volumes are low. Left greater than right bibasilar airspace opacities are favored to reflect atelectasis, although airspace disease is not excluded. Small left pleural effusion. No right pleural effusion. No pneumothorax.    Stable cardiomegaly.   XR Chest Port 1 View    Impression    IMPRESSION: Sternotomy, CABG, mediastinal drain and left chest tube. Right IJ catheter tip in the SVC. The patient has been extubated. Cardiac size approaches upper limits of normal, improved since previous. Strands of platelike atelectasis in both   lungs, less evident on current study. Tiny left  pleural effusion, interval improvement. No appreciable effusion on the right. Mild degenerative changes thoracic spine. Monitoring leads overlying the chest.         Labs, personally reviewed.  Hemoglobin   Date Value Ref Range Status   04/22/2024 10.1 (L) 13.3 - 17.7 g/dL Final   04/21/2024 10.8 (L) 13.3 - 17.7 g/dL Final   04/20/2024 12.6 (L) 13.3 - 17.7 g/dL Final     WBC Count   Date Value Ref Range Status   04/22/2024 10.6 4.0 - 11.0 10e3/uL Final   04/21/2024 12.8 (H) 4.0 - 11.0 10e3/uL Final   04/20/2024 17.1 (H) 4.0 - 11.0 10e3/uL Final     Platelet Count   Date Value Ref Range Status   04/26/2024 269 150 - 450 10e3/uL Final   04/23/2024 156 150 - 450 10e3/uL Final   04/22/2024 132 (L) 150 - 450 10e3/uL Final     Creatinine   Date Value Ref Range Status   04/26/2024 1.89 (H) 0.67 - 1.17 mg/dL Final   04/25/2024 1.64 (H) 0.67 - 1.17 mg/dL Final   04/24/2024 1.83 (H) 0.67 - 1.17 mg/dL Final     Potassium   Date Value Ref Range Status   04/26/2024 3.5 3.4 - 5.3 mmol/L Final   04/25/2024 3.6 3.4 - 5.3 mmol/L Final   04/24/2024 3.7 3.4 - 5.3 mmol/L Final   08/24/2022 4.8 3.5 - 5.0 mmol/L Final   03/30/2022 3.9 3.5 - 5.0 mmol/L Final   03/17/2022 4.9 3.5 - 5.0 mmol/L Final     Potassium POCT   Date Value Ref Range Status   04/19/2024 4.0 3.4 - 5.3 mmol/L Final   04/19/2024 4.6 3.4 - 5.3 mmol/L Final   04/19/2024 4.9 3.4 - 5.3 mmol/L Final     Magnesium   Date Value Ref Range Status   04/26/2024 2.6 (H) 1.7 - 2.3 mg/dL Final   04/25/2024 2.5 (H) 1.7 - 2.3 mg/dL Final   04/24/2024 2.5 (H) 1.7 - 2.3 mg/dL Final          I/O:  I/O last 3 completed shifts:  In: 1620 [P.O.:1620]  Out: 2100 [Urine:2100]       Physical Exam:     General: Patient seen sitting on edge of bed this AM. NAD. Conversant. Pleasant.   HEENT: GERDA, no sclera icterus, moist mucosa  CV: RRR on monitor. 2+ peripheral pulses in all extremities. Mild edema.   Pulm: Non-labored effort on RA. Incision C/D/I.  Abd: Soft, NT, ND  : Voiding  Ext: Mild  pedal edema, SCDs in place, warm, distal pulses intact  Neuro: CNs grossly intact.       ASSESSMENT/PLAN:    Nirav Baker is a 79 year old male with a history of HTN, HLD, DM, obesity, CKD, and prostatectomy w/ bladder strictures who is s/p CABG x2 & RLE EVH in s/o NSTEMI.    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)    Dyslipidemia    Benign essential HTN    Acute chest pain    NSTEMI (non-ST elevated myocardial infarction) (H)    Coronary artery disease involving native coronary artery of native heart, unspecified whether angina present      NEURO:   - Scheduled Tylenol/lidocaine patches and PRN Tylenol for pain  - PRN melatonin    CV:   - Pre-op EF 65%  - Chest tubes/TPW removed POD#2  - Normotensive  - Metoprolol 75mg BID   - Rosuvastatin 20mg daily and zetia 10 mg daily  - ASA 81mg (plavix)  - Plavix per surgeon preference for graft patency in s/o NSTEMI (ASA)    PULM:   - Extubated POD#0  - Maintaining oxygen saturations on room air this AM  - Encourage pulmonary toilet    FEN/GI:  - Continue electrolyte replacement protocol  - Diet: Cardiac, ADAT   - Bowel regimen, +BM    RENAL:  - Adequate UOP/hr. Continue to monitor closely.  - Cr 1.89(1.6) (1.83) this AM, baseline to 1.3  - 0.4 mg flomax qday  - Campos placed per urology removed 4/23, voiding w/o issue currently.  - Urology consult for bladder stricture, appreciate recs, signed off.  - Follow-up outpatient w/ urologist Dr. Figueroa (not yet scheduled)  - Continuing to hold diuresis as patient is at preop weight w/ good UOP + Cr improving off diuresis    HEME:  - Acute blood loss anemia post-op.   - Hgb stable, no bleeding concerns. Hep SQ, ASA    ID:  - Sujata op ppx complete, afebrile. No concerns for infection  - Leukocytosis, resolved.  -  Chest wound vac removed. OK to shower    ENDO:   - HbA1c 7.2%  - BG goal < 180 to promote optimal healing  - Hospitalists managing DM2, appreciate assistance.  - PTA regimen of NovoLog 20 units with breakfast and 30 units with supper, Lantus 60 units in am and 80 units at HS.  - 60u lantus BID, high dose SSI, 1:7 carb count prandial dosing  - Continue SSI  - Holding PTA  metformin, and sitagliptin, plan to resume closer to discharge pending renal function  -Resume PTA glipizide at lunch time                  PPx:   - DVT: SCDs, SQ heparin TID, ambulation   - GI: Protonix 40mg PO daily    DISPO:   - General tele status (Transferred POD#1)  - PT/OT recommending ARU  - Medically Ready for Discharge today.  All IV medications discontinued.  - ARU today pending auth.    Patient discussed with Dr. Edwards.    Abimbola Uribe PA-C  [facility] Cardiothoracic Surgery  [employer] or pager [phone]

## 2024-04-26 NOTE — PROGRESS NOTES
"Care Management Follow Up    Length of Stay (days): 9    Expected Discharge Date: 04/26/2024     Concerns to be Addressed:     Care progression  Patient plan of care discussed at interdisciplinary rounds: Yes    Anticipated Discharge Disposition:  [facility]     Anticipated Discharge Services:  [facility]  Anticipated Discharge DME:  NA    Patient/family educated on Medicare website which has current facility and service quality ratings:  NA  Education Provided on the Discharge Plan: Yes per team  Patient/Family in Agreement with the Plan: yes    Referrals Placed by CM/SW:  Yes  Private pay costs discussed: transportation costs    Additional Information:  0753 sent message to Valencia DILLON at [facility] to follow up on the auth from [facility].    Social Hx: \"Live w/spouse at home, independent. Spouse is primary contact. 4/26 patient decided to go home with home care RN/PT/OT, referral sent. Spouse can  4/27 after 2-3 PM.\"     RNCM to follow for medical progression, recommendations, and final discharge plan.     Chitra Gill RN     0815 Abimbola HODGE cardiothoracic checked in and no [facility] auth yet.    0819 rec'd message from Valencia, \"We haven't heard back from [facility] yet but I will let you know as soon as we do!\"    0936 rec'd a message from Valencia, \"Ok, so I have an update - insurance called requesting a peer to peer - can you have the MD call [phone] option 5 to set this up please? It needs to be completed before 130pm. When they do the peer to peer his pending auth # is [id number]. Also, please direct them to my incomplete note that outlines all the reasons for Acute Rehab.\"    0940 Message sent to Dr. Faulkner and Abimbola Uribe    0947 Abimbola responded she will call [facility] for the peer to peer.  Sent message to update Dr. Faulkner    1038 rec'd message from Abimbola bolaños/Cardiothoracic, \"[facility] declined him. No medical needs! I guess try tcu.\"  Sent message to update Dr. Faulkner    Called to update the spouse, " "Afshan. She selected TCU in the [place] areas. She will be on her way to the hospital.    Referrals sent.    Message sent to update Valencia DILLON at [facility]. She will put in an appeal.     1055 Call to update bedside nurse, Vilma MACKAY  Patient resting now.    1250 met with patient and his wife at bedside with bedside nurse. Rehab will see patient to re-eval. Patient and his wife states patient is doing much better and may not need [facility]. Patient is deciding between TCU placement or home with home care.    Sent message to update Valencia DILLON w/[facility]    1353 met with patient and his wife. Patient states he decided to go home with home care. He agreed to RN/PT/OT.  Message sent to Dr. Faulkner and Abimbola Uribe    1353 Per provider, Dr. Faulkner, \"He isn't ready today. Had low blood sugar.\"  1356 Abimbola also said, \"Tomorrow.\"    Met with patient and his wife at bedside to update  The spouse, Afshan said she can  patient between 2:00 - 3:00 PM tomorrow.    Referral sent for home care services    "

## 2024-04-26 NOTE — PLAN OF CARE
sc  Patient Name: Nirav Baker   MRN: [id number]   Date of Admission: 4/17/2024    Procedure: Procedure(s):  CORONARY ARTERY BYPASS GRAFT TIMES  TWO  WITH LEFT INTERNAL MAMMARY ARTERY HARVEST, LEFT ENDOSCOPIC VESSEL PROCUREMENT, ANESTHESIA TRANSESOPHAGEAL ECHOCARDIOGRAM, EPI AORTIC ULTRASOUND    Post Op day #:7      Subjective (Patient focus/Primary Problem for shift): Take another walk, BG management          Pain Goal 4/10 --> 0/10 w/ tylenol Pain Rating 0/10           Pain Medication/ Regime effective to reduce patient pain tylenol, turning and repositioning    Objective (Physical assessment):           Rhythm:  sinus rhythm with 1st degree AVB and BBB            Bowel Activity: yes if Yes indicate when: 4/25          Bowel Medications: yes            Incision: healing well          Incentive Spirometry Q 1-2 hour when awake:  yes Volume: 1500          Epicardial Pacing Wires:  not applicable            Patient Activity:           Up to chair for meals: not applicable          Ambulation with RN x2 (Not including CR): x1 before bed            Is patient in home clothes:no             Chest Tubes   Pleural: not applicable Draining: not applicable               Suction: not applicable              Mediastinal: not applicable Draining: not applicable               Suction: not applicable   Dressing Change Daily:not applicable If No, why?                     Urinary Catheter: no           Preventative WOC consult (need MD order): no       Assessment (Nursing primary shift focus): Pt.'s bedtime BG was 86, got reduced dose of scheduled lantus ordered. Pt.'s BG this AM for labs was 69, gave pt some apple juice. Pt ambulating well, SBA getting up. Refused to get into chair this morning as it hurts the sore on his bottom, but sitting up on edge of bed - will call if needs to get up. K, Mg, P protocol ran, replaced K.     Plan (Patient Care Plan/focus): Will be discharging today hopefully to acute rehab  facility.      Hannah Garrett RN   4/26/2024   6:49 AM

## 2024-04-26 NOTE — PROGRESS NOTES
Update from [facility] peer to peer review today. [facility] declined pt due to NO medical needs other than physical rehab.     Will try TCU and approach other family members as wife only has 1 foot and unable to help.    CM to follow up.

## 2024-04-27 ENCOUNTER — APPOINTMENT (OUTPATIENT)
Dept: OCCUPATIONAL THERAPY | Facility: HOSPITAL | Age: 79
DRG: 234 | End: 2024-04-27
Attending: INTERNAL MEDICINE
Payer: COMMERCIAL

## 2024-04-27 VITALS
OXYGEN SATURATION: 97 % | HEART RATE: 90 BPM | RESPIRATION RATE: 20 BRPM | SYSTOLIC BLOOD PRESSURE: 127 MMHG | HEIGHT: 71 IN | BODY MASS INDEX: 44 KG/M2 | TEMPERATURE: 98.9 F | WEIGHT: 314.3 LBS | DIASTOLIC BLOOD PRESSURE: 64 MMHG

## 2024-04-27 LAB
ANION GAP SERPL CALCULATED.3IONS-SCNC: 11 MMOL/L (ref 7–15)
BUN SERPL-MCNC: 33.7 MG/DL (ref 8–23)
CALCIUM SERPL-MCNC: 9 MG/DL (ref 8.8–10.2)
CHLORIDE SERPL-SCNC: 106 MMOL/L (ref 98–107)
CREAT SERPL-MCNC: 1.65 MG/DL (ref 0.67–1.17)
DEPRECATED HCO3 PLAS-SCNC: 24 MMOL/L (ref 22–29)
EGFRCR SERPLBLD CKD-EPI 2021: 42 ML/MIN/1.73M2
GLUCOSE BLDC GLUCOMTR-MCNC: 171 MG/DL (ref 70–99)
GLUCOSE BLDC GLUCOMTR-MCNC: 89 MG/DL (ref 70–99)
GLUCOSE BLDC GLUCOMTR-MCNC: 98 MG/DL (ref 70–99)
GLUCOSE SERPL-MCNC: 72 MG/DL (ref 70–99)
HOLD SPECIMEN: NORMAL
MAGNESIUM SERPL-MCNC: 2.3 MG/DL (ref 1.7–2.3)
PHOSPHATE SERPL-MCNC: 3.9 MG/DL (ref 2.5–4.5)
POTASSIUM SERPL-SCNC: 3.7 MMOL/L (ref 3.4–5.3)
SODIUM SERPL-SCNC: 141 MMOL/L (ref 135–145)

## 2024-04-27 PROCEDURE — 97110 THERAPEUTIC EXERCISES: CPT | Mod: GO

## 2024-04-27 PROCEDURE — 83735 ASSAY OF MAGNESIUM: CPT | Performed by: INTERNAL MEDICINE

## 2024-04-27 PROCEDURE — 99232 SBSQ HOSP IP/OBS MODERATE 35: CPT | Performed by: HOSPITALIST

## 2024-04-27 PROCEDURE — 250N000013 HC RX MED GY IP 250 OP 250 PS 637: Performed by: HOSPITALIST

## 2024-04-27 PROCEDURE — 250N000013 HC RX MED GY IP 250 OP 250 PS 637

## 2024-04-27 PROCEDURE — 80048 BASIC METABOLIC PNL TOTAL CA: CPT

## 2024-04-27 PROCEDURE — 250N000013 HC RX MED GY IP 250 OP 250 PS 637: Performed by: INTERNAL MEDICINE

## 2024-04-27 PROCEDURE — 999N000157 HC STATISTIC RCP TIME EA 10 MIN

## 2024-04-27 PROCEDURE — 84100 ASSAY OF PHOSPHORUS: CPT | Performed by: INTERNAL MEDICINE

## 2024-04-27 PROCEDURE — 250N000011 HC RX IP 250 OP 636

## 2024-04-27 PROCEDURE — 36415 COLL VENOUS BLD VENIPUNCTURE: CPT

## 2024-04-27 RX ORDER — POTASSIUM CHLORIDE 1500 MG/1
20 TABLET, EXTENDED RELEASE ORAL ONCE
Status: COMPLETED | OUTPATIENT
Start: 2024-04-27 | End: 2024-04-27

## 2024-04-27 RX ORDER — TAMSULOSIN HYDROCHLORIDE 0.4 MG/1
0.4 CAPSULE ORAL DAILY
Qty: 90 CAPSULE | Refills: 0 | Status: SHIPPED | OUTPATIENT
Start: 2024-04-27

## 2024-04-27 RX ORDER — ROSUVASTATIN CALCIUM 20 MG/1
20 TABLET, COATED ORAL AT BEDTIME
Qty: 90 TABLET | Refills: 0 | Status: SHIPPED | OUTPATIENT
Start: 2024-04-27 | End: 2024-06-04

## 2024-04-27 RX ORDER — INSULIN ASPART 100 [IU]/ML
3 INJECTION, SOLUTION INTRAVENOUS; SUBCUTANEOUS
Qty: 2.7 ML | Refills: 0 | Status: SHIPPED | OUTPATIENT
Start: 2024-04-27 | End: 2024-09-25

## 2024-04-27 RX ORDER — CLOPIDOGREL BISULFATE 75 MG/1
75 TABLET ORAL DAILY
Qty: 90 TABLET | Refills: 3 | Status: SHIPPED | OUTPATIENT
Start: 2024-04-27 | End: 2024-06-04

## 2024-04-27 RX ORDER — ASPIRIN 81 MG/1
81 TABLET, CHEWABLE ORAL DAILY
Qty: 90 TABLET | Refills: 0 | Status: SHIPPED | OUTPATIENT
Start: 2024-04-27

## 2024-04-27 RX ORDER — METOPROLOL TARTRATE 75 MG/1
75 TABLET, FILM COATED ORAL 2 TIMES DAILY
Qty: 90 TABLET | Refills: 0 | Status: SHIPPED | OUTPATIENT
Start: 2024-04-27 | End: 2024-06-04

## 2024-04-27 RX ORDER — AMOXICILLIN 250 MG
1 CAPSULE ORAL 2 TIMES DAILY PRN
Qty: 30 TABLET | Refills: 0 | Status: SHIPPED | OUTPATIENT
Start: 2024-04-27

## 2024-04-27 RX ORDER — GLIPIZIDE 10 MG/1
20 TABLET, FILM COATED, EXTENDED RELEASE ORAL
Qty: 180 TABLET | Refills: 0 | Status: SHIPPED | OUTPATIENT
Start: 2024-04-27 | End: 2024-04-27

## 2024-04-27 RX ORDER — GLIPIZIDE 10 MG/1
20 TABLET, FILM COATED, EXTENDED RELEASE ORAL
Qty: 180 TABLET | Refills: 0 | Status: SHIPPED | OUTPATIENT
Start: 2024-04-27

## 2024-04-27 RX ADMIN — PANTOPRAZOLE SODIUM 40 MG: 40 TABLET, DELAYED RELEASE ORAL at 08:16

## 2024-04-27 RX ADMIN — TAMSULOSIN HYDROCHLORIDE 0.4 MG: 0.4 CAPSULE ORAL at 08:16

## 2024-04-27 RX ADMIN — INSULIN ASPART 2 UNITS: 100 INJECTION, SOLUTION INTRAVENOUS; SUBCUTANEOUS at 11:55

## 2024-04-27 RX ADMIN — HEPARIN SODIUM 5000 UNITS: 10000 INJECTION, SOLUTION INTRAVENOUS; SUBCUTANEOUS at 05:30

## 2024-04-27 RX ADMIN — CLOPIDOGREL BISULFATE 75 MG: 75 TABLET ORAL at 08:16

## 2024-04-27 RX ADMIN — ASPIRIN 81 MG CHEWABLE TABLET 81 MG: 81 TABLET CHEWABLE at 08:16

## 2024-04-27 RX ADMIN — ANORECTAL OINTMENT: 15.7; .44; 24; 20.6 OINTMENT TOPICAL at 08:17

## 2024-04-27 RX ADMIN — POTASSIUM CHLORIDE 20 MEQ: 1500 TABLET, EXTENDED RELEASE ORAL at 08:17

## 2024-04-27 RX ADMIN — METOPROLOL TARTRATE 75 MG: 25 TABLET, FILM COATED ORAL at 08:16

## 2024-04-27 RX ADMIN — SENNOSIDES AND DOCUSATE SODIUM 1 TABLET: 8.6; 5 TABLET ORAL at 08:17

## 2024-04-27 RX ADMIN — POLYETHYLENE GLYCOL 3350 17 G: 17 POWDER, FOR SOLUTION ORAL at 08:17

## 2024-04-27 ASSESSMENT — ACTIVITIES OF DAILY LIVING (ADL)
ADLS_ACUITY_SCORE: 32
ADLS_ACUITY_SCORE: 31
ADLS_ACUITY_SCORE: 32
ADLS_ACUITY_SCORE: 31
ADLS_ACUITY_SCORE: 32
ADLS_ACUITY_SCORE: 32
ADLS_ACUITY_SCORE: 31
ADLS_ACUITY_SCORE: 32
ADLS_ACUITY_SCORE: 31
ADLS_ACUITY_SCORE: 32
ADLS_ACUITY_SCORE: 31

## 2024-04-27 NOTE — PROGRESS NOTES
Occupational Therapy Discharge Summary    Reason for therapy discharge:    Discharged to home with home therapy.    Progress towards therapy goal(s). See goals on Care Plan in [facility] electronic health record for goal details.  Goals partially met.  Barriers to achieving goals:   discharge from facility.    Therapy recommendation(s):    Continued therapy is recommended.  Rationale/Recommendations:  Home OT - to progress to OP cardiac rehab as able.

## 2024-04-27 NOTE — PROGRESS NOTES
[facility]    Medicine Progress Note - Hospitalist Service    Date of Admission:  4/17/2024    Assessment & Plan   Nirav Baker is a 79 year old old male with HTN, HL, DM, obesity with BMI 45, ELDON, CKD who presented to [facility] with acute chest pain, diagnosed with NSTEMI, transferred to our hospital for coronary angiogram.     #CAD  #NSTEMI  -Coronary angiogram 4/17/2024 showed severe multivessel CAD  -CTS consulted. S/p CABG x 2 4/19. Extubated around 10PM 4/19. Off of phenylephrine gtt since 4/20  -Continue ASA, Heparin gtt discontinued. Plavix added 4/20. Cont BB, statin.     #DM 2, with high insulin resistance.  A1c 7.2.  -PTA regimen of NovoLog 20 units with breakfast and 30 units with supper, Lantus 60 units in am and 80 units at HS.  -Insulin gtt titrated off 4/20  -She was on Lantus 6 units a.m. and 40 units at bedtime and NovoLog sliding scale at mealtimes and bedtime  -Patient to discharge on Lantus 60U Am, 40U at bedtime, PTA glipizide. Will add Novolog 3U with each meal.   -Stopped PTA metformin given poor renal function. Also stopped Humology 20U Am and 30U with supper.   -Diabetic diet, monitor BS closely at home and follow up with PCP for further adjustments     #Urinary retention  #Traumatic hematuria  -Patient with h/o BPH, s/p prostatectomy and bladder neck contraction. Difficult catheterization in OR. Urology assisted with lazo insertion. Hematuria has resolved now. Follow up with urology (Dr Figueroa) in the clinic    #GERD-on PPI.  #ELDON-on CPAP.    #Disposition  -ARU recommended. It seems, his insurance didn't approve ARU. Patient discharge home with home cares today.            Diet: Combination Diet Gluten Free Diet; Moderate Consistent Carb (60 g CHO per Meal) Diet; Low Saturated Fat Diet, Low Saturated Fat Na <2400mg Diet  Diet    DVT Prophylaxis: Heparin s/c  Lzao Catheter: Not present  Lines: None     Cardiac Monitoring: ACTIVE order. Indication:  "Open heart surgery (72 hours)  Code Status:        Clinically Significant Risk Factors              # Hypoalbuminemia: Lowest albumin = 3.2 g/dL at 4/20/2024  4:11 AM, will monitor as appropriate     # Hypertension: Noted on problem list       # DMII: A1C = 7.2 % (Ref range: <5.7 %) within past 6 months   # Severe Obesity: Estimated body mass index is 43.84 kg/m  as calculated from the following:    Height as of this encounter: 1.803 m (5' 11\").    Weight as of this encounter: 142.6 kg (314 lb 4.8 oz).             Disposition Plan     Medically Ready for Discharge: Medically ready for JORGE Henry MD  Hospitalist Service  [facility]  Securely message with [employer] (more info)  Text page via [employer] Paging/Directory   ______________________________________________________________________    Interval History   .  Patient is new to me. Chart reviewed and events noted.  Patient seen and examined.   In bed, denies any pain, SOB. No acute events overnight.       Physical Exam   Vital Signs: Temp: 98.9  F (37.2  C) Temp src: Oral BP: 127/64 Pulse: 90   Resp: 20 SpO2: 97 % O2 Device: None (Room air)    Weight: 314 lbs 4.8 oz      General: Obese male Not in obvious distress.  HEENT: NC, AT   Chest: Clear to auscultation bilaterally.  Heart: S1S2 normal, regular.  Extremities: No legs swelling  Neuro: Alert and awake, grossly non-focal      Medical Decision Making             Data     I have personally reviewed the following data over the past 24 hrs:    N/A  \   N/A   / N/A     141 106 33.7 (H) /  98   3.7 24 1.65 (H) \       Imaging results reviewed over the past 24 hrs:   No results found for this or any previous visit (from the past 24 hour(s)).    Plan d/w patient, CV surgery PA    Patient discharge home with home cares today.    "

## 2024-04-27 NOTE — PLAN OF CARE
Physical Therapy Discharge Summary    Reason for therapy discharge:    Discharged to home with home therapy.    Progress towards therapy goal(s). See goals on Care Plan in [facility] electronic health record for goal details.  Goals met    Therapy recommendation(s):    Continues cardiac rehab

## 2024-04-27 NOTE — PROVIDER NOTIFICATION
RN reviewed discharge instructions in detail with spouse, daughter in law, and pt.  All questions answered.  Pt and family verbalize understanding.

## 2024-04-27 NOTE — PROGRESS NOTES
Pt is feeling ready to discharge home today.  Pt has ambulated in the hallway, ate breakfast.  Pt's spouse will be here between 2 and 3pm to pick pt up.

## 2024-04-27 NOTE — PROGRESS NOTES
Care Management Discharge Note    Discharge Date: 04/27/2024       Discharge Disposition: Home Care    Discharge Services: PT, OT, RN    Discharge DME: None    Discharge Transportation: family or friend will provide    Private pay costs discussed: Not applicable    Does the patient's insurance plan have a 3 day qualifying hospital stay waiver?  No    PAS Confirmation Code: NA  Patient/family educated on Medicare website which has current facility and service quality ratings: yes    Education Provided on the Discharge Plan: Yes (per treatment team)  Persons Notified of Discharge Plans: patient   Patient/Family in Agreement with the Plan: yes    Handoff Referral Completed: Yes    Additional Information:  Patient discharging home to prior living environment with spouse.  Home PT, OT, RN arranged through [employer]; ELOISA faxed orders to accepting home care agency.  Patient spouse transporting home.     DANA Diamond

## 2024-04-27 NOTE — PLAN OF CARE
Vitally stable, denies pain, using CPAP overnight, incisions approximated, dry, DOMINICK. Tele:NSR, 1st degree AVB, BBB.  No acute events, anticipating discharge today.    Goal Outcome Evaluation:  Problem: Comorbidity Management  Goal: Blood Glucose Levels Within Targeted Range  Outcome: Progressing     Problem: Cardiovascular Surgery  Goal: Improved Activity Tolerance  Outcome: Progressing     Problem: Cardiovascular Surgery  Goal: Effective Cardiac Function  Outcome: Progressing     Problem: Adult Inpatient Plan of Care  Goal: Optimal Comfort and Wellbeing  Outcome: Progressing

## 2024-04-30 ENCOUNTER — TELEPHONE (OUTPATIENT)
Dept: CARDIOLOGY | Facility: CLINIC | Age: 79
End: 2024-04-30
Payer: COMMERCIAL

## 2024-04-30 NOTE — TELEPHONE ENCOUNTER
Cardiovascular Surgery  [facility]    DISCHARGE FOLLOW UP PHONE CALL      POST OP MONITORING  How is your pain on a 0-10 scale, how are you managing your pain? Pain is being managed well with tylenol.    ACTIVITY  How is your activity tolerance? Up and walking well.  Are you still doing sternal precautions? Yes.  Do you hear any clicking when you are moving or taking a deep breath? No.    Are you weighing yourself daily? Pt is currently 312 lbs.    SIGNS AND SYMPTOMS OF INFECTION  INCREASE IN PAIN - No  FEVER - No  DRAINAGE - No If so, color: NA  REDNESS - No  SWELLING - No    ASSISTANCE  Do you have someone at home to assist you with your daily activities? Spouse is assisting pt at home.    MEDICATIONS  Is someone helping you to set up your medications? Pt and spouse  Do you have any questions about your medications? No.    Are you on a blood thinner? Plavix.  Who is managing your INRs? NA    FOLLOW UP  You are scheduled to see your primary care physician on 5/8.  You are scheduled to see our surgery advanced practive provider for post operative follow up on 5/14.    You are scheduled for cardiac rehab on 5/8.  You are scheduled to see your cardiologist on 6/4.    CONTACT INFORMATION  Please feel free to call us with any questions or symptoms that are concerning for you at [phone]. If it is after 4:00 in the afternoon, or a weekend please call [phone] and ask for the on call specialist. We want to do everything we can to help prevent you needing to return to the ED, so please do not hesitate to call us.    Joseline Chaney RN  Cardiovascular Surgery  [phone]  April 30, 2024 1:58 PM        ----- Message from Joseline Chaney RN sent at 4/29/2024  8:18 AM CDT -----  POC 4/30  ----- Message -----  From: Ernesitna Ceron PA-C  Sent: 4/28/2024   8:28 AM CDT  To: Joseline Chaney RN    Discharge to home yesterday

## 2024-05-08 ENCOUNTER — HOSPITAL ENCOUNTER (OUTPATIENT)
Dept: CARDIAC REHAB | Facility: CLINIC | Age: 79
Discharge: HOME | End: 2024-05-08
Attending: STUDENT IN AN ORGANIZED HEALTH CARE EDUCATION/TRAINING PROGRAM
Payer: COMMERCIAL

## 2024-05-08 DIAGNOSIS — Z95.1 S/P CABG (CORONARY ARTERY BYPASS GRAFT): ICD-10-CM

## 2024-05-08 LAB
GLUCOSE BLDC GLUCOMTR-MCNC: 138 MG/DL (ref 70–99)
GLUCOSE BLDC GLUCOMTR-MCNC: 165 MG/DL (ref 70–99)

## 2024-05-08 PROCEDURE — 93798 PHYS/QHP OP CAR RHAB W/ECG: CPT

## 2024-05-08 PROCEDURE — 93797 PHYS/QHP OP CAR RHAB WO ECG: CPT

## 2024-05-12 ENCOUNTER — HEALTH MAINTENANCE LETTER (OUTPATIENT)
Age: 79
End: 2024-05-12

## 2024-05-13 ENCOUNTER — HOSPITAL ENCOUNTER (OUTPATIENT)
Dept: CARDIAC REHAB | Facility: CLINIC | Age: 79
Discharge: HOME | End: 2024-05-13
Attending: STUDENT IN AN ORGANIZED HEALTH CARE EDUCATION/TRAINING PROGRAM
Payer: COMMERCIAL

## 2024-05-13 PROCEDURE — 93798 PHYS/QHP OP CAR RHAB W/ECG: CPT

## 2024-05-14 ENCOUNTER — OFFICE VISIT (OUTPATIENT)
Dept: CARDIOLOGY | Facility: CLINIC | Age: 79
End: 2024-05-14
Payer: COMMERCIAL

## 2024-05-14 VITALS
HEART RATE: 59 BPM | DIASTOLIC BLOOD PRESSURE: 58 MMHG | RESPIRATION RATE: 18 BRPM | SYSTOLIC BLOOD PRESSURE: 128 MMHG | WEIGHT: 315 LBS | BODY MASS INDEX: 44.49 KG/M2

## 2024-05-14 DIAGNOSIS — Z95.1 S/P CABG (CORONARY ARTERY BYPASS GRAFT): Primary | ICD-10-CM

## 2024-05-14 PROCEDURE — 99024 POSTOP FOLLOW-UP VISIT: CPT

## 2024-05-14 NOTE — PATIENT INSTRUCTIONS
- Surgically doing well. Incisions are healing well with no signs of infection.  - Watch middle of midline incision--if red, has pus, doesn't heal, call our office.   - Hemodynamics are stable. No medication changes were needed today.  - Follow up with your cardiologist as scheduled (6/4/2024 at 4:20pm w/ Dr. Nath)  - OK to stop Plavix 4/2024 at which point aspirin should be increased to 162mg daily indefinitely s/p CABG  - Continue Cardiac Rehab until completed.   - May start driving 5/17/2024 (4 weeks post-op) if not using narcotic pain medications.  - Continue strict sternal precautions until 6/14/2024. No lifting >10bs; may gradually increase at this point (8 weeks post-op).   - No need for further follow-up with CV surgery unless concerns. Feel free to call our office with questions. [phone].

## 2024-05-14 NOTE — PROGRESS NOTES
CARDIOTHORACIC SURGERY FOLLOW-UP VISIT     Nirav Baker   1945   [id number]      Reason for visit: Post operative clinic visit. Patient underwent CABG x2 (LIMA to LAD, rSVG to ramus), RLE EVH, Lazo catheter placement per urology with Dr. Edwards on 4/19/2024.     HPI: Nirav Baker is a 79 year old year old male seen in clinic for a routine follow-up appointment after surgery. Patient has past medical history as below. Hospital course was remarkable for difficult lazo placement in OR (see op note per Dr. Kent), SHELIA w/ Cr to 1.9, and significant insulin requirements postop and unable to resume PO DM2 meds given SHELIA. PT, OT, and cardiac rehab recommended ARU at discharge given patient's significant rehab needs and potential for improvement but this was denied by insurance. Therefore, patient was discharged to home on POD#8.    Patient has been doing overall well since discharge. Patient did follow-up with primary care since leaving the hospital; no concerns noted at this visit. Reports that incision is healing well. Denies fevers, peripheral edema. Appetite is improving and patient is voiding without difficulty. Weight has been stable. Pain management at this point with occasional Tylenol. Patient has been attending cardiac rehab and this is going well. Patient is not on anti-coagulation, but is on Plavix x1 year postop per surgeon preference for graft patency in s/o NSTEMI.      PAST MEDICAL HISTORY:  Past Medical History:   Diagnosis Date    Claustrophobia     Diabetes (H)     Hiatal hernia     Hypertension     Motion sickness     NSTEMI (non-ST elevated myocardial infarction) (H) 4/17/2024    Orthopnea     Personal history of fall     Twice    Sleep apnea     Walking troubles        PAST SURGICAL HISTORY:  Past Surgical History:   Procedure Laterality Date    APPENDECTOMY      BACK SURGERY      C5    CHOLECYSTECTOMY      COLONOSCOPY      COMBINED CYSTOSCOPY, INSERT STENT URETER(S) Left 9/9/2022     Procedure: CYSTOURETEROSCOPY, WITH BLADDER NECK DILATION WITH BLADDER NECK CONTRACTURE,  BLADDER CALCULUS REMOVAL;  Surgeon: Harvey Madden MD;  Location: [place] [facility] [facility]    CORONARY ARTERY BYPASS GRAFT, INTERNAL MAMMARY ARTERY HARVEST, SAPHENOUS VEIN ENDOSCOPIC VESSEL PROCUREMENT, ANESTHESIA TRANSESOPHAGEAL ECHOCARDIOGRAM N/A 4/19/2024    Procedure: CORONARY ARTERY BYPASS GRAFT TIMES  TWO  WITH LEFT INTERNAL MAMMARY ARTERY HARVEST, LEFT ENDOSCOPIC VESSEL PROCUREMENT, ANESTHESIA TRANSESOPHAGEAL ECHOCARDIOGRAM, EPI AORTIC ULTRASOUND;  Surgeon: Cathy Edwards MD;  Location: [facility] OR    CV CORONARY ANGIOGRAM N/A 4/17/2024    Procedure: Coronary Angiogram;  Surgeon: Nils Ceballos MD;  Location: [facility] CATH LAB CV    CV PCI N/A 4/17/2024    Procedure: Percutaneous Coronary Intervention;  Surgeon: Nils Ceballos MD;  Location: [facility] CV    CYSTOSCOPY, BLADDER NECK CUTS, COMBINED N/A 4/14/2023    Procedure: CYSTOSCOPY, WITH MULTIPLE INCISIONS OF BLADDER NECK. Injection of Mitomycin to stricture;  Surgeon: Gunner Pang MD;  [place] [facility] [place]    DENTAL SURGERY      HEMORRHOIDECTOMY      HERNIA REPAIR      PROSTATECTOMY         CURRENT MEDICATIONS:     Current Outpatient Medications:     acetaminophen (TYLENOL) 325 MG tablet, [ACETAMINOPHEN (TYLENOL) 325 MG TABLET] Take 2 tablets by mouth every 4 (four) hours as needed., Disp: , Rfl:     aspirin (ASA) 81 MG chewable tablet, Take 1 tablet (81 mg) by mouth daily, Disp: 90 tablet, Rfl: 0    cholecalciferol, vitamin D3, 5,000 unit capsule, [CHOLECALCIFEROL, VITAMIN D3, 5,000 UNIT CAPSULE] Take 5,000 Units by mouth at bedtime. , Disp: , Rfl:     clopidogrel (PLAVIX) 75 MG tablet, Take 1 tablet (75 mg) by mouth daily, Disp: 90 tablet, Rfl: 3    diphenhydrAMINE (BENADRYL) 25 mg tablet, [DIPHENHYDRAMINE (BENADRYL) 25 MG TABLET] Take 25 mg by mouth at bedtime as needed for itching., Disp: , Rfl:     ezetimibe (ZETIA) 10 mg tablet, [EZETIMIBE  (ZETIA) 10 MG TABLET] Take 1 tablet by mouth at bedtime. , Disp: , Rfl:     glipiZIDE (GLUCOTROL XL) 10 MG 24 hr tablet, Take 2 tablets (20 mg) by mouth daily (with lunch), Disp: 180 tablet, Rfl: 0    insulin aspart (NOVOLOG VIAL) 100 UNITS/ML vial, Inject 3 Units Subcutaneous 3 times daily (with meals) for 30 days, Disp: 2.7 mL, Rfl: 0    insulin glargine (LANTUS PEN) 100 UNIT/ML pen, Inject 40 Units Subcutaneous at bedtime (Patient taking differently: Inject 38 Units Subcutaneous at bedtime), Disp: 15 mL, Rfl: 0    insulin glargine (LANTUS PEN) 100 UNIT/ML pen, Inject 60 Units Subcutaneous every morning (before breakfast), Disp: 15 mL, Rfl: 0    melatonin 3 MG CAPS, Take 6 mg by mouth At Bedtime, Disp: , Rfl:     Metoprolol Tartrate 75 MG TABS, Take 75 mg by mouth 2 times daily, Disp: 90 tablet, Rfl: 0    multivitamin (ONE A DAY) per tablet, [MULTIVITAMIN (ONE A DAY) PER TABLET] Take 1 tablet by mouth at bedtime. , Disp: , Rfl:     omega-3/dha/epa/fish oil (FISH OIL-OMEGA-3 FATTY ACIDS) 300-1,000 mg capsule, [OMEGA-3/DHA/EPA/FISH OIL (FISH OIL-OMEGA-3 FATTY ACIDS) 300-1,000 MG CAPSULE] Take 6 g by mouth at bedtime., Disp: , Rfl:     omeprazole (PRILOSEC) 20 MG capsule, Take 1 capsule by mouth at bedtime, Disp: , Rfl:     polyethylene glycol (MIRALAX) 17 gram packet, [POLYETHYLENE GLYCOL (MIRALAX) 17 GRAM PACKET] Take 17 g by mouth at bedtime. , Disp: , Rfl:     rosuvastatin (CRESTOR) 20 MG tablet, Take 1 tablet (20 mg) by mouth at bedtime, Disp: 90 tablet, Rfl: 0    senna-docusate (SENOKOT-S/PERICOLACE) 8.6-50 MG tablet, Take 1 tablet by mouth 2 times daily as needed for constipation, Disp: 30 tablet, Rfl: 0    tamsulosin (FLOMAX) 0.4 MG capsule, Take 1 capsule (0.4 mg) by mouth daily, Disp: 90 capsule, Rfl: 0    calcium carbonate (TUMS ULTRA) 400 mg calcium (1,000 mg) Chew, Take 2 tablets by mouth as needed (Patient not taking: Reported on 5/14/2024), Disp: , Rfl:     magnesium 500 MG TABS, Take 500 mg by mouth  "at bedtime (Patient not taking: Reported on 5/14/2024), Disp: , Rfl:     riboflavin, vitamin B2, 100 mg Tab, [RIBOFLAVIN, VITAMIN B2, 100 MG TAB] Take 1 tablet by mouth at bedtime.  (Patient not taking: Reported on 5/14/2024), Disp: , Rfl:   No current facility-administered medications for this visit.    Facility-Administered Medications Ordered in Other Visits:     [COMPLETED] methadone (DOLOPHINE) injection 20 mg, 20 mg, Intravenous, Once, Vincent Aguirre MD    ALLERGIES:      Allergies   Allergen Reactions    Amoxicillin Hives     Per Dr. Barrientos, patient has tolerated Keflex.    Atorvastatin Diarrhea    Benzalkonium Chloride Other (See Comments)    Fenofibrate Muscle Pain (Myalgia)    Gemfibrozil     Hydrocodone-Acetaminophen Other (See Comments)     Patient reports \"going white as a sheet\" and cold sweats   OK with Tylenol #3 No problems    Lisinopril Cough    Neosporin (Lsp-Zgm-Uryas) [Neomycin-Bacitracin Zn-Polymyx] Unknown    Penicillins Hives     1992 had pcn and had hives. Pt. States allergic to all cillin's      Pravastatin Sodium [Pravastatin] Muscle Pain (Myalgia)    Trulicity [Dulaglutide] Unknown    Gluten [Gluten Meal] Other (See Comments)     Reports feeling worse with gluten; tested below threshold for Celiac but daughter is positive    Insulin Detemir Rash       ROS:  Gen: No fevers, weight loss/gain  CV: Denies chest pain, peripheral edema  Pulm: Denies SOB  GI/: Voiding without problems, appetite improving.     LABS:  None    IMAGING:  None    PHYSICAL EXAM:   /58 (BP Location: Right arm, Patient Position: Sitting, Cuff Size: Adult Large)   Pulse 59   Resp 18   Wt 144.7 kg (319 lb)   BMI 44.49 kg/m    General: Alert and oriented, pleasant, no acute distress.  CV:  No peripheral edema.  Pulm: Easy work of breathing on room air.   GI: Soft, non-tender, and non-distended  Incision: Chest and right leg incisions clean dry and intact without erythema, swelling or drainage  Neuro: CNs " grossly intact.      ASSESSMENT/PLAN:  Nirav Baker is a 79 year old year old male status post CABG x2 w/ RLE EVH and lazo catheter placement per urology who returns to clinic for postop visit.     - Surgically doing well. Incisions are healing well with no signs of infection.  - Watch middle of midline incision--if red, has pus, doesn't heal, call our office.   - Hemodynamics are stable. No medication changes were needed today.  - Follow up with your cardiologist as scheduled (6/4/2024 at 4:20pm w/ Dr. Nath)  - OK to stop Plavix 4/2024 at which point aspirin should be increased to 162mg daily indefinitely s/p CABG  - Continue Cardiac Rehab until completed.   - May start driving 5/17/2024 (4 weeks post-op) if not using narcotic pain medications.  - Continue strict sternal precautions until 6/14/2024. No lifting >10bs; may gradually increase at this point (8 weeks post-op).   - No need for further follow-up with CV surgery unless concerns. Feel free to call our office with questions. [phone]         _______  Ernestina Ceron PA-C  Cardiothoracic Surgery  [phone]

## 2024-05-15 ENCOUNTER — HOSPITAL ENCOUNTER (OUTPATIENT)
Dept: CARDIAC REHAB | Facility: CLINIC | Age: 79
Discharge: HOME | End: 2024-05-15
Attending: STUDENT IN AN ORGANIZED HEALTH CARE EDUCATION/TRAINING PROGRAM
Payer: COMMERCIAL

## 2024-05-15 PROCEDURE — 93798 PHYS/QHP OP CAR RHAB W/ECG: CPT

## 2024-05-15 PROCEDURE — 93797 PHYS/QHP OP CAR RHAB WO ECG: CPT | Mod: 59

## 2024-05-20 ENCOUNTER — HOSPITAL ENCOUNTER (OUTPATIENT)
Dept: CARDIAC REHAB | Facility: CLINIC | Age: 79
Discharge: HOME | End: 2024-05-20
Attending: STUDENT IN AN ORGANIZED HEALTH CARE EDUCATION/TRAINING PROGRAM
Payer: COMMERCIAL

## 2024-05-20 PROCEDURE — 93798 PHYS/QHP OP CAR RHAB W/ECG: CPT

## 2024-05-22 ENCOUNTER — HOSPITAL ENCOUNTER (OUTPATIENT)
Dept: CARDIAC REHAB | Facility: CLINIC | Age: 79
Discharge: HOME | End: 2024-05-22
Attending: STUDENT IN AN ORGANIZED HEALTH CARE EDUCATION/TRAINING PROGRAM
Payer: COMMERCIAL

## 2024-05-22 PROCEDURE — 93798 PHYS/QHP OP CAR RHAB W/ECG: CPT

## 2024-05-29 ENCOUNTER — HOSPITAL ENCOUNTER (OUTPATIENT)
Dept: CARDIAC REHAB | Facility: CLINIC | Age: 79
Discharge: HOME | End: 2024-05-29
Attending: STUDENT IN AN ORGANIZED HEALTH CARE EDUCATION/TRAINING PROGRAM
Payer: COMMERCIAL

## 2024-05-29 PROCEDURE — 93798 PHYS/QHP OP CAR RHAB W/ECG: CPT

## 2024-06-03 ENCOUNTER — HOSPITAL ENCOUNTER (OUTPATIENT)
Dept: CARDIAC REHAB | Facility: CLINIC | Age: 79
Discharge: HOME | End: 2024-06-03
Attending: STUDENT IN AN ORGANIZED HEALTH CARE EDUCATION/TRAINING PROGRAM
Payer: COMMERCIAL

## 2024-06-03 PROCEDURE — 93798 PHYS/QHP OP CAR RHAB W/ECG: CPT

## 2024-06-04 ENCOUNTER — OFFICE VISIT (OUTPATIENT)
Dept: CARDIOLOGY | Facility: CLINIC | Age: 79
End: 2024-06-04
Payer: COMMERCIAL

## 2024-06-04 VITALS
BODY MASS INDEX: 44.63 KG/M2 | SYSTOLIC BLOOD PRESSURE: 152 MMHG | WEIGHT: 315 LBS | OXYGEN SATURATION: 95 % | DIASTOLIC BLOOD PRESSURE: 64 MMHG | RESPIRATION RATE: 18 BRPM | HEART RATE: 65 BPM

## 2024-06-04 DIAGNOSIS — Z95.1 S/P CABG (CORONARY ARTERY BYPASS GRAFT): ICD-10-CM

## 2024-06-04 DIAGNOSIS — E78.5 DYSLIPIDEMIA: Primary | ICD-10-CM

## 2024-06-04 PROCEDURE — G2211 COMPLEX E/M VISIT ADD ON: HCPCS | Performed by: INTERNAL MEDICINE

## 2024-06-04 PROCEDURE — 99214 OFFICE O/P EST MOD 30 MIN: CPT | Performed by: INTERNAL MEDICINE

## 2024-06-04 RX ORDER — METOPROLOL TARTRATE 75 MG/1
75 TABLET, FILM COATED ORAL 2 TIMES DAILY
Qty: 90 TABLET | Refills: 3 | Status: SHIPPED | OUTPATIENT
Start: 2024-06-04 | End: 2024-06-05

## 2024-06-04 RX ORDER — ROSUVASTATIN CALCIUM 20 MG/1
20 TABLET, COATED ORAL AT BEDTIME
Qty: 90 TABLET | Refills: 3 | Status: SHIPPED | OUTPATIENT
Start: 2024-06-04

## 2024-06-04 RX ORDER — CLOPIDOGREL BISULFATE 75 MG/1
75 TABLET ORAL DAILY
Qty: 90 TABLET | Refills: 3 | Status: SHIPPED | OUTPATIENT
Start: 2024-06-04

## 2024-06-04 RX ORDER — INSULIN ASPART 100 [IU]/ML
3 INJECTION, SOLUTION INTRAVENOUS; SUBCUTANEOUS 3 TIMES DAILY
COMMUNITY
Start: 2024-04-27 | End: 2024-09-25

## 2024-06-04 RX ORDER — CLINDAMYCIN PHOSPHATE 11.9 MG/ML
SOLUTION TOPICAL
COMMUNITY
Start: 2023-08-07

## 2024-06-04 RX ORDER — FUROSEMIDE 20 MG
20 TABLET ORAL DAILY
Qty: 14 TABLET | Refills: 0 | Status: SHIPPED | OUTPATIENT
Start: 2024-06-04 | End: 2024-09-25 | Stop reason: DRUGHIGH

## 2024-06-04 RX ORDER — EZETIMIBE 10 MG/1
10 TABLET ORAL AT BEDTIME
Qty: 90 TABLET | Refills: 3 | Status: SHIPPED | OUTPATIENT
Start: 2024-06-04

## 2024-06-04 NOTE — LETTER
6/4/2024    [facility]  [address] [id number]    RE: Nirav Lanemann       Dear Colleague,     I had the pleasure of seeing Nirav Baker in the [facility].    HEART CARE ENCOUNTER CONSULTATON NOTE      [facility]  [phone]      Assessment/Recommendations   Assessment:  Coronary disease status post recent non-STEMI status post CABG x 2 with LIMA to-LAD, SVG-ramus by Dr. Lu on 4/19/2024  Chronic kidney disease  Morbid obesity  ELDON on CPAP  Hypertension: Blood pressure mildly elevated today but normally better controlled  Insulin-dependent diabetes mellitus type 2    Plan:  1.  Recommend low-dose of Lasix over the next 7 days and then may take as needed for lower extremity edema  2.  Continue cardiac rehab  3.  Continue dual antiplatelet therapy as recommended by CV surgery for 1 year and then may stop Plavix and continue on aspirin 81 mg indefinitely  4.  Continued efforts towards weight loss, diet, exercise  5.  Continue on Crestor, metoprolol  Follow-up in 3 months       History of Present Illness/Subjective    HPI: Nirav Baker is a 79 year old male with history of coronary disease status post recent non-STEMI status post CABG x 2 with LIMA to-LAD, SVG-ramus by Dr. Lu on 4/19/2024, chronic kidney disease, morbid obesity, ELDON on CPAP, hypertension, insulin-dependent diabetes mellitus type 2 who I am seeing today in follow-up.  He initially noted a slight opening and discharge at his chest incision site which is now healing.  He was seen in follow-up by CV surgery.  He is going to cardiac rehab twice a week.  He still feels generalized fatigue and takes naps throughout the day.  Overall slowly improving.  Also notes left leg swelling.  He has some chest wall tenderness over the right side.  No exertional chest pain and denies shortness of breath.  No orthopnea.       Physical Examination  Review of Systems   Vitals:  BP (!) 152/64 (BP Location: Left arm, Patient Position: Sitting, Cuff Size: Adult Large)   Pulse 65   Resp 18   Wt 145.2 kg (320 lb)   SpO2 95%   BMI 44.63 kg/m    BMI= Body mass index is 44.63 kg/m .  Wt Readings from Last 3 Encounters:   06/04/24 145.2 kg (320 lb)   05/14/24 144.7 kg (319 lb)   04/27/24 142.6 kg (314 lb 4.8 oz)       General Appearance:   no distress, normal body habitus   ENT/Mouth: membranes moist, no oral lesions or bleeding gums.      EYES:  no scleral icterus, normal conjunctivae   Neck: no carotid bruits or thyromegaly   Chest/Lungs:   lungs are clear to auscultation, incision site well healing   Cardiovascular:   Regular. Normal first and second heart sounds with no murmur  no edema bilaterally        Extremities: no cyanosis or clubbing   Skin: no xanthelasma, warm.    Neurologic: normal  bilateral, no tremors     Psychiatric: alert and oriented x3, calm        Please refer above for cardiac ROS details.        Medical History  Surgical History Family History Social History   Past Medical History:   Diagnosis Date    Claustrophobia     Diabetes (H)     Hiatal hernia     Hypertension     Motion sickness     NSTEMI (non-ST elevated myocardial infarction) (H) 4/17/2024    Orthopnea     Personal history of fall     Twice    Sleep apnea     Walking troubles      Past Surgical History:   Procedure Laterality Date    APPENDECTOMY      BACK SURGERY      C5    CHOLECYSTECTOMY      COLONOSCOPY      COMBINED CYSTOSCOPY, INSERT STENT URETER(S) Left 9/9/2022    Procedure: CYSTOURETEROSCOPY, WITH BLADDER NECK DILATION WITH BLADDER NECK CONTRACTURE,  BLADDER CALCULUS REMOVAL;  Surgeon: Harvey Madden MD;  Location: [facility]    CORONARY ARTERY BYPASS GRAFT, INTERNAL MAMMARY ARTERY HARVEST, SAPHENOUS VEIN ENDOSCOPIC VESSEL PROCUREMENT, ANESTHESIA TRANSESOPHAGEAL ECHOCARDIOGRAM N/A 4/19/2024    Procedure: CORONARY ARTERY BYPASS GRAFT TIMES  TWO  WITH LEFT INTERNAL MAMMARY ARTERY  HARVEST, LEFT ENDOSCOPIC VESSEL PROCUREMENT, ANESTHESIA TRANSESOPHAGEAL ECHOCARDIOGRAM, EPI AORTIC ULTRASOUND;  Surgeon: Cathy Edwards MD;  Location: [facility] OR    CV CORONARY ANGIOGRAM N/A 4/17/2024    Procedure: Coronary Angiogram;  Surgeon: Nils Ceballos MD;  Location: [facility] CATH LAB CV    CV PCI N/A 4/17/2024    Procedure: Percutaneous Coronary Intervention;  Surgeon: Nils Ceballos MD;  Location: [facility] LAB CV    CYSTOSCOPY, BLADDER NECK CUTS, COMBINED N/A 4/14/2023    Procedure: CYSTOSCOPY, WITH MULTIPLE INCISIONS OF BLADDER NECK. Injection of Mitomycin to stricture;  Surgeon: Gunner Pang MD;  [place] [facility] [place]    DENTAL SURGERY      HEMORRHOIDECTOMY      HERNIA REPAIR      PROSTATECTOMY       Family History   Problem Relation Age of Onset    Anesthesia Reaction No family hx of     Bleeding Disorder No family hx of     Venous thrombosis No family hx of         Social History     Socioeconomic History    Marital status:      Spouse name: Not on file    Number of children: Not on file    Years of education: Not on file    Highest education level: Not on file   Occupational History    Not on file   Tobacco Use    Smoking status: Never     Passive exposure: Past    Smokeless tobacco: Never   Substance and Sexual Activity    Alcohol use: Yes     Alcohol/week: 1.0 standard drink of alcohol     Types: 1 Standard drinks or equivalent per week     Comment: very occasional    Drug use: No    Sexual activity: Not on file   Other Topics Concern    Not on file   Social History Narrative    Not on file     Social Determinants of Health     Financial Resource Strain: Not on file   Food Insecurity: Not on file   Transportation Needs: Not on file   Physical Activity: Not on file   Stress: Not on file   Social Connections: Not on file   Interpersonal Safety: Not on file   Housing Stability: Not on file           Medications  Allergies   Current Outpatient Medications   Medication Sig  Dispense Refill    acetaminophen (TYLENOL) 325 MG tablet [ACETAMINOPHEN (TYLENOL) 325 MG TABLET] Take 2 tablets by mouth every 4 (four) hours as needed.      aspirin (ASA) 81 MG chewable tablet Take 1 tablet (81 mg) by mouth daily 90 tablet 0    calcium carbonate (TUMS ULTRA) 400 mg calcium (1,000 mg) Chew Take 2 tablets by mouth as needed      cholecalciferol, vitamin D3, 5,000 unit capsule [CHOLECALCIFEROL, VITAMIN D3, 5,000 UNIT CAPSULE] Take 5,000 Units by mouth at bedtime.       clindamycin (CLEOCIN T) 1 % external solution APPLY TO AFFECTED AREAS ON JAW LINE AND BEHIND EAR FOR FOLLICULITIS      clopidogrel (PLAVIX) 75 MG tablet Take 1 tablet (75 mg) by mouth daily 90 tablet 3    diphenhydrAMINE (BENADRYL) 25 mg tablet [DIPHENHYDRAMINE (BENADRYL) 25 MG TABLET] Take 25 mg by mouth at bedtime as needed for itching.      ezetimibe (ZETIA) 10 MG tablet Take 1 tablet (10 mg) by mouth at bedtime 90 tablet 3    furosemide (LASIX) 20 MG tablet Take 1 tablet (20 mg) by mouth daily 14 tablet 0    glipiZIDE (GLUCOTROL XL) 10 MG 24 hr tablet Take 2 tablets (20 mg) by mouth daily (with lunch) 180 tablet 0    Insulin Aspart FlexPen 100 UNIT/ML SOPN Inject 3 Units Subcutaneous 3 times daily      insulin glargine (LANTUS PEN) 100 UNIT/ML pen Inject 40 Units Subcutaneous at bedtime (Patient taking differently: Inject 38 Units Subcutaneous at bedtime) 15 mL 0    insulin glargine (LANTUS PEN) 100 UNIT/ML pen Inject 60 Units Subcutaneous every morning (before breakfast) 15 mL 0    magnesium 500 MG TABS Take 500 mg by mouth at bedtime      melatonin 3 MG CAPS Take 6 mg by mouth At Bedtime      multivitamin (ONE A DAY) per tablet Take 1 tablet by mouth every morning      omega-3/dha/epa/fish oil (FISH OIL-OMEGA-3 FATTY ACIDS) 300-1,000 mg capsule Take 4 capsules by mouth at bedtime      omeprazole (PRILOSEC) 20 MG capsule Take 1 capsule by mouth at bedtime      polyethylene glycol (MIRALAX) 17 gram packet [POLYETHYLENE GLYCOL  "(MIRALAX) 17 GRAM PACKET] Take 17 g by mouth at bedtime.       riboflavin, vitamin B2, 100 mg Tab Take 1 tablet by mouth at bedtime      rosuvastatin (CRESTOR) 20 MG tablet Take 1 tablet (20 mg) by mouth at bedtime 90 tablet 3    tamsulosin (FLOMAX) 0.4 MG capsule Take 1 capsule (0.4 mg) by mouth daily 90 capsule 0    insulin aspart (NOVOLOG VIAL) 100 UNITS/ML vial Inject 3 Units Subcutaneous 3 times daily (with meals) for 30 days 2.7 mL 0    Metoprolol Tartrate 75 MG TABS Take 75 mg by mouth 2 times daily 180 tablet 3    senna-docusate (SENOKOT-S/PERICOLACE) 8.6-50 MG tablet Take 1 tablet by mouth 2 times daily as needed for constipation (Patient not taking: Reported on 6/4/2024) 30 tablet 0       Allergies   Allergen Reactions    Amoxicillin Hives     Per Dr. Barrientos, patient has tolerated Keflex.    Atorvastatin Diarrhea    Benzalkonium Chloride Other (See Comments)    Fenofibrate Muscle Pain (Myalgia)    Gemfibrozil     Hydrocodone-Acetaminophen Other (See Comments)     Patient reports \"going white as a sheet\" and cold sweats   OK with Tylenol #3 No problems    Lisinopril Cough    Neosporin (Con-Vnh-Rdnfw) [Neomycin-Bacitracin Zn-Polymyx] Unknown    Penicillins Hives     1992 had pcn and had hives. Pt. States allergic to all cillin's      Pravastatin Sodium [Pravastatin] Muscle Pain (Myalgia)    Trulicity [Dulaglutide] Unknown    Gluten [Gluten Meal] Other (See Comments)     Reports feeling worse with gluten; tested below threshold for Celiac but daughter is positive    Insulin Detemir Rash          Lab Results    Chemistry/lipid CBC Cardiac Enzymes/BNP/TSH/INR   Recent Labs   Lab Test 04/17/24  0548   CHOL 209*   HDL 33*   *   TRIG 261*     Recent Labs   Lab Test 04/17/24  0548   *     Recent Labs   Lab Test 05/08/24  1433 04/27/24  0725 04/27/24  0527   NA  --   --  141   POTASSIUM  --   --  3.7   CHLORIDE  --   --  106   CO2  --   --  24   *   < > 72   BUN  --   --  33.7*   CR  --   --  " "1.65*   GFRESTIMATED  --   --  42*   AURORA  --   --  9.0    < > = values in this interval not displayed.     Recent Labs   Lab Test 04/27/24  0527 04/26/24  0437 04/25/24  0426   CR 1.65* 1.89* 1.64*     Recent Labs   Lab Test 04/18/24  0439   A1C 7.2*          Recent Labs   Lab Test 04/26/24  0437 04/23/24  0404 04/22/24  0402   WBC  --   --  10.6   HGB  --   --  10.1*   HCT  --   --  30.1*   MCV  --   --  88      < > 132*    < > = values in this interval not displayed.     Recent Labs   Lab Test 04/22/24  0402 04/21/24  0721 04/20/24  0410   HGB 10.1* 10.8* 12.6*    Recent Labs   Lab Test 02/19/22  2312   TROPONINI <0.01     Recent Labs   Lab Test 04/16/24  2206 06/09/18  0525   BNP  --  34   NTBNPI 87  --      No results for input(s): \"TSH\" in the last [id number] hours.  Recent Labs   Lab Test 04/19/24  1722 04/19/24  1530 04/19/24  0711   INR 1.21* 1.31* 1.00        Valencia Nath MD    Thank you for allowing me to participate in the care of your patient.      Sincerely,     Valencia Nath MD     [facility]  cc:   Cathy Edwards MD  [address] [place] [id number]      "

## 2024-06-05 ENCOUNTER — HOSPITAL ENCOUNTER (OUTPATIENT)
Dept: CARDIAC REHAB | Facility: CLINIC | Age: 79
Discharge: HOME | End: 2024-06-05
Attending: STUDENT IN AN ORGANIZED HEALTH CARE EDUCATION/TRAINING PROGRAM
Payer: COMMERCIAL

## 2024-06-05 ENCOUNTER — LAB (OUTPATIENT)
Dept: LAB | Facility: CLINIC | Age: 79
End: 2024-06-05
Payer: COMMERCIAL

## 2024-06-05 DIAGNOSIS — E78.5 DYSLIPIDEMIA: ICD-10-CM

## 2024-06-05 DIAGNOSIS — Z95.1 S/P CABG (CORONARY ARTERY BYPASS GRAFT): ICD-10-CM

## 2024-06-05 LAB
CHOLEST SERPL-MCNC: 108 MG/DL
FASTING STATUS PATIENT QL REPORTED: YES
HDLC SERPL-MCNC: 35 MG/DL
LDLC SERPL CALC-MCNC: 47 MG/DL
NONHDLC SERPL-MCNC: 73 MG/DL
TRIGL SERPL-MCNC: 129 MG/DL

## 2024-06-05 PROCEDURE — 93798 PHYS/QHP OP CAR RHAB W/ECG: CPT

## 2024-06-05 PROCEDURE — 36415 COLL VENOUS BLD VENIPUNCTURE: CPT

## 2024-06-05 PROCEDURE — 80061 LIPID PANEL: CPT

## 2024-06-05 RX ORDER — METOPROLOL TARTRATE 75 MG/1
75 TABLET, FILM COATED ORAL 2 TIMES DAILY
Qty: 180 TABLET | Refills: 3 | Status: SHIPPED | OUTPATIENT
Start: 2024-06-05 | End: 2024-09-25

## 2024-06-05 NOTE — PROGRESS NOTES
HEART CARE ENCOUNTER CONSULTATON NOTE      [facility]  [phone]      Assessment/Recommendations   Assessment:  Coronary disease status post recent non-STEMI status post CABG x 2 with LIMA to-LAD, SVG-ramus by Dr. Lu on 4/19/2024  Chronic kidney disease  Morbid obesity  ELDON on CPAP  Hypertension: Blood pressure mildly elevated today but normally better controlled  Insulin-dependent diabetes mellitus type 2    Plan:  1.  Recommend low-dose of Lasix over the next 7 days and then may take as needed for lower extremity edema  2.  Continue cardiac rehab  3.  Continue dual antiplatelet therapy as recommended by CV surgery for 1 year and then may stop Plavix and continue on aspirin 81 mg indefinitely  4.  Continued efforts towards weight loss, diet, exercise  5.  Continue on Crestor, metoprolol  Follow-up in 3 months       History of Present Illness/Subjective    HPI: Nirav Baker is a 79 year old male with history of coronary disease status post recent non-STEMI status post CABG x 2 with LIMA to-LAD, SVG-ramus by Dr. Lu on 4/19/2024, chronic kidney disease, morbid obesity, ELDON on CPAP, hypertension, insulin-dependent diabetes mellitus type 2 who I am seeing today in follow-up.  He initially noted a slight opening and discharge at his chest incision site which is now healing.  He was seen in follow-up by CV surgery.  He is going to cardiac rehab twice a week.  He still feels generalized fatigue and takes naps throughout the day.  Overall slowly improving.  Also notes left leg swelling.  He has some chest wall tenderness over the right side.  No exertional chest pain and denies shortness of breath.  No orthopnea.       Physical Examination  Review of Systems   Vitals: BP (!) 152/64 (BP Location: Left arm, Patient Position: Sitting, Cuff Size: Adult Large)   Pulse 65   Resp 18   Wt 145.2 kg (320 lb)   SpO2 95%   BMI 44.63 kg/m    BMI= Body mass index is 44.63 kg/m .  Wt Readings from  Last 3 Encounters:   06/04/24 145.2 kg (320 lb)   05/14/24 144.7 kg (319 lb)   04/27/24 142.6 kg (314 lb 4.8 oz)       General Appearance:   no distress, normal body habitus   ENT/Mouth: membranes moist, no oral lesions or bleeding gums.      EYES:  no scleral icterus, normal conjunctivae   Neck: no carotid bruits or thyromegaly   Chest/Lungs:   lungs are clear to auscultation, incision site well healing   Cardiovascular:   Regular. Normal first and second heart sounds with no murmur  no edema bilaterally        Extremities: no cyanosis or clubbing   Skin: no xanthelasma, warm.    Neurologic: normal  bilateral, no tremors     Psychiatric: alert and oriented x3, calm        Please refer above for cardiac ROS details.        Medical History  Surgical History Family History Social History   Past Medical History:   Diagnosis Date    Claustrophobia     Diabetes (H)     Hiatal hernia     Hypertension     Motion sickness     NSTEMI (non-ST elevated myocardial infarction) (H) 4/17/2024    Orthopnea     Personal history of fall     Twice    Sleep apnea     Walking troubles      Past Surgical History:   Procedure Laterality Date    APPENDECTOMY      BACK SURGERY      C5    CHOLECYSTECTOMY      COLONOSCOPY      COMBINED CYSTOSCOPY, INSERT STENT URETER(S) Left 9/9/2022    Procedure: CYSTOURETEROSCOPY, WITH BLADDER NECK DILATION WITH BLADDER NECK CONTRACTURE,  BLADDER CALCULUS REMOVAL;  Surgeon: Harvey Madden MD;  Location: [facility]    CORONARY ARTERY BYPASS GRAFT, INTERNAL MAMMARY ARTERY HARVEST, SAPHENOUS VEIN ENDOSCOPIC VESSEL PROCUREMENT, ANESTHESIA TRANSESOPHAGEAL ECHOCARDIOGRAM N/A 4/19/2024    Procedure: CORONARY ARTERY BYPASS GRAFT TIMES  TWO  WITH LEFT INTERNAL MAMMARY ARTERY HARVEST, LEFT ENDOSCOPIC VESSEL PROCUREMENT, ANESTHESIA TRANSESOPHAGEAL ECHOCARDIOGRAM, EPI AORTIC ULTRASOUND;  Surgeon: Cathy Edwards MD;  Location: [facility] OR    CV CORONARY ANGIOGRAM N/A 4/17/2024    Procedure: Coronary  Angiogram;  Surgeon: Nils Ceballos MD;  Location: [facility] CV    CV PCI N/A 4/17/2024    Procedure: Percutaneous Coronary Intervention;  Surgeon: Nils Ceballos MD;  Location: [facility] CV    CYSTOSCOPY, BLADDER NECK CUTS, COMBINED N/A 4/14/2023    Procedure: CYSTOSCOPY, WITH MULTIPLE INCISIONS OF BLADDER NECK. Injection of Mitomycin to stricture;  Surgeon: Gunner Pang MD;  Location: [facility] OR    DENTAL SURGERY      HEMORRHOIDECTOMY      HERNIA REPAIR      PROSTATECTOMY       Family History   Problem Relation Age of Onset    Anesthesia Reaction No family hx of     Bleeding Disorder No family hx of     Venous thrombosis No family hx of         Social History     Socioeconomic History    Marital status:      Spouse name: Not on file    Number of children: Not on file    Years of education: Not on file    Highest education level: Not on file   Occupational History    Not on file   Tobacco Use    Smoking status: Never     Passive exposure: Past    Smokeless tobacco: Never   Substance and Sexual Activity    Alcohol use: Yes     Alcohol/week: 1.0 standard drink of alcohol     Types: 1 Standard drinks or equivalent per week     Comment: very occasional    Drug use: No    Sexual activity: Not on file   Other Topics Concern    Not on file   Social History Narrative    Not on file     Social Determinants of Health     Financial Resource Strain: Not on file   Food Insecurity: Not on file   Transportation Needs: Not on file   Physical Activity: Not on file   Stress: Not on file   Social Connections: Not on file   Interpersonal Safety: Not on file   Housing Stability: Not on file           Medications  Allergies   Current Outpatient Medications   Medication Sig Dispense Refill    acetaminophen (TYLENOL) 325 MG tablet [ACETAMINOPHEN (TYLENOL) 325 MG TABLET] Take 2 tablets by mouth every 4 (four) hours as needed.      aspirin (ASA) 81 MG chewable tablet Take 1 tablet (81 mg) by mouth daily  90 tablet 0    calcium carbonate (TUMS ULTRA) 400 mg calcium (1,000 mg) Chew Take 2 tablets by mouth as needed      cholecalciferol, vitamin D3, 5,000 unit capsule [CHOLECALCIFEROL, VITAMIN D3, 5,000 UNIT CAPSULE] Take 5,000 Units by mouth at bedtime.       clindamycin (CLEOCIN T) 1 % external solution APPLY TO AFFECTED AREAS ON JAW LINE AND BEHIND EAR FOR FOLLICULITIS      clopidogrel (PLAVIX) 75 MG tablet Take 1 tablet (75 mg) by mouth daily 90 tablet 3    diphenhydrAMINE (BENADRYL) 25 mg tablet [DIPHENHYDRAMINE (BENADRYL) 25 MG TABLET] Take 25 mg by mouth at bedtime as needed for itching.      ezetimibe (ZETIA) 10 MG tablet Take 1 tablet (10 mg) by mouth at bedtime 90 tablet 3    furosemide (LASIX) 20 MG tablet Take 1 tablet (20 mg) by mouth daily 14 tablet 0    glipiZIDE (GLUCOTROL XL) 10 MG 24 hr tablet Take 2 tablets (20 mg) by mouth daily (with lunch) 180 tablet 0    Insulin Aspart FlexPen 100 UNIT/ML SOPN Inject 3 Units Subcutaneous 3 times daily      insulin glargine (LANTUS PEN) 100 UNIT/ML pen Inject 40 Units Subcutaneous at bedtime (Patient taking differently: Inject 38 Units Subcutaneous at bedtime) 15 mL 0    insulin glargine (LANTUS PEN) 100 UNIT/ML pen Inject 60 Units Subcutaneous every morning (before breakfast) 15 mL 0    magnesium 500 MG TABS Take 500 mg by mouth at bedtime      melatonin 3 MG CAPS Take 6 mg by mouth At Bedtime      multivitamin (ONE A DAY) per tablet Take 1 tablet by mouth every morning      omega-3/dha/epa/fish oil (FISH OIL-OMEGA-3 FATTY ACIDS) 300-1,000 mg capsule Take 4 capsules by mouth at bedtime      omeprazole (PRILOSEC) 20 MG capsule Take 1 capsule by mouth at bedtime      polyethylene glycol (MIRALAX) 17 gram packet [POLYETHYLENE GLYCOL (MIRALAX) 17 GRAM PACKET] Take 17 g by mouth at bedtime.       riboflavin, vitamin B2, 100 mg Tab Take 1 tablet by mouth at bedtime      rosuvastatin (CRESTOR) 20 MG tablet Take 1 tablet (20 mg) by mouth at bedtime 90 tablet 3     "tamsulosin (FLOMAX) 0.4 MG capsule Take 1 capsule (0.4 mg) by mouth daily 90 capsule 0    insulin aspart (NOVOLOG VIAL) 100 UNITS/ML vial Inject 3 Units Subcutaneous 3 times daily (with meals) for 30 days 2.7 mL 0    Metoprolol Tartrate 75 MG TABS Take 75 mg by mouth 2 times daily 180 tablet 3    senna-docusate (SENOKOT-S/PERICOLACE) 8.6-50 MG tablet Take 1 tablet by mouth 2 times daily as needed for constipation (Patient not taking: Reported on 6/4/2024) 30 tablet 0       Allergies   Allergen Reactions    Amoxicillin Hives     Per Dr. Barrientos, patient has tolerated Keflex.    Atorvastatin Diarrhea    Benzalkonium Chloride Other (See Comments)    Fenofibrate Muscle Pain (Myalgia)    Gemfibrozil     Hydrocodone-Acetaminophen Other (See Comments)     Patient reports \"going white as a sheet\" and cold sweats   OK with Tylenol #3 No problems    Lisinopril Cough    Neosporin (Awq-Bwz-Fdxih) [Neomycin-Bacitracin Zn-Polymyx] Unknown    Penicillins Hives     1992 had pcn and had hives. Pt. States allergic to all cillin's      Pravastatin Sodium [Pravastatin] Muscle Pain (Myalgia)    Trulicity [Dulaglutide] Unknown    Gluten [Gluten Meal] Other (See Comments)     Reports feeling worse with gluten; tested below threshold for Celiac but daughter is positive    Insulin Detemir Rash          Lab Results    Chemistry/lipid CBC Cardiac Enzymes/BNP/TSH/INR   Recent Labs   Lab Test 04/17/24  0548   CHOL 209*   HDL 33*   *   TRIG 261*     Recent Labs   Lab Test 04/17/24  0548   *     Recent Labs   Lab Test 05/08/24  1433 04/27/24  0725 04/27/24  0527   NA  --   --  141   POTASSIUM  --   --  3.7   CHLORIDE  --   --  106   CO2  --   --  24   *   < > 72   BUN  --   --  33.7*   CR  --   --  1.65*   GFRESTIMATED  --   --  42*   AURORA  --   --  9.0    < > = values in this interval not displayed.     Recent Labs   Lab Test 04/27/24  0527 04/26/24  0437 04/25/24  0426   CR 1.65* 1.89* 1.64*     Recent Labs   Lab Test " "04/18/24  0439   A1C 7.2*          Recent Labs   Lab Test 04/26/24  0437 04/23/24  0404 04/22/24  0402   WBC  --   --  10.6   HGB  --   --  10.1*   HCT  --   --  30.1*   MCV  --   --  88      < > 132*    < > = values in this interval not displayed.     Recent Labs   Lab Test 04/22/24  0402 04/21/24  0721 04/20/24  0410   HGB 10.1* 10.8* 12.6*    Recent Labs   Lab Test 02/19/22  2312   TROPONINI <0.01     Recent Labs   Lab Test 04/16/24  2206 06/09/18  0525   BNP  --  34   NTBNPI 87  --      No results for input(s): \"TSH\" in the last 96538 hours.  Recent Labs   Lab Test 04/19/24  1722 04/19/24  1530 04/19/24  0711   INR 1.21* 1.31* 1.00        Valencia Nath MD                                      "

## 2024-06-10 ENCOUNTER — HOSPITAL ENCOUNTER (OUTPATIENT)
Dept: CARDIAC REHAB | Facility: CLINIC | Age: 79
Discharge: HOME | End: 2024-06-10
Attending: STUDENT IN AN ORGANIZED HEALTH CARE EDUCATION/TRAINING PROGRAM
Payer: COMMERCIAL

## 2024-06-10 PROCEDURE — 93798 PHYS/QHP OP CAR RHAB W/ECG: CPT

## 2024-06-12 ENCOUNTER — HOSPITAL ENCOUNTER (OUTPATIENT)
Dept: CARDIAC REHAB | Facility: CLINIC | Age: 79
Discharge: HOME | End: 2024-06-12
Attending: STUDENT IN AN ORGANIZED HEALTH CARE EDUCATION/TRAINING PROGRAM
Payer: COMMERCIAL

## 2024-06-12 PROCEDURE — 93798 PHYS/QHP OP CAR RHAB W/ECG: CPT

## 2024-06-17 ENCOUNTER — HOSPITAL ENCOUNTER (OUTPATIENT)
Dept: CARDIAC REHAB | Facility: CLINIC | Age: 79
Discharge: HOME | End: 2024-06-17
Attending: STUDENT IN AN ORGANIZED HEALTH CARE EDUCATION/TRAINING PROGRAM
Payer: COMMERCIAL

## 2024-06-17 PROCEDURE — 93798 PHYS/QHP OP CAR RHAB W/ECG: CPT

## 2024-06-19 ENCOUNTER — HOSPITAL ENCOUNTER (OUTPATIENT)
Dept: CARDIAC REHAB | Facility: CLINIC | Age: 79
Discharge: HOME | End: 2024-06-19
Attending: STUDENT IN AN ORGANIZED HEALTH CARE EDUCATION/TRAINING PROGRAM
Payer: COMMERCIAL

## 2024-06-19 PROCEDURE — 93798 PHYS/QHP OP CAR RHAB W/ECG: CPT

## 2024-06-24 ENCOUNTER — HOSPITAL ENCOUNTER (OUTPATIENT)
Dept: CARDIAC REHAB | Facility: CLINIC | Age: 79
Discharge: HOME | End: 2024-06-24
Attending: STUDENT IN AN ORGANIZED HEALTH CARE EDUCATION/TRAINING PROGRAM
Payer: COMMERCIAL

## 2024-06-24 PROCEDURE — 93798 PHYS/QHP OP CAR RHAB W/ECG: CPT

## 2024-06-26 ENCOUNTER — HOSPITAL ENCOUNTER (OUTPATIENT)
Dept: CARDIAC REHAB | Facility: CLINIC | Age: 79
Discharge: HOME | End: 2024-06-26
Attending: STUDENT IN AN ORGANIZED HEALTH CARE EDUCATION/TRAINING PROGRAM
Payer: COMMERCIAL

## 2024-06-26 PROCEDURE — 93798 PHYS/QHP OP CAR RHAB W/ECG: CPT

## 2024-06-27 ENCOUNTER — TELEPHONE (OUTPATIENT)
Dept: CARDIOLOGY | Facility: CLINIC | Age: 79
End: 2024-06-27
Payer: COMMERCIAL

## 2024-06-27 NOTE — TELEPHONE ENCOUNTER
Called and left  for pt, requested call back. CV RN contact info provided.    ----- Message from Vilma AMADO sent at 6/26/2024  3:15 PM CDT -----    PT is calling and wanted to speak with you and wanted to know when he has to get the piece of plastic out of his chest. He said its like a piece of string. Please call pt back at  [phone]. He stated he had surgery with Dr Edwards in April.  Thank you,  Vilma

## 2024-06-28 ENCOUNTER — TELEPHONE (OUTPATIENT)
Dept: CARDIOLOGY | Facility: CLINIC | Age: 79
End: 2024-06-28
Payer: COMMERCIAL

## 2024-06-28 NOTE — TELEPHONE ENCOUNTER
"LVM on both phone number provided to schedule pt for ALEX visit as Pt states he has a piece of \"plastic\" in his incision. Left name and callback  "

## 2024-07-01 ENCOUNTER — HOSPITAL ENCOUNTER (OUTPATIENT)
Dept: CARDIAC REHAB | Facility: CLINIC | Age: 79
Discharge: HOME | End: 2024-07-01
Attending: STUDENT IN AN ORGANIZED HEALTH CARE EDUCATION/TRAINING PROGRAM
Payer: COMMERCIAL

## 2024-07-01 ENCOUNTER — TELEPHONE (OUTPATIENT)
Dept: CARDIOLOGY | Facility: CLINIC | Age: 79
End: 2024-07-01
Payer: COMMERCIAL

## 2024-07-01 PROCEDURE — 93798 PHYS/QHP OP CAR RHAB W/ECG: CPT

## 2024-07-01 NOTE — TELEPHONE ENCOUNTER
Per Gary Ceron, schedule pt for 7/2 for a piece of plastic pt says is coming out of incision site. Pt aware of time and location and agreed to the plan

## 2024-07-02 ENCOUNTER — OFFICE VISIT (OUTPATIENT)
Dept: CARDIOLOGY | Facility: CLINIC | Age: 79
End: 2024-07-02
Payer: COMMERCIAL

## 2024-07-02 VITALS
DIASTOLIC BLOOD PRESSURE: 64 MMHG | SYSTOLIC BLOOD PRESSURE: 147 MMHG | HEART RATE: 61 BPM | RESPIRATION RATE: 14 BRPM | BODY MASS INDEX: 43.38 KG/M2 | WEIGHT: 311 LBS

## 2024-07-02 DIAGNOSIS — Z95.1 S/P CABG (CORONARY ARTERY BYPASS GRAFT): Primary | ICD-10-CM

## 2024-07-02 PROCEDURE — 99207 PR NO CHARGE LOS: CPT

## 2024-07-02 NOTE — PROGRESS NOTES
Brief CV Surgery Clinic Note      Patient well known to us as he is s/p CABG on 04/19/2024. His post-op course was relatively unremarkable. He saw us in routine post-op clinic on 5/14/2024 and was recovering well.    Patient called our office a few days ago with concern for suture sticking out of his chest incision that is bothersome to him. Advised him to come to clinic today.    Indeed, a one-inch piece of monocryl suture was extending from his sternal incision, about midway up. This was cut at the level of the skin.     Patient otherwise still doing well. No fevers, cough, edema. Does have some scaly skin on either side of his sternal incision that is improving. Left leg incision healing nicely. Attending cardiac rehab and this is going well. 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 No other complaints. Endorses possibly needed medication refills but is not certain which ones he needs; he will call our office about this.    No need for further follow-up with our team unless concerns. Office phone number provided to patient should he need medication refills or have other concerns.    _______  Ernestina Ceron PA-C  Cardiothoracic Surgery  [phone]

## 2024-07-03 ENCOUNTER — HOSPITAL ENCOUNTER (OUTPATIENT)
Dept: CARDIAC REHAB | Facility: CLINIC | Age: 79
Discharge: HOME | End: 2024-07-03
Attending: STUDENT IN AN ORGANIZED HEALTH CARE EDUCATION/TRAINING PROGRAM
Payer: COMMERCIAL

## 2024-07-03 PROCEDURE — 93798 PHYS/QHP OP CAR RHAB W/ECG: CPT

## 2024-07-08 ENCOUNTER — HOSPITAL ENCOUNTER (OUTPATIENT)
Dept: CARDIAC REHAB | Facility: CLINIC | Age: 79
Discharge: HOME | End: 2024-07-08
Attending: STUDENT IN AN ORGANIZED HEALTH CARE EDUCATION/TRAINING PROGRAM
Payer: COMMERCIAL

## 2024-07-08 PROCEDURE — 93798 PHYS/QHP OP CAR RHAB W/ECG: CPT

## 2024-07-10 ENCOUNTER — HOSPITAL ENCOUNTER (OUTPATIENT)
Dept: CARDIAC REHAB | Facility: CLINIC | Age: 79
Discharge: HOME | End: 2024-07-10
Attending: STUDENT IN AN ORGANIZED HEALTH CARE EDUCATION/TRAINING PROGRAM
Payer: COMMERCIAL

## 2024-07-10 PROCEDURE — 93798 PHYS/QHP OP CAR RHAB W/ECG: CPT

## 2024-07-15 ENCOUNTER — HOSPITAL ENCOUNTER (OUTPATIENT)
Dept: CARDIAC REHAB | Facility: CLINIC | Age: 79
Discharge: HOME | End: 2024-07-15
Attending: STUDENT IN AN ORGANIZED HEALTH CARE EDUCATION/TRAINING PROGRAM
Payer: COMMERCIAL

## 2024-07-15 PROCEDURE — 93798 PHYS/QHP OP CAR RHAB W/ECG: CPT

## 2024-07-17 ENCOUNTER — HOSPITAL ENCOUNTER (OUTPATIENT)
Dept: CARDIAC REHAB | Facility: CLINIC | Age: 79
Discharge: HOME | End: 2024-07-17
Attending: STUDENT IN AN ORGANIZED HEALTH CARE EDUCATION/TRAINING PROGRAM
Payer: COMMERCIAL

## 2024-07-17 DIAGNOSIS — Z95.1 S/P CABG (CORONARY ARTERY BYPASS GRAFT): ICD-10-CM

## 2024-07-17 PROCEDURE — 93798 PHYS/QHP OP CAR RHAB W/ECG: CPT

## 2024-07-22 ENCOUNTER — HOSPITAL ENCOUNTER (OUTPATIENT)
Dept: CARDIAC REHAB | Facility: CLINIC | Age: 79
Discharge: HOME | End: 2024-07-22
Attending: STUDENT IN AN ORGANIZED HEALTH CARE EDUCATION/TRAINING PROGRAM
Payer: COMMERCIAL

## 2024-07-22 PROCEDURE — 93798 PHYS/QHP OP CAR RHAB W/ECG: CPT

## 2024-07-24 ENCOUNTER — HOSPITAL ENCOUNTER (OUTPATIENT)
Dept: CARDIAC REHAB | Facility: CLINIC | Age: 79
Discharge: HOME | End: 2024-07-24
Attending: STUDENT IN AN ORGANIZED HEALTH CARE EDUCATION/TRAINING PROGRAM
Payer: COMMERCIAL

## 2024-07-24 PROCEDURE — 93798 PHYS/QHP OP CAR RHAB W/ECG: CPT

## 2024-07-29 ENCOUNTER — HOSPITAL ENCOUNTER (OUTPATIENT)
Dept: CARDIAC REHAB | Facility: CLINIC | Age: 79
Discharge: HOME | End: 2024-07-29
Attending: STUDENT IN AN ORGANIZED HEALTH CARE EDUCATION/TRAINING PROGRAM
Payer: COMMERCIAL

## 2024-07-29 PROCEDURE — 93798 PHYS/QHP OP CAR RHAB W/ECG: CPT

## 2024-07-31 ENCOUNTER — HOSPITAL ENCOUNTER (OUTPATIENT)
Dept: CARDIAC REHAB | Facility: CLINIC | Age: 79
Discharge: HOME | End: 2024-07-31
Attending: STUDENT IN AN ORGANIZED HEALTH CARE EDUCATION/TRAINING PROGRAM
Payer: COMMERCIAL

## 2024-07-31 PROCEDURE — 93798 PHYS/QHP OP CAR RHAB W/ECG: CPT

## 2024-08-05 ENCOUNTER — HOSPITAL ENCOUNTER (OUTPATIENT)
Dept: CARDIAC REHAB | Facility: CLINIC | Age: 79
Discharge: HOME | End: 2024-08-05
Attending: STUDENT IN AN ORGANIZED HEALTH CARE EDUCATION/TRAINING PROGRAM
Payer: COMMERCIAL

## 2024-08-05 PROCEDURE — 93798 PHYS/QHP OP CAR RHAB W/ECG: CPT

## 2024-08-07 ENCOUNTER — HOSPITAL ENCOUNTER (OUTPATIENT)
Dept: CARDIAC REHAB | Facility: CLINIC | Age: 79
Discharge: HOME | End: 2024-08-07
Attending: STUDENT IN AN ORGANIZED HEALTH CARE EDUCATION/TRAINING PROGRAM
Payer: COMMERCIAL

## 2024-08-07 PROCEDURE — 93798 PHYS/QHP OP CAR RHAB W/ECG: CPT

## 2024-08-12 ENCOUNTER — HOSPITAL ENCOUNTER (OUTPATIENT)
Dept: CARDIAC REHAB | Facility: CLINIC | Age: 79
Discharge: HOME | End: 2024-08-12
Attending: STUDENT IN AN ORGANIZED HEALTH CARE EDUCATION/TRAINING PROGRAM
Payer: COMMERCIAL

## 2024-08-12 PROCEDURE — 93798 PHYS/QHP OP CAR RHAB W/ECG: CPT

## 2024-08-14 ENCOUNTER — HOSPITAL ENCOUNTER (OUTPATIENT)
Dept: CARDIAC REHAB | Facility: CLINIC | Age: 79
Discharge: HOME | End: 2024-08-14
Attending: STUDENT IN AN ORGANIZED HEALTH CARE EDUCATION/TRAINING PROGRAM
Payer: COMMERCIAL

## 2024-08-14 PROCEDURE — 93798 PHYS/QHP OP CAR RHAB W/ECG: CPT

## 2024-08-19 ENCOUNTER — HOSPITAL ENCOUNTER (OUTPATIENT)
Dept: CARDIAC REHAB | Facility: CLINIC | Age: 79
Discharge: HOME | End: 2024-08-19
Attending: STUDENT IN AN ORGANIZED HEALTH CARE EDUCATION/TRAINING PROGRAM
Payer: COMMERCIAL

## 2024-08-19 LAB
GLUCOSE BLDC GLUCOMTR-MCNC: 201 MG/DL (ref 70–99)
GLUCOSE BLDC GLUCOMTR-MCNC: 238 MG/DL (ref 70–99)

## 2024-08-19 PROCEDURE — 93798 PHYS/QHP OP CAR RHAB W/ECG: CPT

## 2024-08-21 ENCOUNTER — HOSPITAL ENCOUNTER (OUTPATIENT)
Dept: CARDIAC REHAB | Facility: CLINIC | Age: 79
Discharge: HOME | End: 2024-08-21
Attending: STUDENT IN AN ORGANIZED HEALTH CARE EDUCATION/TRAINING PROGRAM
Payer: COMMERCIAL

## 2024-08-21 PROCEDURE — 93798 PHYS/QHP OP CAR RHAB W/ECG: CPT

## 2024-08-23 ENCOUNTER — TELEPHONE (OUTPATIENT)
Dept: VASCULAR SURGERY | Facility: CLINIC | Age: 79
End: 2024-08-23
Payer: COMMERCIAL

## 2024-08-23 NOTE — TELEPHONE ENCOUNTER
"Vascular Referral Intake    Referred by: pt for \"butt crack wound\"    Specialty: Wound Clinic    Specific Provider if Necessary:  KEITH Whittaker, KEITH Torres, or MD Jackson Gallo    Visit Type: New (pt wasn't seen here in February. He was seen inpatient at [facility] in April)    Time Frame: Next Available    Testing/Imaging Needed Before Consult: none    Appt Note: (to be pasted into appt comments, also add where additional information is located, ie, outside images pushed to PACS, in Epic, sent to [facility], etc) Unable to be seen at  r/t clinic volumes.       "

## 2024-08-23 NOTE — TELEPHONE ENCOUNTER
Caller: Rishi    Provider: none    Detailed reason for call: Rishi is calling requesting a wound consult. He states he was seen in Feb and has used some products for the butt crack wound, however he is not able to establish care with [facility] for continued treatment.  Please review care everywhere and determine whom he should see and if studies are needed.    Best phone number to contact: [phone]    Best time to contact: any    Ok to leave a detailed message: Yes    Ok to speak to authorized person if needed: No      (Noted to patient if reason is related to wound or incision, to please send a photo via email or [employer].)

## 2024-08-26 ENCOUNTER — OFFICE VISIT (OUTPATIENT)
Dept: VASCULAR SURGERY | Facility: CLINIC | Age: 79
End: 2024-08-26
Attending: NURSE PRACTITIONER
Payer: COMMERCIAL

## 2024-08-26 ENCOUNTER — HOSPITAL ENCOUNTER (OUTPATIENT)
Dept: CARDIAC REHAB | Facility: CLINIC | Age: 79
Discharge: HOME | End: 2024-08-26
Attending: STUDENT IN AN ORGANIZED HEALTH CARE EDUCATION/TRAINING PROGRAM
Payer: COMMERCIAL

## 2024-08-26 VITALS
HEART RATE: 65 BPM | TEMPERATURE: 98.8 F | WEIGHT: 309 LBS | DIASTOLIC BLOOD PRESSURE: 69 MMHG | BODY MASS INDEX: 43.1 KG/M2 | OXYGEN SATURATION: 96 % | SYSTOLIC BLOOD PRESSURE: 160 MMHG

## 2024-08-26 DIAGNOSIS — E11.628 TYPE 2 DIABETES MELLITUS WITH OTHER SKIN COMPLICATION, UNSPECIFIED WHETHER LONG TERM INSULIN USE (H): ICD-10-CM

## 2024-08-26 DIAGNOSIS — E66.01 CLASS 3 SEVERE OBESITY DUE TO EXCESS CALORIES WITH SERIOUS COMORBIDITY AND BODY MASS INDEX (BMI) OF 40.0 TO 44.9 IN ADULT (H): ICD-10-CM

## 2024-08-26 DIAGNOSIS — L89.42: Primary | ICD-10-CM

## 2024-08-26 DIAGNOSIS — E66.813 CLASS 3 SEVERE OBESITY DUE TO EXCESS CALORIES WITH SERIOUS COMORBIDITY AND BODY MASS INDEX (BMI) OF 40.0 TO 44.9 IN ADULT (H): ICD-10-CM

## 2024-08-26 DIAGNOSIS — T30.0 FRICTION BURN: ICD-10-CM

## 2024-08-26 PROCEDURE — 99203 OFFICE O/P NEW LOW 30 MIN: CPT | Performed by: NURSE PRACTITIONER

## 2024-08-26 PROCEDURE — 93798 PHYS/QHP OP CAR RHAB W/ECG: CPT

## 2024-08-26 PROCEDURE — G0463 HOSPITAL OUTPT CLINIC VISIT: HCPCS | Performed by: NURSE PRACTITIONER

## 2024-08-26 ASSESSMENT — PAIN SCALES - GENERAL: PAINLEVEL: NO PAIN (0)

## 2024-08-26 NOTE — PROGRESS NOTES
[facility] Consult Note    Date of Service: August 26, 2024     Requesting Provider: self-referred     Chief Complaint: buttocks ulcer    History of Present Illness: Nirav Baker is being seen at [facility] today regarding buttocks. They arrive to the clinic today alone; he was able to walk unassisted to the room. The patient reports that the wound on the buttocks began January 2024. He underwent CABG x2 in February and had to sit at the edge of the bed post op for much of the time as he did not tolerate the chair in the hospital. He is spending much of his time at his computer chair at home. He sleeps in a bed on his back; flat with cpap on; when he naps he also wears cpap. Was currently/previously using ketoconazole; diaper ointment. Reports pain of 0/10; currently using nothing for pain; hurts when he sits too long. Denies any fevers, chills, or generalized ill feeling. Denies history of cancer.  I personally reviewed outside imaging, lab work, and progress noted through Care Everywhere and outside records. Never smoker. Last A1c was 7.3% done 3 weeks ago he is on insulin.      Review of Systems:   Constitutional:  Negative   EENTM: + for glasses;  negative [place]  GI:  negative for nausea/vomiting;   negative for constipation   negative diarrhea;   positive for fecal incontinence  negative weight loss  :   negative dysuria,  positive incontinence  MS: patient is ambulatory;  does not use assistive devices  Cardiac: positive CAD; +CABG x2; chronic anticoagulation  Respiratory:  negative SOB  Endocrine:  positive  diabetes  Psych: negative  depression/anxiety    Past Medical History:   Past Medical History:   Diagnosis Date    Claustrophobia     Diabetes (H)     Hiatal hernia     Hypertension     Motion sickness     NSTEMI (non-ST elevated myocardial infarction) (H) 4/17/2024    Orthopnea     Personal history of fall     Twice    Sleep apnea     Walking troubles          Surgical History:   Past Surgical History:   Procedure Laterality Date    APPENDECTOMY      BACK SURGERY      C5    CHOLECYSTECTOMY      COLONOSCOPY      COMBINED CYSTOSCOPY, INSERT STENT URETER(S) Left 9/9/2022    Procedure: CYSTOURETEROSCOPY, WITH BLADDER NECK DILATION WITH BLADDER NECK CONTRACTURE,  BLADDER CALCULUS REMOVAL;  Surgeon: Harvey Madden MD;  Location: [facility]    CORONARY ARTERY BYPASS GRAFT, INTERNAL MAMMARY ARTERY HARVEST, SAPHENOUS VEIN ENDOSCOPIC VESSEL PROCUREMENT, ANESTHESIA TRANSESOPHAGEAL ECHOCARDIOGRAM N/A 4/19/2024    Procedure: CORONARY ARTERY BYPASS GRAFT TIMES  TWO  WITH LEFT INTERNAL MAMMARY ARTERY HARVEST, LEFT ENDOSCOPIC VESSEL PROCUREMENT, ANESTHESIA TRANSESOPHAGEAL ECHOCARDIOGRAM, EPI AORTIC ULTRASOUND;  Surgeon: Cathy Edwards MD;  Location: [facility] OR    CV CORONARY ANGIOGRAM N/A 4/17/2024    Procedure: Coronary Angiogram;  Surgeon: Nils Ceballos MD;  Location: [facility] CV    CV PCI N/A 4/17/2024    Procedure: Percutaneous Coronary Intervention;  Surgeon: Nils Ceballos MD;  Location: [facility] CV    CYSTOSCOPY, BLADDER NECK CUTS, COMBINED N/A 4/14/2023    Procedure: CYSTOSCOPY, WITH MULTIPLE INCISIONS OF BLADDER NECK. Injection of Mitomycin to stricture;  Surgeon: Gunner Pang MD;  Location: [facility] OR    DENTAL SURGERY      HEMORRHOIDECTOMY      HERNIA REPAIR      PROSTATECTOMY          Medications:   Current Outpatient Medications   Medication Sig Dispense Refill    acetaminophen (TYLENOL) 325 MG tablet [ACETAMINOPHEN (TYLENOL) 325 MG TABLET] Take 2 tablets by mouth every 4 (four) hours as needed.      aspirin (ASA) 81 MG chewable tablet Take 1 tablet (81 mg) by mouth daily 90 tablet 0    calcium carbonate (TUMS ULTRA) 400 mg calcium (1,000 mg) Chew Take 2 tablets by mouth as needed      cholecalciferol, vitamin D3, 5,000 unit capsule [CHOLECALCIFEROL, VITAMIN D3, 5,000 UNIT CAPSULE] Take 5,000 Units by mouth at bedtime.        clindamycin (CLEOCIN T) 1 % external solution APPLY TO AFFECTED AREAS ON JAW LINE AND BEHIND EAR FOR FOLLICULITIS      clopidogrel (PLAVIX) 75 MG tablet Take 1 tablet (75 mg) by mouth daily 90 tablet 3    diphenhydrAMINE (BENADRYL) 25 mg tablet [DIPHENHYDRAMINE (BENADRYL) 25 MG TABLET] Take 25 mg by mouth at bedtime as needed for itching.      ezetimibe (ZETIA) 10 MG tablet Take 1 tablet (10 mg) by mouth at bedtime 90 tablet 3    furosemide (LASIX) 20 MG tablet Take 1 tablet (20 mg) by mouth daily (Patient taking differently: Take 40 mg by mouth daily.) 14 tablet 0    glipiZIDE (GLUCOTROL XL) 10 MG 24 hr tablet Take 2 tablets (20 mg) by mouth daily (with lunch) 180 tablet 0    Insulin Aspart FlexPen 100 UNIT/ML SOPN Inject 3 Units subcutaneously 3 times daily.      insulin glargine (LANTUS PEN) 100 UNIT/ML pen Inject 40 Units Subcutaneous at bedtime (Patient taking differently: Inject 38 Units subcutaneously at bedtime.) 15 mL 0    insulin glargine (LANTUS PEN) 100 UNIT/ML pen Inject 60 Units Subcutaneous every morning (before breakfast) 15 mL 0    magnesium 500 MG TABS Take 500 mg by mouth at bedtime      melatonin 3 MG CAPS Take 6 mg by mouth At Bedtime      Metoprolol Tartrate 75 MG TABS Take 75 mg by mouth 2 times daily 180 tablet 3    multivitamin (ONE A DAY) per tablet Take 1 tablet by mouth every morning      omega-3/dha/epa/fish oil (FISH OIL-OMEGA-3 FATTY ACIDS) 300-1,000 mg capsule Take 4 capsules by mouth at bedtime      omeprazole (PRILOSEC) 20 MG capsule Take 1 capsule by mouth at bedtime      polyethylene glycol (MIRALAX) 17 gram packet [POLYETHYLENE GLYCOL (MIRALAX) 17 GRAM PACKET] Take 17 g by mouth at bedtime.       riboflavin, vitamin B2, 100 mg Tab Take 1 tablet by mouth at bedtime      rosuvastatin (CRESTOR) 20 MG tablet Take 1 tablet (20 mg) by mouth at bedtime 90 tablet 3    senna-docusate (SENOKOT-S/PERICOLACE) 8.6-50 MG tablet Take 1 tablet by mouth 2 times daily as needed for constipation  "30 tablet 0    tamsulosin (FLOMAX) 0.4 MG capsule Take 1 capsule (0.4 mg) by mouth daily 90 capsule 0    insulin aspart (NOVOLOG VIAL) 100 UNITS/ML vial Inject 3 Units Subcutaneous 3 times daily (with meals) for 30 days 2.7 mL 0     No current facility-administered medications for this visit.     Facility-Administered Medications Ordered in Other Visits   Medication Dose Route Frequency Provider Last Rate Last Admin    [COMPLETED] methadone (DOLOPHINE) injection 20 mg  20 mg Intravenous Once Vincent Aguirre MD           Allergies:   Allergies   Allergen Reactions    Amoxicillin Hives     Per Dr. Barrientos, patient has tolerated Keflex.    Atorvastatin Diarrhea    Benzalkonium Chloride Other (See Comments)    Fenofibrate Muscle Pain (Myalgia)    Gemfibrozil     Hydrocodone-Acetaminophen Other (See Comments)     Patient reports \"going white as a sheet\" and cold sweats   OK with Tylenol #3 No problems    Lisinopril Cough    Neosporin (Jtg-Gst-Nbdlv) [Neomycin-Bacitracin Zn-Polymyx] Unknown    Penicillins Hives     1992 had pcn and had hives. Pt. States allergic to all cillin's      Pravastatin Sodium [Pravastatin] Muscle Pain (Myalgia)    Trulicity [Dulaglutide] Unknown    Gluten [Gluten Meal] Other (See Comments)     Reports feeling worse with gluten; tested below threshold for Celiac but daughter is positive    Insulin Detemir Rash       Family history:   Family History   Problem Relation Age of Onset    Anesthesia Reaction No family hx of     Bleeding Disorder No family hx of     Venous thrombosis No family hx of         Social History:   Social History     Socioeconomic History    Marital status:      Spouse name: Not on file    Number of children: Not on file    Years of education: Not on file    Highest education level: Not on file   Occupational History    Not on file   Tobacco Use    Smoking status: Never     Passive exposure: Past    Smokeless tobacco: Never   Substance and Sexual Activity    Alcohol " use: Yes     Alcohol/week: 1.0 standard drink of alcohol     Types: 1 Standard drinks or equivalent per week     Comment: very occasional    Drug use: No    Sexual activity: Not on file   Other Topics Concern    Not on file   Social History Narrative    Not on file     Social Determinants of Health     Financial Resource Strain: Not on file   Food Insecurity: Not on file   Transportation Needs: Not on file   Physical Activity: Not on file   Stress: Not on file   Social Connections: Not on file   Interpersonal Safety: Not on file   Housing Stability: Not on file        Physical Exam  Vitals: BP (!) 160/69   Pulse 65   Temp 98.8  F (37.1  C)   Wt 309 lb (140.2 kg)   SpO2 96%   BMI 43.10 kg/m    Weight is 309 lbs 0 oz          Body mass index is 43.1 kg/m .  General: This is a 79 year old male who appears their reported age, not in acute distress  Head: normocephalic, Atraumatic; wearing glasses; non-icteric sclera; no exudate; mild hearing loss   Respiratory:  unlabored breathing; no cough   Skin: Uniformly warm and dry  Psych: Alert and oriented x4; calm and cooperative throughout exam  Buttocks: Left buttocks with [id number] area of rough; ragged; hypertrophic skin; right buttocks with 3x1cm area of scar tissue; no drainage; no pain with palpation; mild induration; no fluctuance.    Circumferential volume measures:             No data to display                Ulceration(s)/Wound(s):     VASC Wound Left buttock (Active)   Pre Size Length 5 08/26/24 0700   Pre Size Width 2 08/26/24 0700   Pre Size Depth 0 08/26/24 0700   Pre Total Sq cm 10 08/26/24 0700   Description redeness 08/26/24 0700       Laboratory studies:     I personally reviewed the following lab results today and those on care everywhere, if indicated     CRP   Date Value Ref Range Status   08/24/2022 2.8 (H) 0.0 - <0.8 mg/dL Final      Erythrocyte Sedimentation Rate   Date Value Ref Range Status   07/02/2018 19 (H) 0 - 15 mm/hr Final      Last Renal  Panel:  Sodium   Date Value Ref Range Status   04/27/2024 141 135 - 145 mmol/L Final     Comment:     Reference intervals for this test were updated on 09/26/2023 to more accurately reflect our healthy population. There may be differences in the flagging of prior results with similar values performed with this method. Interpretation of those prior results can be made in the context of the updated reference intervals.      Potassium   Date Value Ref Range Status   04/27/2024 3.7 3.4 - 5.3 mmol/L Final   08/24/2022 4.8 3.5 - 5.0 mmol/L Final     Potassium POCT   Date Value Ref Range Status   04/19/2024 4.0 3.4 - 5.3 mmol/L Final     Chloride   Date Value Ref Range Status   04/27/2024 106 98 - 107 mmol/L Final   08/24/2022 106 98 - 107 mmol/L Final     Carbon Dioxide (CO2)   Date Value Ref Range Status   04/27/2024 24 22 - 29 mmol/L Final   08/24/2022 27 22 - 31 mmol/L Final     Anion Gap   Date Value Ref Range Status   04/27/2024 11 7 - 15 mmol/L Final   08/24/2022 7 5 - 18 mmol/L Final     Glucose   Date Value Ref Range Status   09/09/2022 163 (A) 70 - 99 mg/dL Final     Comment:     Lot [id number] Exp 2023-03-29     GLUCOSE BY METER POCT   Date Value Ref Range Status   08/19/2024 201 (H) 70 - 99 mg/dL Final     Urea Nitrogen   Date Value Ref Range Status   04/27/2024 33.7 (H) 8.0 - 23.0 mg/dL Final   08/24/2022 37 (H) 8 - 28 mg/dL Final     Creatinine   Date Value Ref Range Status   04/27/2024 1.65 (H) 0.67 - 1.17 mg/dL Final     GFR Estimate   Date Value Ref Range Status   04/27/2024 42 (L) >60 mL/min/1.73m2 Final   07/05/2018 >60 >60 mL/min/1.73m2 Final     Calcium   Date Value Ref Range Status   04/27/2024 9.0 8.8 - 10.2 mg/dL Final     Phosphorus   Date Value Ref Range Status   04/27/2024 3.9 2.5 - 4.5 mg/dL Final     Albumin   Date Value Ref Range Status   04/20/2024 3.2 (L) 3.5 - 5.2 g/dL Final   08/24/2022 3.4 (L) 3.5 - 5.0 g/dL Final      Lab Results   Component Value Date    WBC 10.6 04/22/2024     Lab Results  "  Component Value Date    RBC 3.41 04/22/2024     Lab Results   Component Value Date    HGB 10.1 04/22/2024     Lab Results   Component Value Date    HCT 30.1 04/22/2024     No components found for: \"MCT\"  Lab Results   Component Value Date    MCV 88 04/22/2024     Lab Results   Component Value Date    MCH 29.6 04/22/2024     Lab Results   Component Value Date    MCHC 33.6 04/22/2024     Lab Results   Component Value Date    RDW 13.8 04/22/2024     Lab Results   Component Value Date     04/26/2024      Lab Results   Component Value Date    A1C 7.2 04/18/2024    A1C 6.5 09/08/2012      No results found for: \"TSH\"   No results found for: \"VITDT\"       Impression:    Encounter Diagnoses   Name Primary?    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 Debridement: na    2. Treatment: wound treatment will include irrigation and dressings to promote autolytic debridement which will include: will use triad paste; apply 2-3 times per day    If for some reason the patient is not able to get your dressing(s) changed as outlined above (due to illness, lack of supplies, lack of help) please do the following: remove old, soiled dressings; wash the ulcers with saline; pat dry; apply ABD pad or other absorbant pad and secure with rolled gauze; avoid tape directly on your skin; Patient instructed to call the clinic as soon as possible to let us know what the current issues are in receiving ulcer care.    3. Edema. na    4. Offloading: we spoke about the cause of the wound being from unrelieved pressure friction and shear. Recommend he obtain a roho cushion and use on computer chair during the day; position " off buttocks.    5. Nutrition: diabetes control    6. Wound Etiology: pressure friction and shear    Patient to return to clinic in prn week(s) for re-evaluation. They were instructed to call the clinic sooner with any further questions or concerns. Answered all questions.          Rossy Whittaker NP   [facility]  [phone]      This note was electronically signed by Rossy Whittaker NP

## 2024-08-26 NOTE — LETTER
[facility]  [address] [place] [address] [address]  [place], [place] [address]  [phone]      Fax [phone]    [facility], located within [facility]           Fax: [phone]            Customer Service: [phone]        Account #: [id number]    Wound Dressing Rx and Order Form  Order Status: new  Verbal: Lise  Date: 2024     Nirav Baker  Gender: male  : 1945  [address] [place] [id number] [phone] (home)     Medical Record: [id number]  [facility] Care Provider: [facility]      ICD-10-CM    1. 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 buttock, stage 2 (H)  L89.42             Insurance Info:  INSURER: Payor: [employer] / Plan: [employer] MEDICARE ADVANTAGE / Product Type: HMO /   Policy ID#:  [id number]  SECONDARY INSURANCE:    Secondary Policy ID#:  N/A        Physician Info:   Name:  NATALY SOSA     Dept Address/Phones:   [address] [place] [address], [address] [address] [phone]  Fax: [phone]    Lymphedema circumferential measurements (in cm):       No data to display                  Wound info:  VASC Wound Left buttock (Active)   Pre Size Length 5 24 0700   Pre Size Width 2 24 0700   Pre Size Depth 0 24 0700   Pre Total Sq cm 10 24 0700   Description redeness 24 0700   Number of days: 0        Drainage: none  Thickness:  N/A  Duration of Need: 30 DAYS  Days Supply: 30 DAYS  Start Date: 2024  Starter Kit, Ancillary Kit (saline, gloves, gauze): No   Qualifying wound/Debridement: No     NO SUBSTITUTIONS. Call [phone].      Dressing Type Brand Size Frequency of change  Quantity   Primary Triad  6oz DAILY and as needed 1     NO SUBSTITUTIONS. Call [phone] with questions.     Triad Paste apply to the wound 1-2 times per day  Do not need to scrub off in between applications as this can cause additional trauma to the wound and surrounding skin  Just gently cleanse and  apply additional product on top    OK to forward to covered supplier.    Electronically Signed Physician:  NATALY SOSA             Date: August 26, 2024

## 2024-08-26 NOTE — PATIENT INSTRUCTIONS
"Triad Paste apply to the wound 1-2 times per day  Do not need to scrub off in between applications as this can cause additional trauma to the wound and surrounding skin  Just gently cleanse and apply additional product on top        You will need to reposition frequently; keep direct pressure off of the wound or reddened area    Reposition Every 1-2 hours while in bed; left, right; supine; repeat    Reposition Every 15 minutes while up in a chair; chair push ups    Stand every 30 minutes while in a chair if able    Obtain a seat cushion; can get a 4\" high density foam at [employer]; or [employer] or J-cushion from [employer]; or we can order a ROHO cushion if you qualify for this type of cushion          Roho Cushion        [employer] [facility]   [address]             [address]                 [address]     [place] [address]      [place] [address]         [place] [address]  [phone]  [phone]     [phone]               Fax: [phone]  [email or web address]                                                     [facility]  [address] [place] [address]  [phone]  [phone]  [email or web address]    [employer]  [phone]  [email or web address]    [facility]   [phone]    [facility]  [address] [place] [address]    [phone]          Chair Pushups        Bed Positioning             "

## 2024-08-28 ENCOUNTER — HOSPITAL ENCOUNTER (OUTPATIENT)
Dept: CARDIAC REHAB | Facility: CLINIC | Age: 79
Discharge: HOME | End: 2024-08-28
Attending: STUDENT IN AN ORGANIZED HEALTH CARE EDUCATION/TRAINING PROGRAM
Payer: COMMERCIAL

## 2024-08-28 PROCEDURE — 93798 PHYS/QHP OP CAR RHAB W/ECG: CPT

## 2024-08-28 PROCEDURE — 93797 PHYS/QHP OP CAR RHAB WO ECG: CPT

## 2024-09-04 ENCOUNTER — HOSPITAL ENCOUNTER (OUTPATIENT)
Dept: CARDIAC REHAB | Facility: CLINIC | Age: 79
Discharge: HOME | End: 2024-09-04
Attending: STUDENT IN AN ORGANIZED HEALTH CARE EDUCATION/TRAINING PROGRAM
Payer: COMMERCIAL

## 2024-09-04 LAB — GLUCOSE BLDC GLUCOMTR-MCNC: 229 MG/DL (ref 70–99)

## 2024-09-04 PROCEDURE — 93798 PHYS/QHP OP CAR RHAB W/ECG: CPT

## 2024-09-04 PROCEDURE — 93797 PHYS/QHP OP CAR RHAB WO ECG: CPT

## 2024-09-25 ENCOUNTER — OFFICE VISIT (OUTPATIENT)
Dept: CARDIOLOGY | Facility: CLINIC | Age: 79
End: 2024-09-25
Payer: COMMERCIAL

## 2024-09-25 VITALS
BODY MASS INDEX: 43.6 KG/M2 | HEART RATE: 60 BPM | HEIGHT: 71 IN | WEIGHT: 311.4 LBS | SYSTOLIC BLOOD PRESSURE: 142 MMHG | DIASTOLIC BLOOD PRESSURE: 60 MMHG | RESPIRATION RATE: 12 BRPM

## 2024-09-25 DIAGNOSIS — E78.5 DYSLIPIDEMIA: ICD-10-CM

## 2024-09-25 DIAGNOSIS — Z95.1 S/P CABG (CORONARY ARTERY BYPASS GRAFT): ICD-10-CM

## 2024-09-25 PROCEDURE — G2211 COMPLEX E/M VISIT ADD ON: HCPCS | Performed by: INTERNAL MEDICINE

## 2024-09-25 PROCEDURE — 99214 OFFICE O/P EST MOD 30 MIN: CPT | Performed by: INTERNAL MEDICINE

## 2024-09-25 RX ORDER — METOPROLOL SUCCINATE 100 MG/1
100 TABLET, EXTENDED RELEASE ORAL DAILY
Qty: 90 TABLET | Refills: 3 | Status: SHIPPED | OUTPATIENT
Start: 2024-09-25

## 2024-09-25 RX ORDER — TRIAMCINOLONE ACETONIDE 1 MG/G
OINTMENT TOPICAL
COMMUNITY
Start: 2024-09-16

## 2024-09-25 RX ORDER — FUROSEMIDE 40 MG
40 TABLET ORAL DAILY
COMMUNITY
Start: 2024-08-08

## 2024-09-25 NOTE — LETTER
9/25/2024    [facility]  [address] [address]    RE: Nirav Lanemann       Dear Colleague,     I had the pleasure of seeing Nirav Baker in the [facility].    HEART CARE ENCOUNTER CONSULTATON NOTE      [facility]  [phone]      Assessment/Recommendations   Assessment:  Coronary disease status post recent non-STEMI status post CABG x 2 with LIMA to-LAD, SVG-ramus by Dr. Lu on 4/19/2024  Chronic kidney disease  Morbid obesity  ELDON on CPAP  Hypertension: Blood pressure mildly elevated today but normally better controlled  Insulin-dependent diabetes mellitus type 2  Dyslipidemia: Well-controlled on statin therapy     Plan:  1.  Stop plaivix 4/19/2025 and increase aspirin to 162 mg daily at that time  2.  Continued efforts towards weight loss, diet, exercise  3.  May stop metoprolol tartrate and change to metoprolol 1600 mg daily.  If continues to suffer from vivid dreams consider titrating down further.  4.  Continue on Crestor    Follow-up in 3 months         History of Present Illness/Subjective    HPI: Nirav Baker is a 79 year old male  with history of coronary disease status post recent non-STEMI status post CABG x 2 with LIMA to-LAD, SVG-ramus by Dr. Lu on 4/19/2024, chronic kidney disease, morbid obesity, ELDON on CPAP, hypertension, insulin-dependent diabetes mellitus type 2 who I am seeing today in follow-up.  Not very active due to chronic back and hip pain.  He complains of vivid dreams/hallucinations.  He occasionally has a numbness in the left side of his chest and a sharp twinge of pain on the surface.  No chest pressure or discomfort with exertion.  Denies shortness of breath.      Echocardiogram 4/17/2024  1. Normal left ventricular size and systolic performance with a visually  estimated ejection fraction of 65%.  2. No significant valvular heart disease is identified on this study.  3. Normal right  "ventricular size and systolic performance.    Coronary angiogram 4/17/2024  Nirav Baker is a 79 year old old male with HTN, HL, DM, high BMI 45, ELDON, mild CKD who is here for NSTEMI.     - multi-vessel native CADl mildly elevated L-sided filling pressures  - will stop to obtain CTS consult for CABG evaluation  - continue ASA 81mg daily indefinitely, re-start heparin for at least 48 hrs in setting of NSTEMI  - add rosuva 40, consider PCSK9 inhibitors  - continue aggressive risk factor modification  Findings:  LM:no obstruction  LAD:50% mid-, 90% mid-distal narrowing  RIL large branch w/ long area of up to 95-99% narrowing ostial/proximal  Lcx:50-60% proximal narrowing  RCA:dominant, rPLV branch  w/ 80% narrowing     LVEDP:20    The longitudinal plan of care for the diagnosis(es)/condition(s) as documented were addressed during this visit. Due to the added complexity in care, I will continue to support Rishi in the subsequent management and with ongoing continuity of care.      Physical Examination  Review of Systems   Vitals: BP (!) 142/60 (BP Location: Left arm, Patient Position: Sitting, Cuff Size: Adult Large)   Pulse 60   Resp 12   Ht 1.803 m (5' 11\")   Wt 141.3 kg (311 lb 6.4 oz)   BMI 43.43 kg/m    BMI= Body mass index is 43.43 kg/m .  Wt Readings from Last 3 Encounters:   09/25/24 141.3 kg (311 lb 6.4 oz)   08/26/24 140.2 kg (309 lb)   07/02/24 141.1 kg (311 lb)       General Appearance:   no distress, normal body habitus   ENT/Mouth: membranes moist, no oral lesions or bleeding gums.      EYES:  no scleral icterus, normal conjunctivae   Neck: no carotid bruits    Chest/Lungs:   lungs are clear to auscultation   Cardiovascular:   Regular. Normal first and second heart sounds with no murmur  + edema bilaterally L>R       Extremities: no cyanosis or clubbing   Skin: no xanthelasma, warm.    Neurologic: normal  bilateral, no tremors     Psychiatric: alert and oriented x3, calm        Please refer " above for cardiac ROS details.        Medical History  Surgical History Family History Social History   Past Medical History:   Diagnosis Date     Claustrophobia      Diabetes (H)      Hiatal hernia      Hypertension      Motion sickness      NSTEMI (non-ST elevated myocardial infarction) (H) 4/17/2024     Orthopnea      Personal history of fall     Twice     Sleep apnea      Walking troubles      Past Surgical History:   Procedure Laterality Date     APPENDECTOMY       BACK SURGERY      C5     CHOLECYSTECTOMY       COLONOSCOPY       COMBINED CYSTOSCOPY, INSERT STENT URETER(S) Left 9/9/2022    Procedure: CYSTOURETEROSCOPY, WITH BLADDER NECK DILATION WITH BLADDER NECK CONTRACTURE,  BLADDER CALCULUS REMOVAL;  Surgeon: Harvey Madden MD;  Location: [facility]     CORONARY ARTERY BYPASS GRAFT, INTERNAL MAMMARY ARTERY HARVEST, SAPHENOUS VEIN ENDOSCOPIC VESSEL PROCUREMENT, ANESTHESIA TRANSESOPHAGEAL ECHOCARDIOGRAM N/A 4/19/2024    Procedure: CORONARY ARTERY BYPASS GRAFT TIMES  TWO  WITH LEFT INTERNAL MAMMARY ARTERY HARVEST, LEFT ENDOSCOPIC VESSEL PROCUREMENT, ANESTHESIA TRANSESOPHAGEAL ECHOCARDIOGRAM, EPI AORTIC ULTRASOUND;  Surgeon: Cathy Edwards MD;  Location: [facility] OR     CV CORONARY ANGIOGRAM N/A 4/17/2024    Procedure: Coronary Angiogram;  Surgeon: Nils Ceballos MD;  Location: [facility] CATH LAB CV     CV PCI N/A 4/17/2024    Procedure: Percutaneous Coronary Intervention;  Surgeon: Nils Ceballos MD;  Location: [facility] LAB CV     CYSTOSCOPY, BLADDER NECK CUTS, COMBINED N/A 4/14/2023    Procedure: CYSTOSCOPY, WITH MULTIPLE INCISIONS OF BLADDER NECK. Injection of Mitomycin to stricture;  Surgeon: Gunner Pang MD;  Location: [facility]     DENTAL SURGERY       HEMORRHOIDECTOMY       HERNIA REPAIR       PROSTATECTOMY       Family History   Problem Relation Age of Onset     Coronary Artery Disease Mother      Coronary Artery Disease Early Onset Mother      Coronary Artery Disease  Father      Coronary Artery Disease Early Onset Father      Coronary Artery Disease Brother      Coronary Artery Disease Early Onset Brother      Valvular heart disease Daughter      Suicide Nephew      Unknown/Adopted Granddaughter      Aortic Valve Replacement Granddaughter      Anesthesia Reaction No family hx of      Bleeding Disorder No family hx of      Venous thrombosis No family hx of         Social History     Socioeconomic History     Marital status:      Spouse name: Not on file     Number of children: Not on file     Years of education: Not on file     Highest education level: Not on file   Occupational History     Not on file   Tobacco Use     Smoking status: Never     Passive exposure: Past     Smokeless tobacco: Never   Vaping Use     Vaping status: Never Used   Substance and Sexual Activity     Alcohol use: Yes     Alcohol/week: 1.0 standard drink of alcohol     Types: 1 Standard drinks or equivalent per week     Comment: very occasional     Drug use: No     Sexual activity: Not on file   Other Topics Concern     Not on file   Social History Narrative     Not on file     Social Determinants of Health     Financial Resource Strain: Not on file   Food Insecurity: Not on file   Transportation Needs: Not on file   Physical Activity: Not on file   Stress: Not on file   Social Connections: Not on file   Interpersonal Safety: Not on file   Housing Stability: Not on file           Medications  Allergies   Current Outpatient Medications   Medication Sig Dispense Refill     acetaminophen (TYLENOL) 325 MG tablet [ACETAMINOPHEN (TYLENOL) 325 MG TABLET] Take 2 tablets by mouth every 4 (four) hours as needed.       aspirin (ASA) 81 MG chewable tablet Take 1 tablet (81 mg) by mouth daily 90 tablet 0     calcium carbonate (TUMS ULTRA) 400 mg calcium (1,000 mg) Chew Take 2 tablets by mouth as needed       cholecalciferol, vitamin D3, 5,000 unit capsule [CHOLECALCIFEROL, VITAMIN D3, 5,000 UNIT CAPSULE] Take  5,000 Units by mouth at bedtime.        clindamycin (CLEOCIN T) 1 % external solution APPLY TO AFFECTED AREAS ON JAW LINE AND BEHIND EAR FOR FOLLICULITIS       clopidogrel (PLAVIX) 75 MG tablet Take 1 tablet (75 mg) by mouth daily 90 tablet 3     diphenhydrAMINE (BENADRYL) 25 mg tablet [DIPHENHYDRAMINE (BENADRYL) 25 MG TABLET] Take 25 mg by mouth at bedtime as needed for itching.       ezetimibe (ZETIA) 10 MG tablet Take 1 tablet (10 mg) by mouth at bedtime 90 tablet 3     furosemide (LASIX) 40 MG tablet Take 40 mg by mouth daily.       glipiZIDE (GLUCOTROL XL) 10 MG 24 hr tablet Take 2 tablets (20 mg) by mouth daily (with lunch) 180 tablet 0     insulin aspart (NOVOLOG VIAL) 100 UNITS/ML vial Inject 3 Units Subcutaneous 3 times daily (with meals) for 30 days 2.7 mL 0     insulin glargine (LANTUS PEN) 100 UNIT/ML pen Inject 40 Units Subcutaneous at bedtime (Patient taking differently: Inject 38 Units subcutaneously at bedtime.) 15 mL 0     insulin glargine (LANTUS PEN) 100 UNIT/ML pen Inject 60 Units Subcutaneous every morning (before breakfast) 15 mL 0     Magnesium 400 MG TABS Take 400 mg by mouth at bedtime.       melatonin 3 MG CAPS Take 6 mg by mouth At Bedtime       Metoprolol Tartrate 75 MG TABS Take 75 mg by mouth 2 times daily 180 tablet 3     multivitamin (ONE A DAY) per tablet Take 1 tablet by mouth every morning       omega-3/dha/epa/fish oil (FISH OIL-OMEGA-3 FATTY ACIDS) 300-1,000 mg capsule Take 4 capsules by mouth at bedtime       omeprazole (PRILOSEC) 20 MG capsule Take 1 capsule by mouth at bedtime       polyethylene glycol (MIRALAX) 17 gram packet [POLYETHYLENE GLYCOL (MIRALAX) 17 GRAM PACKET] Take 17 g by mouth at bedtime.        riboflavin, vitamin B2, 100 mg Tab Take 1 tablet by mouth at bedtime       rosuvastatin (CRESTOR) 20 MG tablet Take 1 tablet (20 mg) by mouth at bedtime 90 tablet 3     senna-docusate (SENOKOT-S/PERICOLACE) 8.6-50 MG tablet Take 1 tablet by mouth 2 times daily as needed  "for constipation 30 tablet 0     triamcinolone (KENALOG) 0.1 % external ointment Apply topically two times a day. To buttock for up to 2 weeks at a time Indications: Localized Neurodermatitis       tamsulosin (FLOMAX) 0.4 MG capsule Take 1 capsule (0.4 mg) by mouth daily (Patient not taking: Reported on 9/25/2024) 90 capsule 0       Allergies   Allergen Reactions     Amoxicillin Hives     Per Dr. Barrientos, patient has tolerated Keflex.     Atorvastatin Diarrhea     Benzalkonium Chloride Other (See Comments)     Fenofibrate Muscle Pain (Myalgia)     Gemfibrozil      Hydrocodone-Acetaminophen Other (See Comments)     Patient reports \"going white as a sheet\" and cold sweats   OK with Tylenol #3 No problems     Lisinopril Cough     Morphine And Codeine Other (See Comments)     cold sweat;pale     Neosporin (Xzc-Jps-Lhnja) [Neomycin-Bacitracin Zn-Polymyx] Unknown     Penicillins Hives     1992 had pcn and had hives. Pt. States allergic to all cillin's       Pravastatin Sodium [Pravastatin] Muscle Pain (Myalgia)     Trulicity [Dulaglutide] Unknown     Gluten [Gluten Meal] Other (See Comments)     Reports feeling worse with gluten; tested below threshold for Celiac but daughter is positive     Insulin Detemir Rash          Lab Results    Chemistry/lipid CBC Cardiac Enzymes/BNP/TSH/INR   Recent Labs   Lab Test 06/05/24  0919   CHOL 108   HDL 35*   LDL 47   TRIG 129     Recent Labs   Lab Test 06/05/24  0919 04/17/24  0548   LDL 47 124*     Recent Labs   Lab Test 09/04/24  1003 04/27/24  0725 04/27/24  0527   NA  --   --  141   POTASSIUM  --   --  3.7   CHLORIDE  --   --  106   CO2  --   --  24   *   < > 72   BUN  --   --  33.7*   CR  --   --  1.65*   GFRESTIMATED  --   --  42*   AURORA  --   --  9.0    < > = values in this interval not displayed.     Recent Labs   Lab Test 04/27/24  0527 04/26/24  0437 04/25/24  0426   CR 1.65* 1.89* 1.64*     Recent Labs   Lab Test 04/18/24  0439   A1C 7.2*          Recent Labs   Lab Test " "04/26/24  0437 04/23/24  0404 04/22/24  0402   WBC  --   --  10.6   HGB  --   --  10.1*   HCT  --   --  30.1*   MCV  --   --  88      < > 132*    < > = values in this interval not displayed.     Recent Labs   Lab Test 04/22/24  0402 04/21/24  0721 04/20/24  0410   HGB 10.1* 10.8* 12.6*    Recent Labs   Lab Test 02/19/22  2312   TROPONINI <0.01     Recent Labs   Lab Test 04/16/24  2206 06/09/18  0525   BNP  --  34   NTBNPI 87  --      No results for input(s): \"TSH\" in the last 92317 hours.  Recent Labs   Lab Test 04/19/24  1722 04/19/24  1530 04/19/24  0711   INR 1.21* 1.31* 1.00        Valencia Nath MD                                        Thank you for allowing me to participate in the care of your patient.      Sincerely,     Valencia Nath MD     [facility]  cc:   Valencia Nath MD  [address]  [address]  [place] [address]      "

## 2024-09-25 NOTE — PATIENT INSTRUCTIONS
Mr. Nirav Baker,     It was a pleasure to see you in the office today. My recommendations for you include:   1. Stop plaivix 4/19/2025 and increase aspirin to 162 mg daily at that time  2. Will change metoprolol tartrate to metoprolol succinate 100 mg dialy     Please do not hesitate to call the [facility] clinic with any questions or concerns at [phone].    Sincerely,     Valencia Nath MD

## 2024-09-25 NOTE — PROGRESS NOTES
HEART CARE ENCOUNTER CONSULTATON NOTE      [facility]  [phone]      Assessment/Recommendations   Assessment:  Coronary disease status post recent non-STEMI status post CABG x 2 with LIMA to-LAD, SVG-ramus by Dr. Lu on 4/19/2024  Chronic kidney disease  Morbid obesity  ELDON on CPAP  Hypertension: Blood pressure mildly elevated today but normally better controlled  Insulin-dependent diabetes mellitus type 2  Dyslipidemia: Well-controlled on statin therapy     Plan:  1.  Stop plaivix 4/19/2025 and increase aspirin to 162 mg daily at that time  2.  Continued efforts towards weight loss, diet, exercise  3.  May stop metoprolol tartrate and change to metoprolol 1600 mg daily.  If continues to suffer from vivid dreams consider titrating down further.  4.  Continue on Crestor    Follow-up in 3 months         History of Present Illness/Subjective    HPI: Nirav Baker is a 79 year old male  with history of coronary disease status post recent non-STEMI status post CABG x 2 with LIMA to-LAD, SVG-ramus by Dr. Lu on 4/19/2024, chronic kidney disease, morbid obesity, ELDON on CPAP, hypertension, insulin-dependent diabetes mellitus type 2 who I am seeing today in follow-up.  Not very active due to chronic back and hip pain.  He complains of vivid dreams/hallucinations.  He occasionally has a numbness in the left side of his chest and a sharp twinge of pain on the surface.  No chest pressure or discomfort with exertion.  Denies shortness of breath.      Echocardiogram 4/17/2024  1. Normal left ventricular size and systolic performance with a visually  estimated ejection fraction of 65%.  2. No significant valvular heart disease is identified on this study.  3. Normal right ventricular size and systolic performance.    Coronary angiogram 4/17/2024  Nirav Baker is a 79 year old old male with HTN, HL, DM, high BMI 45, ELDON, mild CKD who is here for NSTEMI.     - multi-vessel native CADl mildly  "elevated L-sided filling pressures  - will stop to obtain CTS consult for CABG evaluation  - continue ASA 81mg daily indefinitely, re-start heparin for at least 48 hrs in setting of NSTEMI  - add rosuva 40, consider PCSK9 inhibitors  - continue aggressive risk factor modification  Findings:  LM:no obstruction  LAD:50% mid-, 90% mid-distal narrowing  RIL large branch w/ long area of up to 95-99% narrowing ostial/proximal  Lcx:50-60% proximal narrowing  RCA:dominant, rPLV branch  w/ 80% narrowing     LVEDP:20    The longitudinal plan of care for the diagnosis(es)/condition(s) as documented were addressed during this visit. Due to the added complexity in care, I will continue to support Rishi in the subsequent management and with ongoing continuity of care.      Physical Examination  Review of Systems   Vitals: BP (!) 142/60 (BP Location: Left arm, Patient Position: Sitting, Cuff Size: Adult Large)   Pulse 60   Resp 12   Ht 1.803 m (5' 11\")   Wt 141.3 kg (311 lb 6.4 oz)   BMI 43.43 kg/m    BMI= Body mass index is 43.43 kg/m .  Wt Readings from Last 3 Encounters:   09/25/24 141.3 kg (311 lb 6.4 oz)   08/26/24 140.2 kg (309 lb)   07/02/24 141.1 kg (311 lb)       General Appearance:   no distress, normal body habitus   ENT/Mouth: membranes moist, no oral lesions or bleeding gums.      EYES:  no scleral icterus, normal conjunctivae   Neck: no carotid bruits    Chest/Lungs:   lungs are clear to auscultation   Cardiovascular:   Regular. Normal first and second heart sounds with no murmur  + edema bilaterally L>R       Extremities: no cyanosis or clubbing   Skin: no xanthelasma, warm.    Neurologic: normal  bilateral, no tremors     Psychiatric: alert and oriented x3, calm        Please refer above for cardiac ROS details.        Medical History  Surgical History Family History Social History   Past Medical History:   Diagnosis Date    Claustrophobia     Diabetes (H)     Hiatal hernia     Hypertension     Motion " sickness     NSTEMI (non-ST elevated myocardial infarction) (H) 4/17/2024    Orthopnea     Personal history of fall     Twice    Sleep apnea     Walking troubles      Past Surgical History:   Procedure Laterality Date    APPENDECTOMY      BACK SURGERY      C5    CHOLECYSTECTOMY      COLONOSCOPY      COMBINED CYSTOSCOPY, INSERT STENT URETER(S) Left 9/9/2022    Procedure: CYSTOURETEROSCOPY, WITH BLADDER NECK DILATION WITH BLADDER NECK CONTRACTURE,  BLADDER CALCULUS REMOVAL;  Surgeon: Harvey Madden MD;  Location: [facility]    CORONARY ARTERY BYPASS GRAFT, INTERNAL MAMMARY ARTERY HARVEST, SAPHENOUS VEIN ENDOSCOPIC VESSEL PROCUREMENT, ANESTHESIA TRANSESOPHAGEAL ECHOCARDIOGRAM N/A 4/19/2024    Procedure: CORONARY ARTERY BYPASS GRAFT TIMES  TWO  WITH LEFT INTERNAL MAMMARY ARTERY HARVEST, LEFT ENDOSCOPIC VESSEL PROCUREMENT, ANESTHESIA TRANSESOPHAGEAL ECHOCARDIOGRAM, EPI AORTIC ULTRASOUND;  Surgeon: Cathy Edwards MD;  Location: [facility]    CV CORONARY ANGIOGRAM N/A 4/17/2024    Procedure: Coronary Angiogram;  Surgeon: Nils Ceballos MD;  Location: [facility] CATH LAB CV    CV PCI N/A 4/17/2024    Procedure: Percutaneous Coronary Intervention;  Surgeon: Nils Ceballos MD;  Location: [facility] CATH LAB CV    CYSTOSCOPY, BLADDER NECK CUTS, COMBINED N/A 4/14/2023    Procedure: CYSTOSCOPY, WITH MULTIPLE INCISIONS OF BLADDER NECK. Injection of Mitomycin to stricture;  Surgeon: Gunner Pang MD;  [place] [facility]    DENTAL SURGERY      HEMORRHOIDECTOMY      HERNIA REPAIR      PROSTATECTOMY       Family History   Problem Relation Age of Onset    Coronary Artery Disease Mother     Coronary Artery Disease Early Onset Mother     Coronary Artery Disease Father     Coronary Artery Disease Early Onset Father     Coronary Artery Disease Brother     Coronary Artery Disease Early Onset Brother     Valvular heart disease Daughter     Suicide Nephew     Unknown/Adopted Granddaughter     Aortic Valve  Replacement Granddaughter     Anesthesia Reaction No family hx of     Bleeding Disorder No family hx of     Venous thrombosis No family hx of         Social History     Socioeconomic History    Marital status:      Spouse name: Not on file    Number of children: Not on file    Years of education: Not on file    Highest education level: Not on file   Occupational History    Not on file   Tobacco Use    Smoking status: Never     Passive exposure: Past    Smokeless tobacco: Never   Vaping Use    Vaping status: Never Used   Substance and Sexual Activity    Alcohol use: Yes     Alcohol/week: 1.0 standard drink of alcohol     Types: 1 Standard drinks or equivalent per week     Comment: very occasional    Drug use: No    Sexual activity: Not on file   Other Topics Concern    Not on file   Social History Narrative    Not on file     Social Determinants of Health     Financial Resource Strain: Not on file   Food Insecurity: Not on file   Transportation Needs: Not on file   Physical Activity: Not on file   Stress: Not on file   Social Connections: Not on file   Interpersonal Safety: Not on file   Housing Stability: Not on file           Medications  Allergies   Current Outpatient Medications   Medication Sig Dispense Refill    acetaminophen (TYLENOL) 325 MG tablet [ACETAMINOPHEN (TYLENOL) 325 MG TABLET] Take 2 tablets by mouth every 4 (four) hours as needed.      aspirin (ASA) 81 MG chewable tablet Take 1 tablet (81 mg) by mouth daily 90 tablet 0    calcium carbonate (TUMS ULTRA) 400 mg calcium (1,000 mg) Chew Take 2 tablets by mouth as needed      cholecalciferol, vitamin D3, 5,000 unit capsule [CHOLECALCIFEROL, VITAMIN D3, 5,000 UNIT CAPSULE] Take 5,000 Units by mouth at bedtime.       clindamycin (CLEOCIN T) 1 % external solution APPLY TO AFFECTED AREAS ON JAW LINE AND BEHIND EAR FOR FOLLICULITIS      clopidogrel (PLAVIX) 75 MG tablet Take 1 tablet (75 mg) by mouth daily 90 tablet 3    diphenhydrAMINE (BENADRYL) 25  mg tablet [DIPHENHYDRAMINE (BENADRYL) 25 MG TABLET] Take 25 mg by mouth at bedtime as needed for itching.      ezetimibe (ZETIA) 10 MG tablet Take 1 tablet (10 mg) by mouth at bedtime 90 tablet 3    furosemide (LASIX) 40 MG tablet Take 40 mg by mouth daily.      glipiZIDE (GLUCOTROL XL) 10 MG 24 hr tablet Take 2 tablets (20 mg) by mouth daily (with lunch) 180 tablet 0    insulin aspart (NOVOLOG VIAL) 100 UNITS/ML vial Inject 3 Units Subcutaneous 3 times daily (with meals) for 30 days 2.7 mL 0    insulin glargine (LANTUS PEN) 100 UNIT/ML pen Inject 40 Units Subcutaneous at bedtime (Patient taking differently: Inject 38 Units subcutaneously at bedtime.) 15 mL 0    insulin glargine (LANTUS PEN) 100 UNIT/ML pen Inject 60 Units Subcutaneous every morning (before breakfast) 15 mL 0    Magnesium 400 MG TABS Take 400 mg by mouth at bedtime.      melatonin 3 MG CAPS Take 6 mg by mouth At Bedtime      Metoprolol Tartrate 75 MG TABS Take 75 mg by mouth 2 times daily 180 tablet 3    multivitamin (ONE A DAY) per tablet Take 1 tablet by mouth every morning      omega-3/dha/epa/fish oil (FISH OIL-OMEGA-3 FATTY ACIDS) 300-1,000 mg capsule Take 4 capsules by mouth at bedtime      omeprazole (PRILOSEC) 20 MG capsule Take 1 capsule by mouth at bedtime      polyethylene glycol (MIRALAX) 17 gram packet [POLYETHYLENE GLYCOL (MIRALAX) 17 GRAM PACKET] Take 17 g by mouth at bedtime.       riboflavin, vitamin B2, 100 mg Tab Take 1 tablet by mouth at bedtime      rosuvastatin (CRESTOR) 20 MG tablet Take 1 tablet (20 mg) by mouth at bedtime 90 tablet 3    senna-docusate (SENOKOT-S/PERICOLACE) 8.6-50 MG tablet Take 1 tablet by mouth 2 times daily as needed for constipation 30 tablet 0    triamcinolone (KENALOG) 0.1 % external ointment Apply topically two times a day. To buttock for up to 2 weeks at a time Indications: Localized Neurodermatitis      tamsulosin (FLOMAX) 0.4 MG capsule Take 1 capsule (0.4 mg) by mouth daily (Patient not taking:  "Reported on 9/25/2024) 90 capsule 0       Allergies   Allergen Reactions    Amoxicillin Hives     Per Dr. Barrientos, patient has tolerated Keflex.    Atorvastatin Diarrhea    Benzalkonium Chloride Other (See Comments)    Fenofibrate Muscle Pain (Myalgia)    Gemfibrozil     Hydrocodone-Acetaminophen Other (See Comments)     Patient reports \"going white as a sheet\" and cold sweats   OK with Tylenol #3 No problems    Lisinopril Cough    Morphine And Codeine Other (See Comments)     cold sweat;pale    Neosporin (Jca-Ruy-Wtqyy) [Neomycin-Bacitracin Zn-Polymyx] Unknown    Penicillins Hives     1992 had pcn and had hives. Pt. States allergic to all cillin's      Pravastatin Sodium [Pravastatin] Muscle Pain (Myalgia)    Trulicity [Dulaglutide] Unknown    Gluten [Gluten Meal] Other (See Comments)     Reports feeling worse with gluten; tested below threshold for Celiac but daughter is positive    Insulin Detemir Rash          Lab Results    Chemistry/lipid CBC Cardiac Enzymes/BNP/TSH/INR   Recent Labs   Lab Test 06/05/24  0919   CHOL 108   HDL 35*   LDL 47   TRIG 129     Recent Labs   Lab Test 06/05/24  0919 04/17/24  0548   LDL 47 124*     Recent Labs   Lab Test 09/04/24  1003 04/27/24  0725 04/27/24  0527   NA  --   --  141   POTASSIUM  --   --  3.7   CHLORIDE  --   --  106   CO2  --   --  24   *   < > 72   BUN  --   --  33.7*   CR  --   --  1.65*   GFRESTIMATED  --   --  42*   AURORA  --   --  9.0    < > = values in this interval not displayed.     Recent Labs   Lab Test 04/27/24  0527 04/26/24  0437 04/25/24  0426   CR 1.65* 1.89* 1.64*     Recent Labs   Lab Test 04/18/24  0439   A1C 7.2*          Recent Labs   Lab Test 04/26/24  0437 04/23/24  0404 04/22/24  0402   WBC  --   --  10.6   HGB  --   --  10.1*   HCT  --   --  30.1*   MCV  --   --  88      < > 132*    < > = values in this interval not displayed.     Recent Labs   Lab Test 04/22/24  0402 04/21/24  0721 04/20/24  0410   HGB 10.1* 10.8* 12.6*    Recent " "Labs   Lab Test 02/19/22  2312   TROPONINI <0.01     Recent Labs   Lab Test 04/16/24  2206 06/09/18  0525   BNP  --  34   NTBNPI 87  --      No results for input(s): \"TSH\" in the last 88001 hours.  Recent Labs   Lab Test 04/19/24  1722 04/19/24  1530 04/19/24  0711   INR 1.21* 1.31* 1.00        Valencia Nath MD                                      "
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
## 2024-10-15 RX ORDER — ROSUVASTATIN CALCIUM 20 MG/1
TABLET, COATED ORAL
Qty: 90 TABLET | Refills: 0 | OUTPATIENT
Start: 2024-10-15

## 2024-12-07 ENCOUNTER — HEALTH MAINTENANCE LETTER (OUTPATIENT)
Age: 79
End: 2024-12-07

## 2024-12-16 ENCOUNTER — TELEPHONE (OUTPATIENT)
Dept: CARDIOLOGY | Facility: CLINIC | Age: 79
End: 2024-12-16
Payer: COMMERCIAL

## 2024-12-16 NOTE — TELEPHONE ENCOUNTER
----- Message from Sharifa MAHER sent at 12/16/2024  3:00 PM CST -----  Regarding: cll pt  General phone call:    Caller: [facility]    Primary cardiologist: luz  Detailed reason for call: Did we get the fax from last week for the hold for Plavix 7 days    Best phone number: [phone]  Best time to contact: any  Ok to leave a detailedmessage? yes  Device? no    Additional Info:

## 2024-12-16 NOTE — TELEPHONE ENCOUNTER
Form completed by Dr. Nath 12-13-24 giving approval for 7 day Plavix hold prior to injection procedure and successfully faxed back to [facility] as requested 12-16-24.  mg

## 2025-01-22 ENCOUNTER — DOCUMENTATION ONLY (OUTPATIENT)
Dept: CARDIOLOGY | Facility: CLINIC | Age: 80
End: 2025-01-22
Payer: COMMERCIAL

## 2025-01-22 NOTE — PROGRESS NOTES
Received fax request from [facility] requesting 7 day hold for Plavix prior to injection procedure.     Request completed by ALIX and faxed back to number provided. NITZA

## 2025-05-18 ENCOUNTER — HEALTH MAINTENANCE LETTER (OUTPATIENT)
Age: 80
End: 2025-05-18

## 2025-06-08 ENCOUNTER — HEALTH MAINTENANCE LETTER (OUTPATIENT)
Age: 80
End: 2025-06-08

## 2025-07-15 DIAGNOSIS — Z95.1 S/P CABG (CORONARY ARTERY BYPASS GRAFT): ICD-10-CM

## 2025-07-16 RX ORDER — EZETIMIBE 10 MG/1
10 TABLET ORAL AT BEDTIME
Qty: 90 TABLET | Refills: 3 | Status: SHIPPED | OUTPATIENT
Start: 2025-07-16

## 2025-07-18 DIAGNOSIS — E11.22 TYPE 2 DM WITH CKD STAGE 1 AND HYPERTENSION (H): ICD-10-CM

## 2025-07-18 DIAGNOSIS — I21.4 ACUTE NON-ST SEGMENT ELEVATION MYOCARDIAL INFARCTION (H): ICD-10-CM

## 2025-07-18 DIAGNOSIS — I10 BENIGN ESSENTIAL HTN: ICD-10-CM

## 2025-07-18 DIAGNOSIS — N18.1 TYPE 2 DM WITH CKD STAGE 1 AND HYPERTENSION (H): ICD-10-CM

## 2025-07-18 DIAGNOSIS — I12.9 TYPE 2 DM WITH CKD STAGE 1 AND HYPERTENSION (H): ICD-10-CM

## 2025-07-18 DIAGNOSIS — E78.5 DYSLIPIDEMIA: ICD-10-CM

## 2025-07-18 DIAGNOSIS — R07.9 ACUTE CHEST PAIN: ICD-10-CM

## 2025-07-21 ENCOUNTER — TELEPHONE (OUTPATIENT)
Dept: CARDIOLOGY | Facility: CLINIC | Age: 80
End: 2025-07-21
Payer: COMMERCIAL

## 2025-07-21 DIAGNOSIS — Z95.1 S/P CABG (CORONARY ARTERY BYPASS GRAFT): ICD-10-CM

## 2025-07-21 RX ORDER — EZETIMIBE 10 MG/1
10 TABLET ORAL AT BEDTIME
Qty: 90 TABLET | Refills: 3 | Status: ON HOLD | OUTPATIENT
Start: 2025-07-21

## 2025-07-21 RX ORDER — ROSUVASTATIN CALCIUM 20 MG/1
20 TABLET, COATED ORAL AT BEDTIME
Qty: 90 TABLET | Refills: 3 | Status: ON HOLD | OUTPATIENT
Start: 2025-07-21

## 2025-07-21 RX ORDER — ROSUVASTATIN CALCIUM 40 MG/1
40 TABLET, COATED ORAL DAILY
Qty: 90 TABLET | Refills: 3 | OUTPATIENT
Start: 2025-07-21

## 2025-07-21 NOTE — TELEPHONE ENCOUNTER
[facility] Call Center    Phone Message    May a detailed message be left on voicemail: yes     Reason for Call: Medication Question or concern regarding medication   Prescription Clarification  Name of Medication:   ezetimibe (ZETIA) 10 MG tablet  2.   rosuvastatin (CRESTOR) 20 MG tablet    Prescribing Provider: Valencia Nath     Pharmacy: [id number] [place], [place] - [address] AT [facility]     What on the order needs clarification? Pt went to  his prescription refills and was told these had been cancelled by provider. Pt will run out this week and has a surgery this week as well and will need the refills asap as he wont be able to go back out and get.       Action Taken: Message routed to:  Other: [facility] [[id number]]    Travel Screening: Not Applicable     Date of Service:

## 2025-07-23 ENCOUNTER — ANESTHESIA EVENT (OUTPATIENT)
Dept: SURGERY | Facility: CLINIC | Age: 80
End: 2025-07-23
Payer: COMMERCIAL

## 2025-07-24 ENCOUNTER — ANESTHESIA (OUTPATIENT)
Dept: SURGERY | Facility: CLINIC | Age: 80
End: 2025-07-24
Payer: COMMERCIAL

## 2025-07-24 ENCOUNTER — HOSPITAL ENCOUNTER (INPATIENT)
Facility: CLINIC | Age: 80
DRG: 427 | End: 2025-07-24
Attending: ORTHOPAEDIC SURGERY | Admitting: ORTHOPAEDIC SURGERY
Payer: COMMERCIAL

## 2025-07-24 ENCOUNTER — APPOINTMENT (OUTPATIENT)
Dept: RADIOLOGY | Facility: CLINIC | Age: 80
DRG: 427 | End: 2025-07-24
Attending: ORTHOPAEDIC SURGERY
Payer: COMMERCIAL

## 2025-07-24 VITALS
HEIGHT: 70 IN | TEMPERATURE: 98.3 F | RESPIRATION RATE: 18 BRPM | OXYGEN SATURATION: 92 % | DIASTOLIC BLOOD PRESSURE: 58 MMHG | BODY MASS INDEX: 42.29 KG/M2 | WEIGHT: 295.4 LBS | SYSTOLIC BLOOD PRESSURE: 122 MMHG | HEART RATE: 79 BPM

## 2025-07-24 DIAGNOSIS — Z98.1 S/P LUMBAR FUSION: Primary | ICD-10-CM

## 2025-07-24 DIAGNOSIS — M54.50 LUMBAR BACK PAIN: ICD-10-CM

## 2025-07-24 LAB
BASE EXCESS BLDA CALC-SCNC: -3.4 MMOL/L (ref -3–3)
GLUCOSE BLDC GLUCOMTR-MCNC: 175 MG/DL (ref 70–99)
GLUCOSE BLDC GLUCOMTR-MCNC: 180 MG/DL (ref 70–99)
GLUCOSE BLDC GLUCOMTR-MCNC: 192 MG/DL (ref 70–99)
GLUCOSE BLDC GLUCOMTR-MCNC: 224 MG/DL (ref 70–99)
GLUCOSE BLDC GLUCOMTR-MCNC: 231 MG/DL (ref 70–99)
GLUCOSE BLDC GLUCOMTR-MCNC: 256 MG/DL (ref 70–99)
GLUCOSE BLDC GLUCOMTR-MCNC: 268 MG/DL (ref 70–99)
HCO3 BLD-SCNC: 22 MMOL/L (ref 21–28)
O2/TOTAL GAS SETTING VFR VENT: 30 %
OXYHGB MFR BLDA: 95 % (ref 92–100)
PCO2 BLD: 41 MM HG (ref 35–45)
PEEP: 10 CM H2O
PH BLD: 7.34 [PH] (ref 7.35–7.45)
PO2 BLD: 80 MM HG (ref 80–105)
SAO2 % BLDA: 96.4 % (ref 95–96)

## 2025-07-24 PROCEDURE — C1762 CONN TISS, HUMAN(INC FASCIA): HCPCS | Performed by: ORTHOPAEDIC SURGERY

## 2025-07-24 PROCEDURE — 0ST20ZZ RESECTION OF LUMBAR VERTEBRAL DISC, OPEN APPROACH: ICD-10-PCS | Performed by: ORTHOPAEDIC SURGERY

## 2025-07-24 PROCEDURE — 82805 BLOOD GASES W/O2 SATURATION: CPT | Performed by: ANESTHESIOLOGY

## 2025-07-24 PROCEDURE — 999N000141 HC STATISTIC PRE-PROCEDURE NURSING ASSESSMENT: Performed by: ORTHOPAEDIC SURGERY

## 2025-07-24 PROCEDURE — 250N000011 HC RX IP 250 OP 636: Performed by: NURSE ANESTHETIST, CERTIFIED REGISTERED

## 2025-07-24 PROCEDURE — 250N000025 HC SEVOFLURANE, PER MIN: Performed by: ORTHOPAEDIC SURGERY

## 2025-07-24 PROCEDURE — 250N000011 HC RX IP 250 OP 636: Performed by: ANESTHESIOLOGY

## 2025-07-24 PROCEDURE — 5A09357 ASSISTANCE WITH RESPIRATORY VENTILATION, LESS THAN 24 CONSECUTIVE HOURS, CONTINUOUS POSITIVE AIRWAY PRESSURE: ICD-10-PCS | Performed by: INTERNAL MEDICINE

## 2025-07-24 PROCEDURE — 36600 WITHDRAWAL OF ARTERIAL BLOOD: CPT

## 2025-07-24 PROCEDURE — 0SG10AJ FUSION OF 2 OR MORE LUMBAR VERTEBRAL JOINTS WITH INTERBODY FUSION DEVICE, POSTERIOR APPROACH, ANTERIOR COLUMN, OPEN APPROACH: ICD-10-PCS | Performed by: ORTHOPAEDIC SURGERY

## 2025-07-24 PROCEDURE — P9045 ALBUMIN (HUMAN), 5%, 250 ML: HCPCS | Mod: JZ | Performed by: NURSE ANESTHETIST, CERTIFIED REGISTERED

## 2025-07-24 PROCEDURE — 999N000157 HC STATISTIC RCP TIME EA 10 MIN

## 2025-07-24 PROCEDURE — 250N000009 HC RX 250: Performed by: ORTHOPAEDIC SURGERY

## 2025-07-24 PROCEDURE — 360N000086 HC SURGERY LEVEL 6 W/ FLUORO, PER MIN: Performed by: ORTHOPAEDIC SURGERY

## 2025-07-24 PROCEDURE — 99222 1ST HOSP IP/OBS MODERATE 55: CPT | Performed by: INTERNAL MEDICINE

## 2025-07-24 PROCEDURE — 250N000011 HC RX IP 250 OP 636: Performed by: PHYSICIAN ASSISTANT

## 2025-07-24 PROCEDURE — 999N000182 XR SURGERY OARM

## 2025-07-24 PROCEDURE — 370N000017 HC ANESTHESIA TECHNICAL FEE, PER MIN: Performed by: ORTHOPAEDIC SURGERY

## 2025-07-24 PROCEDURE — 250N000009 HC RX 250: Performed by: NURSE ANESTHETIST, CERTIFIED REGISTERED

## 2025-07-24 PROCEDURE — 120N000001 HC R&B MED SURG/OB

## 2025-07-24 PROCEDURE — C1713 ANCHOR/SCREW BN/BN,TIS/BN: HCPCS | Performed by: ORTHOPAEDIC SURGERY

## 2025-07-24 PROCEDURE — 250N000013 HC RX MED GY IP 250 OP 250 PS 637

## 2025-07-24 PROCEDURE — 250N000013 HC RX MED GY IP 250 OP 250 PS 637: Performed by: ANESTHESIOLOGY

## 2025-07-24 PROCEDURE — 8E0WXBZ COMPUTER ASSISTED PROCEDURE OF TRUNK REGION: ICD-10-PCS | Performed by: ORTHOPAEDIC SURGERY

## 2025-07-24 PROCEDURE — 258N000003 HC RX IP 258 OP 636: Performed by: PHYSICIAN ASSISTANT

## 2025-07-24 PROCEDURE — 250N000011 HC RX IP 250 OP 636: Performed by: ORTHOPAEDIC SURGERY

## 2025-07-24 PROCEDURE — 94660 CPAP INITIATION&MGMT: CPT

## 2025-07-24 PROCEDURE — 258N000003 HC RX IP 258 OP 636: Performed by: ANESTHESIOLOGY

## 2025-07-24 PROCEDURE — 250N000013 HC RX MED GY IP 250 OP 250 PS 637: Performed by: PHYSICIAN ASSISTANT

## 2025-07-24 PROCEDURE — 250N000009 HC RX 250

## 2025-07-24 PROCEDURE — 258N000003 HC RX IP 258 OP 636: Performed by: NURSE ANESTHETIST, CERTIFIED REGISTERED

## 2025-07-24 PROCEDURE — 250N000005 HC OR RX SURGIFLO HEMOSTATIC MATRIX 10ML 199102S OPNP: Performed by: ORTHOPAEDIC SURGERY

## 2025-07-24 PROCEDURE — 250N000012 HC RX MED GY IP 250 OP 636 PS 637: Performed by: INTERNAL MEDICINE

## 2025-07-24 PROCEDURE — 250N000013 HC RX MED GY IP 250 OP 250 PS 637: Performed by: INTERNAL MEDICINE

## 2025-07-24 PROCEDURE — 0SG1071 FUSION OF 2 OR MORE LUMBAR VERTEBRAL JOINTS WITH AUTOLOGOUS TISSUE SUBSTITUTE, POSTERIOR APPROACH, POSTERIOR COLUMN, OPEN APPROACH: ICD-10-PCS | Performed by: ORTHOPAEDIC SURGERY

## 2025-07-24 PROCEDURE — 01NB0ZZ RELEASE LUMBAR NERVE, OPEN APPROACH: ICD-10-PCS | Performed by: ORTHOPAEDIC SURGERY

## 2025-07-24 PROCEDURE — 272N000001 HC OR GENERAL SUPPLY STERILE: Performed by: ORTHOPAEDIC SURGERY

## 2025-07-24 PROCEDURE — 710N000010 HC RECOVERY PHASE 1, LEVEL 2, PER MIN: Performed by: ORTHOPAEDIC SURGERY

## 2025-07-24 DEVICE — BONE GRAFT PUTTY MAGNETOS EASYPACK PUTTY 10CC 703-053-US: Type: IMPLANTABLE DEVICE | Site: SPINE LUMBAR | Status: FUNCTIONAL

## 2025-07-24 DEVICE — IMP SCR MEDT 4.75MM SOLERA 6.5X55MM MA 54840006555: Type: IMPLANTABLE DEVICE | Site: SPINE LUMBAR | Status: FUNCTIONAL

## 2025-07-24 DEVICE — IMP SCR MEDT BREAK-OFF SET SOLERA 4.75MM TI 5440030: Type: IMPLANTABLE DEVICE | Site: SPINE LUMBAR | Status: FUNCTIONAL

## 2025-07-24 DEVICE — KIT BNGF 9CC DMNR CORT FBR ACCELERATE GRFTN CNN T50209: Type: IMPLANTABLE DEVICE | Site: SPINE LUMBAR | Status: FUNCTIONAL

## 2025-07-24 DEVICE — IMP SPI INTERBODY MEDT CATALYFT PL LONG 9MM 6068096: Type: IMPLANTABLE DEVICE | Site: SPINE LUMBAR | Status: FUNCTIONAL

## 2025-07-24 DEVICE — IMP SCR MEDT 4.75MM SOLERA 6.5X50MM MA 54840006550: Type: IMPLANTABLE DEVICE | Site: SPINE LUMBAR | Status: FUNCTIONAL

## 2025-07-24 DEVICE — IMP ROD MEDT 7CM 1475501070: Type: IMPLANTABLE DEVICE | Site: SPINE LUMBAR | Status: FUNCTIONAL

## 2025-07-24 RX ORDER — ONDANSETRON 2 MG/ML
4 INJECTION INTRAMUSCULAR; INTRAVENOUS EVERY 30 MIN PRN
Status: DISCONTINUED | OUTPATIENT
Start: 2025-07-24 | End: 2025-07-24 | Stop reason: HOSPADM

## 2025-07-24 RX ORDER — PROCHLORPERAZINE MALEATE 5 MG/1
5 TABLET ORAL EVERY 6 HOURS PRN
Status: DISCONTINUED | OUTPATIENT
Start: 2025-07-24 | End: 2025-07-27 | Stop reason: HOSPADM

## 2025-07-24 RX ORDER — HYDROMORPHONE HCL IN WATER/PF 6 MG/30 ML
0.2 PATIENT CONTROLLED ANALGESIA SYRINGE INTRAVENOUS EVERY 5 MIN PRN
Status: DISCONTINUED | OUTPATIENT
Start: 2025-07-24 | End: 2025-07-24 | Stop reason: HOSPADM

## 2025-07-24 RX ORDER — CEFAZOLIN SODIUM/WATER 3 G/30 ML
3 SYRINGE (ML) INTRAVENOUS SEE ADMIN INSTRUCTIONS
Status: DISCONTINUED | OUTPATIENT
Start: 2025-07-24 | End: 2025-07-24 | Stop reason: HOSPADM

## 2025-07-24 RX ORDER — METHADONE HYDROCHLORIDE 10 MG/ML
INJECTION, SOLUTION INTRAMUSCULAR; INTRAVENOUS; SUBCUTANEOUS
Status: DISCONTINUED
Start: 2025-07-24 | End: 2025-07-24 | Stop reason: HOSPADM

## 2025-07-24 RX ORDER — CEFAZOLIN SODIUM/WATER 3 G/30 ML
3 SYRINGE (ML) INTRAVENOUS
Status: COMPLETED | OUTPATIENT
Start: 2025-07-24 | End: 2025-07-24

## 2025-07-24 RX ORDER — SODIUM CHLORIDE, SODIUM LACTATE, POTASSIUM CHLORIDE, CALCIUM CHLORIDE 600; 310; 30; 20 MG/100ML; MG/100ML; MG/100ML; MG/100ML
INJECTION, SOLUTION INTRAVENOUS CONTINUOUS
Status: DISCONTINUED | OUTPATIENT
Start: 2025-07-24 | End: 2025-07-24 | Stop reason: HOSPADM

## 2025-07-24 RX ORDER — LORATADINE 10 MG/1
10 TABLET ORAL DAILY PRN
Status: DISCONTINUED | OUTPATIENT
Start: 2025-07-24 | End: 2025-07-27 | Stop reason: HOSPADM

## 2025-07-24 RX ORDER — ONDANSETRON 4 MG/1
4 TABLET, ORALLY DISINTEGRATING ORAL EVERY 30 MIN PRN
Status: DISCONTINUED | OUTPATIENT
Start: 2025-07-24 | End: 2025-07-24 | Stop reason: HOSPADM

## 2025-07-24 RX ORDER — HYDROMORPHONE HCL IN WATER/PF 6 MG/30 ML
0.1 PATIENT CONTROLLED ANALGESIA SYRINGE INTRAVENOUS EVERY 4 HOURS PRN
Status: DISCONTINUED | OUTPATIENT
Start: 2025-07-24 | End: 2025-07-27 | Stop reason: HOSPADM

## 2025-07-24 RX ORDER — HYDROMORPHONE HCL IN WATER/PF 6 MG/30 ML
0.4 PATIENT CONTROLLED ANALGESIA SYRINGE INTRAVENOUS EVERY 5 MIN PRN
Status: DISCONTINUED | OUTPATIENT
Start: 2025-07-24 | End: 2025-07-24 | Stop reason: HOSPADM

## 2025-07-24 RX ORDER — FUROSEMIDE 40 MG/1
40 TABLET ORAL
Status: DISCONTINUED | OUTPATIENT
Start: 2025-07-24 | End: 2025-07-24

## 2025-07-24 RX ORDER — FENTANYL CITRATE 50 UG/ML
50 INJECTION, SOLUTION INTRAMUSCULAR; INTRAVENOUS EVERY 5 MIN PRN
Status: DISCONTINUED | OUTPATIENT
Start: 2025-07-24 | End: 2025-07-24 | Stop reason: HOSPADM

## 2025-07-24 RX ORDER — ROSUVASTATIN CALCIUM 10 MG/1
20 TABLET, COATED ORAL AT BEDTIME
Status: DISCONTINUED | OUTPATIENT
Start: 2025-07-24 | End: 2025-07-27 | Stop reason: HOSPADM

## 2025-07-24 RX ORDER — LIDOCAINE HYDROCHLORIDE 10 MG/ML
INJECTION, SOLUTION INFILTRATION; PERINEURAL PRN
Status: DISCONTINUED | OUTPATIENT
Start: 2025-07-24 | End: 2025-07-24

## 2025-07-24 RX ORDER — ONDANSETRON 2 MG/ML
INJECTION INTRAMUSCULAR; INTRAVENOUS PRN
Status: DISCONTINUED | OUTPATIENT
Start: 2025-07-24 | End: 2025-07-24

## 2025-07-24 RX ORDER — NALOXONE HYDROCHLORIDE 0.4 MG/ML
0.2 INJECTION, SOLUTION INTRAMUSCULAR; INTRAVENOUS; SUBCUTANEOUS
Status: DISCONTINUED | OUTPATIENT
Start: 2025-07-24 | End: 2025-07-27 | Stop reason: HOSPADM

## 2025-07-24 RX ORDER — EZETIMIBE 10 MG/1
10 TABLET ORAL AT BEDTIME
Status: DISCONTINUED | OUTPATIENT
Start: 2025-07-24 | End: 2025-07-27 | Stop reason: HOSPADM

## 2025-07-24 RX ORDER — VANCOMYCIN HYDROCHLORIDE 1 G/20ML
INJECTION, POWDER, LYOPHILIZED, FOR SOLUTION INTRAVENOUS
Status: DISCONTINUED
Start: 2025-07-24 | End: 2025-07-24 | Stop reason: HOSPADM

## 2025-07-24 RX ORDER — PANTOPRAZOLE SODIUM 40 MG/1
40 TABLET, DELAYED RELEASE ORAL AT BEDTIME
Status: DISCONTINUED | OUTPATIENT
Start: 2025-07-24 | End: 2025-07-27 | Stop reason: HOSPADM

## 2025-07-24 RX ORDER — NICOTINE POLACRILEX 4 MG
15-30 LOZENGE BUCCAL
Status: DISCONTINUED | OUTPATIENT
Start: 2025-07-24 | End: 2025-07-27 | Stop reason: HOSPADM

## 2025-07-24 RX ORDER — ONDANSETRON 2 MG/ML
4 INJECTION INTRAMUSCULAR; INTRAVENOUS EVERY 6 HOURS PRN
Status: DISCONTINUED | OUTPATIENT
Start: 2025-07-24 | End: 2025-07-27 | Stop reason: HOSPADM

## 2025-07-24 RX ORDER — CLINDAMYCIN PHOSPHATE 900 MG/50ML
900 INJECTION, SOLUTION INTRAVENOUS
Status: DISCONTINUED | OUTPATIENT
Start: 2025-07-24 | End: 2025-07-24 | Stop reason: ALTCHOICE

## 2025-07-24 RX ORDER — ACETAMINOPHEN 325 MG/1
975 TABLET ORAL EVERY 8 HOURS
Status: DISCONTINUED | OUTPATIENT
Start: 2025-07-24 | End: 2025-07-27 | Stop reason: HOSPADM

## 2025-07-24 RX ORDER — DEXAMETHASONE SODIUM PHOSPHATE 4 MG/ML
4 INJECTION, SOLUTION INTRA-ARTICULAR; INTRALESIONAL; INTRAMUSCULAR; INTRAVENOUS; SOFT TISSUE
Status: DISCONTINUED | OUTPATIENT
Start: 2025-07-24 | End: 2025-07-24 | Stop reason: HOSPADM

## 2025-07-24 RX ORDER — CEFAZOLIN SODIUM 2 G/50ML
2 SOLUTION INTRAVENOUS EVERY 8 HOURS
Status: COMPLETED | OUTPATIENT
Start: 2025-07-24 | End: 2025-07-25

## 2025-07-24 RX ORDER — POLYETHYLENE GLYCOL 3350 17 G/17G
17 POWDER, FOR SOLUTION ORAL DAILY
Status: DISCONTINUED | OUTPATIENT
Start: 2025-07-25 | End: 2025-07-27 | Stop reason: HOSPADM

## 2025-07-24 RX ORDER — PROPOFOL 10 MG/ML
INJECTION, EMULSION INTRAVENOUS PRN
Status: DISCONTINUED | OUTPATIENT
Start: 2025-07-24 | End: 2025-07-24

## 2025-07-24 RX ORDER — MAGNESIUM SULFATE 4 G/50ML
4 INJECTION INTRAVENOUS ONCE
Status: DISCONTINUED | OUTPATIENT
Start: 2025-07-24 | End: 2025-07-24

## 2025-07-24 RX ORDER — HYDROMORPHONE HYDROCHLORIDE 2 MG/1
2 TABLET ORAL EVERY 4 HOURS PRN
Status: DISCONTINUED | OUTPATIENT
Start: 2025-07-24 | End: 2025-07-26

## 2025-07-24 RX ORDER — FENTANYL CITRATE 50 UG/ML
25 INJECTION, SOLUTION INTRAMUSCULAR; INTRAVENOUS EVERY 5 MIN PRN
Status: DISCONTINUED | OUTPATIENT
Start: 2025-07-24 | End: 2025-07-24 | Stop reason: HOSPADM

## 2025-07-24 RX ORDER — VANCOMYCIN HYDROCHLORIDE 1 G/20ML
INJECTION, POWDER, LYOPHILIZED, FOR SOLUTION INTRAVENOUS PRN
Status: DISCONTINUED | OUTPATIENT
Start: 2025-07-24 | End: 2025-07-24 | Stop reason: HOSPADM

## 2025-07-24 RX ORDER — METHADONE HYDROCHLORIDE 10 MG/ML
10 INJECTION, SOLUTION INTRAMUSCULAR; INTRAVENOUS; SUBCUTANEOUS ONCE
Refills: 0 | Status: COMPLETED | OUTPATIENT
Start: 2025-07-24 | End: 2025-07-24

## 2025-07-24 RX ORDER — FUROSEMIDE 40 MG/1
40 TABLET ORAL
Status: DISCONTINUED | OUTPATIENT
Start: 2025-07-25 | End: 2025-07-27 | Stop reason: HOSPADM

## 2025-07-24 RX ORDER — MAGNESIUM HYDROXIDE 1200 MG/15ML
LIQUID ORAL PRN
Status: DISCONTINUED | OUTPATIENT
Start: 2025-07-24 | End: 2025-07-24 | Stop reason: HOSPADM

## 2025-07-24 RX ORDER — LIDOCAINE 40 MG/G
CREAM TOPICAL
Status: DISCONTINUED | OUTPATIENT
Start: 2025-07-24 | End: 2025-07-24 | Stop reason: HOSPADM

## 2025-07-24 RX ORDER — METHOCARBAMOL 750 MG/1
750 TABLET, FILM COATED ORAL ONCE
Status: COMPLETED | OUTPATIENT
Start: 2025-07-24 | End: 2025-07-24

## 2025-07-24 RX ORDER — CALCIUM CARBONATE 500 MG/1
2 TABLET, CHEWABLE ORAL 4 TIMES DAILY PRN
COMMUNITY

## 2025-07-24 RX ORDER — THERA TABS 400 MCG
1 TAB ORAL DAILY
Status: DISCONTINUED | OUTPATIENT
Start: 2025-07-25 | End: 2025-07-27 | Stop reason: HOSPADM

## 2025-07-24 RX ORDER — DEXAMETHASONE SODIUM PHOSPHATE 4 MG/ML
INJECTION, SOLUTION INTRA-ARTICULAR; INTRALESIONAL; INTRAMUSCULAR; INTRAVENOUS; SOFT TISSUE PRN
Status: DISCONTINUED | OUTPATIENT
Start: 2025-07-24 | End: 2025-07-24

## 2025-07-24 RX ORDER — PROPOFOL 10 MG/ML
INJECTION, EMULSION INTRAVENOUS CONTINUOUS PRN
Status: DISCONTINUED | OUTPATIENT
Start: 2025-07-24 | End: 2025-07-24

## 2025-07-24 RX ORDER — NALOXONE HYDROCHLORIDE 0.4 MG/ML
0.1 INJECTION, SOLUTION INTRAMUSCULAR; INTRAVENOUS; SUBCUTANEOUS
Status: DISCONTINUED | OUTPATIENT
Start: 2025-07-24 | End: 2025-07-24 | Stop reason: HOSPADM

## 2025-07-24 RX ORDER — METOPROLOL SUCCINATE 50 MG/1
100 TABLET, EXTENDED RELEASE ORAL DAILY
Status: DISCONTINUED | OUTPATIENT
Start: 2025-07-25 | End: 2025-07-27 | Stop reason: HOSPADM

## 2025-07-24 RX ORDER — INSULIN ASPART 100 [IU]/ML
10 INJECTION, SOLUTION INTRAVENOUS; SUBCUTANEOUS
COMMUNITY
Start: 2025-06-09

## 2025-07-24 RX ORDER — AMOXICILLIN 250 MG
1 CAPSULE ORAL 2 TIMES DAILY
Status: DISCONTINUED | OUTPATIENT
Start: 2025-07-24 | End: 2025-07-27 | Stop reason: HOSPADM

## 2025-07-24 RX ORDER — BISACODYL 10 MG
10 SUPPOSITORY, RECTAL RECTAL DAILY PRN
Status: DISCONTINUED | OUTPATIENT
Start: 2025-07-27 | End: 2025-07-27 | Stop reason: HOSPADM

## 2025-07-24 RX ORDER — NALOXONE HYDROCHLORIDE 0.4 MG/ML
0.4 INJECTION, SOLUTION INTRAMUSCULAR; INTRAVENOUS; SUBCUTANEOUS
Status: DISCONTINUED | OUTPATIENT
Start: 2025-07-24 | End: 2025-07-27 | Stop reason: HOSPADM

## 2025-07-24 RX ORDER — FENTANYL CITRATE 50 UG/ML
INJECTION, SOLUTION INTRAMUSCULAR; INTRAVENOUS PRN
Status: DISCONTINUED | OUTPATIENT
Start: 2025-07-24 | End: 2025-07-24

## 2025-07-24 RX ORDER — GABAPENTIN 100 MG/1
100 CAPSULE ORAL
Status: COMPLETED | OUTPATIENT
Start: 2025-07-24 | End: 2025-07-24

## 2025-07-24 RX ORDER — SODIUM CHLORIDE 9 MG/ML
INJECTION, SOLUTION INTRAVENOUS CONTINUOUS
Status: DISCONTINUED | OUTPATIENT
Start: 2025-07-24 | End: 2025-07-27 | Stop reason: HOSPADM

## 2025-07-24 RX ORDER — ONDANSETRON 4 MG/1
4 TABLET, ORALLY DISINTEGRATING ORAL EVERY 6 HOURS PRN
Status: DISCONTINUED | OUTPATIENT
Start: 2025-07-24 | End: 2025-07-27 | Stop reason: HOSPADM

## 2025-07-24 RX ORDER — HEPARIN SOD,PORCINE/0.9 % NACL 30K/1000ML
INTRAVENOUS SOLUTION INTRAVENOUS ONCE
Status: COMPLETED | OUTPATIENT
Start: 2025-07-24 | End: 2025-07-24

## 2025-07-24 RX ORDER — INSULIN GLARGINE 100 [IU]/ML
54 INJECTION, SOLUTION SUBCUTANEOUS EVERY MORNING
COMMUNITY
Start: 2025-06-05

## 2025-07-24 RX ORDER — MAGNESIUM SULFATE 4 G/50ML
4 INJECTION INTRAVENOUS ONCE
Status: COMPLETED | OUTPATIENT
Start: 2025-07-24 | End: 2025-07-24

## 2025-07-24 RX ORDER — ASPIRIN 81 MG/1
162 TABLET ORAL DAILY
COMMUNITY

## 2025-07-24 RX ORDER — GABAPENTIN 100 MG/1
100 CAPSULE ORAL AT BEDTIME
Status: DISCONTINUED | OUTPATIENT
Start: 2025-07-24 | End: 2025-07-27 | Stop reason: HOSPADM

## 2025-07-24 RX ORDER — DEXTROSE MONOHYDRATE 25 G/50ML
25-50 INJECTION, SOLUTION INTRAVENOUS
Status: DISCONTINUED | OUTPATIENT
Start: 2025-07-24 | End: 2025-07-27 | Stop reason: HOSPADM

## 2025-07-24 RX ORDER — HYDROMORPHONE HCL IN WATER/PF 6 MG/30 ML
0.2 PATIENT CONTROLLED ANALGESIA SYRINGE INTRAVENOUS EVERY 4 HOURS PRN
Status: DISCONTINUED | OUTPATIENT
Start: 2025-07-24 | End: 2025-07-27 | Stop reason: HOSPADM

## 2025-07-24 RX ORDER — BUPIVACAINE HYDROCHLORIDE AND EPINEPHRINE 2.5; 5 MG/ML; UG/ML
INJECTION, SOLUTION EPIDURAL; INFILTRATION; INTRACAUDAL; PERINEURAL
Status: DISCONTINUED
Start: 2025-07-24 | End: 2025-07-24 | Stop reason: HOSPADM

## 2025-07-24 RX ORDER — CLINDAMYCIN PHOSPHATE 900 MG/50ML
900 INJECTION, SOLUTION INTRAVENOUS SEE ADMIN INSTRUCTIONS
Status: DISCONTINUED | OUTPATIENT
Start: 2025-07-24 | End: 2025-07-24 | Stop reason: ALTCHOICE

## 2025-07-24 RX ORDER — BUPIVACAINE HYDROCHLORIDE AND EPINEPHRINE 2.5; 5 MG/ML; UG/ML
INJECTION, SOLUTION EPIDURAL; INFILTRATION; INTRACAUDAL; PERINEURAL PRN
Status: DISCONTINUED | OUTPATIENT
Start: 2025-07-24 | End: 2025-07-24 | Stop reason: HOSPADM

## 2025-07-24 RX ADMIN — LIDOCAINE HYDROCHLORIDE 50 MG: 10 INJECTION, SOLUTION INFILTRATION; PERINEURAL at 07:38

## 2025-07-24 RX ADMIN — Medication 3 G: at 11:50

## 2025-07-24 RX ADMIN — FENTANYL CITRATE 50 MCG: 50 INJECTION INTRAMUSCULAR; INTRAVENOUS at 07:33

## 2025-07-24 RX ADMIN — METHOCARBAMOL 750 MG: 750 TABLET ORAL at 07:18

## 2025-07-24 RX ADMIN — ALBUMIN (HUMAN): 12.5 SOLUTION INTRAVENOUS at 09:20

## 2025-07-24 RX ADMIN — ACETAMINOPHEN 975 MG: 325 TABLET ORAL at 16:10

## 2025-07-24 RX ADMIN — SODIUM CHLORIDE: 0.9 INJECTION, SOLUTION INTRAVENOUS at 15:17

## 2025-07-24 RX ADMIN — SENNOSIDES AND DOCUSATE SODIUM 1 TABLET: 50; 8.6 TABLET ORAL at 21:56

## 2025-07-24 RX ADMIN — INSULIN ASPART 2 UNITS: 100 INJECTION, SOLUTION INTRAVENOUS; SUBCUTANEOUS at 17:25

## 2025-07-24 RX ADMIN — Medication 200 MG: at 12:24

## 2025-07-24 RX ADMIN — ROCURONIUM BROMIDE 100 MG: 10 INJECTION, SOLUTION INTRAVENOUS at 07:38

## 2025-07-24 RX ADMIN — DEXMEDETOMIDINE HYDROCHLORIDE 8 MCG: 100 INJECTION, SOLUTION INTRAVENOUS at 09:25

## 2025-07-24 RX ADMIN — ROCURONIUM BROMIDE 20 MG: 10 INJECTION, SOLUTION INTRAVENOUS at 08:30

## 2025-07-24 RX ADMIN — SODIUM CHLORIDE, SODIUM LACTATE, POTASSIUM CHLORIDE, AND CALCIUM CHLORIDE: .6; .31; .03; .02 INJECTION, SOLUTION INTRAVENOUS at 06:47

## 2025-07-24 RX ADMIN — PHENYLEPHRINE HYDROCHLORIDE 0.2 MCG/KG/MIN: 10 INJECTION INTRAVENOUS at 08:30

## 2025-07-24 RX ADMIN — SODIUM CHLORIDE, SODIUM LACTATE, POTASSIUM CHLORIDE, AND CALCIUM CHLORIDE: .6; .31; .03; .02 INJECTION, SOLUTION INTRAVENOUS at 08:15

## 2025-07-24 RX ADMIN — PHENYLEPHRINE HYDROCHLORIDE 100 MCG: 10 INJECTION INTRAVENOUS at 08:30

## 2025-07-24 RX ADMIN — FENTANYL CITRATE 50 MCG: 50 INJECTION INTRAMUSCULAR; INTRAVENOUS at 07:38

## 2025-07-24 RX ADMIN — MAGNESIUM SULFATE HEPTAHYDRATE 4 G: 80 INJECTION, SOLUTION INTRAVENOUS at 07:26

## 2025-07-24 RX ADMIN — TRANEXAMIC ACID 1 G: 1 INJECTION, SOLUTION INTRAVENOUS at 07:55

## 2025-07-24 RX ADMIN — EZETIMIBE 10 MG: 10 TABLET ORAL at 22:44

## 2025-07-24 RX ADMIN — Medication 10 MG: at 07:39

## 2025-07-24 RX ADMIN — DEXMEDETOMIDINE HYDROCHLORIDE 8 MCG: 100 INJECTION, SOLUTION INTRAVENOUS at 08:20

## 2025-07-24 RX ADMIN — PANTOPRAZOLE SODIUM 40 MG: 40 TABLET, DELAYED RELEASE ORAL at 21:57

## 2025-07-24 RX ADMIN — PROPOFOL 50 MCG/KG/MIN: 10 INJECTION, EMULSION INTRAVENOUS at 07:50

## 2025-07-24 RX ADMIN — Medication 3 G: at 07:50

## 2025-07-24 RX ADMIN — ONDANSETRON 4 MG: 2 INJECTION INTRAMUSCULAR; INTRAVENOUS at 12:10

## 2025-07-24 RX ADMIN — PROPOFOL 200 MG: 10 INJECTION, EMULSION INTRAVENOUS at 07:38

## 2025-07-24 RX ADMIN — ACETAMINOPHEN 975 MG: 325 TABLET ORAL at 22:44

## 2025-07-24 RX ADMIN — ROSUVASTATIN CALCIUM 20 MG: 10 TABLET, FILM COATED ORAL at 21:56

## 2025-07-24 RX ADMIN — CEFAZOLIN SODIUM 2 G: 2 SOLUTION INTRAVENOUS at 21:58

## 2025-07-24 RX ADMIN — DEXMEDETOMIDINE HYDROCHLORIDE 8 MCG: 100 INJECTION, SOLUTION INTRAVENOUS at 11:45

## 2025-07-24 RX ADMIN — GABAPENTIN 100 MG: 100 CAPSULE ORAL at 06:46

## 2025-07-24 RX ADMIN — DEXAMETHASONE SODIUM PHOSPHATE 4 MG: 4 INJECTION, SOLUTION INTRA-ARTICULAR; INTRALESIONAL; INTRAMUSCULAR; INTRAVENOUS; SOFT TISSUE at 08:00

## 2025-07-24 RX ADMIN — HYDROMORPHONE HYDROCHLORIDE 2 MG: 2 TABLET ORAL at 18:07

## 2025-07-24 RX ADMIN — INSULIN ASPART 3 UNITS: 100 INJECTION, SOLUTION INTRAVENOUS; SUBCUTANEOUS at 17:24

## 2025-07-24 RX ADMIN — ROCURONIUM BROMIDE 50 MG: 10 INJECTION, SOLUTION INTRAVENOUS at 09:15

## 2025-07-24 RX ADMIN — DEXMEDETOMIDINE HYDROCHLORIDE 8 MCG: 100 INJECTION, SOLUTION INTRAVENOUS at 12:20

## 2025-07-24 RX ADMIN — DEXMEDETOMIDINE HYDROCHLORIDE 8 MCG: 100 INJECTION, SOLUTION INTRAVENOUS at 10:45

## 2025-07-24 RX ADMIN — GABAPENTIN 100 MG: 100 CAPSULE ORAL at 21:56

## 2025-07-24 ASSESSMENT — ACTIVITIES OF DAILY LIVING (ADL)
ADLS_ACUITY_SCORE: 36
ADLS_ACUITY_SCORE: 33
ADLS_ACUITY_SCORE: 38
ADLS_ACUITY_SCORE: 37
ADLS_ACUITY_SCORE: 36
ADLS_ACUITY_SCORE: 36
ADLS_ACUITY_SCORE: 35
ADLS_ACUITY_SCORE: 37
ADLS_ACUITY_SCORE: 35
ADLS_ACUITY_SCORE: 35
ADLS_ACUITY_SCORE: 36
ADLS_ACUITY_SCORE: 35
ADLS_ACUITY_SCORE: 33
ADLS_ACUITY_SCORE: 35
ADLS_ACUITY_SCORE: 37
ADLS_ACUITY_SCORE: 35

## 2025-07-24 ASSESSMENT — ENCOUNTER SYMPTOMS: ORTHOPNEA: 1

## 2025-07-24 NOTE — OP NOTE
DATE OF SERVICE: 07/24/2025     PREOPERATIVE DIAGNOSES:   1.  Severe spinal stenosis at L3-4 and L4-5  2.  L3-4 and L4-5 bilateral foraminal stenosis  3.  L3-4 and L4-5 degenerative disc disease  4.  Failure of conservative measures     POSTOPERATIVE DIAGNOSES:   1.  Severe spinal stenosis at L3-4 and L4-5  2.  L3-4 and L4-5 bilateral foraminal stenosis   3.  L3-4 and L4-5 degenerative disc disease  4.  Failure of conservative measures        PROCEDURE:   1. Right-sided transforaminal/transfacet decompression of the exiting L4 and traversing L5 nerve roots.   2. Transforaminal lumbar interbody fusion L4-5 with autograft bone, Medtronic Catalyft interbody long 9  3. Right-sided transforaminal/transfacet decompression of the exiting L3 and traversing L4 nerve roots.   4. Transforaminal lumbar interbody fusion L3-4 with autograft bone, Medtronic Catalyft interbody long 9  5. Posterior lateral fusion on the left at L3-4 and L4-5.   6. Segmental pedicle screw fixation in the pedicles of L3, L4, and L5 bilaterally  7. Complete laminectomy at L3-4 and L4-5  8. O arm with intra operative navigation        Medtronic Solera navigated pedicle screws systems.      SURGEON: Dr. Kem Martinez.      ASSISTANT: Verona Santamaria PA-C, who was needed for patient positioning, soft tissue retraction, patient safety and assistance with closure of the wound.  She was vital in the protection of the nerve roots as well as the dura.  This could only be performed by a surgical PA trained in spine     ESTIMATED BLOOD LOSS: 560 mL     DRAINS: Hemovac      COMPLICATIONS: None perceived.      SPECIMENS SENT: None.      HISTORY OF PRESENT ILLNESS AND INDICATIONS FOR PROCEDURE:   This is a pleasant 80 year old male that presented with severe buttock and leg pain.  MRI demonstrated L3-4 and L4-5 severe central stenosis with L3-4 and L4-5 bilateral foraminal stenosis and L3-4 and L4-5 degenerative disc disease.  He underwent non-operative treatment  with injections as well as therapy with no improvement long term.  We discussed because of this that he was a candidate for surgery.  I discussed that a fusion would give him more reliable results given bilateral foraminal stenosis.  He wished to proceed with the fusion.  We discussed the risks and benefits which include but are not limited to bleeding, infection, damage to the nerve or dura, failure of procedure to provide pain relief, foot drop, iatrogenic instability, pseudoarthrosis, need for additional procedures, and risks associated with anesthesia.  The intention of the surgery is to treat leg pain and will not reliably treat any back pain. He was completely clear on this.  Following this, they would like to proceed with the fusion.       DESCRIPTION OF PROCEDURE     Therefore, the patient was brought to the operating room. He was intubated via general endotracheal anesthetic and placed on a Fawad table with a Bishnu frame, care taken to pad all of her bony prominences. Pause for the Cause was performed correctly identifying the patient, procedure, proposed plan and radiographs and all were in agreement. We then dissected down over the L3 and L4 hemilamina bilaterally. We dissected over the facet joints at L3-4 and L4-5 bilaterally.  In addition to this, the transverse process was visualized at the L3, L4 and L5 level.  O arm was brought in and a tracer was placed on the L5 spinous process.  An intraoperative CT scan was taken.       We then turned our attention to placing pedicle screws, first using a Midas Deepak bur, then a pedicle probe, then a Baum probe to palpate the cannulated pedicle. We then tapped the holes to 5.5 mm.  We then used the Baum probe again to palpate all 4 quadrants of our cannulated pedicle. We then placed 6.5x55 screws bilaterally at L3 and 6.5x55 screws at L4 and 6.5x50 screws in L5.   A subsequent O arm spin was performed and demonstrated the screws in good position.     We then  turned our attention to the decompression and interbody.  A laminotomy was made.  The right L3 pars was scored allowing for the removal of the descending articular process of L3 along with the ascending articular process of L4. This gave us our transforaminal/transfacet decompression of the exiting L3, traversing L4 nerve root.      At the disk space level, we performed a box annulotomy at L3-4, used a combination of ODC curettes, pituitary rongeurs and Kerrison rongeurs to remove the disk material. We took great care not to dig into the endplates. When all of the disk material that we could remove was taken out, we gently scored the endplates. Surrency was placed into the disc space.  We then placed the interbody graft, a 28 mm length and 9 height, Medtronic catalyft cage. This had good fit and fill.       We then turned our attention to the decompression and interbody at L4-5.  A laminotomy was made.  The right L4 pars was scored allowing for the removal of the descending articular process of L4 along with the ascending articular process of L5. This gave us our transforaminal/transfacet decompression of the exiting L4, traversing L5 nerve root.      At the disk space level, we performed a box annulotomy at L4-5, used a combination of ODC curettes, pituitary rongeurs and Kerrison rongeurs to remove the disk material. We took great care not to dig into the endplates. When all of the disk material that we could remove was taken out, we gently scored the endplates. Surrency was placed into the disc space.  We then placed the interbody graft,  a 28 mm length and 9 height Medtronic Catalyft cage. This had good fit and fill.         We then performed a complete laminectomy and medial facetectomy.  Thick ligamentum and epidural fat was encountered and removed in a piecemeal fashion to completely free the left lateral recess at L3-4.  And the same procedure was performed at L4-5       We then decorticated our contralateral  facet joint at L3-4 and L4-5 as well as the transverse processes and placed additional autograft in the defect.  This was mixed with Magnetos biologic and additional autograft to assist in posterolateral fusion.       We then placed our connectors and rods and tightened to the 's specifications. We irrigated the wound copiously, had good hemostasis. The fascia was closed with #1 Vicryl, subcutaneous tissue with 2-0 Vicryl, skin with a 3-0 monocryl.  A sterile dressing was applied. The patient tolerated the procedure well. There were no apparent complications.      He will be weightbearing as tolerated bilateral lower extremities with strict instructions to avoid bending, lifting and twisting over the next 6 weeks. They may have a corset type brace for comfort and have early mobilization and RAMON stockings for DVT prophylaxis. He will have 2 grams of Ancef IV q.8 hours x2 doses for antibiotics or until his drain is removed. Return to see me in 2 weeks, sooner if there are any problems, questions or concerns.     Hardware used:  Medtronic Solera screws and rods with end caps  Medtronic Catalyft cage  Magn[employer] biologics

## 2025-07-24 NOTE — PROGRESS NOTES
Pt is a 80 yom. Order placed for off the shelf LSO brace, ordered by Dr. Martinez's team. Pt is s/p lumbar fusion. I spoke with the nurse of the pt who stated that the pt had already been fit with a back brace (LSO) by [facility]. Nothing was dispensed. All questions answered at this time. F/U PRN.    Electronically signed by Kriss Calvert CPO, EVELYN

## 2025-07-24 NOTE — ANESTHESIA POSTPROCEDURE EVALUATION
Patient: Nirav Baker    Procedure: Procedure(s):  RIGHT LUMBAR 3-4 AND LUMBAR 4 - 5 TRANSFORAMINAL LUMBAR INTERBODY FUSION WITH STEALTH NAVIGATION, WITH LUMBAR 3 - 4 AND LUMBAR 4-5 LAMINECTOMY       Anesthesia Type:  General    Note:  Disposition: Admission   Postop Pain Control: Uneventful            Sign Out: Well controlled pain   PONV: No   Neuro/Psych: Uneventful            Sign Out: Acceptable/Baseline neuro status   Airway/Respiratory: Uneventful            Sign Out: Acceptable/Baseline resp. status   CV/Hemodynamics: Uneventful            Sign Out: Acceptable CV status; No obvious hypovolemia; No obvious fluid overload   Other NRE: NONE   DID A NON-ROUTINE EVENT OCCUR? No           Last vitals:  Vitals Value Taken Time   /60 07/24/25 14:24   Temp 36.1  C (96.98  F) 07/24/25 14:29   Pulse 75 07/24/25 14:29   Resp 18 07/24/25 14:29   SpO2 96 % 07/24/25 14:29   Vitals shown include unfiled device data.    Electronically Signed By: Bacilio Michaels MD  July 24, 2025  2:30 PM

## 2025-07-24 NOTE — PROVIDER NOTIFICATION
1405  Pt following commands, squeezes hand, moves feet, trying to talk.  SpO2 96%, Color improved.  BiPAP removed, Oxymask at 4 L on, Rest rate 24.  LS slightly coarse uppers with air movement, bilateral lowers diminished.  1410 Pt remains drowsy, answering questions appropriately, ,maintaining SpO2 97% of 4L.  Will continue to wean O2.  Dr. Michaels stops by in PACU to see pt.  Will continue to progress through Phase I

## 2025-07-24 NOTE — ANESTHESIA PREPROCEDURE EVALUATION
Anesthesia Pre-Procedure Evaluation    Patient: Nirav Baker   MRN: [id number] : 1945          Procedure : Procedure(s):  RIGHT LUMBAR 3-4 AND LUMBAR 4 - 5 TRANSFORAMINAL LUMBAR INTERBODY FUSION WITH STEALTH NAVIGATION, WITH LUMBAR 3 - 4 AND LUMBAR 4-5 LAMINECTOMY         Past Medical History:   Diagnosis Date    Claustrophobia     Diabetes (H)     Gastroesophageal reflux disease     Hiatal hernia     Hypertension     Motion sickness     NSTEMI (non-ST elevated myocardial infarction) (H) 2024    Orthopnea     Personal history of fall     Twice    Sleep apnea     Walking troubles       Past Surgical History:   Procedure Laterality Date    APPENDECTOMY      BACK SURGERY      C5    CHOLECYSTECTOMY      COLONOSCOPY      COMBINED CYSTOSCOPY, INSERT STENT URETER(S) Left 2022    Procedure: CYSTOURETEROSCOPY, WITH BLADDER NECK DILATION WITH BLADDER NECK CONTRACTURE,  BLADDER CALCULUS REMOVAL;  Surgeon: Harvey Madden MD;  Location: [facility]    CORONARY ARTERY BYPASS GRAFT, INTERNAL MAMMARY ARTERY HARVEST, SAPHENOUS VEIN ENDOSCOPIC VESSEL PROCUREMENT, ANESTHESIA TRANSESOPHAGEAL ECHOCARDIOGRAM N/A 2024    Procedure: CORONARY ARTERY BYPASS GRAFT TIMES  TWO  WITH LEFT INTERNAL MAMMARY ARTERY HARVEST, LEFT ENDOSCOPIC VESSEL PROCUREMENT, ANESTHESIA TRANSESOPHAGEAL ECHOCARDIOGRAM, EPI AORTIC ULTRASOUND;  Surgeon: Cathy Edwards MD;  Location: [facility] OR    CV CORONARY ANGIOGRAM N/A 2024    Procedure: Coronary Angiogram;  Surgeon: Nils Ceballos MD;  Location: [facility] LAB CV    CV PCI N/A 2024    Procedure: Percutaneous Coronary Intervention;  Surgeon: Nils Ceballos MD;  Location: [facility] CV    CYSTOSCOPY, BLADDER NECK CUTS, COMBINED N/A 2023    Procedure: CYSTOSCOPY, WITH MULTIPLE INCISIONS OF BLADDER NECK. Injection of Mitomycin to stricture;  Surgeon: Gunner Pang MD;  Location: [facility]    DENTAL SURGERY      HEMORRHOIDECTOMY       "HERNIA REPAIR      PROSTATECTOMY        Allergies   Allergen Reactions    Amoxicillin Hives     Per Dr. Barrientos, patient has tolerated Keflex.    Atorvastatin Diarrhea    Benzalkonium Chloride Other (See Comments)    Fenofibrate Muscle Pain (Myalgia)    Gemfibrozil     Hydrocodone-Acetaminophen Other (See Comments)     Patient reports \"going white as a sheet\" and cold sweats   OK with Tylenol #3 No problems    Lisinopril Cough    Morphine And Codeine Other (See Comments)     cold sweat;pale    Neosporin (Das-Tks-Nqtqb) [Neomycin-Bacitracin Zn-Polymyx] Unknown    Penicillins Hives     1992 had pcn and had hives. Pt. States allergic to all cillin's      Pravastatin Sodium [Pravastatin] Muscle Pain (Myalgia)    Trulicity [Dulaglutide] Unknown    Gluten [Gluten Meal] Other (See Comments)     Reports feeling worse with gluten; tested below threshold for Celiac but daughter is positive    Insulin Detemir Rash      Social History     Tobacco Use    Smoking status: Never     Passive exposure: Past    Smokeless tobacco: Never   Substance Use Topics    Alcohol use: Yes     Alcohol/week: 1.0 standard drink of alcohol     Types: 1 Standard drinks or equivalent per week     Comment: very occasional      Wt Readings from Last 1 Encounters:   07/24/25 134 kg (295 lb 6.4 oz)        Anesthesia Evaluation            ROS/MED HX  ENT/Pulmonary:     (+) sleep apnea, uses CPAP,                                      Neurologic:     (+)      migraines,                          Cardiovascular:     (+)  hypertension- -   -  - -            orthopnea/PND,                          METS/Exercise Tolerance:     Hematologic:       Musculoskeletal:       GI/Hepatic:     (+) GERD,     hiatal hernia,              Renal/Genitourinary:     (+) renal disease, type: CRI, Pt requires dialysis,           Endo:     (+)  type II DM,             Obesity,       Psychiatric/Substance Use:       Infectious Disease:       Malignancy:       Other:      (+)  , H/O " "Chronic Pain,           Physical Exam  Airway  Mallampati: II  TM distance: >3 FB  Neck ROM: limited  Mouth opening: >= 4 cm    Cardiovascular - normal exam  Rhythm: regular  Rate: normal rate     Dental   (+) Modest Abnormalities - crowns, retainers, 1 or 2 missing teeth      Pulmonary - normal examBreath sounds clear to auscultation        Neurological - normal exam  He appears awake, alert and oriented x3.    Other Findings       OUTSIDE LABS:  CBC:   Lab Results   Component Value Date    WBC 10.6 04/22/2024    WBC 12.8 (H) 04/21/2024    HGB 10.1 (L) 04/22/2024    HGB 10.8 (L) 04/21/2024    HCT 30.1 (L) 04/22/2024    HCT 32.2 (L) 04/21/2024     04/26/2024     04/23/2024     BMP:   Lab Results   Component Value Date     04/27/2024     04/26/2024    POTASSIUM 3.7 04/27/2024    POTASSIUM 3.5 04/26/2024    CHLORIDE 106 04/27/2024    CHLORIDE 104 04/26/2024    CO2 24 04/27/2024    CO2 25 04/26/2024    BUN 33.7 (H) 04/27/2024    BUN 38.5 (H) 04/26/2024    CR 1.65 (H) 04/27/2024    CR 1.89 (H) 04/26/2024     (H) 07/24/2025     (H) 09/04/2024     COAGS:   Lab Results   Component Value Date    PTT 31 04/19/2024    INR 1.21 (H) 04/19/2024    FIBR 297 04/19/2024     POC: No results found for: \"BGM\", \"HCG\", \"HCGS\"  HEPATIC:   Lab Results   Component Value Date    ALBUMIN 3.2 (L) 04/20/2024    PROTTOTAL 5.7 (L) 04/20/2024    ALT 28 04/20/2024    AST 56 (H) 04/20/2024    ALKPHOS 66 04/20/2024    BILITOTAL 0.6 04/20/2024     OTHER:   Lab Results   Component Value Date    PH 7.38 04/19/2024    LACT 1.4 04/19/2024    A1C 7.2 (H) 04/18/2024    AURORA 9.0 04/27/2024    PHOS 3.9 04/27/2024    MAG 2.3 04/27/2024    LIPASE 19 02/19/2022    CRP 2.8 (H) 08/24/2022    SED 19 (H) 07/02/2018       Anesthesia Plan    ASA Status:  3      NPO Status: ELEVATED Aspiration Risk/Unknown   Anesthesia Type: General.  Airway: oral.  Induction: intravenous, rapid sequence.  Maintenance: Balanced.   Techniques and " "Equipment:     - Airway:  Planned airway equipment includes video laryngoscope.     - Monitoring Plan: standard ASA monitoring     Consents    Anesthesia Plan(s) and associated risks, benefits, and realistic alternatives discussed. Questions answered and patient/representative(s) expressed understanding.     - Discussed: CRNA     - Discussed with:  Patient, family        - Pt is DNR/DNI Status: no DNR          Postoperative Care    Pain management: non-narcotic analgesics, plan for postoperative opioid use, multimodal analgesia.     Comments:    Other Comments: GA/ETT, RSI, glidescope, magnesium, methadone, robaxin, precedex, ketamine               Bacilio Michaels MD    I have reviewed the pertinent notes and labs in the chart from the past 30 days and (re)examined the patient.  Any updates or changes from those notes are reflected in this note.    Clinically Significant Risk Factors Present on Admission                 # Drug Induced Platelet Defect: home medication list includes an antiplatelet medication   # Hypertension: Noted on problem list           # Morbid Obesity: Estimated body mass index is 42.39 kg/m  as calculated from the following:    Height as of this encounter: 1.778 m (5' 10\").    Weight as of this encounter: 134 kg (295 lb 6.4 oz).                    "

## 2025-07-24 NOTE — ANESTHESIA CARE TRANSFER NOTE
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    Anesthesia Type:   General     Note:    Oropharynx: oral airway in place and spontaneously breathing  Level of Consciousness: drowsy  Oxygen Supplementation: face mask  Level of Supplemental Oxygen (L/min / FiO2): 6  Independent Airway: airway patency satisfactory and stable  Dentition: dentition unchanged  Vital Signs Stable: post-procedure vital signs reviewed and stable  Report to RN Given: handoff report given  Patient transferred to: PACU    Handoff Report: Identifed the Patient, Identified the Reponsible Provider, Reviewed the pertinent medical history, Discussed the surgical course, Reviewed Intra-OP anesthesia mangement and issues during anesthesia, Set expectations for post-procedure period and Allowed opportunity for questions and acknowledgement of understanding    Vitals:  Vitals Value Taken Time   /68 07/24/25 12:35   Temp     Pulse 67 07/24/25 12:35   Resp 17 07/24/25 12:35   SpO2 99 % 07/24/25 12:35   Vitals shown include unfiled device data.    Electronically Signed By: TIFFANY Zambrano CRNA  July 24, 2025  12:36 PM

## 2025-07-24 NOTE — INTERVAL H&P NOTE
"I have reviewed the surgical (or preoperative) H&P that is linked to this encounter, and examined the patient. There are no significant changes    Clinical Conditions Present on Arrival:  Clinically Significant Risk Factors Present on Admission                  # Drug Induced Platelet Defect: home medication list includes an antiplatelet medication      # Morbid Obesity: Estimated body mass index is 42.39 kg/m  as calculated from the following:    Height as of this encounter: 1.778 m (5' 10\").    Weight as of this encounter: 134 kg (295 lb 6.4 oz).       "

## 2025-07-24 NOTE — ANESTHESIA PROCEDURE NOTES
Airway       Patient location during procedure: OR       Procedure Start/Stop Times: 7/24/2025 7:40 AM and 7/24/2025 7:44 AM  Staff -        CRNA: Reid Owen APRN CRNA       Performed By: CRNA  Consent for Airway        Urgency: elective  Indications and Patient Condition       Indications for airway management: jarrett-procedural       Induction type:intravenous       Mask difficulty assessment: 1 - vent by mask    Final Airway Details       Final airway type: endotracheal airway       Successful airway: ETT - single  Endotracheal Airway Details        ETT size (mm): 7.5       Cuffed: yes       Successful intubation technique: direct laryngoscopy       DL Blade Type: Foley 2       Grade View of Cords: 1       Adjucts: stylet       Position: Right       Measured from: lips       Secured at (cm): 24       Bite block used: Soft    Post intubation assessment        Placement verified by: capnometry, equal breath sounds and chest rise        Number of attempts at approach: 1       Secured with: tape       Ease of procedure: easy       Dentition: Intact and Unchanged       Dental guard used and removed. Dental Guard Type: Standard White.    Medication(s) Administered   Medication Administration Time: 7/24/2025 7:40 AM

## 2025-07-24 NOTE — PROGRESS NOTES
Got report on patient from Felicity CELIS in PACU. Patient came to floor at 1500. Settled patient in room. VSS, 3L nc. Patient is drowsy. Patient is yet to walk, eat or drink anything. Drain intact, Bandage CDI. Campos in place. IV fluids running at 125. Wife is in room with patient. Patient brought their own CPAP and brace. Call light is in reach. Report gave to Pedro CELIS.

## 2025-07-24 NOTE — CONSULTS
"[facility] Medicine Service Consult Note.     Physician requesting consult: Kem Martinez*  Thank you, Kem Guillen*, for asking the [facility] Medicine Service to see Nirav Baker in consultation.    Assessment/Recommendations   Assessment/Plan: Nirav Sorto is a 80-year-old male with history of severe spinal stenosis at L3-4 and L4-5.  Status post decompression and fusion procedure on 7/24/2025.  Hospital medicine service was consulted to address below medical conditions.      Diabetes mellitus type 2  Steroid induced hyperglycemia  - Insulin NovoLog carb correction 1 unit per 15 g carbohydrates 3 times daily AC  - Order Novolog correction insulin medium scale QID AC HS  - Order glargine 54 units subcu daily, to resume on 7/25  - Order creatinine in AM to confirm stable renal function.   - Hold PTA Ozempic    Hypertension, essential  - order metoprolol XL 100mg daily  - Hold parameters written, as patient is high risk of post operative hypotension.      CKD 3A  Repeat creatinine and potassium level in a.m.  Avoid nephrotoxins including NSAIDs    GERD  PTA omeprazole 20 mg at bedtime    ELDON  Postop on BiPAP, transition to Home CPAP per respiratory therapy.    Hyperlipidemia  PTA rosuvastatin 20 mg at bedtime      Clinically Significant Risk Factors Present on Admission   # Drug Induced Platelet Defect: home medication list includes an antiplatelet medication   # Hypertension: Noted on problem list   # Morbid Obesity: Estimated body mass index is 42.39 kg/m  as calculated from the following:    Height as of this encounter: 1.778 m (5' 10\").    Weight as of this encounter: 134 kg (295 lb 6.4 oz).           Code status:Prior  -Reviewed the patient's preoperative H and P and updated missing elements.  -Home medication reconciliation has been reviewed.      History of Present Illness/Subjective    HPI: Mr. Nirav Baker is a 80 year old male status post " "Procedure(s):  RIGHT LUMBAR 3-4 AND LUMBAR 4 - 5 TRANSFORAMINAL LUMBAR INTERBODY FUSION WITH STEALTH NAVIGATION, WITH LUMBAR 3 - 4 AND LUMBAR 4-5 LAMINECTOMY  Post-operative Day: * Day of Surgery *, hospital medicine was consulted for hospitalist consult.    Diabetes. Patient rates symptoms as severe. This problem has been ongoing for >1 years. Perioperative blood sugars have ranged between 180-268 mg/dL.  Elevation of blood sugar occurs after eating. This occurs after illness/stress. It is worse with immobility. It is better with losing weight. Denies associated symptoms including polyuria, polydipsia, abdominal pain, nausea, vomiting, change in vision, confusion, anxiety or weight gain or loss.     Patient denies cerebrovascular accident, pulmonary embolism, or deep venous thrombosis.     Estimated Blood Loss:  560 mL    I have reviewed preop physical including past medical hx, family hx, social hx, surgical hx, medication hx.      Physical Examination  Review of Systems   VITALS: BP (!) 149/63   Pulse 68   Temp 97  F (36.1  C) (Core)   Resp 19   Ht 1.778 m (5' 10\")   Wt 134 kg (295 lb 6.4 oz)   SpO2 (!) 91%   BMI 42.39 kg/m    BMI: Body mass index is 42.39 kg/m .  Wt Readings from Last 3 Encounters:   07/24/25 134 kg (295 lb 6.4 oz)   03/21/25 (!) 140.6 kg (310 lb)   09/25/24 141.3 kg (311 lb 6.4 oz)       Intake/Output Summary (Last 24 hours) at 7/24/2025 1324  Last data filed at 7/24/2025 1245  Gross per 24 hour   Intake 2235 ml   Output 785 ml   Net 1450 ml     General Appearance:   no acute distress.   ENT/Mouth: membranes moist, no oral lesions or bleeding gums.      EYES:  no scleral icterus, normal conjunctivae   Neck: no carotid bruits or thyromegaly   Chest/Lungs:   lungs are clear to auscultation, no rales or wheezing, equal chest wall expansion    Cardiovascular:   Regular. Normal S1/S2 heart sounds with no murmurs, rubs, or gallops.   Abdomen:  no organomegaly, masses, bruits, or tenderness; " bowel sounds are present   Extremities: no cyanosis or clubbing   Skin: no xanthelasma, warm.    Neurologic: normal  bilateral, no tremors     Psychiatric: alert and oriented x3, calm     A 12 point comprehensive review of systems was negative except as noted above.         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  See Allergies for additional details.      Chronic right-sided thoracic back pain 10/07/2021     Priority: Medium    Bilateral hip pain 11/17/2020     Priority: Medium    Chronic bilateral low back pain without sciatica 11/17/2020     Priority: Medium    Cellulitis of right leg      Priority: Medium    Cardiovascular disease      Priority: Medium    Dyslipidemia      Priority: Medium    Benign essential HTN      Priority: Medium    Cellulitis 07/02/2018     Priority: Medium    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)      Priority: Medium    Sepsis (H) 06/09/2018     Priority: Medium    Cellulitis of right lower extremity      Priority: Medium    Morbid obesity (H)       Priority: Medium    GERD (gastroesophageal reflux disease) 05/16/2018     Priority: Medium    Uncontrolled daytime somnolence 12/08/2017     Priority: Medium    Hypovitaminosis D 08/26/2015     Priority: Medium    Hernia of abdominal wall 07/03/2013     Priority: Medium    Incisional hernia 05/09/2013     Priority: Medium    Adenocarcinoma of prostate (H) 12/13/2012     Priority: Medium     Overview:   Christy score 7/10 confined to the prostate gland, bilateral and involving   <5% of the gland, incidentally found when robotic prostatectomy done   12/2012 for PIN and BPH causing urinary retention.        Gluten enteropathy 12/03/2012     Priority: Medium    CAD (coronary artery disease) 09/22/2012     Priority: Medium     Overview:   Left heart catheterization performed 9/13/12 with findings of mild CAD   only in the range of 10-30% stenoses in the mid and distal segments of the   left anterior descending and right coronary arteries.  Medical therapy   only advised.  EF 60%.        Retention of urine 09/18/2012     Priority: Medium     Formatting of this note might be different from the original.  Post-void residual of 250 mL on the day of discharge 9/15/12, and patient was discharged home on bladder self catheterization and had Urology follow up appointment on 9/17/12.  Was able to discontinue this at the end of October, 2012.  Decision made to proceed with a radical robotic prostatectomy due to having urinary retention from a very large prostate gland, and he also has atypical cells on his prostate biopsies.      Sepsis due to Escherichia coli (H) 09/12/2012     Priority: Medium     Formatting of this note might be different from the original.  Admitted 9/8/12 to [facility] due to E. Coli sepsis following prostate biopsies done 9/7/12.  Blood cultures positive for E. Coli, resistant to a number of antibiotics.  Followed by ID and treated initially with ertapenem, and then switched to oral cephalexin to  "be completed 9/23/12.      Acute non-ST segment elevation myocardial infarction (H) 09/11/2012     Priority: Medium     Overview:   Admitted 9/8/12 at [facility] in the setting of sepsis and acute   renal failure following a prostate biopsy on 9/7/12, and found to also   have acute MI.  Cardiology consultation Dr. Spence from [facility].  Echocardiogram done 9/10/12 shows EF of 45% with mild to moderate inferior   wall hypokinesis and mild global LV hypokinesis, mild LVH, mildly dilated   right ventricle with mildly reduced RV function and moderately dilated   left and right atria.  Transferred to [facility] for coronary angiography once his   acute renal failure resolved, and angiography did not find any lesion   amenable to stenting. His troponin elevation was felt by Cardiology (Dr. Jacobo) to most likely be due to stress of sepsis.        Elevated prostate specific antigen (PSA) 05/30/2012     Priority: Medium     Formatting of this note might be different from the original.  Urology referral advised 5/30/2012 for further evaluation.  Patient saw Dr. Canas 7/6/12, and prostate biopsy advised for 8/2012.      Migraines 08/14/2011     Priority: Medium    Celiac disease 05/20/2011     Priority: Medium    Diarrhea 12/26/2009     Priority: Medium     Formatting of this note might be different from the original.  Presumed celiac sprue, with elevated TTG level,  and diarrhea symptoms markedly improved on gluten free diet.  EGD showed normal duodenal biopsies, but done after 7 weeks on a gluten free diet.      Stool fat increased 11/07/2009     Priority: Medium    Dermatographic urticaria 02/26/2009     Priority: Medium    Obstructive sleep apnea syndrome 05/21/2007     Priority: Medium     Overview:   On CPAP.  POLYSOMNOGRAM   PSG type: split night  Date of study:12/06/2003; weight: 318#  Facility: [facility]  Baseline data:  Total Sleep Time:  195\"  Sleep " Efficiency: 96.4%  AHI: 58.5  RDI: 63.1  Jun O2%: 66  MD interp: ELDON, CPAP 15cm water         Cervical stenosis of spinal canal 05/21/2007     Priority: Medium     Overview:   MRI 5/03 C5-6.        Benign neoplasm of colon 05/21/2007     Priority: Medium     Formatting of this note might be different from the original.  Adenomatous colon polyps 7/01.  Negative colonoscopy 4/04.      Essential hypertension 12/10/2006     Priority: Medium     Overview:   Epic         Mixed hyperlipidemia 12/10/2006     Priority: Medium    Actinic keratosis 08/08/2006     Priority: Medium    Diabetes mellitus without complication (H) 12/03/2002     Priority: Medium     Overview:   Dx 3/1997.        Past Surgical History:   Procedure Laterality Date    APPENDECTOMY      BACK SURGERY      C5    CHOLECYSTECTOMY      COLONOSCOPY      COMBINED CYSTOSCOPY, INSERT STENT URETER(S) Left 9/9/2022    Procedure: CYSTOURETEROSCOPY, WITH BLADDER NECK DILATION WITH BLADDER NECK CONTRACTURE,  BLADDER CALCULUS REMOVAL;  Surgeon: Harvey Madden MD;  Location: [place] [facility]    CORONARY ARTERY BYPASS GRAFT, INTERNAL MAMMARY ARTERY HARVEST, SAPHENOUS VEIN ENDOSCOPIC VESSEL PROCUREMENT, ANESTHESIA TRANSESOPHAGEAL ECHOCARDIOGRAM N/A 4/19/2024    Procedure: CORONARY ARTERY BYPASS GRAFT TIMES  TWO  WITH LEFT INTERNAL MAMMARY ARTERY HARVEST, LEFT ENDOSCOPIC VESSEL PROCUREMENT, ANESTHESIA TRANSESOPHAGEAL ECHOCARDIOGRAM, EPI AORTIC ULTRASOUND;  Surgeon: Cathy Edwards MD;  Location: [facility] OR    CV CORONARY ANGIOGRAM N/A 4/17/2024    Procedure: Coronary Angiogram;  Surgeon: Nils Ceballos MD;  Location: [facility] CATH LAB CV    CV PCI N/A 4/17/2024    Procedure: Percutaneous Coronary Intervention;  Surgeon: Nils Ceballos MD;  Location: [facility] LAB CV    CYSTOSCOPY, BLADDER NECK CUTS, COMBINED N/A 4/14/2023    Procedure: CYSTOSCOPY, WITH MULTIPLE INCISIONS OF BLADDER NECK. Injection of Mitomycin to stricture;  Surgeon: Gunner Pang  MD Viral;  Location: [facility] OR    DENTAL SURGERY      HEMORRHOIDECTOMY      HERNIA REPAIR      PROSTATECTOMY       Reviewed, and family history includes Aortic Valve Replacement in his granddaughter; Coronary Artery Disease in his brother, father, and mother; Coronary Artery Disease Early Onset in his brother, father, and mother; Suicide in his nephew; Unknown/Adopted in his granddaughter; Valvular heart disease in his daughter.     Reviewed, and he  reports that he has never smoked. He has been exposed to tobacco smoke. He has never used smokeless tobacco. He reports current alcohol use of about 1.0 standard drink of alcohol per week. He reports that he does not use drugs.  Social History     Tobacco Use    Smoking status: Never     Passive exposure: Past    Smokeless tobacco: Never   Substance Use Topics    Alcohol use: Yes     Alcohol/week: 1.0 standard drink of alcohol     Types: 1 Standard drinks or equivalent per week     Comment: very occasional        Medications  Allergies   Medications Prior to Admission   Medication Sig Dispense Refill Last Dose/Taking    aspirin 81 MG EC tablet Take 162 mg by mouth daily.   7/16/2025 Morning    calcium carbonate (TUMS) 500 MG chewable tablet Take 2 chew tab by mouth 4 times daily as needed for heartburn.   Unknown    cholecalciferol, vitamin D3, 5,000 unit capsule [CHOLECALCIFEROL, VITAMIN D3, 5,000 UNIT CAPSULE] Take 5,000 Units by mouth at bedtime.    7/21/2025 Bedtime    diphenhydrAMINE (BENADRYL) 25 mg tablet Take 25 mg by mouth nightly as needed (sleep).   Unknown    ezetimibe (ZETIA) 10 MG tablet Take 1 tablet (10 mg) by mouth at bedtime. 90 tablet 3 7/23/2025 Bedtime    furosemide (LASIX) 40 MG tablet Take 40 mg by mouth daily (with lunch).   7/23/2025 Noon    Insulin Aspart FlexPen 100 UNIT/ML SOPN Inject 10 Units subcutaneously 3 times daily (before meals).   7/23/2025 Evening    LANTUS VIAL 100 UNIT/ML soln Inject 54 Units subcutaneously every morning.    "7/24/2025 Morning    Magnesium 400 MG TABS Take 400 mg by mouth at bedtime.   7/23/2025 Bedtime    melatonin 3 MG CAPS Take 6 mg by mouth At Bedtime   7/23/2025 Bedtime    metoprolol succinate ER (TOPROL XL) 100 MG 24 hr tablet Take 1 tablet (100 mg) by mouth daily. 90 tablet 3 7/24/2025 Morning    multivitamin (ONE A DAY) per tablet Take 1 tablet by mouth every morning   7/21/2025 Morning    omega-3/dha/epa/fish oil (FISH OIL-OMEGA-3 FATTY ACIDS) 300-1,000 mg capsule Take 4 capsules by mouth at bedtime   7/16/2025 Bedtime    omeprazole (PRILOSEC) 20 MG capsule Take 1 capsule by mouth at bedtime   7/23/2025 Bedtime    polyethylene glycol (MIRALAX) 17 gram packet [POLYETHYLENE GLYCOL (MIRALAX) 17 GRAM PACKET] Take 17 g by mouth at bedtime.    7/23/2025 Bedtime    riboflavin, vitamin B2, 100 mg Tab Take 1 tablet by mouth at bedtime   7/20/2025 Bedtime    rosuvastatin (CRESTOR) 20 MG tablet Take 1 tablet (20 mg) by mouth at bedtime. 90 tablet 3 7/23/2025 Bedtime    Semaglutide, 2 MG/DOSE, (OZEMPIC) 8 MG/3ML pen Inject 2 mg subcutaneously every 7 days. Saturdays 7/12/2025    Continuous Glucose Sensor (FREESTYLE TOD 3 SENSOR) MISC           Allergies   Allergen Reactions    Amoxicillin Hives     Per Dr. Barrientos, patient has tolerated Keflex.    Atorvastatin Diarrhea    Benzalkonium Chloride Other (See Comments)    Fenofibrate Muscle Pain (Myalgia)    Gemfibrozil     Hydrocodone-Acetaminophen Other (See Comments)     Patient reports \"going white as a sheet\" and cold sweats   OK with Tylenol #3 No problems    Lisinopril Cough    Morphine And Codeine Other (See Comments)     cold sweat;pale    Neosporin (Lxr-Wtz-Zpzti) [Neomycin-Bacitracin Zn-Polymyx] Unknown    Penicillins Hives     1992 had pcn and had hives. Pt. States allergic to all cillin's      Pravastatin Sodium [Pravastatin] Muscle Pain (Myalgia)    Trulicity [Dulaglutide] Unknown    Gluten [Gluten Meal] Other (See Comments)     Reports feeling worse with gluten; " tested below threshold for Celiac but daughter is positive    Insulin Detemir Rash         Imaging Reviewed Personally By Myself    Radiology Results:   Recent Results (from the past 24 hours)   XR Surgery [facility]    Narrative    This exam was marked as non-reportable because it will not be read by a   radiologist or a [facility] non-radiologist provider.             Labs Reviewed Personally By Myself     Recent Results (from the past 24 hours)   Glucose by meter   Result Value Ref Range    GLUCOSE BY METER POCT 180 (H) 70 - 99 mg/dL   Glucose by meter   Result Value Ref Range    GLUCOSE BY METER POCT 175 (H) 70 - 99 mg/dL   Glucose by meter   Result Value Ref Range    GLUCOSE BY METER POCT 192 (H) 70 - 99 mg/dL   XR Surgery OARM    Narrative    This exam was marked as non-reportable because it will not be read by a   radiologist or a [facility] non-radiologist provider.         Glucose by meter   Result Value Ref Range    GLUCOSE BY METER POCT 268 (H) 70 - 99 mg/dL       Thank you for this consultation.  Appreciate the opportunity to participate in the care of Nirav Baker, please feel free to contact us for any questions or concerns.    Jay Dugan DO, MS  [facility] Service  Internal Medicine  Phone: #[phone]

## 2025-07-24 NOTE — PHARMACY-ADMISSION MEDICATION HISTORY
Pharmacist BLAIRE Medication History    Admission medication history is complete. The information provided in this note is only as accurate as the sources available at the time of the update.    Medication reconciliation/reorder completed by provider prior to medication history? No    Information Source(s): Patient and Clinic records and Care Everywhere via in-person    Pertinent Information: N/A    Allergies reviewed with patient and updates made in EHR: yes    Medications available for use during hospital stay: None.      Medication History Completed By: Joe Mclaughlin [facility] 7/24/2025 7:17 AM    PTA Med List   Medication Sig Note Last Dose/Taking    aspirin 81 MG EC tablet Take 162 mg by mouth daily.  7/16/2025 Morning    calcium carbonate (TUMS) 500 MG chewable tablet Take 2 chew tab by mouth 4 times daily as needed for heartburn.  Unknown    cholecalciferol, vitamin D3, 5,000 unit capsule [CHOLECALCIFEROL, VITAMIN D3, 5,000 UNIT CAPSULE] Take 5,000 Units by mouth at bedtime.   7/21/2025 Bedtime    diphenhydrAMINE (BENADRYL) 25 mg tablet Take 25 mg by mouth nightly as needed (sleep).  Unknown    ezetimibe (ZETIA) 10 MG tablet Take 1 tablet (10 mg) by mouth at bedtime.  7/23/2025 Bedtime    furosemide (LASIX) 40 MG tablet Take 40 mg by mouth daily (with lunch).  7/23/2025 Noon    Insulin Aspart FlexPen 100 UNIT/ML SOPN Inject 10 Units subcutaneously 3 times daily (before meals).  7/23/2025 Evening    LANTUS VIAL 100 UNIT/ML soln Inject 54 Units subcutaneously every morning. 7/24/2025: 30 units morning of surgery 7/24/2025 Morning    Magnesium 400 MG TABS Take 400 mg by mouth at bedtime.  7/23/2025 Bedtime    melatonin 3 MG CAPS Take 6 mg by mouth At Bedtime  7/23/2025 Bedtime    metoprolol succinate ER (TOPROL XL) 100 MG 24 hr tablet Take 1 tablet (100 mg) by mouth daily.  7/24/2025 Morning    multivitamin (ONE A DAY) per tablet Take 1 tablet by mouth every morning  7/21/2025 Morning    omega-3/dha/epa/fish oil  (FISH OIL-OMEGA-3 FATTY ACIDS) 300-1,000 mg capsule Take 4 capsules by mouth at bedtime  7/16/2025 Bedtime    omeprazole (PRILOSEC) 20 MG capsule Take 1 capsule by mouth at bedtime  7/23/2025 Bedtime    polyethylene glycol (MIRALAX) 17 gram packet [POLYETHYLENE GLYCOL (MIRALAX) 17 GRAM PACKET] Take 17 g by mouth at bedtime.   7/23/2025 Bedtime    riboflavin, vitamin B2, 100 mg Tab Take 1 tablet by mouth at bedtime  7/20/2025 Bedtime    rosuvastatin (CRESTOR) 20 MG tablet Take 1 tablet (20 mg) by mouth at bedtime.  7/23/2025 Bedtime    Semaglutide, 2 MG/DOSE, (OZEMPIC) 8 MG/3ML pen Inject 2 mg subcutaneously every 7 days. Saturdays 7/12/2025

## 2025-07-24 NOTE — PROVIDER NOTIFICATION
1233 Pt arrived in PACU, very sedate, not responsive, oral ariway in place,  1243  Pt on 15L per Simple Face mask with oral airway in place, began to de-sat 82-86%, predominant use of abdominal/ accessory muscles, coarse upper lobes bilaterally, very little air movement noted uppers, bases no heard.    PACU RN Jaw thrusting, attempts made to stimulate pt, with no response.  .Anesthesia Dr. Michaels notified to come to bedside.  SpO2 rebounded with jaw thrusting to 94% on 15L Simple mask.  Pt does hav Hx of ELDON, CAD, HTN,   1248 RT here BiPAP initiated.  Anesthesia, Dr. Michaels arrived at bedside     BiPAP on, monitored for 10 mins, settings now 10/16 FiO2 40%.    Will attempt to wean in PACU as pt clears anesthesia

## 2025-07-25 ENCOUNTER — APPOINTMENT (OUTPATIENT)
Dept: OCCUPATIONAL THERAPY | Facility: CLINIC | Age: 80
DRG: 427 | End: 2025-07-25
Attending: PHYSICIAN ASSISTANT
Payer: COMMERCIAL

## 2025-07-25 ENCOUNTER — APPOINTMENT (OUTPATIENT)
Dept: PHYSICAL THERAPY | Facility: CLINIC | Age: 80
DRG: 427 | End: 2025-07-25
Attending: ORTHOPAEDIC SURGERY
Payer: COMMERCIAL

## 2025-07-25 LAB
CREAT SERPL-MCNC: 1.38 MG/DL (ref 0.67–1.17)
EGFRCR SERPLBLD CKD-EPI 2021: 52 ML/MIN/1.73M2
GLUCOSE BLDC GLUCOMTR-MCNC: 148 MG/DL (ref 70–99)
GLUCOSE BLDC GLUCOMTR-MCNC: 173 MG/DL (ref 70–99)
GLUCOSE BLDC GLUCOMTR-MCNC: 181 MG/DL (ref 70–99)
GLUCOSE BLDC GLUCOMTR-MCNC: 190 MG/DL (ref 70–99)
GLUCOSE BLDC GLUCOMTR-MCNC: 215 MG/DL (ref 70–99)
HGB BLD-MCNC: 15.2 G/DL (ref 13.3–17.7)
MCV RBC AUTO: 87 FL (ref 78–100)
POTASSIUM SERPL-SCNC: 4.4 MMOL/L (ref 3.4–5.3)

## 2025-07-25 PROCEDURE — 250N000012 HC RX MED GY IP 250 OP 636 PS 637: Performed by: INTERNAL MEDICINE

## 2025-07-25 PROCEDURE — 84132 ASSAY OF SERUM POTASSIUM: CPT | Performed by: INTERNAL MEDICINE

## 2025-07-25 PROCEDURE — 99232 SBSQ HOSP IP/OBS MODERATE 35: CPT | Performed by: INTERNAL MEDICINE

## 2025-07-25 PROCEDURE — 85018 HEMOGLOBIN: CPT

## 2025-07-25 PROCEDURE — 36415 COLL VENOUS BLD VENIPUNCTURE: CPT

## 2025-07-25 PROCEDURE — 97116 GAIT TRAINING THERAPY: CPT | Mod: GP

## 2025-07-25 PROCEDURE — 97161 PT EVAL LOW COMPLEX 20 MIN: CPT | Mod: GP

## 2025-07-25 PROCEDURE — 250N000013 HC RX MED GY IP 250 OP 250 PS 637: Performed by: INTERNAL MEDICINE

## 2025-07-25 PROCEDURE — 97166 OT EVAL MOD COMPLEX 45 MIN: CPT | Mod: GO

## 2025-07-25 PROCEDURE — 82565 ASSAY OF CREATININE: CPT | Performed by: INTERNAL MEDICINE

## 2025-07-25 PROCEDURE — 97535 SELF CARE MNGMENT TRAINING: CPT | Mod: GO

## 2025-07-25 PROCEDURE — 120N000001 HC R&B MED SURG/OB

## 2025-07-25 PROCEDURE — 94660 CPAP INITIATION&MGMT: CPT

## 2025-07-25 PROCEDURE — 97530 THERAPEUTIC ACTIVITIES: CPT | Mod: GP

## 2025-07-25 PROCEDURE — 250N000013 HC RX MED GY IP 250 OP 250 PS 637: Performed by: PHYSICIAN ASSISTANT

## 2025-07-25 PROCEDURE — 250N000011 HC RX IP 250 OP 636: Performed by: PHYSICIAN ASSISTANT

## 2025-07-25 PROCEDURE — 36415 COLL VENOUS BLD VENIPUNCTURE: CPT | Performed by: INTERNAL MEDICINE

## 2025-07-25 PROCEDURE — 999N000157 HC STATISTIC RCP TIME EA 10 MIN

## 2025-07-25 RX ADMIN — INSULIN ASPART 4 UNITS: 100 INJECTION, SOLUTION INTRAVENOUS; SUBCUTANEOUS at 18:08

## 2025-07-25 RX ADMIN — HYDROMORPHONE HYDROCHLORIDE 2 MG: 2 TABLET ORAL at 20:00

## 2025-07-25 RX ADMIN — PANTOPRAZOLE SODIUM 40 MG: 40 TABLET, DELAYED RELEASE ORAL at 21:31

## 2025-07-25 RX ADMIN — ACETAMINOPHEN 975 MG: 325 TABLET ORAL at 23:34

## 2025-07-25 RX ADMIN — HYDROMORPHONE HYDROCHLORIDE 2 MG: 2 TABLET ORAL at 15:39

## 2025-07-25 RX ADMIN — METOPROLOL SUCCINATE 100 MG: 50 TABLET, EXTENDED RELEASE ORAL at 09:04

## 2025-07-25 RX ADMIN — INSULIN ASPART 1 UNITS: 100 INJECTION, SOLUTION INTRAVENOUS; SUBCUTANEOUS at 12:01

## 2025-07-25 RX ADMIN — CEFAZOLIN SODIUM 2 G: 2 SOLUTION INTRAVENOUS at 03:51

## 2025-07-25 RX ADMIN — INSULIN ASPART 2 UNITS: 100 INJECTION, SOLUTION INTRAVENOUS; SUBCUTANEOUS at 18:07

## 2025-07-25 RX ADMIN — FUROSEMIDE 40 MG: 40 TABLET ORAL at 12:01

## 2025-07-25 RX ADMIN — ROSUVASTATIN CALCIUM 20 MG: 10 TABLET, FILM COATED ORAL at 21:31

## 2025-07-25 RX ADMIN — SENNOSIDES AND DOCUSATE SODIUM 1 TABLET: 50; 8.6 TABLET ORAL at 20:01

## 2025-07-25 RX ADMIN — MULTIVITAMIN TABLET 1 TABLET: TABLET at 09:04

## 2025-07-25 RX ADMIN — POLYETHYLENE GLYCOL 3350 17 G: 17 POWDER, FOR SOLUTION ORAL at 09:04

## 2025-07-25 RX ADMIN — ACETAMINOPHEN 975 MG: 325 TABLET ORAL at 15:39

## 2025-07-25 RX ADMIN — ACETAMINOPHEN 975 MG: 325 TABLET ORAL at 06:34

## 2025-07-25 RX ADMIN — EZETIMIBE 10 MG: 10 TABLET ORAL at 21:31

## 2025-07-25 RX ADMIN — INSULIN GLARGINE 54 UNITS: 100 INJECTION, SOLUTION SUBCUTANEOUS at 09:19

## 2025-07-25 RX ADMIN — GABAPENTIN 100 MG: 100 CAPSULE ORAL at 20:00

## 2025-07-25 RX ADMIN — SENNOSIDES AND DOCUSATE SODIUM 1 TABLET: 50; 8.6 TABLET ORAL at 09:04

## 2025-07-25 ASSESSMENT — ACTIVITIES OF DAILY LIVING (ADL)
ADLS_ACUITY_SCORE: 38
ADLS_ACUITY_SCORE: 35
IADL_COMMENTS: FAMILY WILL DO UNTIL PT IS RECOVERED
ADLS_ACUITY_SCORE: 35
ADLS_ACUITY_SCORE: 38
ADLS_ACUITY_SCORE: 38
ADLS_ACUITY_SCORE: 35
ADLS_ACUITY_SCORE: 35
ADLS_ACUITY_SCORE: 38
ADLS_ACUITY_SCORE: 35
ADLS_ACUITY_SCORE: 35
ADLS_ACUITY_SCORE: 38
ADLS_ACUITY_SCORE: 35
ADLS_ACUITY_SCORE: 38
ADLS_ACUITY_SCORE: 35
ADLS_ACUITY_SCORE: 35
ADLS_ACUITY_SCORE: 38
ADLS_ACUITY_SCORE: 35
ADLS_ACUITY_SCORE: 35

## 2025-07-25 NOTE — PLAN OF CARE
Patient vital signs are at baseline: Yes  Patient able to ambulate as they were prior to admission or with assist devices provided by therapies during their stay:  Yes  Patient MUST void prior to discharge:  No, removed lazo, due to void  Patient able to tolerate oral intake:  Yes  Pain has adequate pain control using Oral analgesics:  Yes, denied pain at rest  Does patient have an identified :  Yes  Has goal D/C date and time been discussed with patient:  Yes     Goal Outcome Evaluation:      Plan of Care Reviewed With: patient    Overall Patient Progress: improvingOverall Patient Progress: improving    Alert and oriented x 4, able to make needs known, CMS intact per patient's baseline, dsg has scant dried drainage, PIV SL, HV in place and patent, output was 100 ml, bloody bright drainage, passing flatus.

## 2025-07-25 NOTE — PROGRESS NOTES
"[facility]    Medicine Progress Note - Hospitalist Service    Date of Admission:  7/24/2025    Assessment & Plan   Nirav Sorto is a 80-year-old male with history of severe spinal stenosis at L3-4 and L4-5.  Status post decompression and fusion procedure on 7/24/2025.  Hospital medicine service was consulted to address below medical conditions.        Diabetes mellitus type 2  Steroid induced hyperglycemia  - Insulin NovoLog carb correction 1 unit per 15 g carbohydrates 3 times daily AC  - Insulin Novolog correction insulin medium scale QID AC HS  - Insulin glargine 54 units subcu daily, to resume on 7/25  - Hold PTA Ozempic     Hypertension, essential  - metoprolol XL 100mg daily  - Hold parameters written, as patient is high risk of post operative hypotension.       Chronic parasternal chest pain  History of sternotomy.   Chronic complaints.   No chest pain at this time.     CKD 3A  Repeat creatinine and potassium levels reviewed and normal.   Avoid nephrotoxins including NSAIDs     GERD  PTA omeprazole 20 mg at bedtime     ELDON  Postop on BiPAP, transition to Home CPAP per respiratory therapy.     Hyperlipidemia  PTA rosuvastatin 20 mg at bedtime           Diet: Regular Diet Adult    DVT Prophylaxis: Defer to primary service  Campos Catheter: Not present  Lines: None     Cardiac Monitoring: None  Code Status: Full Code      Clinically Significant Risk Factors                   # Hypertension: Noted on problem list            # Morbid Obesity: Estimated body mass index is 42.39 kg/m  as calculated from the following:    Height as of this encounter: 1.778 m (5' 10\").    Weight as of this encounter: 134 kg (295 lb 6.4 oz)., PRESENT ON ADMISSION            Social Drivers of Health            Disposition Plan     Medically Ready for Discharge: Anticipated Today     Jay Dugan DO  Hospitalist Service  [facility]  Securely message with [employer] (more info)  Text " page via [employer] Paging/Directory   ______________________________________________________________________    Interval History   Rishi reports no new complaints. Noted right and left parasternal chest pain this morning which he reports is similar to previous pains since his sternotomy.     Physical Exam   Vital Signs: Temp: 97.7  F (36.5  C) Temp src: Oral BP: (!) 149/69 Pulse: 72   Resp: 18 SpO2: 97 % O2 Device: BiPAP/CPAP Oxygen Delivery: 3 LPM  Weight: 295 lbs 6.4 oz    General Appearance: NAD.  Respiratory: CTAB no wheezing, nonlabored breathing.   Cardiovascular: RRR, no murmur or gallop.   GI: Nondistended, normoactive bowel sounds, nontender to palpation. No guarding.   Skin: warm and dry, no exanthem on exposed skin.   Other: Aox3, normal mood and affect.        Medical Decision Making       40 MINUTES SPENT BY ME on the date of service doing chart review, history, exam, documentation & further activities per the note.      Data         Imaging results reviewed over the past 24 hrs:   Recent Results (from the past 24 hours)   XR Surgery OARM    Narrative    This exam was marked as non-reportable because it will not be read by a   radiologist or a [place] non-radiologist provider.

## 2025-07-25 NOTE — PROGRESS NOTES
07/25/25 0800   Appointment Info   Signing Clinician's Name / Credentials (PT) Qing Lee, PT, DPT   Living Environment   People in Home spouse   Current Living Arrangements house   Home Accessibility stairs to enter home   Number of Stairs, Main Entrance 3   Stair Railings, Main Entrance railings safe and in good condition   Living Environment Comments Rail on 1 side, able to stay on main level   Self-Care   Equipment Currently Used at Home none   Fall history within last six months no   Activity/Exercise/Self-Care Comment Has FWW and 1 walking stick   General Information   Onset of Illness/Injury or Date of Surgery 07/24/25   Referring Physician Dr. Kem Martinez   Patient/Family Therapy Goals Statement (PT) Walk and move without pain   Pertinent History of Current Problem (include personal factors and/or comorbidities that impact the POC) S/P L3-5 lami and fusion, October 2024 double bypass, DM   Existing Precautions/Restrictions spinal;brace worn when out of bed   Weight-Bearing Status - LLE weight-bearing as tolerated   Weight-Bearing Status - RLE weight-bearing as tolerated   Range of Motion (ROM)   ROM Comment WFL   Bed Mobility   Bed Mobility supine-sit   Supine-Sit [place] (Bed Mobility) contact guard;verbal cues   Transfers   Transfers sit-stand transfer   Maintains Weight-bearing Status (Transfers) able to maintain   Sit-Stand Transfer   Sit-Stand [place] (Transfers) contact guard;verbal cues   Assistive Device (Sit-Stand Transfers) walker, front-wheeled   Gait/Stairs (Locomotion)   [place] Level (Gait) contact guard;verbal cues   Assistive Device (Gait) walker, front-wheeled   Distance in Feet (Gait) 10   Pattern (Gait) step-through   Deviations/Abnormal Patterns (Gait) loren decreased   Maintains Weight-bearing Status (Gait) able to maintain   Clinical Impression   Criteria for Skilled Therapeutic Intervention Yes, treatment indicated   PT Diagnosis (PT) impaired  functional mobility   Influenced by the following impairments pain, weakness   Functional limitations due to impairments gait, stairs, transfers   Clinical Presentation (PT Evaluation Complexity) stable   Clinical Presentation Rationale pt presents as medically diagnosed   Clinical Decision Making (Complexity) low complexity   Planned Therapy Interventions (PT) bed mobility training;gait training;home exercise program;patient/family education;stair training;strengthening;transfer training   Risk & Benefits of therapy have been explained care plan/treatment goals reviewed;patient   PT Total Evaluation Time   PT Eval, Low Complexity Minutes (36571) 10   Physical Therapy Goals   PT Frequency Daily   PT Predicted Duration/Target Date for Goal Attainment 07/28/25   PT Goals PT Goal 1;Gait;Transfers;Stairs   PT: Transfers Modified independent;Sit to/from stand;Assistive device   PT: Gait Supervision/stand-by assist;Rolling walker;150 feet   PT: Stairs Supervision/stand-by assist;3 stairs;Rail on right   PT: Goal 1 Spine HEP with SBA   Interventions   Interventions Quick Adds Therapeutic Procedure;Therapeutic Activity;Gait Training   Therapeutic Activity   Therapeutic Activities: dynamic activities to improve functional performance Minutes (81573) 10   Symptoms Noted During/After Treatment Increased pain   Treatment Detail/Skilled Intervention supine to sit with log roll technique, CGA, verbal cueing for technique. Sitting balance on EOB with SBA for donning lower body, verbal cueing to maintain spine precautions, min assist to dress. Donned brace in sitting with mod assist. sit to/from stand with FWW, verbal cueing cueing for safe hand placement,  reviewed spinal precautions. Supine spine exercises x 5 reps with bilateral LEs, SBA, verbal cueing for technique   Gait Training   Gait Training Minutes (04855) 15   Symptoms Noted During/After Treatment (Gait Training) increased pain;fatigue   Treatment Detail/Skilled  Intervention verbal cueing for hand placement on walker for increased support. Education on changing positions frequently and ambulation progression at home. Patient declined to trial stairs due to fatigue. Adjusted walker for proper height, demonstrated hand placement on FWW for increased support.   Distance in Feet 350   [place] Level (Gait Training) contact guard   Physical Assistance Level (Gait Training) verbal cues;1 person assist  (chair follow)   Weight Bearing (Gait Training) full weight-bearing   Assistive Device (Gait Training) rolling walker   Pattern Analysis (Gait Training) swing-through gait   Gait Analysis Deviations decreased loren;decreased step length   Impairments (Gait Analysis/Training) strength decreased   PT Discharge Planning   PT Plan Stairs with 1 rail, Spine exercises   PT Discharge Recommendation (DC Rec) (S)  home with assist   PT Rationale for DC Rec Patient ambulating safely with FWW, needs to trial stairs, has spouse for support at home   PT Brief overview of current status Amb 350ft with FWW, CGA   PT Total Distance Amb During Session (feet) 360   PT Equipment Needed at Discharge walker, rolling   Physical Therapy Time and Intention   Timed Code Treatment Minutes 25   Total Session Time (sum of timed and untimed services) 35

## 2025-07-25 NOTE — CONSULTS
CARE MANAGEMENT NOTE:    Care management team consulted for discharge planning. Care manager reviewed patients chart and discussed patient at unit rounds. No discharge needs identified at this time.     No further care management intervention anticipated at this time.  Please re-consult if further needs arise.  Care management signing off.      DANA Jaramillo  7/25/2025 11:36 AM

## 2025-07-25 NOTE — PLAN OF CARE
Goal Outcome Evaluation:       Pt alert and oriented x 4. CSM intact. Dressing CDI. Hemovac put out 100cc this shift. Did well with both PT/OT. Voided x 1 and passed 1 PVR. 2 more to go. Blood sugars controlled. Denies need for pain medications. Discharge pending drain and finishing PVRS.         Problem: Spinal Surgery  Goal: Optimal Functional Ability  Outcome: Progressing  Intervention: Optimize Functional Status  Recent Flowsheet Documentation  Taken 7/25/2025 0856 by Dannie Correa, RN  Activity Management: up in chair  Positioning/Transfer Devices: pillows     Problem: Spinal Surgery  Goal: Optimal Pain Control and Function  Outcome: Progressing  Intervention: Prevent or Manage Pain  Recent Flowsheet Documentation  Taken 7/25/2025 0856 by Dannie Correa, RN  Pain Management Interventions: medication offered but refused     Problem: Spinal Surgery  Goal: Effective Urinary Elimination  Outcome: Progressing     Problem: Comorbidity Management  Goal: Blood Glucose Levels Within Targeted Range  Outcome: Progressing  Intervention: Monitor and Manage Glycemia  Recent Flowsheet Documentation  Taken 7/25/2025 0856 by Dannie Correa, RN  Medication Review/Management: medications reviewed        Dannie Correa RN, ONC

## 2025-07-25 NOTE — PLAN OF CARE
Goal Outcome Evaluation:  Alert and oriented. RA.VSS.Pain managed with oral Dilaudid. Assist of 1 for ambulation. Campos in place. Dressing to the back CDI. Hemovac drain out put 100cc. IV running at 125ml/hr. Wears TLSO brace when up out of bed. Calls appropriately. Call light with in reach.   Problem: Adult Inpatient Plan of Care  Goal: Optimal Comfort and Wellbeing  Intervention: Monitor Pain and Promote Comfort  Problem: Adult Inpatient Plan of Care  Goal: Absence of Hospital-Acquired Illness or Injury  Intervention: Prevent Infection

## 2025-07-25 NOTE — PROGRESS NOTES
Orthopedic Progress Note      Assessment: 1 Day Post-Op  S/P Procedure(s):  RIGHT LUMBAR 3-4 AND LUMBAR 4 - 5 TRANSFORAMINAL LUMBAR INTERBODY FUSION WITH STEALTH NAVIGATION, WITH LUMBAR 3 - 4 AND LUMBAR 4-5 LAMINECTOMY     Plan:   - Continue PT/OT  - Weightbearing status: WBAT. Can use brace for comfort  - Activity: No bending, twisting or lifting more than 10 pounds for 6 weeks.  - Anticoagulation: No chemical prophylaxis in addition to SCDs, britt stockings and early ambulation.  - Hgb 15.2, transfuse if < 7.0. No indication today.   - Lazo: Remove lazo per protocol - lazo removed - patient has not urinated since - continue to monitor   - Antibiotics: 2 grams of Ancef IV q 8 hours or until drain is removed   - Diet: Progress diet as tolerated   - Pain control: Continue current regimen  - Drain can be removed when output is <30cc for 2 consecutive shifts or on POD3  - Patient reported historical chest pain - hospitalist notified to monitor.   - Follow up: Outpatient follow up in 2 weeks with Dr. Martinez   - Discharge planning: pending drain output, PT/OT, and pain control       Subjective:  Pain: mild to moderate  Chest pain: Yes - Patient reports right sided chest pain that can move from right to left. He reports this is historical for him. He does have a history of cardiac bypass surgery. He reports he has had this pain worked up with a mammogram and other tests, including EKG.   Shortness of breath: No   Nausea, Vomiting: No  Lightheadedness, Dizziness: No   Neuro: Patient denies new onset of numbness, tingling, or pain to bilateral lower extremities. He does note slight loss of sensation to the medial aspect of his left distal shin. This is historical prior to surgery. He reports he has had this since his bypass surgery a few years back.     Patient reports he is doing well on POD1. He states his pain is manageable with his current pain regimen. He was working with PT prior to our visit. States most of his  "pain is in his bilateral hips while walking. Reports little to no pain while laying in bed. Patient eating and drinking well. Patient voiding and passing gas. No bowel movement at this time.     Objective:  BP (!) 151/57 (BP Location: Left arm)   Pulse 80   Temp 97.7  F (36.5  C) (Oral)   Resp 18   Ht 1.778 m (5' 10\")   Wt 134 kg (295 lb 6.4 oz)   SpO2 97%   BMI 42.39 kg/m    The patient is A&Ox3. Appears comfortable. Sitting up in chair.   Dorsalis pedis pulses are intact bilaterally. Bilateral feet are warm and well perfused.   Calves are soft and non-tender bilaterally.   Sensation slightly diminished to light touch over the medial aspect of the left distal shin. Remainder of sensation is equal and intact in the L2-S1 dermatomes bilaterally.   Motor: Appropriately flexes and extends toes bilaterally.   Strength: +5/5 hip flexion, knee flexion and extension, plantarflexion, dorsiflexion, and EHL bilaterally.   Dressing at the low back is C/D/I without strikethrough.     Drain output: 100     Pertinent Labs   Lab Results: personally reviewed.   Lab Results   Component Value Date    INR 1.21 (H) 04/19/2024    INR 1.31 (H) 04/19/2024    INR 1.00 04/19/2024     Lab Results   Component Value Date    WBC 10.6 04/22/2024    HGB 15.2 07/25/2025    HCT 30.1 (L) 04/22/2024    MCV 87 07/25/2025     04/26/2024     Lab Results   Component Value Date     04/27/2024    CO2 24 04/27/2024         Report completed by:  Alyson Fisher PA-C  [facility]    Date: 7/25/2025  Time: 7:56 AM   "

## 2025-07-25 NOTE — PROGRESS NOTES
07/25/25 1030   Appointment Info   Signing Clinician's Name / Credentials (OT) Yuki Hua, OTR/L   Living Environment   People in Home spouse   Current Living Arrangements house   Living Environment Comments Has RTS, walk in shower with chair, reacher   Self-Care   Usual Activity Tolerance good   Current Activity Tolerance moderate   Activity/Exercise/Self-Care Comment Pt was independent with ADLS and IADLS prior to admit   Instrumental Activities of Daily Living (IADL)   IADL Comments Family will do until pt is recovered   General Information   Onset of Illness/Injury or Date of Surgery 07/24/25   Referring Physician Kem Martinez   Patient/Family Therapy Goal Statement (OT) heal well   Additional Occupational Profile Info/Pertinent History of Current Problem Pt admitted for elective spine fusion, h/o MI   Existing Precautions/Restrictions fall;no pivoting or twisting;lifting;spinal;brace worn when out of bed   Cognitive Status Examination   Affect/Mental Status (Cognitive) WNL   Range of Motion Comprehensive   General Range of Motion no range of motion deficits identified   Strength Comprehensive (MMT)   General Manual Muscle Testing (MMT) Assessment no strength deficits identified   Bed Mobility   Comment (Bed Mobility) SBA   Transfers   Transfer Comments SBA   Balance   Balance Comments SBA with FWW   Activities of Daily Living   BADL Assessment/Intervention lower body dressing;upper body dressing;toileting   Upper Body Dressing Assessment/Training   [place] Level (Upper Body Dressing) minimum assist (75% patient effort)   Lower Body Dressing Assessment/Training   [place] Level (Lower Body Dressing) minimum assist (75% patient effort)   Toileting   [place] Level (Toileting) minimum assist (75% patient effort)   Clinical Impression   Criteria for Skilled Therapeutic Interventions Met (OT) Yes, treatment indicated   OT Diagnosis Decreased independence with adls   OT Problem List-Impairments  impacting ADL post-surgical precautions;pain   Assessment of Occupational Performance 3-5 Performance Deficits   Identified Performance Deficits dressing, transfers, bed mobility, toileting   Planned Therapy Interventions (OT) ADL retraining;bed mobility training;transfer training;home program guidelines   Clinical Decision Making Complexity (OT) detailed assessment/moderate complexity   Risk & Benefits of therapy have been explained evaluation/treatment results reviewed;care plan/treatment goals reviewed;risks/benefits reviewed;current/potential barriers reviewed;participants voiced agreement with care plan;participants included;patient   OT Total Evaluation Time   OT Eval, Moderate Complexity Minutes (78661) 15   OT Goals   Therapy Frequency (OT) One time eval and treatment   OT Goals Upper Body Dressing;Lower Body Dressing;Bed Mobility;Toilet Transfer/Toileting   OT: Upper Body Dressing Modified independent;within precautions;Goal Met;Completed   OT: Lower Body Dressing Modified independent;using adaptive equipment;within precautions;Goal Met;Completed   OT: Bed Mobility Modified independent;supine to/from sitting;rolling;bridging;within precautions;Goal Met;Completed   OT: Toilet Transfer/Toileting Modified independent;toilet transfer;cleaning and garment management;within precautions;Goal Met;Completed   Interventions   Interventions Quick Adds Self-Care/Home Management   Self-Care/Home Management   Self-Care/Home Mgmt/ADL, Compensatory, Meal Prep Minutes (46580) 30   Symptoms Noted During/After Treatment (Meal Preparation/Planning Training) fatigue;increased pain   Treatment Detail/Skilled Intervention Taught pt how to perform adls following spine precautions: total body dressing, including brace, transfers to and from bed, chair, toilet, car, and bed mobility using the log roll.  Pt mod I with all after OT intervention.  Gave handouts and answered all questions.Wife present for session   OT Discharge Planning    OT Plan dc OT   OT Discharge Recommendation (DC Rec) home   OT Rationale for DC Rec Pt has good support at home   OT Brief overview of current status Pt is mod I with basic adls and functional mobility after OT intervention while following correct body mechanics   OT Total Distance Amb During Session (feet) 35   Total Session Time   Timed Code Treatment Minutes 30   Total Session Time (sum of timed and untimed services) 45

## 2025-07-26 ENCOUNTER — APPOINTMENT (OUTPATIENT)
Dept: PHYSICAL THERAPY | Facility: CLINIC | Age: 80
DRG: 427 | End: 2025-07-26
Attending: ORTHOPAEDIC SURGERY
Payer: COMMERCIAL

## 2025-07-26 LAB
GLUCOSE BLDC GLUCOMTR-MCNC: 202 MG/DL (ref 70–99)
GLUCOSE BLDC GLUCOMTR-MCNC: 208 MG/DL (ref 70–99)
GLUCOSE BLDC GLUCOMTR-MCNC: 227 MG/DL (ref 70–99)
GLUCOSE BLDC GLUCOMTR-MCNC: 251 MG/DL (ref 70–99)

## 2025-07-26 PROCEDURE — 250N000013 HC RX MED GY IP 250 OP 250 PS 637: Performed by: PHYSICIAN ASSISTANT

## 2025-07-26 PROCEDURE — 99232 SBSQ HOSP IP/OBS MODERATE 35: CPT | Performed by: INTERNAL MEDICINE

## 2025-07-26 PROCEDURE — 120N000001 HC R&B MED SURG/OB

## 2025-07-26 PROCEDURE — 97110 THERAPEUTIC EXERCISES: CPT | Mod: GP

## 2025-07-26 PROCEDURE — 97116 GAIT TRAINING THERAPY: CPT | Mod: GP

## 2025-07-26 PROCEDURE — 250N000011 HC RX IP 250 OP 636: Performed by: PHYSICIAN ASSISTANT

## 2025-07-26 PROCEDURE — 250N000013 HC RX MED GY IP 250 OP 250 PS 637: Performed by: INTERNAL MEDICINE

## 2025-07-26 PROCEDURE — 250N000013 HC RX MED GY IP 250 OP 250 PS 637: Performed by: STUDENT IN AN ORGANIZED HEALTH CARE EDUCATION/TRAINING PROGRAM

## 2025-07-26 RX ORDER — HYDROMORPHONE HYDROCHLORIDE 4 MG/1
4 TABLET ORAL EVERY 4 HOURS PRN
Refills: 0 | Status: DISCONTINUED | OUTPATIENT
Start: 2025-07-26 | End: 2025-07-27 | Stop reason: HOSPADM

## 2025-07-26 RX ORDER — HYDROMORPHONE HYDROCHLORIDE 2 MG/1
2 TABLET ORAL EVERY 4 HOURS PRN
Refills: 0 | Status: DISCONTINUED | OUTPATIENT
Start: 2025-07-26 | End: 2025-07-27 | Stop reason: HOSPADM

## 2025-07-26 RX ADMIN — HYDROMORPHONE HYDROCHLORIDE 2 MG: 2 TABLET ORAL at 10:34

## 2025-07-26 RX ADMIN — ACETAMINOPHEN 975 MG: 325 TABLET ORAL at 15:36

## 2025-07-26 RX ADMIN — SENNOSIDES AND DOCUSATE SODIUM 1 TABLET: 50; 8.6 TABLET ORAL at 20:42

## 2025-07-26 RX ADMIN — HYDROMORPHONE HYDROCHLORIDE 2 MG: 2 TABLET ORAL at 20:42

## 2025-07-26 RX ADMIN — METOPROLOL SUCCINATE 100 MG: 50 TABLET, EXTENDED RELEASE ORAL at 08:33

## 2025-07-26 RX ADMIN — ACETAMINOPHEN 975 MG: 325 TABLET ORAL at 08:32

## 2025-07-26 RX ADMIN — EZETIMIBE 10 MG: 10 TABLET ORAL at 21:15

## 2025-07-26 RX ADMIN — INSULIN ASPART 2 UNITS: 100 INJECTION, SOLUTION INTRAVENOUS; SUBCUTANEOUS at 09:21

## 2025-07-26 RX ADMIN — SENNOSIDES AND DOCUSATE SODIUM 1 TABLET: 50; 8.6 TABLET ORAL at 08:33

## 2025-07-26 RX ADMIN — POLYETHYLENE GLYCOL 3350 17 G: 17 POWDER, FOR SOLUTION ORAL at 08:32

## 2025-07-26 RX ADMIN — HYDROMORPHONE HYDROCHLORIDE 4 MG: 4 TABLET ORAL at 14:46

## 2025-07-26 RX ADMIN — INSULIN GLARGINE 54 UNITS: 100 INJECTION, SOLUTION SUBCUTANEOUS at 08:33

## 2025-07-26 RX ADMIN — GABAPENTIN 100 MG: 100 CAPSULE ORAL at 20:42

## 2025-07-26 RX ADMIN — INSULIN ASPART 2 UNITS: 100 INJECTION, SOLUTION INTRAVENOUS; SUBCUTANEOUS at 17:37

## 2025-07-26 RX ADMIN — INSULIN ASPART 2 UNITS: 100 INJECTION, SOLUTION INTRAVENOUS; SUBCUTANEOUS at 12:11

## 2025-07-26 RX ADMIN — INSULIN ASPART 2 UNITS: 100 INJECTION, SOLUTION INTRAVENOUS; SUBCUTANEOUS at 17:36

## 2025-07-26 RX ADMIN — MULTIVITAMIN TABLET 1 TABLET: TABLET at 08:33

## 2025-07-26 RX ADMIN — INSULIN ASPART 2 UNITS: 100 INJECTION, SOLUTION INTRAVENOUS; SUBCUTANEOUS at 08:38

## 2025-07-26 RX ADMIN — INSULIN ASPART 4 UNITS: 100 INJECTION, SOLUTION INTRAVENOUS; SUBCUTANEOUS at 12:11

## 2025-07-26 RX ADMIN — ROSUVASTATIN CALCIUM 20 MG: 10 TABLET, FILM COATED ORAL at 21:15

## 2025-07-26 RX ADMIN — FUROSEMIDE 40 MG: 40 TABLET ORAL at 12:11

## 2025-07-26 RX ADMIN — HYDROMORPHONE HYDROCHLORIDE 0.2 MG: 0.2 INJECTION, SOLUTION INTRAMUSCULAR; INTRAVENOUS; SUBCUTANEOUS at 12:18

## 2025-07-26 RX ADMIN — PANTOPRAZOLE SODIUM 40 MG: 40 TABLET, DELAYED RELEASE ORAL at 21:15

## 2025-07-26 RX ADMIN — HYDROMORPHONE HYDROCHLORIDE 2 MG: 2 TABLET ORAL at 00:14

## 2025-07-26 ASSESSMENT — ACTIVITIES OF DAILY LIVING (ADL)
ADLS_ACUITY_SCORE: 34
ADLS_ACUITY_SCORE: 35
ADLS_ACUITY_SCORE: 36
ADLS_ACUITY_SCORE: 36
ADLS_ACUITY_SCORE: 37
ADLS_ACUITY_SCORE: 36
ADLS_ACUITY_SCORE: 35
ADLS_ACUITY_SCORE: 37
ADLS_ACUITY_SCORE: 36
ADLS_ACUITY_SCORE: 36
ADLS_ACUITY_SCORE: 37
ADLS_ACUITY_SCORE: 34
ADLS_ACUITY_SCORE: 34
ADLS_ACUITY_SCORE: 37
ADLS_ACUITY_SCORE: 36
ADLS_ACUITY_SCORE: 37
ADLS_ACUITY_SCORE: 37
ADLS_ACUITY_SCORE: 36
ADLS_ACUITY_SCORE: 36
ADLS_ACUITY_SCORE: 37

## 2025-07-26 NOTE — PLAN OF CARE
"  Problem: Spinal Surgery  Goal: Optimal Coping with Surgery  Outcome: Progressing     Problem: Spinal Surgery  Goal: Absence of Bleeding  Outcome: Progressing     Problem: Spinal Surgery  Goal: Optimal Pain Control and Function  Outcome: Progressing    Patient vital signs are at baseline: Yes  Patient able to ambulate as they were prior to admission or with assist devices provided by therapies during their stay:  Yes, A1 w/ GBW  Patient MUST void prior to discharge:  Yes, passed 3/3 PVR this shift  Patient able to tolerate oral intake:  Yes  Pain has adequate pain control using Oral analgesics:  Yes  Does patient have an identified :  Yes  Has goal D/C date and time been discussed with patient:  Yes     /60 (BP Location: Left arm)   Pulse 86   Temp 99  F (37.2  C) (Oral)   Resp 18   Ht 1.778 m (5' 10\")   Wt 134 kg (295 lb 6.4 oz)   SpO2 93%   BMI 42.39 kg/m       Goal Outcome Evaluation:  Care provided from [id number]    Patient is alert and oriented, able to make needs known. A1 w/ GBW. VSS on RA. Passing gas. PVRs passed. Baseline N/T in left foot and intermittent N/T in hands. Regular diet, ACHS checks. L PIV SL. Hemovac in place, 40 mL emptied this shift. Scant drainage on dressing. Pain managed with scheduled meds and PRN dilaudid. Continue with POC.    " Pt called lvm stated she has been having UTI and yeast infection symptoms. Pt stated she is unable to come in for an apt was wondering if something could be called in.

## 2025-07-26 NOTE — PROGRESS NOTES
"[facility]    Medicine Progress Note - Hospitalist Service    Date of Admission:  7/24/2025    Assessment & Plan   Nirav Sorto is a 80-year-old male with history of severe spinal stenosis at L3-4 and L4-5.  Status post decompression and fusion procedure on 7/24/2025.  Hospital medicine service was consulted to address below medical conditions.      Diabetes mellitus type 2  Steroid induced hyperglycemia  - Resume home insulin at discharge.   - Insulin Novolog 1U:10 g CHO TID AC  - Insulin Novolog correction insulin medium scale QID AC HS while inpatient.  - Insulin glargine 54 units subcu daily.  - Hold PTA Ozempic, may resume at discharge.      Hypertension, essential  - metoprolol XL 100mg daily  - Hold parameters written, as patient is high risk of post operative hypotension.       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   Coronary artery disease.   Coronary disease with non-STEMI status post CABG x 2 with LIMA to-LAD, SVG-ramus by Dr. Lu on 4/19/2024.   Chronic complaints.   No chest pain at this time.   - resume home ASA when appropriate per orthopedic spine.   - outpatient follow up with cardiology as previously scheduled.     CKD 3A  Repeat creatinine and potassium levels reviewed and normal.   Avoid nephrotoxins including NSAIDs     GERD  PTA omeprazole 20 mg at bedtime     ELDON  Postop on BiPAP, transition to Home CPAP per respiratory therapy.     Hyperlipidemia  PTA rosuvastatin 20 mg at bedtime           Diet: Regular Diet Adult    DVT Prophylaxis: Defer to primary service  Campos Catheter: Not present  Lines: None     Cardiac Monitoring: None  Code Status: Full Code      Clinically Significant Risk Factors                   # Hypertension: Noted on problem list            # Morbid Obesity: Estimated body mass index is 42.39 kg/m  as calculated from the following:    Height as of this encounter: 1.778 m (5' 10\").    Weight as of this encounter: 134 kg (295 " lb 6.4 oz)., PRESENT ON ADMISSION            Social Drivers of Health            Disposition Plan     Medically Ready for Discharge: Anticipated Tomorrow     Jay Dugan DO  Hospitalist Service  [facility]  Securely message with [employer] (more info)  Text page via [employer] Paging/Directory   ______________________________________________________________________    Interval History   Drain output over last shift was 40 mL.  No new complaints.  Tolerating diet.     Physical Exam   Vital Signs: Temp: 98.3  F (36.8  C) Temp src: Oral BP: (!) 140/65 Pulse: 78   Resp: 16 SpO2: 96 % O2 Device: BiPAP/CPAP    Weight: 295 lbs 6.4 oz    General Appearance: NAD.  Respiratory: CTAB no wheezing, nonlabored breathing.   Cardiovascular: RRR, no murmur or gallop.   GI: Nondistended, normoactive bowel sounds, nontender to palpation. No guarding.   Skin: warm and dry, no exanthem on exposed skin.   Other: Aox3, normal mood and affect.        Medical Decision Making       40 MINUTES SPENT BY ME on the date of service doing chart review, history, exam, documentation & further activities per the note.      Data         Imaging results reviewed over the past 24 hrs:   No results found for this or any previous visit (from the past 24 hours).

## 2025-07-26 NOTE — PLAN OF CARE
Patient vital signs are at baseline: Yes  Patient able to ambulate as they were prior to admission or with assist devices provided by therapies during their stay:  Yes  Patient MUST void prior to discharge:  Yes  Patient able to tolerate oral intake:  Yes  Pain has adequate pain control using Oral analgesics:  No  Does patient have an identified :  Yes  Has goal D/C date and time been discussed with patient:  Yes    Patient passed therapies. Rating back pain severe and states it is radiating down LLE.  Got PO dilaudid increased as pt stated the 2 mg was not effective.  Pt staying one more night for the drain and pain.

## 2025-07-26 NOTE — PLAN OF CARE
Patient vital signs are at baseline: Yes  Patient able to ambulate as they were prior to admission or with assist devices provided by therapies during their stay:  Yes, x1 with walker and GB  Patient MUST void prior to discharge:  Yes, still needs one more PVR  Patient able to tolerate oral intake:  Yes  Pain has adequate pain control using Oral analgesics:  Yes  Does patient have an identified :  Yes  Has goal D/C date and time been discussed with patient:  Yes  Hemovac in place, 60 mL output this shift. ACHS checks continued.    Ariela Oro RN on 7/25/2025 at 9:58 PM

## 2025-07-26 NOTE — PROGRESS NOTES
"Orthopedic Progress Note      Assessment: 2 Days Post-Op  S/P Procedure(s):  RIGHT LUMBAR 3 - 4 AND LUMBAR 4 - 5 TRANSFORAMINAL LUMBAR INTERBODY FUSION WITH STEALTH NAVIGATION, WITH  LUMBAR 3 - 4 AND LUMBAR 4 - 5 LAMINECTOMY     Plan:   - Continue PT/OT  - Weightbearing status: WBAT. Can use brace for comfort  - Activity: No bending, twisting or lifting more than 10 pounds for 6 weeks.  - Anticoagulation: No chemical prophylaxis in addition to SCDs, britt stockings and early ambulation.  - Hgb 15.2 (7/25), transfuse if < 7.0.   - Antibiotics: 2 grams of Ancef IV q 8 hours or until drain is removed   - Diet: Progress diet as tolerated   - Pain control: Continue current regimen  - Drain can be removed when output is <30cc for 2 consecutive shifts or on POD3  - Patient reported historical chest pain - hospitalist notified to monitor.   - Follow up: Outpatient follow up in 2 weeks with Dr. Martinez   - Discharge planning: pending drain output, PT/OT, and pain control       Subjective:  Pain:present in low back when getting up from chair  Nausea, Vomiting:  No  Lightheadedness, Dizziness:  No  Neuro:  Patient denies new onset numbness or paresthesias  Chest pain: at baseline, an ache that comes and goes.   SOB: No    Patient reports feeling well today. Patient reports pain is tolerable with current pain regimen. Has some pain when getting up from seated position, but improves when up and moving, and at rest. Patient eating and drinking well. Patient voiding and passing gas however no BM.  All questions/concerns answered.      Objective:  BP (!) 140/65 (BP Location: Left arm, Patient Position: Supine, Cuff Size: Adult Regular)   Pulse 78   Temp 98.3  F (36.8  C) (Oral)   Resp 16   Ht 1.778 m (5' 10\")   Wt 134 kg (295 lb 6.4 oz)   SpO2 96%   BMI 42.39 kg/m    The patient is A&Ox3. Appears comfortable. Sitting up in chair at bedside.   Dorsalis pedis pulses are intact bilaterally. Bilateral feet are warm and well " perfused.   Calves are soft and non-tender bilaterally.   Sensation slightly diminished to light touch over the medial aspect of the left distal shin. Remainder of sensation is equal and intact in the L2-S1 dermatomes bilaterally.   Motor: Appropriately flexes and extends toes bilaterally.   Strength: +5/5 hip flexion, knee flexion and extension, plantarflexion, dorsiflexion, and EHL bilaterally.   Dressing at the low back is C/D/I without strikethrough.      Drain output: 40 and 60 last two shifts.     Pertinent Labs   Lab Results: personally reviewed.   Lab Results   Component Value Date    INR 1.21 (H) 04/19/2024    INR 1.31 (H) 04/19/2024    INR 1.00 04/19/2024     Lab Results   Component Value Date    WBC 10.6 04/22/2024    HGB 15.2 07/25/2025    HCT 30.1 (L) 04/22/2024    MCV 87 07/25/2025     04/26/2024     Lab Results   Component Value Date     04/27/2024    CO2 24 04/27/2024         Report completed by:  Kathi Roman PA-C  Date: 7/26/2025  Time: 9:00 AM  [facility]

## 2025-07-27 VITALS
OXYGEN SATURATION: 96 % | RESPIRATION RATE: 18 BRPM | HEIGHT: 70 IN | BODY MASS INDEX: 42.29 KG/M2 | TEMPERATURE: 97.4 F | HEART RATE: 80 BPM | DIASTOLIC BLOOD PRESSURE: 70 MMHG | WEIGHT: 295.4 LBS | SYSTOLIC BLOOD PRESSURE: 151 MMHG

## 2025-07-27 LAB — GLUCOSE BLDC GLUCOMTR-MCNC: 159 MG/DL (ref 70–99)

## 2025-07-27 PROCEDURE — 999N000157 HC STATISTIC RCP TIME EA 10 MIN

## 2025-07-27 PROCEDURE — 94660 CPAP INITIATION&MGMT: CPT

## 2025-07-27 PROCEDURE — 250N000013 HC RX MED GY IP 250 OP 250 PS 637: Performed by: STUDENT IN AN ORGANIZED HEALTH CARE EDUCATION/TRAINING PROGRAM

## 2025-07-27 PROCEDURE — 99232 SBSQ HOSP IP/OBS MODERATE 35: CPT | Performed by: INTERNAL MEDICINE

## 2025-07-27 PROCEDURE — 250N000013 HC RX MED GY IP 250 OP 250 PS 637: Performed by: PHYSICIAN ASSISTANT

## 2025-07-27 PROCEDURE — 250N000013 HC RX MED GY IP 250 OP 250 PS 637: Performed by: INTERNAL MEDICINE

## 2025-07-27 RX ORDER — ACETAMINOPHEN 325 MG/1
975 TABLET ORAL EVERY 8 HOURS
Qty: 100 TABLET | Refills: 0 | Status: SHIPPED | OUTPATIENT
Start: 2025-07-27

## 2025-07-27 RX ORDER — HYDROMORPHONE HYDROCHLORIDE 2 MG/1
2-4 TABLET ORAL EVERY 4 HOURS PRN
Qty: 30 TABLET | Refills: 0 | Status: SHIPPED | OUTPATIENT
Start: 2025-07-27

## 2025-07-27 RX ORDER — GABAPENTIN 100 MG/1
100 CAPSULE ORAL AT BEDTIME
Qty: 30 CAPSULE | Refills: 0 | Status: SHIPPED | OUTPATIENT
Start: 2025-07-27

## 2025-07-27 RX ORDER — AMOXICILLIN 250 MG
1 CAPSULE ORAL 2 TIMES DAILY
Qty: 30 TABLET | Refills: 0 | Status: SHIPPED | OUTPATIENT
Start: 2025-07-27

## 2025-07-27 RX ADMIN — HYDROMORPHONE HYDROCHLORIDE 2 MG: 2 TABLET ORAL at 11:29

## 2025-07-27 RX ADMIN — MULTIVITAMIN TABLET 1 TABLET: TABLET at 09:25

## 2025-07-27 RX ADMIN — METOPROLOL SUCCINATE 100 MG: 50 TABLET, EXTENDED RELEASE ORAL at 09:25

## 2025-07-27 RX ADMIN — SENNOSIDES AND DOCUSATE SODIUM 1 TABLET: 50; 8.6 TABLET ORAL at 09:25

## 2025-07-27 RX ADMIN — INSULIN ASPART 1 UNITS: 100 INJECTION, SOLUTION INTRAVENOUS; SUBCUTANEOUS at 09:23

## 2025-07-27 RX ADMIN — INSULIN ASPART 5 UNITS: 100 INJECTION, SOLUTION INTRAVENOUS; SUBCUTANEOUS at 09:23

## 2025-07-27 RX ADMIN — POLYETHYLENE GLYCOL 3350 17 G: 17 POWDER, FOR SOLUTION ORAL at 09:25

## 2025-07-27 RX ADMIN — HYDROMORPHONE HYDROCHLORIDE 2 MG: 2 TABLET ORAL at 04:59

## 2025-07-27 RX ADMIN — INSULIN GLARGINE 54 UNITS: 100 INJECTION, SOLUTION SUBCUTANEOUS at 09:30

## 2025-07-27 RX ADMIN — HYDROMORPHONE HYDROCHLORIDE 2 MG: 2 TABLET ORAL at 00:53

## 2025-07-27 RX ADMIN — ACETAMINOPHEN 975 MG: 325 TABLET ORAL at 00:54

## 2025-07-27 RX ADMIN — ACETAMINOPHEN 975 MG: 325 TABLET ORAL at 09:25

## 2025-07-27 ASSESSMENT — ACTIVITIES OF DAILY LIVING (ADL)
ADLS_ACUITY_SCORE: 36
ADLS_ACUITY_SCORE: 37
ADLS_ACUITY_SCORE: 36

## 2025-07-27 NOTE — PROGRESS NOTES
"Orthopedic Progress Note      Assessment: 3 Days Post-Op  S/P Procedure(s):  RIGHT LUMBAR 3 - 4 AND LUMBAR 4 - 5 TRANSFORAMINAL LUMBAR INTERBODY FUSION WITH STEALTH NAVIGATION, WITH  LUMBAR 3 - 4 AND LUMBAR 4 - 5 LAMINECTOMY       Plan:   - Continue PT/OT  - Weightbearing status: WBAT. Can use brace for comfort  - Activity: No bending, twisting or lifting more than 10 pounds for 6 weeks.  - Anticoagulation: No chemical prophylaxis in addition to SCDs, britt stockings and early ambulation.  - Hgb 15.2 (7/25), transfuse if < 7.0.   - Antibiotics: 2 grams of Ancef IV q 8 hours or until drain is removed   - Diet: Progress diet as tolerated   - Pain control: Continue current regimen  - Drain to be removed today.  - Patient reported historical chest pain - hospitalist notified to monitor.   - Follow up: Outpatient follow up in 2 weeks with Dr. Martinez   - Discharge planning: Tentative plan for home today      Subjective:  Pain: mild  Nausea, Vomiting:  No  Lightheadedness, Dizziness:  No  Neuro:  Patient denies new onset numbness or paresthesias    Patient reports that he is doing okay today. Notes that pain is tolerable at rest but seems to flare with movements. No acute events overnight.    Objective:  BP (!) 146/65 (BP Location: Left arm)   Pulse 75   Temp 97.4  F (36.3  C) (Oral)   Resp 18   Ht 1.778 m (5' 10\")   Wt 134 kg (295 lb 6.4 oz)   SpO2 96%   BMI 42.39 kg/m    The patient is A&Ox3. Appears comfortable. Sitting up in chair at bedside.   Dorsalis pedis pulses are intact bilaterally. Bilateral feet are warm and well perfused.   Calves are soft and non-tender bilaterally.   Sensation slightly diminished to light touch over the medial aspect of the left distal shin. Remainder of sensation is equal and intact in the L2-S1 dermatomes bilaterally.   Motor: Appropriately flexes and extends toes bilaterally.   Strength: +5/5 hip flexion, knee flexion and extension, plantarflexion, dorsiflexion, and EHL " bilaterally.   Dressing at the low back is C/D/I without strikethrough.     Pertinent Labs   Lab Results: personally reviewed.   Lab Results   Component Value Date    INR 1.21 (H) 04/19/2024    INR 1.31 (H) 04/19/2024    INR 1.00 04/19/2024     Lab Results   Component Value Date    WBC 10.6 04/22/2024    HGB 15.2 07/25/2025    HCT 30.1 (L) 04/22/2024    MCV 87 07/25/2025     04/26/2024     Lab Results   Component Value Date     04/27/2024    CO2 24 04/27/2024         Report completed by:  Hussein Reyes PA-C, GEORGE  Date: 7/27/2025  Time: 9:04 AM

## 2025-07-27 NOTE — PLAN OF CARE
Patient vital signs are at baseline: Yes  Patient able to ambulate as they were prior to admission or with assist devices provided by therapies during their stay:  Yes  Patient MUST void prior to discharge:  Yes  Patient able to tolerate oral intake:  Yes  Pain has adequate pain control using Oral analgesics:  Yes, pain rated a 0-4 this shift. Scheduled and prn oral medications effective.  Does patient have an identified :  Yes  Has goal D/C date and time been discussed with patient:  Yes  ACHS checks continued. Drain in place, output 30 mL this shift.    Ariela Oro RN on 7/26/2025 at 10:57 PM

## 2025-07-27 NOTE — DISCHARGE SUMMARY
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 Martinez on 25. There were no intraoperative complications and the patient was transferred to the recovery room and later the orthopedic unit in stable condition. Once the patient reached the orthopedic floor our orthopedic pain protocol was implemented along with the following:    Anticoagulation Medications: ASA  Therapy: PT and OT  Activity: WBAT  Bracing: None    Consultations during Admission: Hospitalist service for medical management     COMPLICATIONS/SIGNIFICANT FINDINGS    none    DISCHARGE INFORMATION   Condition at discharge: Good  Discharge destination: Home  Patient was seen by myself on the date of discharge.    FOLLOW UP CARE   Follow up with orthopedics in 2 weeks or sooner should the need arise. Ortho will continue to manage pain control, post op anticoagulation and incision care.     Follow up with your PCP for management of chronic medical problems and to evaluate for post op medical complications including constipation, nausea/vomiting, DVT/PE, anemia, changes in blood pressure, fevers/chills, urinary retention and atelectasis/pneumonia.     Subjective   Patient is doing well today. Patient has passed his therapies. He is using Tylenol and Dilaudid for pain which he is tolerating well. He feels ready to discharge home. No other complaints. All questions answered.       Physical Exam   The patient is  "A&Ox3. Appears comfortable. Sitting up in chair at bedside.   Dorsalis pedis pulses are intact bilaterally. Bilateral feet are warm and well perfused.   Calves are soft and non-tender bilaterally.   Sensation slightly diminished to light touch over the medial aspect of the left distal shin. Remainder of sensation is equal and intact in the L2-S1 dermatomes bilaterally.   Motor: Appropriately flexes and extends toes bilaterally.   Strength: +5/5 hip flexion, knee flexion and extension, plantarflexion, dorsiflexion, and EHL bilaterally.   Dressing at the low back is C/D/I without strikethrough.     BP (!) 146/65 (BP Location: Left arm)   Pulse 75   Temp 97.4  F (36.3  C) (Oral)   Resp 18   Ht 1.778 m (5' 10\")   Wt 134 kg (295 lb 6.4 oz)   SpO2 96%   BMI 42.39 kg/m      Pertinent Results at Discharge     Hemoglobin   Date/Time Value Ref Range Status   07/25/2025 08:29 AM 15.2 13.3 - 17.7 g/dL Final   04/22/2024 04:02 AM 10.1 (L) 13.3 - 17.7 g/dL Final   04/21/2024 07:21 AM 10.8 (L) 13.3 - 17.7 g/dL Final     INR   Date/Time Value Ref Range Status   04/19/2024 05:22 PM 1.21 (H) 0.85 - 1.15 Final   04/19/2024 03:30 PM 1.31 (H) 0.85 - 1.15 Final   04/19/2024 07:11 AM 1.00 0.85 - 1.15 Final     Platelet Count   Date/Time Value Ref Range Status   04/26/2024 04:37  150 - 450 10e3/uL Final   04/23/2024 04:04  150 - 450 10e3/uL Final   04/22/2024 04:02  (L) 150 - 450 10e3/uL Final       Problem List   Principal Problem:    Lumbar back pain        Hussein Reyes PA-C  Date: 7/27/2025  Time: 9:09 AM    "

## 2025-07-27 NOTE — PROGRESS NOTES
[facility]    Medicine Progress Note - Hospitalist Service    Date of Admission:  7/24/2025    Assessment & Plan   Nirav Sorto is a 80-year-old male with history of severe spinal stenosis at L3-4 and L4-5.  Status post decompression and fusion procedure on 7/24/2025.  Hospital medicine service was consulted to address below medical conditions.      Diabetes mellitus type 2  Steroid induced hyperglycemia  - Resume home insulin at discharge.   - Insulin Novolog 1U:10 g CHO TID AC  - Insulin Novolog correction insulin medium scale QID AC HS while inpatient.  - Insulin glargine 54 units subcu daily.  - Hold PTA Ozempic, may resume at discharge.      Hypertension, essential  - metoprolol XL 100mg daily  - Hold parameters written, as patient is high risk of post operative hypotension.       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   Coronary artery disease.   Coronary disease with non-STEMI status post CABG x 2 with LIMA to-LAD, SVG-ramus by Dr. Lu on 4/19/2024.   Chronic complaints.   No chest pain at this time.   - resume home ASA when appropriate per orthopedic spine.   - outpatient follow up with cardiology as previously scheduled.     CKD 3A  Repeat creatinine and potassium levels reviewed and normal.   Avoid nephrotoxins including NSAIDs     GERD  PTA omeprazole 20 mg at bedtime     ELDON  Postop on BiPAP, transition to Home CPAP per respiratory therapy.     Hyperlipidemia  PTA rosuvastatin 20 mg at bedtime           Diet: Regular Diet Adult  Discharge Instruction - Regular Diet Adult    DVT Prophylaxis: Defer to primary service  Campos Catheter: Not present  Lines: None     Cardiac Monitoring: None  Code Status: Full Code      Clinically Significant Risk Factors                   # Hypertension: Noted on problem list            # Morbid Obesity: Estimated body mass index is 42.39 kg/m  as calculated from the following:    Height as of this encounter: 1.778 m (5'  "10\").    Weight as of this encounter: 134 kg (295 lb 6.4 oz)., PRESENT ON ADMISSION            Social Drivers of Health            Disposition Plan     Medically Ready for Discharge: Anticipated Today     Jay Dugan DO  Hospitalist Service  [facility]  Securely message with [employer] (more info)  Text page via [employer] Paging/Directory   ______________________________________________________________________    Interval History   Feels well.  NOELLE drain to be removed today.     Physical Exam   Vital Signs: Temp: 97.4  F (36.3  C) Temp src: Oral BP: (!) 146/65 Pulse: 75   Resp: 18 SpO2: 96 % O2 Device: None (Room air)    Weight: 295 lbs 6.4 oz    General Appearance: NAD.  Respiratory: CTAB no wheezing, nonlabored breathing.   Cardiovascular: RRR, no murmur or gallop.   GI: Nondistended, normoactive bowel sounds, nontender to palpation. No guarding.   Skin: warm and dry, no exanthem on exposed skin.   Other: Aox3, normal mood and affect.        Medical Decision Making       40 MINUTES SPENT BY ME on the date of service doing chart review, history, exam, documentation & further activities per the note.      Data         Imaging results reviewed over the past 24 hrs:   No results found for this or any previous visit (from the past 24 hours).  "

## 2025-07-27 NOTE — PLAN OF CARE
Goal Outcome Evaluation:           Problem: Spinal Surgery  Goal: Optimal Coping with Surgery  Outcome: Progressing  Goal: Absence of Bleeding  Outcome: Progressing  Intervention: Monitor and Manage Bleeding  Recent Flowsheet Documentation  Taken 7/27/2025 0054 by Staci Nunez RN  Bleeding Management: dressing monitored  Goal: Effective Bowel Elimination  Outcome: Progressing  Goal: Fluid and Electrolyte Balance  Outcome: Progressing  Goal: Optimal Functional Ability  Outcome: Progressing  Intervention: Optimize Functional Status  Recent Flowsheet Documentation  Taken 7/27/2025 0054 by Staci Nunez RN  Positioning/Transfer Devices:   pillows   in use  Goal: Absence of Infection Signs and Symptoms  Outcome: Progressing  Intervention: Prevent or Manage Infection  Recent Flowsheet Documentation  Taken 7/27/2025 0054 by Staci Nunez RN  Infection Prevention:   rest/sleep promoted   personal protective equipment utilized   hand hygiene promoted   equipment surfaces disinfected   single patient room provided  Goal: Optimal Neurologic Function  Outcome: Progressing  Intervention: Optimize Neurologic Function  Recent Flowsheet Documentation  Taken 7/27/2025 0054 by Staci Nunez RN  Body Position: position changed independently  Goal: Anesthesia/Sedation Recovery  Outcome: Progressing  Intervention: Optimize Anesthesia Recovery  Recent Flowsheet Documentation  Taken 7/27/2025 0054 by Staci Nunez RN  Safety Promotion/Fall Prevention: safety round/check completed  Goal: Optimal Pain Control and Function  Outcome: Progressing  Intervention: Prevent or Manage Pain  Recent Flowsheet Documentation  Taken 7/27/2025 0054 by Staci Nunez RN  Pain Management Interventions: medication (see MAR)  Goal: Nausea and Vomiting Relief  Outcome: Progressing  Goal: Effective Urinary Elimination  Outcome: Progressing  Goal: Effective Oxygenation and Ventilation  Outcome: Progressing  Intervention: Optimize  Oxygenation and Ventilation  Recent Flowsheet Documentation  Taken 7/27/2025 0054 by Staci Nunez, RN  Head of Bed (HOB) Positioning: HOB at 20-30 degrees   Patient vital signs are at baseline: Yes  Patient able to ambulate as they were prior to admission or with assist devices provided by therapies during their stay:  Yes  Patient MUST void prior to discharge:  Yes  Patient able to tolerate oral intake:  Yes  Pain has adequate pain control using Oral analgesics:  Yes  Does patient have an identified :  Yes  Has goal D/C date and time been discussed with patient:  Yes     Patient is alert and oriented x4. Vital signs are stable. On room air. Drain output 0 ml.  Up with one assist gait belt and walker. Pain managed with PRN oral dilaudid.

## 2025-07-27 NOTE — PLAN OF CARE
Patient vital signs are at baseline: Yes  Patient able to ambulate as they were prior to admission or with assist devices provided by therapies during their stay:  Yes  Patient MUST void prior to discharge:  Yes  Patient able to tolerate oral intake:  Yes  Pain has adequate pain control using Oral analgesics:  Yes  Does patient have an identified :  Yes  Has goal D/C date and time been discussed with patient:  Yes    Discharge instructions went over with patient and pt's wife and pt was given AVS.   Questions were answered and pt stated understanding.  Pt is aware of follow up appointment(s). Pt discharging to home with family transport.

## (undated) DEVICE — SPONGE LAP 18X18" X8435

## (undated) DEVICE — CATH TRAY FOLEY SURESTEP 16FR DRAIN BAG STATOCK A899916

## (undated) DEVICE — KIT ENDO VASOVIEW HEMOPRO 2 VH-4000

## (undated) DEVICE — GOWN XLG DISP 9545

## (undated) DEVICE — BLANKET BAIR ADLT PLYMR 60X36IN 63000

## (undated) DEVICE — KIT HAND CONTROL ACIST 014644 AR-P54

## (undated) DEVICE — CATH THORACIC RT ANGLE CLOTSTOP 32FR

## (undated) DEVICE — STRATAFIX SUTURE

## (undated) DEVICE — GOWN IMPERVIOUS BREATHABLE SMART LG 89015

## (undated) DEVICE — SET CANNULATION TOUNIQUET TS-10061

## (undated) DEVICE — DRAPE STERI TOWEL LG 1010

## (undated) DEVICE — DRAPE TOWEL IMPERVIOUS X2 7553

## (undated) DEVICE — BLADE BONE MILL STRK MED 5420-MED-000

## (undated) DEVICE — CUSTOM PACK CAB SCV5BCBHEA

## (undated) DEVICE — SUCTION CANISTER MEDIVAC LINER 3000ML W/LID 65651-530

## (undated) DEVICE — ESU PENCIL SMOKE EVAC W/ROCKER SWITCH 0703-047-000

## (undated) DEVICE — PITCHER STERILE 1000ML  SSK9004A

## (undated) DEVICE — PACK CYSTO CUSTOM ASC

## (undated) DEVICE — SU PROLENE 5-0 C-1 36" 2ARM MONOFILAMENT M8720

## (undated) DEVICE — ESU ELEC BLADE 2.75" COATED/INSULATED E1455

## (undated) DEVICE — MARKER SPHERES PASSIVE MEDT PACK 5 8801075

## (undated) DEVICE — SUTURE MONOCRYL 3-0 18 PS2 UND MCP497G

## (undated) DEVICE — PACK 9X6IN THRP HOT COLD CMPR  MED GEL 80104

## (undated) DEVICE — ESU GROUND PAD ADULT REM W/15' CORD E7507DB

## (undated) DEVICE — LEAD PACER MYOCARDIAL BIPOLAR TEMPORARY 53CM 6495F

## (undated) DEVICE — GLOVE UNDER INDICATOR PI SZ 7.0 LF 41670

## (undated) DEVICE — Device

## (undated) DEVICE — GLOVE BIOGEL PI INDICATOR 8.0 LF 41680

## (undated) DEVICE — DRSG DRAIN 4X4" 7086

## (undated) DEVICE — HEMOSTASIS BONE OSTENE 2.5G SYNTHETIC 1503832

## (undated) DEVICE — CATH DIAGNOSTIC RADIAL 5FR TIG 4.0

## (undated) DEVICE — SOL NACL 0.9% IRRIG 1000ML BOTTLE 2F7124

## (undated) DEVICE — DRAPE O ARM TUBE 9732722

## (undated) DEVICE — WRAP B-COOL TERI LUMBAR 08143380

## (undated) DEVICE — LIGACLIP LARGE AESCULAP O4120-1

## (undated) DEVICE — SOL WATER IRRIG 1000ML BOTTLE 2F7114

## (undated) DEVICE — PREP CHLORAPREP 26ML TINTED HI-LITE ORANGE 930815

## (undated) DEVICE — NDL WOLF ASPIRATING INJECT 22GA 31.25CM 8652.7775

## (undated) DEVICE — SU PROLENE 4-0 RB-1DA 36" 8557H

## (undated) DEVICE — SU MYOSTERNAL WIRE  046-267

## (undated) DEVICE — SU PROLENE 7-0 BV-1DA 30" 8703H

## (undated) DEVICE — SOL WATER IRRIG 500ML BOTTLE 2F7113

## (undated) DEVICE — DRSG ADAPTIC 3X8" 6113

## (undated) DEVICE — PREP POVIDONE IODINE SOLUTION 10% 4OZ BOTTLE 29906-004

## (undated) DEVICE — SURGICEL POWDER ABSORBABLE HEMOSTAT 3GM 3013SP

## (undated) DEVICE — EXCHANGE WIRE .035 260 STAR/JFC/035/260/ M001491681

## (undated) DEVICE — SOLUTION IRRIGATION 0.9% NACL 1000ML BOTTLE R5200-01

## (undated) DEVICE — SYR ANGIOGRAPHY MULTIUSE KIT ACIST 014612

## (undated) DEVICE — CATH FOLEY 3WAY 24FR 30ML SIL LUBRISIL IC 73024SI

## (undated) DEVICE — CELL SAVER

## (undated) DEVICE — SU DERMABOND ADVANCED .7ML DNX12

## (undated) DEVICE — ESU ELEC COAGULATE 24FR POINTED  27050L-S

## (undated) DEVICE — GLOVE BIOGEL 6 LATEX

## (undated) DEVICE — MANIFOLD KIT ANGIO AUTOMATED 014613

## (undated) DEVICE — VESSEL LOOP BLUE MAXI 30-723

## (undated) DEVICE — TOOL DISSECT MIDAS MR8 14CM MATCH HEAD 3MM MR8-14MH30

## (undated) DEVICE — SUTURE VICRYL+ 2-0 CT-2 27" UND VCP269H

## (undated) DEVICE — PREP POVIDONE-IODINE 7.5% SCRUB 4OZ BOTTLE MDS093945

## (undated) DEVICE — DEVICE TISSUE STABILIZATION OCTOBASE 28707

## (undated) DEVICE — PACK MINOR SINGLE BASIN SSK3001

## (undated) DEVICE — SU STRATAFIX PDS PLUS 2-0 SPIRAL CT-1 30CM SXPP1B410

## (undated) DEVICE — CATH ANGIO INFINITI JL5 4FRX100CM 538422

## (undated) DEVICE — PROTECTOR ARM STANDARD ONE STEP

## (undated) DEVICE — CABLE MYO/LEAD PACING WHITE DISP 019-530

## (undated) DEVICE — SU ETHIBOND 2-0 SHDA 30" X563H

## (undated) DEVICE — SU STRATAFIX PDS PLUS 0 CT 45CM SXPP1A406

## (undated) DEVICE — CABLE MYO/LEAD PACING BLUE DISP 019-535

## (undated) DEVICE — SUTURE PDS 0 27IN CT1 + VIOLET PDP340H

## (undated) DEVICE — DRAPE BACK TABLE PADDED 60X90

## (undated) DEVICE — SU ETHIBOND 3-0 BBDA 36" X588H

## (undated) DEVICE — SYR 50ML CATH TIP W/O NDL 309620

## (undated) DEVICE — CUSTOM PACK CV ST JOES SCV5BCVHEA

## (undated) DEVICE — SU PROLENE 7-0 BV-1DA 4X30" M8703

## (undated) DEVICE — TUBING INSUFFLATION W/FILTER 28-0207

## (undated) DEVICE — GLOVE UNDER INDICATOR PI SZ 6.5 LF 41665

## (undated) DEVICE — GLOVE BIOGEL PI SZ 6.5 40865

## (undated) DEVICE — CONNECTOR BLAKE DRAIN SGL BCC1

## (undated) DEVICE — SYR BULB IRRIG DOVER 60 ML LATEX FREE 67000

## (undated) DEVICE — DRSG WOUND VAC GRANUFOAM LG BLACK 26X15CM M8275053/5

## (undated) DEVICE — RX SURGIFLO HEMOSTATIC MATRIX 8ML 2991

## (undated) DEVICE — SPONGE RAY-TEC 4X8" 7318

## (undated) DEVICE — SUTURE VICRYL+ 1 27IN CT-2 VLT VCP335H

## (undated) DEVICE — CLIP HORIZON MULTI SM YELLOW 001204

## (undated) DEVICE — SUCTION DRY CHEST DRAIN OASIS 3600-100

## (undated) DEVICE — SUCTION MANIFOLD NEPTUNE 2 SYS 4 PORT 0702-020-000

## (undated) DEVICE — LINEN GOWN XLG 5407

## (undated) DEVICE — GLOVE BIOGEL PI MICRO SZ 7.5 48575

## (undated) DEVICE — SU PDS II 2-0 CT 36"  Z357H

## (undated) DEVICE — CONNECTOR Y 1/2 X 3/8 X 3/8 STRL C440

## (undated) DEVICE — RX SURGIFLO HEMOSTATIC MATRIX W/THROMBIN 8ML 2994

## (undated) DEVICE — PAD CHUX UNDERPAD 30X36" P3036C

## (undated) DEVICE — CUSTOM PACK LUMBAR FUSION SNE5BLFHEA

## (undated) DEVICE — CUSTOM PACK CORONARY SAN5BCRHEA

## (undated) DEVICE — SU STERNAL WIRE SINGLE #6 046-032

## (undated) DEVICE — CATH FOLEY 20FR 5ML SILVER COAT LATEX 0165SI20

## (undated) DEVICE — POSITIONER ARM CRADLE LAMINECTOMY DISP

## (undated) DEVICE — GLOVE BIOGEL PI SZ 8.0 40880

## (undated) DEVICE — PACK MAJOR BASIN 673

## (undated) DEVICE — SU STRATAFIX PDS PLUS 1 CT-1 18" SXPP1A404

## (undated) DEVICE — SU MONOCRYL+ 4-0 18IN PS2 UND MCP496G

## (undated) DEVICE — ADAPTER LUER LOCK C EQUASHIELD LL-1C

## (undated) DEVICE — CANISTER WOUND VAC W/GEL 500ML M8275063/5

## (undated) DEVICE — SHTH INTRO 0.021IN ID 6FR DIA

## (undated) DEVICE — SU PROLENE 8-0 BV130-5DA 24" 8732H

## (undated) DEVICE — ELECTRODE DEFIB CADENCE 22550R

## (undated) DEVICE — BAG URINARY DRAIN LUBRISIL IC 4000ML LF 253509A

## (undated) DEVICE — PSTNR FACE LRG F/PRONE PSTN 10/CA PFC-100

## (undated) DEVICE — GLOVE BIOGEL PI MICRO SZ 8.0 48580

## (undated) DEVICE — SOLUTION WATER 1000ML BOTTLE R5000-01

## (undated) DEVICE — SOL WATER IRRIG 3000ML BAG 2B7117

## (undated) DEVICE — GLOVE BIOGEL PI SZ 7.0 40870

## (undated) DEVICE — ESU GROUND PAD ADULT W/CORD E7507

## (undated) DEVICE — DRSG BIOPATCH GERMICIDAL SPLIT SPONGE 4MM MED 4150

## (undated) DEVICE — CATH IV 14GA 2IN REM FLASHPLUG DEHP-FR PVC FR 4251717-02

## (undated) DEVICE — ELECTRODE PATIENT RETURN ADULT L10 FT 2 PLATE CORD 0855C

## (undated) DEVICE — SLEEVE TR BAND RADIAL COMPRESSION DEVICE 29CM XX-RF06L

## (undated) DEVICE — CATH THORACIC STRAIGHT CLOTSTOP 32FR

## (undated) DEVICE — LINEN TOWEL PACK X5 5464

## (undated) DEVICE — SOL NACL 0.9% IRRIG 500ML BOTTLE 2F7123

## (undated) DEVICE — SU PLEDGET SOFT TFE 3/8"X3/26"X1/16" PCP40

## (undated) DEVICE — CANNULA VESSEL 3ML BEVELED DPL 30000

## (undated) RX ORDER — EPHEDRINE SULFATE 50 MG/ML
INJECTION, SOLUTION INTRAMUSCULAR; INTRAVENOUS; SUBCUTANEOUS
Status: DISPENSED
Start: 2024-04-19

## (undated) RX ORDER — DEXAMETHASONE SODIUM PHOSPHATE 4 MG/ML
INJECTION, SOLUTION INTRA-ARTICULAR; INTRALESIONAL; INTRAMUSCULAR; INTRAVENOUS; SOFT TISSUE
Status: DISPENSED
Start: 2025-07-24

## (undated) RX ORDER — TRANEXAMIC ACID 100 MG/ML
INJECTION, SOLUTION INTRAVENOUS
Status: DISPENSED
Start: 2025-07-24

## (undated) RX ORDER — KETOROLAC TROMETHAMINE 30 MG/ML
INJECTION, SOLUTION INTRAMUSCULAR; INTRAVENOUS
Status: DISPENSED
Start: 2023-04-14

## (undated) RX ORDER — FENTANYL CITRATE 50 UG/ML
INJECTION, SOLUTION INTRAMUSCULAR; INTRAVENOUS
Status: DISPENSED
Start: 2025-07-24

## (undated) RX ORDER — FENTANYL CITRATE 50 UG/ML
INJECTION, SOLUTION INTRAMUSCULAR; INTRAVENOUS
Status: DISPENSED
Start: 2023-04-14

## (undated) RX ORDER — PROPOFOL 10 MG/ML
INJECTION, EMULSION INTRAVENOUS
Status: DISPENSED
Start: 2024-04-19

## (undated) RX ORDER — CEFAZOLIN SODIUM 1 G/3ML
INJECTION, POWDER, FOR SOLUTION INTRAMUSCULAR; INTRAVENOUS
Status: DISPENSED
Start: 2025-07-24

## (undated) RX ORDER — VANCOMYCIN HYDROCHLORIDE 1 G/20ML
INJECTION, POWDER, LYOPHILIZED, FOR SOLUTION INTRAVENOUS
Status: DISPENSED
Start: 2023-04-14

## (undated) RX ORDER — LIDOCAINE HYDROCHLORIDE 10 MG/ML
INJECTION, SOLUTION EPIDURAL; INFILTRATION; INTRACAUDAL; PERINEURAL
Status: DISPENSED
Start: 2024-04-19

## (undated) RX ORDER — ONDANSETRON 2 MG/ML
INJECTION INTRAMUSCULAR; INTRAVENOUS
Status: DISPENSED
Start: 2025-07-24

## (undated) RX ORDER — DIAZEPAM 5 MG
TABLET ORAL
Status: DISPENSED
Start: 2024-04-17

## (undated) RX ORDER — PROPOFOL 10 MG/ML
INJECTION, EMULSION INTRAVENOUS
Status: DISPENSED
Start: 2023-04-14

## (undated) RX ORDER — PROPOFOL 10 MG/ML
INJECTION, EMULSION INTRAVENOUS
Status: DISPENSED
Start: 2025-07-24

## (undated) RX ORDER — ASPIRIN 81 MG/1
TABLET, CHEWABLE ORAL
Status: DISPENSED
Start: 2024-04-17

## (undated) RX ORDER — FENTANYL CITRATE 50 UG/ML
INJECTION, SOLUTION INTRAMUSCULAR; INTRAVENOUS
Status: DISPENSED
Start: 2024-04-19

## (undated) RX ORDER — LIDOCAINE HYDROCHLORIDE 10 MG/ML
INJECTION, SOLUTION EPIDURAL; INFILTRATION; INTRACAUDAL; PERINEURAL
Status: DISPENSED
Start: 2025-07-24

## (undated) RX ORDER — SULFAMETHOXAZOLE/TRIMETHOPRIM 800-160 MG
TABLET ORAL
Status: DISPENSED
Start: 2023-03-31

## (undated) RX ORDER — GLYCOPYRROLATE 0.2 MG/ML
INJECTION INTRAMUSCULAR; INTRAVENOUS
Status: DISPENSED
Start: 2023-04-14

## (undated) RX ORDER — FENTANYL CITRATE 50 UG/ML
INJECTION, SOLUTION INTRAMUSCULAR; INTRAVENOUS
Status: DISPENSED
Start: 2024-04-17

## (undated) RX ORDER — DEXAMETHASONE SODIUM PHOSPHATE 10 MG/ML
INJECTION, EMULSION INTRAMUSCULAR; INTRAVENOUS
Status: DISPENSED
Start: 2025-07-24

## (undated) RX ORDER — FENTANYL CITRATE-0.9 % NACL/PF 10 MCG/ML
PLASTIC BAG, INJECTION (ML) INTRAVENOUS
Status: DISPENSED
Start: 2024-04-19

## (undated) RX ORDER — ONDANSETRON 2 MG/ML
INJECTION INTRAMUSCULAR; INTRAVENOUS
Status: DISPENSED
Start: 2024-04-19

## (undated) RX ORDER — VANCOMYCIN HYDROCHLORIDE 1 G/20ML
INJECTION, POWDER, LYOPHILIZED, FOR SOLUTION INTRAVENOUS
Status: DISPENSED
Start: 2024-04-19

## (undated) RX ORDER — ACETAMINOPHEN 325 MG/1
TABLET ORAL
Status: DISPENSED
Start: 2023-04-14